# Patient Record
Sex: FEMALE | Race: WHITE | NOT HISPANIC OR LATINO | Employment: OTHER | URBAN - METROPOLITAN AREA
[De-identification: names, ages, dates, MRNs, and addresses within clinical notes are randomized per-mention and may not be internally consistent; named-entity substitution may affect disease eponyms.]

---

## 2017-02-27 DIAGNOSIS — R00.2 PALPITATIONS: Primary | ICD-10-CM

## 2017-03-01 ENCOUNTER — HOSPITAL ENCOUNTER (OUTPATIENT)
Dept: CARDIOLOGY | Facility: CLINIC | Age: 67
Discharge: HOME | End: 2017-03-01
Payer: MEDICARE

## 2017-03-01 ENCOUNTER — OFFICE VISIT (OUTPATIENT)
Dept: ELECTROPHYSIOLOGY | Facility: CLINIC | Age: 67
End: 2017-03-01
Payer: MEDICARE

## 2017-03-01 VITALS
BODY MASS INDEX: 21.66 KG/M2 | HEART RATE: 56 BPM | HEIGHT: 65 IN | SYSTOLIC BLOOD PRESSURE: 118 MMHG | WEIGHT: 130 LBS | DIASTOLIC BLOOD PRESSURE: 66 MMHG

## 2017-03-01 DIAGNOSIS — R00.2 PALPITATIONS: ICD-10-CM

## 2017-03-01 LAB
AORTIC VALVE REGURGITATION: NORMAL
DIASTOLIC DYSFUNCTION: NO
ESTIMATED PA SYSTOLIC PRESSURE: 25.85
MITRAL VALVE MOBILITY: NORMAL
MITRAL VALVE REGURGITATION: NORMAL
RETIRED EF AND QEF - SEE NOTES: 60 (ref 55–65)
TRICUSPID VALVE REGURGITATION: NORMAL

## 2017-03-01 PROCEDURE — 93325 DOPPLER ECHO COLOR FLOW MAPG: CPT | Mod: PBBFAC | Performed by: INTERNAL MEDICINE

## 2017-03-01 PROCEDURE — 99999 PR PBB SHADOW E&M-EST. PATIENT-LVL III: CPT | Mod: PBBFAC,,, | Performed by: INTERNAL MEDICINE

## 2017-03-01 PROCEDURE — 93010 ELECTROCARDIOGRAM REPORT: CPT | Mod: S$PBB,,, | Performed by: INTERNAL MEDICINE

## 2017-03-01 PROCEDURE — 93320 DOPPLER ECHO COMPLETE: CPT | Mod: 26,S$PBB,, | Performed by: INTERNAL MEDICINE

## 2017-03-01 PROCEDURE — 93351 STRESS TTE COMPLETE: CPT | Mod: 26,S$PBB,, | Performed by: INTERNAL MEDICINE

## 2017-03-01 PROCEDURE — 99204 OFFICE O/P NEW MOD 45 MIN: CPT | Mod: S$PBB,,, | Performed by: INTERNAL MEDICINE

## 2017-03-01 NOTE — MR AVS SNAPSHOT
[facility]  [address]  [place] [address]  Phone: [phone]  Fax: [phone]                  Meredith Ramos   3/1/2017 8:00 AM   Office Visit    Description:  Female : 1950   Provider:  Shin Ortiz MD   Department:  [facility]           Reason for Visit     Palpitations           Diagnoses this Visit        Comments    Palpitations                To Do List           Future Appointments        Provider Department Dept Phone    3/1/2017 3:15 PM EXERCISE, STRESS ECHO [facility] [phone]    3/6/2017 8:20 AM HOLTER, EKG [facility] [phone]      Goals (5 Years of Data)     None      Follow-Up and Disposition     Return if symptoms worsen or fail to improve.    Follow-up and Disposition History      [facility]     [facility] -  Assistance  Registered nurses in the [facility] provide clinical advisement, health education, appointment booking, and other advisory services.  Call for this free service at [phone].             Medications           Message regarding Medications     Verify the changes and/or additions to your medication regime listed below are the same as discussed with your clinician today.  If any of these changes or additions are incorrect, please notify your healthcare provider.             Verify that the below list of medications is an accurate representation of the medications you are currently taking.  If none reported, the list may be blank. If incorrect, please contact your healthcare provider. Carry this list with you in case of emergency.           Current Medications     biotin 300 mcg Tab Take 1 tablet by mouth once daily.    calcium-vitamin D 500-125 mg-unit tablet Take 1 tablet by mouth 2 (two) times daily.    azelastine (ASTEPRO) 0.15 % (205.5 mcg) Spry by Nasal route.    fish oil-omega-3 fatty acids 300-1,000 mg capsule Take 2 g by mouth once daily.     multivitamin (ONE DAILY MULTIVITAMIN) per tablet Take 1 tablet by mouth once daily.           Clinical Reference Information           Your Vitals Were     BP                   118/66           Blood Pressure          Most Recent Value    BP  118/66      Allergies as of 3/1/2017     No Known Allergies      Immunizations Administered on Date of Encounter - 3/1/2017     None      Orders Placed During Today's Visit      Normal Orders This Visit    Holter monitor - 24 hour     Future Labs/Procedures Expected by Expires    Exercise stress echo with color flow  As directed 3/1/2018      Language Assistance Services     ATTENTION: Language assistance services are available, free of charge. Please call [phone].      ATENCIÓN: Si habla español, tiene a zuniga disposición servicios gratuitos de asistencia lingüística. Llame al [phone].     CHÚ Ý: N?u b?n nói Ti?ng Vi?t, có các d?ch v? h? tr? ngôn ng? mi?n phí dành cho b?n. G?i s? [phone].         [facility] complies with applicable Federal civil rights laws and does not discriminate on the basis of race, color, national origin, age, disability, or sex.

## 2017-03-01 NOTE — PROGRESS NOTES
Subjective:    Patient ID:  Meredith Ramos is a 67 y.o. female who presents for evaluation of Palpitations      HPI 66 yo female with minimal past medical history.  Notes occasional palpitations, manifesting as skipped beat, primarily at night while getting ready to sleep, over the past few years.  Over the past couple of years, has increased in frequency, and now occurring during the day.  No associated symptoms.  Improved with taking deep breaths. Has calmed down over the past couple of days.  Has exercised significantly, less so over the past year.  Has gained 7 lbs over the past year, has stopped going to the gym.  Walks 4 miles a day.  Having increasing stressors over the past year, not worse over the past couple of weeks.  Notes occasional tightness in chest, primarily when lying in bed, not with exertion.  ECG reveals nsr with normal baseline intervals, no ectopy.      Review of Systems   Constitution: Negative. Negative for weakness and malaise/fatigue.   Cardiovascular: Positive for palpitations. Negative for chest pain, dyspnea on exertion, irregular heartbeat, leg swelling, near-syncope, orthopnea, paroxysmal nocturnal dyspnea and syncope.   Respiratory: Negative for cough and shortness of breath.    Neurological: Negative for dizziness and light-headedness.   All other systems reviewed and are negative.       Objective:    Physical Exam   Constitutional: She is oriented to person, place, and time. She appears well-developed and well-nourished.   Eyes: Conjunctivae are normal. No scleral icterus.   Neck: No JVD present. No tracheal deviation present.   Cardiovascular: Normal rate and regular rhythm.  PMI is not displaced.    Pulmonary/Chest: Effort normal and breath sounds normal. No respiratory distress.   Abdominal: Soft. There is no hepatosplenomegaly. There is no tenderness.   Musculoskeletal: She exhibits no edema or tenderness.   Neurological: She is alert and oriented to person, place, and time.    Skin: Skin is warm and dry. No rash noted.   Psychiatric: She has a normal mood and affect. Her behavior is normal.         Assessment:       1. Palpitations         Plan:       Palpitations, c/w benign etiology, ie PVC's.  Exercise echo with color flow.  24 hr holter monitor.  We discussed that if in fact she is having premature beats, management is directed at symptoms.  Offered, did not recommend, medical therapy.  She would defer.

## 2017-03-02 ENCOUNTER — TELEPHONE (OUTPATIENT)
Dept: CARDIOLOGY | Facility: HOSPITAL | Age: 67
End: 2017-03-02

## 2017-03-02 ENCOUNTER — TELEPHONE (OUTPATIENT)
Dept: ELECTROPHYSIOLOGY | Facility: CLINIC | Age: 67
End: 2017-03-02

## 2017-03-02 DIAGNOSIS — R00.2 PALPITATIONS: Primary | ICD-10-CM

## 2017-03-02 NOTE — TELEPHONE ENCOUNTER
Called Pt and left VM. Will continue to try and reach out to Pt. Will also verify with Dr. Ortiz if he would prefer Dipyridamole or Dobutamine for PET Stress Test.

## 2017-03-02 NOTE — TELEPHONE ENCOUNTER
Discussed with Eil in Echo. Pest Stress scheduled with Dipyridamole on 3/9. I called Pt to discuss further, however, she did not answer. Left a detailed voicemail asking her to call me back. Will continue to follow up with Pt.

## 2017-03-02 NOTE — TELEPHONE ENCOUNTER
Relayed results of stress test to patient.  She was asymptomatic and reached 17 mets, ? False positive.    Options are LHC or Pet stress.  Recommended Pet stress given overall findings, and pt is in agreement.  Will obtain Pet Stress test.

## 2017-03-02 NOTE — TELEPHONE ENCOUNTER
----- Message from Shin Ortiz MD sent at 3/2/2017  7:39 AM CST -----  Thanks for the follow-up Boni.  Much appreciated.  I will order a Pet stress.  ----- Message -----     From: Boni Gan MD     Sent: 3/1/2017   4:53 PM       To: Shin Ortiz MD    Im sitting here with Kai reading Ms. Ramos's stress echo. She got to 17 METS without symptoms but has EKG changes and what I thought was failure to augment vs subtle anterior and septal wall motion abnormalities.     PET ?

## 2017-03-03 ENCOUNTER — TELEPHONE (OUTPATIENT)
Dept: ELECTROPHYSIOLOGY | Facility: CLINIC | Age: 67
End: 2017-03-03

## 2017-03-03 NOTE — TELEPHONE ENCOUNTER
----- Message from Carmen Muñoz sent at 3/3/2017 10:09 AM CST -----  Contact: pt   Pt called and returned your call from yesterday.  She can be reached @ [phone].  Thanks

## 2017-03-03 NOTE — TELEPHONE ENCOUNTER
Pt confirmed Cardiac PET Stress on 3/9/17 at 145 pm. We dicussed pre-test instructions. Verbalized understanding. Denied questions, needs, and concerns.

## 2017-03-06 ENCOUNTER — APPOINTMENT (OUTPATIENT)
Dept: ELECTROPHYSIOLOGY | Facility: CLINIC | Age: 67
End: 2017-03-06
Payer: MEDICARE

## 2017-03-06 PROCEDURE — 93226 XTRNL ECG REC<48 HR SCAN A/R: CPT | Mod: PBBFAC | Performed by: INTERNAL MEDICINE

## 2017-03-06 PROCEDURE — 93225 XTRNL ECG REC<48 HRS REC: CPT | Mod: PBBFAC | Performed by: INTERNAL MEDICINE

## 2017-03-06 PROCEDURE — 93227 XTRNL ECG REC<48 HR R&I: CPT | Mod: S$PBB,,, | Performed by: INTERNAL MEDICINE

## 2017-03-08 ENCOUNTER — TELEPHONE (OUTPATIENT)
Dept: CARDIOLOGY | Facility: CLINIC | Age: 67
End: 2017-03-08

## 2017-03-09 ENCOUNTER — CLINICAL SUPPORT (OUTPATIENT)
Dept: CARDIOLOGY | Facility: CLINIC | Age: 67
End: 2017-03-09
Payer: MEDICARE

## 2017-03-09 DIAGNOSIS — R00.2 PALPITATIONS: ICD-10-CM

## 2017-03-09 PROCEDURE — 78492 MYOCRD IMG PET MLT RST&STRS: CPT | Mod: 26,S$PBB,, | Performed by: INTERNAL MEDICINE

## 2017-03-09 PROCEDURE — 93017 CV STRESS TEST TRACING ONLY: CPT | Mod: PBBFAC | Performed by: INTERNAL MEDICINE

## 2017-03-09 PROCEDURE — 93018 CV STRESS TEST I&R ONLY: CPT | Mod: S$PBB,,, | Performed by: INTERNAL MEDICINE

## 2017-03-09 PROCEDURE — 93016 CV STRESS TEST SUPVJ ONLY: CPT | Mod: S$PBB,,, | Performed by: INTERNAL MEDICINE

## 2017-03-17 ENCOUNTER — HOSPITAL ENCOUNTER (EMERGENCY)
Facility: HOSPITAL | Age: 67
Discharge: HOME | End: 2017-03-17
Attending: EMERGENCY MEDICINE
Payer: MEDICARE

## 2017-03-17 VITALS
RESPIRATION RATE: 16 BRPM | OXYGEN SATURATION: 98 % | HEART RATE: 65 BPM | BODY MASS INDEX: 21.66 KG/M2 | SYSTOLIC BLOOD PRESSURE: 123 MMHG | WEIGHT: 130 LBS | HEIGHT: 65 IN | TEMPERATURE: 99 F | DIASTOLIC BLOOD PRESSURE: 73 MMHG

## 2017-03-17 DIAGNOSIS — M25.561 ACUTE PAIN OF RIGHT KNEE: Primary | ICD-10-CM

## 2017-03-17 PROCEDURE — 25000003 PHARM REV CODE 250: Performed by: EMERGENCY MEDICINE

## 2017-03-17 PROCEDURE — 99284 EMERGENCY DEPT VISIT MOD MDM: CPT | Mod: ,,, | Performed by: EMERGENCY MEDICINE

## 2017-03-17 PROCEDURE — 99284 EMERGENCY DEPT VISIT MOD MDM: CPT

## 2017-03-17 RX ORDER — NAPROXEN 500 MG/1
500 TABLET ORAL
Status: COMPLETED | OUTPATIENT
Start: 2017-03-17 | End: 2017-03-17

## 2017-03-17 RX ORDER — HYDROCODONE BITARTRATE AND ACETAMINOPHEN 5; 325 MG/1; MG/1
1 TABLET ORAL EVERY 4 HOURS PRN
Qty: 12 TABLET | Refills: 0 | Status: SHIPPED | OUTPATIENT
Start: 2017-03-17 | End: 2017-05-07

## 2017-03-17 RX ADMIN — NAPROXEN 500 MG: 500 TABLET ORAL at 01:03

## 2017-03-17 NOTE — DISCHARGE INSTRUCTIONS
Arthralgia    Arthralgia is the term for pain in or around the joint. It is a symptom, not a disease. This pain may involve one or more joints. In some cases, the pain moves from joint to joint.  There are many causes for joint pain. These include:  · Injury  · Osteoarthritis (wearing out of the joint surface)  · Gout (inflammation of the joint due to crystals in the joint fluid)  · Infection inside the joint    · Bursitis (inflammation of the fluid-filled sacs around the joint)  · Autoimmune disorders such as rheumatoid arthritis or lupus  · Tendonitis (inflamation of chords that attach muscle to bone)  Home care  · Rest the involved joint(s) until your symptoms improve.   · You may be prescribed pain medication. If none is prescribed, you may use acetaminophen or ibuprofen to control pain and inflammation.  Follow up  Follow up with your healthcare provider or our staff as advised.  When to seek medical care  Contact your healthcare provider right away if any of the following occurs:  · Pain, swelling, or redness of joint increases  · Pain worsens or recurs after a period of improvement  · Pain moves to other joints  · You cannot bear weight on the affected joint   · You cannot move the affected joint  · Joint appears deformed  · New rash appears  · Fever of 101ºF (38.8ºC) or higher, or as directed by your healthcare provider  Date Last Reviewed: 4/26/2015  © 5061-5702 [employer]. [address] [place] [address], [place] [address]. All rights reserved. This information is not intended as a substitute for professional medical care. Always follow your healthcare professional's instructions.

## 2017-03-17 NOTE — ED PROVIDER NOTES
Encounter Date: 3/17/2017    SCRIBE #1 NOTE: I, Verito Yo, am scribing for, and in the presence of,  Dr. Arcos. I have scribed the entire note.       History     Chief Complaint   Patient presents with    Leg Pain     Review of patient's allergies indicates:  No Known Allergies  HPI Comments: Time seen by provider: 9:56 AM    This is a 67 y.o. female with no pertinent medical hx who presents with complaint of right leg pain. Pt reports pain behind knee for the past several weeks, some fluid on right side when it first started but then resolved. Pain now radiating down calf muscle and up her thigh. She was walking this morning and pain worsened to the point where she was unable to bear weight on it. She presents with crutches. Pt denies numbness of right foot, or any other symptoms at this time. No hx of DVT.     The history is provided by the patient and medical records.     Past Medical History:   Diagnosis Date    Diverticulitis     Osteoporosis     ostenopenia      Past Surgical History:   Procedure Laterality Date    BREAST BIOPSY      x1    BUNIONECTOMY      Hammer toe repair    BUNIONECTOMY      COLECTOMY  2004    partial for diverticulitis    COLECTOMY      partial for diverticulitis    HYSTERECTOMY      kidney stones      removal of breast implants       Family History   Problem Relation Age of Onset    Hypertension Father     Cancer Father      bladder cancer    Breast cancer Sister 54    Uterine cancer Maternal Grandmother     Colon cancer Maternal Grandfather     Ovarian cancer Neg Hx      Social History   Substance Use Topics    Smoking status: Former Smoker     Types: Cigarettes     Quit date: 6/25/1998    Smokeless tobacco: Never Used    Alcohol use No     Review of Systems   Musculoskeletal:        (+) RLE pain   Neurological: Negative for numbness.       Physical Exam   Initial Vitals   BP Pulse Resp Temp SpO2   03/17/17 0917 03/17/17 0917 03/17/17 0917 03/17/17 0917 03/17/17  0917   139/71 77 15 98.9 °F (37.2 °C) 98 %     Physical Exam    Vitals reviewed.  Constitutional: She appears well-developed and well-nourished. She is not diaphoretic. No distress.   68 yo  woman    HENT:   Head: Normocephalic and atraumatic.   Cardiovascular: Normal rate, regular rhythm, normal heart sounds and intact distal pulses.   Pulmonary/Chest: Breath sounds normal. No respiratory distress. She has no wheezes. She has no rhonchi. She has no rales.   Abdominal: Soft. She exhibits no distension.   Musculoskeletal:   Moves all extremities x 4. Mild TTP of the right popliteal fossa without bony tenderness. Sensation intact without peripheral edema.    Neurological: She is alert and oriented to person, place, and time. No sensory deficit.   Psychiatric: Her behavior is normal. Thought content normal.         ED Course   Procedures  Labs Reviewed - No data to display     Imaging Results         US Lower Extremity Veins Bilateral (Final result) Result time:  03/17/17 12:31:27    Final result by Juan Kauffman MD (03/17/17 12:31:27)    Impression:        No evidence for acute deep venous thrombosis.    No significant Baker's cyst noted      Electronically signed by: Juan Kauffman MD  Date:     03/17/17  Time:    12:31     Narrative:    Complete evaluation of the right and left lower extremity venous structures was performed.      There is small amount of fluid in both popliteal spaces but no defined cystic lesion evident.  Perhaps there could have been rupture of a very small Baker's cyst.      There is good flow identified in the right common femoral , superficial femoral, greater saphenous, popliteal , anterior tibial, posterior tibial, and peroneal veins.    On the left side there is good flow identified in the left common femoral, superficial femoral, greater saphenous , popliteal , anterior tibial, posterior tibial , and peroneal veins.    The examination was performed using gray scale, compression  when apprpropriate , color flow and Doppler spectrum analysis.                 Medical Decision Making:   History:   Old Medical Records: I decided to obtain old medical records.  Differential Diagnosis:   Osteoarthritis, Baker's cyst, and DVT.   Clinical Tests:   Radiological Study: Ordered and Reviewed            Scribe Attestation:   Scribe #1: I performed the above scribed service and the documentation accurately describes the services I performed. I attest to the accuracy of the note.    Attending Attestation:           Physician Attestation for Scribe:  Physician Attestation Statement for Scribe #1: I, Dr. Arcos, reviewed documentation, as scribed by Verito Yo in my presence, and it is both accurate and complete.         Attending ED Notes:   Emergency department findings including Doppler ultrasound of bilateral lower extremity vessels does not reveal any evidence of a deep vein thrombosis, nor is there jace evidence of a large Baker's cyst.  The patient will be discharged home in stable condition with an Ace wrap for compression and I'll prescribe a brief course of narcotic analgesics to be taken as needed for severe pain.  I will refer her to orthopedic sports medicine as an outpatient for follow-up in 3-5 days for further management and therapy.          ED Course     Clinical Impression:   The encounter diagnosis was Acute pain of right knee.    Disposition:   Disposition: Discharged  Condition: Stable       Dominic Arcos MD  03/17/17 4190

## 2017-03-17 NOTE — ED AVS SNAPSHOT
[facility]  [address]  [facility] [address]               Meredith Ramos   3/17/2017  9:45 AM   ED    Description:  Female : 1950   Department:  [facility]           Your Care was Coordinated By:     Provider Role From To    Dominic Arcos MD Attending Provider 17 0954 --      Reason for Visit     Leg Pain           Diagnoses this Visit        Comments    Acute pain of right knee    -  Primary       ED Disposition     None           To Do List           Follow-up Information     Follow up with [facility]. Schedule an appointment as soon as possible for a visit in 3 days.    Specialties:  Sports Medicine, Orthopedic Surgery, Physical Therapy    Why:  Further evaluation of acute right-sided knee pain    Contact information:    [address] [place] [id number] [phone]         These Medications        Disp Refills Start End    hydrocodone-acetaminophen 5-325mg (NORCO) 5-325 mg per tablet 12 tablet 0 3/17/2017     Take 1 tablet by mouth every 4 (four) hours as needed. - Oral    Ph[facility]: [facility] [address] [place] [address] [address] [facility] Ph #: [phone]         [facility]     [facility] -  Assistance  Registered nurses in the [facility] provide clinical advisement, health education, appointment booking, and other advisory services.  Call for this free service at [phone].             Medications           Message regarding Medications     Verify the changes and/or additions to your medication regime listed below are the same as discussed with your clinician today.  If any of these changes or additions are incorrect, please notify your healthcare provider.        START taking these NEW medications        Refills    hydrocodone-acetaminophen 5-325mg (NORCO) 5-325 mg per tablet 0    Sig: Take 1 tablet by  "mouth every 4 (four) hours as needed.    Class: Print    Route: Oral      These medications were administered today        Dose Freq    naproxen tablet 500 mg 500 mg ED 1 Time    Sig: Take 1 tablet (500 mg total) by mouth ED 1 Time.    Class: Normal    Route: Oral      STOP taking these medications     azelastine (ASTEPRO) 0.15 % (205.5 mcg) Spry by Nasal route.           Verify that the below list of medications is an accurate representation of the medications you are currently taking.  If none reported, the list may be blank. If incorrect, please contact your healthcare provider. Carry this list with you in case of emergency.           Current Medications     biotin 300 mcg Tab Take 1 tablet by mouth once daily.    calcium-vitamin D 500-125 mg-unit tablet Take 1 tablet by mouth 2 (two) times daily.    fish oil-omega-3 fatty acids 300-1,000 mg capsule Take 2 g by mouth once daily.    hydrocodone-acetaminophen 5-325mg (NORCO) 5-325 mg per tablet Take 1 tablet by mouth every 4 (four) hours as needed.    multivitamin (ONE DAILY MULTIVITAMIN) per tablet Take 1 tablet by mouth once daily.    naproxen tablet 500 mg Take 1 tablet (500 mg total) by mouth ED 1 Time.           Clinical Reference Information           Your Vitals Were     BP Pulse Temp Resp Height Weight    139/71 (BP Location: Right arm, Patient Position: Sitting) 77 98.9 °F (37.2 °C) (Oral) 15 5' 5" (1.651 m) 59 kg (130 lb)    SpO2 BMI             98% 21.63 kg/m2         Allergies as of 3/17/2017     No Known Allergies      Immunizations Administered on Date of Encounter - 3/17/2017     None      ED Micro, Lab, POCT     None      ED Imaging Orders     Start Ordered       Status Ordering Provider    03/17/17 1002 03/17/17 1002   Lower Extremity Veins Bilateral  1 time imaging      Final result         Discharge Instructions         Arthralgia    Arthralgia is the term for pain in or around the joint. It is a symptom, not a disease. This pain may involve " one or more joints. In some cases, the pain moves from joint to joint.  There are many causes for joint pain. These include:  · Injury  · Osteoarthritis (wearing out of the joint surface)  · Gout (inflammation of the joint due to crystals in the joint fluid)  · Infection inside the joint    · Bursitis (inflammation of the fluid-filled sacs around the joint)  · Autoimmune disorders such as rheumatoid arthritis or lupus  · Tendonitis (inflamation of chords that attach muscle to bone)  Home care  · Rest the involved joint(s) until your symptoms improve.   · You may be prescribed pain medication. If none is prescribed, you may use acetaminophen or ibuprofen to control pain and inflammation.  Follow up  Follow up with your healthcare provider or our staff as advised.  When to seek medical care  Contact your healthcare provider right away if any of the following occurs:  · Pain, swelling, or redness of joint increases  · Pain worsens or recurs after a period of improvement  · Pain moves to other joints  · You cannot bear weight on the affected joint   · You cannot move the affected joint  · Joint appears deformed  · New rash appears  · Fever of 101ºF (38.8ºC) or higher, or as directed by your healthcare provider  Date Last Reviewed: 4/26/2015  © 5925-5102 [employer]. [address] [place] [address], [place] [address]. All rights reserved. This information is not intended as a substitute for professional medical care. Always follow your healthcare professional's instructions.           [facility] complies with applicable Federal civil rights laws and does not discriminate on the basis of race, color, national origin, age, disability, or sex.        Language Assistance Services     ATTENTION: Language assistance services are available, free of charge. Please call [phone].      ATENCIÓN: Si habla español, tiene a zuniga disposición servicios gratuitos de asistencia lingüística. Llame al  [phone].     HAILEY Ý: N?u b?n nói Ti?ng Vi?t, có các d?ch v? h? tr? ngôn ng? mi?n phí dành cho b?n. G?i s? [phone].

## 2017-03-20 ENCOUNTER — TELEPHONE (OUTPATIENT)
Dept: ELECTROPHYSIOLOGY | Facility: CLINIC | Age: 67
End: 2017-03-20

## 2017-03-20 NOTE — TELEPHONE ENCOUNTER
Per Dr. Ortiz, stress test is negative, however, did have a few early heart beats that is normal. Discussed with Pt. All questions answered. Denied further questions, needs, and concerns.

## 2017-03-20 NOTE — TELEPHONE ENCOUNTER
----- Message from Pavel Joaquin MA sent at 3/20/2017  2:36 PM CDT -----  Contact: pt called      ----- Message -----     From: Wendie Sterling     Sent: 3/20/2017   1:55 PM       To: Diana Melissa Staff    Pt called, states she is returning Dr. Ortiz's call in regards to obtaining test results. Ph [phone]. Thank you

## 2017-03-22 ENCOUNTER — OFFICE VISIT (OUTPATIENT)
Dept: SPORTS MEDICINE | Facility: CLINIC | Age: 67
End: 2017-03-22
Payer: MEDICARE

## 2017-03-22 ENCOUNTER — HOSPITAL ENCOUNTER (OUTPATIENT)
Dept: RADIOLOGY | Facility: HOSPITAL | Age: 67
Discharge: HOME | End: 2017-03-22
Attending: FAMILY MEDICINE
Payer: MEDICARE

## 2017-03-22 VITALS — BODY MASS INDEX: 21.66 KG/M2 | TEMPERATURE: 98 F | WEIGHT: 130 LBS | HEIGHT: 65 IN

## 2017-03-22 DIAGNOSIS — M25.561 RIGHT KNEE PAIN, UNSPECIFIED CHRONICITY: ICD-10-CM

## 2017-03-22 DIAGNOSIS — M65.9 SYNOVITIS OF KNEE: Primary | ICD-10-CM

## 2017-03-22 PROCEDURE — 73564 X-RAY EXAM KNEE 4 OR MORE: CPT | Mod: TC,PO,RT

## 2017-03-22 PROCEDURE — 73564 X-RAY EXAM KNEE 4 OR MORE: CPT | Mod: 26,59,LT, | Performed by: RADIOLOGY

## 2017-03-22 PROCEDURE — 97110 THERAPEUTIC EXERCISES: CPT | Mod: GP,,, | Performed by: FAMILY MEDICINE

## 2017-03-22 PROCEDURE — 99214 OFFICE O/P EST MOD 30 MIN: CPT | Mod: 25,S$PBB,, | Performed by: FAMILY MEDICINE

## 2017-03-22 PROCEDURE — 99999 PR PBB SHADOW E&M-EST. PATIENT-LVL III: CPT | Mod: PBBFAC,,, | Performed by: FAMILY MEDICINE

## 2017-03-22 NOTE — PROGRESS NOTES
Meredith Ramos, a 67 y.o. female, presents today for evaluation of her RIGHT knee.      New patient.     HISTORY OF PRESENT ILLNESS   Location: posterior knee, right  Onset: insidious,   Palliative:    Relative rest   Oral analgesics     Provocative:    ADLs     Prior: none  Progression: plateau discomfort  Quality:    sharp  Radiation: none  Severity: per nursing documentation  Timing: intermittent w/ use  Trauma:     Review of systems (ROS):  A 10+ review of systems was performed with pertinent positives and negatives noted above in the history of present illness. Other systems were negative unless otherwise specified.      PHYSICAL EXAMINATION  General:  The patient is alert and oriented x 3. Mood is pleasant. Observation of ears, eyes and nose reveal no gross abnormalities. HEENT: NCAT, sclera anicteric.   Lungs: Respirations are equal and unlabored.  Gait is coordinated. Patient can toe walk and heel walk without difficulty.    RIGHT KNEE EXAMINATION    Observation/Inspection  Gait:   Nonantalgic   Alignment:  Neutral   Scars:   None   Muscle atrophy: Mild  Effusion:  None   Warmth:  None   Discoloration:   none     Tenderness / Crepitus (T / C):         T / C      T / C  Patella   - / -   Lateral joint line   - / -     Peripatellar medial  -  Medial joint line    + / -  Peripatellar lateral -  Medial plica   - / -  Patellar tendon -   Popliteal fossa   - / -  Quad tendon   -   Gastrocnemius   -  Prepatellar Bursa - / -   Quadricep   -  Tibial tubercle  -  Thigh/hamstring  -  Pes anserine/HS -  Fibula    -  ITB   - / -  Tibia     -  Tib/fib joint  - / -  LCL    -    MFC   - / -   MCL: Proximal  -    LFC   - / -   Distal    -          ROM: (* = pain)  PASSIVE   ACTIVE    Left :   5 / 0 / 145   5 / 0 / 145     Right :    5 / 0 / 145   5 / 0 / 145    Patellofemoral examination:  See above noted areas of tenderness.   Patella position    Subluxation / dislocation: Centered        Sup. / Inf;   Normal   Crepitus  (PF):    Absent   Patellar Mobility:       Medial-lateral:   Normal    Superior-inferior:  Normal    Inferior tilt   Normal    Patellar tendon:  Normal   Lateral tilt:    Normal   J-sign:     None   Patellofemoral grind:   No pain       Meniscal Signs:     Pain on terminal extension:  +  Pain on terminal flexion:  +  Katies maneuver:  +*  Squat     NT    Ligament Examination:  ACL / Lachman:  WNL  PCL-Post.  drawer: normal 0 to 2mm  MCL- Valgus:  normal 0 to 2mm  LCL- Varus:    normal 0 to 2mm  Pivot shift:  guarding   Dial Test:   difference c/w other side   At 30° flexion: normal (< 5°)    At 90° flexion: normal (< 5°)   Reverse Pivot Shift:   normal (Equal)     Strength: (* = with pain) Painful Side  Quadriceps   5/5  Hamstrin/5    Extremity Neuro-vascular Examination:   Sensation:  Grossly intact to light touch all dermatomal regions.   Motor Function:  Fully intact motor function at hip, knee, foot and ankle    DTRs;  quadriceps and  achilles 2+.  No clonus and downgoing Babinski.    Vascular status:  DP and PT pulses 2+, brisk capillary refill, symmetric.     Other Findings:      ASSESSMENT & PLAN  Assessment:   #1 Kellgren-Guillermo Grade I osteoarthritis of knee, right   W/ popliteal cyst   W/ suspected inflammation vs. Tearing of post horn of medial meniscus     No evidence of neurologic pathology  No evidence of vascular pathology    Imaging studies reviewed:   X-ray knee, bilateral 17.03    Plan:    We discussed the importance of appropriate diet, weight, and regular exercise including quadriceps strengthening     We discussed options including:  #1 watchful waiting  #2 physical therapy aimed at:   Core stability   RoM knee   Strengthening quadriceps   Gait training   #3 injection therapy:   CSI iaknee     Right,     Left,    VSI iaknee    Right,     Left,    Orthobiologics   #4 MRI for further evaluation      The patient chooses #2     Pain management: handout given  Bracing: ACE wrap,  "d/c  Physical therapy: hgPT, handout given, begin as above  Activity (e.g. sports, work) restrictions: as tolerated   school/vocation: yoga, walking (BUT WILL FOCUS ON BICYCLE VS. SWIMMING FOR NOW)    Follow up in 8 w  A/e hgPT  Ineffective-->csi iaknee right   Ineffective-->MRI knee right  Should symptoms worsen or fail to resolve, consider:  Revisiting the above options    [address]54 HOME EXERCISE PROGRAM (HEP):  The patient was taught a homegoing physical therapy regimen as described above and based on the appropriate chapter of "The Sports Medicine Patient Advisor," by Artis Akers, c2090. The patient demonstrated understanding of the exercises and proper technique of their execution. This interaction took 15 minutes.       "

## 2017-03-22 NOTE — MR AVS SNAPSHOT
[facility]  [address]  [facility] [address]  Phone: [phone]                  Meredith Ramos   3/22/2017 3:15 PM   Appointment    Description:  Female : 1950   Provider:  Sarwat Lu MD   Department:  [facility]                To Do List           Future Appointments        Provider Department Dept Phone    3/22/2017 3:15 PM Sarwat Lu MD [facility] [phone]      Goals (5 Years of Data)     None      [facility]     [facility] Care [facility] -  Assistance  Registered nurses in the [facility] Center provide clinical advisement, health education, appointment booking, and other advisory services.  Call for this free service at [phone].             Medications           Message regarding Medications     Verify the changes and/or additions to your medication regime listed below are the same as discussed with your clinician today.  If any of these changes or additions are incorrect, please notify your healthcare provider.             Verify that the below list of medications is an accurate representation of the medications you are currently taking.  If none reported, the list may be blank. If incorrect, please contact your healthcare provider. Carry this list with you in case of emergency.           Current Medications     biotin 300 mcg Tab Take 1 tablet by mouth once daily.    calcium-vitamin D 500-125 mg-unit tablet Take 1 tablet by mouth 2 (two) times daily.    fish oil-omega-3 fatty acids 300-1,000 mg capsule Take 2 g by mouth once daily.    hydrocodone-acetaminophen 5-325mg (NORCO) 5-325 mg per tablet Take 1 tablet by mouth every 4 (four) hours as needed.    multivitamin (ONE DAILY MULTIVITAMIN) per tablet Take 1 tablet by mouth once daily.           Clinical Reference Information           Allergies as of 3/22/2017     No Known Allergies      Immunizations Administered on Date of  Encounter - 3/22/2017     None      Language Assistance Services     ATTENTION: Language assistance services are available, free of charge. Please call [phone].      ATENCIÓN: Si habla radha, tiene a zuniga disposición servicios gratuitos de asistencia lingüística. Llame al [phone].     CHÚ Ý: N?u b?n nói Ti?ng Vi?t, có các d?ch v? h? tr? ngôn ng? mi?n phí dành cho b?n. G?i s? [phone].         [facility] complies with applicable Federal civil rights laws and does not discriminate on the basis of race, color, national origin, age, disability, or sex.

## 2017-05-07 ENCOUNTER — HOSPITAL ENCOUNTER (EMERGENCY)
Facility: HOSPITAL | Age: 67
Discharge: HOME | End: 2017-05-07
Attending: EMERGENCY MEDICINE
Payer: MEDICARE

## 2017-05-07 VITALS
WEIGHT: 128 LBS | SYSTOLIC BLOOD PRESSURE: 139 MMHG | RESPIRATION RATE: 18 BRPM | OXYGEN SATURATION: 99 % | HEIGHT: 65 IN | DIASTOLIC BLOOD PRESSURE: 86 MMHG | HEART RATE: 73 BPM | BODY MASS INDEX: 21.33 KG/M2 | TEMPERATURE: 98 F

## 2017-05-07 DIAGNOSIS — N20.0 RENAL STONE: Primary | ICD-10-CM

## 2017-05-07 DIAGNOSIS — R10.9 LEFT FLANK PAIN: ICD-10-CM

## 2017-05-07 LAB
ALBUMIN SERPL BCP-MCNC: 4.2 G/DL
ALP SERPL-CCNC: 103 U/L
ALT SERPL W/O P-5'-P-CCNC: 46 U/L
ANION GAP SERPL CALC-SCNC: 11 MMOL/L
AST SERPL-CCNC: 44 U/L
BACTERIA #/AREA URNS AUTO: ABNORMAL /HPF
BASOPHILS # BLD AUTO: 0.04 K/UL
BASOPHILS NFR BLD: 0.6 %
BILIRUB SERPL-MCNC: 0.5 MG/DL
BILIRUB UR QL STRIP: NEGATIVE
BUN SERPL-MCNC: 16 MG/DL
CALCIUM SERPL-MCNC: 10 MG/DL
CHLORIDE SERPL-SCNC: 104 MMOL/L
CLARITY UR REFRACT.AUTO: ABNORMAL
CO2 SERPL-SCNC: 25 MMOL/L
COLOR UR AUTO: YELLOW
CREAT SERPL-MCNC: 0.8 MG/DL
DIFFERENTIAL METHOD: NORMAL
EOSINOPHIL # BLD AUTO: 0.2 K/UL
EOSINOPHIL NFR BLD: 3.7 %
ERYTHROCYTE [DISTWIDTH] IN BLOOD BY AUTOMATED COUNT: 13 %
EST. GFR  (AFRICAN AMERICAN): >60 ML/MIN/1.73 M^2
EST. GFR  (NON AFRICAN AMERICAN): >60 ML/MIN/1.73 M^2
GLUCOSE SERPL-MCNC: 98 MG/DL
GLUCOSE UR QL STRIP: NEGATIVE
HCT VFR BLD AUTO: 41.7 %
HGB BLD-MCNC: 14.2 G/DL
HGB UR QL STRIP: ABNORMAL
HYALINE CASTS UR QL AUTO: 0 /LPF
KETONES UR QL STRIP: NEGATIVE
LEUKOCYTE ESTERASE UR QL STRIP: NEGATIVE
LYMPHOCYTES # BLD AUTO: 2.8 K/UL
LYMPHOCYTES NFR BLD: 42.6 %
MCH RBC QN AUTO: 30.2 PG
MCHC RBC AUTO-ENTMCNC: 34.1 %
MCV RBC AUTO: 89 FL
MICROSCOPIC COMMENT: ABNORMAL
MONOCYTES # BLD AUTO: 0.5 K/UL
MONOCYTES NFR BLD: 6.9 %
NEUTROPHILS # BLD AUTO: 3 K/UL
NEUTROPHILS NFR BLD: 46 %
NITRITE UR QL STRIP: NEGATIVE
PH UR STRIP: 5 [PH] (ref 5–8)
PLATELET # BLD AUTO: 185 K/UL
PMV BLD AUTO: 12.4 FL
POTASSIUM SERPL-SCNC: 3.8 MMOL/L
PROT SERPL-MCNC: 7.2 G/DL
PROT UR QL STRIP: ABNORMAL
RBC # BLD AUTO: 4.7 M/UL
RBC #/AREA URNS AUTO: >100 /HPF (ref 0–4)
SODIUM SERPL-SCNC: 140 MMOL/L
SP GR UR STRIP: 1.02 (ref 1–1.03)
URN SPEC COLLECT METH UR: ABNORMAL
UROBILINOGEN UR STRIP-ACNC: NEGATIVE EU/DL
WBC # BLD AUTO: 6.5 K/UL
WBC #/AREA URNS AUTO: 3 /HPF (ref 0–5)

## 2017-05-07 PROCEDURE — 63600175 PHARM REV CODE 636 W HCPCS

## 2017-05-07 PROCEDURE — 96361 HYDRATE IV INFUSION ADD-ON: CPT

## 2017-05-07 PROCEDURE — 96375 TX/PRO/DX INJ NEW DRUG ADDON: CPT

## 2017-05-07 PROCEDURE — 96376 TX/PRO/DX INJ SAME DRUG ADON: CPT

## 2017-05-07 PROCEDURE — 96374 THER/PROPH/DIAG INJ IV PUSH: CPT

## 2017-05-07 PROCEDURE — 99284 EMERGENCY DEPT VISIT MOD MDM: CPT | Mod: 25

## 2017-05-07 PROCEDURE — 85025 COMPLETE CBC W/AUTO DIFF WBC: CPT

## 2017-05-07 PROCEDURE — 25000003 PHARM REV CODE 250

## 2017-05-07 PROCEDURE — 80053 COMPREHEN METABOLIC PANEL: CPT

## 2017-05-07 PROCEDURE — 81001 URINALYSIS AUTO W/SCOPE: CPT

## 2017-05-07 PROCEDURE — 99285 EMERGENCY DEPT VISIT HI MDM: CPT | Mod: ,,,

## 2017-05-07 RX ORDER — ONDANSETRON 4 MG/1
4 TABLET, FILM COATED ORAL EVERY 6 HOURS
Qty: 12 TABLET | Refills: 0 | Status: SHIPPED | OUTPATIENT
Start: 2017-05-07 | End: 2017-05-07 | Stop reason: CLARIF

## 2017-05-07 RX ORDER — HYDROCODONE BITARTRATE AND ACETAMINOPHEN 10; 325 MG/1; MG/1
1 TABLET ORAL EVERY 4 HOURS PRN
Qty: 12 TABLET | Refills: 0 | Status: ON HOLD | OUTPATIENT
Start: 2017-05-07 | End: 2017-05-19 | Stop reason: HOSPADM

## 2017-05-07 RX ORDER — MORPHINE SULFATE 2 MG/ML
4 INJECTION, SOLUTION INTRAMUSCULAR; INTRAVENOUS
Status: DISCONTINUED | OUTPATIENT
Start: 2017-05-07 | End: 2017-05-07

## 2017-05-07 RX ORDER — ONDANSETRON 4 MG/1
4 TABLET, FILM COATED ORAL EVERY 6 HOURS
Qty: 12 TABLET | Refills: 0 | Status: SHIPPED | OUTPATIENT
Start: 2017-05-07 | End: 2018-01-17

## 2017-05-07 RX ORDER — KETOROLAC TROMETHAMINE 30 MG/ML
10 INJECTION, SOLUTION INTRAMUSCULAR; INTRAVENOUS
Status: COMPLETED | OUTPATIENT
Start: 2017-05-07 | End: 2017-05-07

## 2017-05-07 RX ORDER — NAPROXEN 500 MG/1
500 TABLET ORAL 2 TIMES DAILY WITH MEALS
Qty: 12 TABLET | Refills: 0 | Status: SHIPPED | OUTPATIENT
Start: 2017-05-07 | End: 2017-05-07 | Stop reason: CLARIF

## 2017-05-07 RX ORDER — ONDANSETRON 2 MG/ML
4 INJECTION INTRAMUSCULAR; INTRAVENOUS
Status: COMPLETED | OUTPATIENT
Start: 2017-05-07 | End: 2017-05-07

## 2017-05-07 RX ORDER — TAMSULOSIN HYDROCHLORIDE 0.4 MG/1
0.4 CAPSULE ORAL DAILY
Qty: 10 CAPSULE | Refills: 0 | Status: SHIPPED | OUTPATIENT
Start: 2017-05-07 | End: 2017-10-05 | Stop reason: SDUPTHER

## 2017-05-07 RX ORDER — NAPROXEN 500 MG/1
500 TABLET ORAL 2 TIMES DAILY WITH MEALS
Qty: 20 TABLET | Refills: 0 | Status: SHIPPED | OUTPATIENT
Start: 2017-05-07 | End: 2017-10-05 | Stop reason: SDUPTHER

## 2017-05-07 RX ADMIN — KETOROLAC TROMETHAMINE 10 MG: 30 INJECTION, SOLUTION INTRAMUSCULAR at 08:05

## 2017-05-07 RX ADMIN — SODIUM CHLORIDE 1000 ML: 0.9 INJECTION, SOLUTION INTRAVENOUS at 07:05

## 2017-05-07 RX ADMIN — ONDANSETRON 4 MG: 2 INJECTION INTRAMUSCULAR; INTRAVENOUS at 07:05

## 2017-05-07 RX ADMIN — KETOROLAC TROMETHAMINE 10 MG: 30 INJECTION, SOLUTION INTRAMUSCULAR at 07:05

## 2017-05-07 RX ADMIN — ONDANSETRON 4 MG: 2 INJECTION INTRAMUSCULAR; INTRAVENOUS at 08:05

## 2017-05-07 NOTE — ED NOTES
GENERAL: The patient is a well-developed, well-nourished female in no apparent distress. She is alert and oriented x3.    HEENT: Head is normocephalic and atraumatic. Extraocular muscles are intact. Pupils are equal, round, and reactive to light and accommodation. Nares appeared normal. Mouth is well hydrated and without lesions. Mucous membranes are moist. Posterior pharynx clear of any exudate or lesions.    NECK: Supple. No carotid bruits. No lymphadenopathy or thyromegaly.    LUNGS: Clear to auscultation.    HEART: Regular rate and rhythm without murmur.     ABDOMEN: Soft, nontender, and nondistended. Positive bowel sounds. No hepatosplenomegaly was noted.     EXTREMITIES: Without any cyanosis, clubbing, rash, lesions or edema.     NEUROLOGIC: Cranial nerves II through XII are grossly intact.     PSYCHIATRIC: Flat affect, but denies suicidal or homicidal ideations.    SKIN: No ulceration or induration present.

## 2017-05-07 NOTE — ED AVS SNAPSHOT
[facility]  [address]  [facility] [address]               Meredith Ramos   2017  6:35 PM   ED    Description:  Female : 1950   Department:  [facility]           Your Care was Coordinated By:     Provider Role From To    Joan Lew MD Attending Provider 17 [id number] --    Tennille Cross PA-C Physician Assistant 17 [phone] --      Reason for Visit     Flank Pain           Diagnoses this Visit        Comments    Renal stone    -  Primary     Left flank pain           ED Disposition     ED Disposition Condition Comment    Discharge             To Do List            These Medications        Disp Refills Start End    hydrocodone-acetaminophen 10-325mg (NORCO)  mg Tab 12 tablet 0 2017     Take 1 tablet by mouth every 4 (four) hours as needed for Pain. - Oral    Pharmacy: [facility] [place] [address] AT [facility] Ph #: [phone]       tamsulosin (FLOMAX) 0.4 mg Cp24 10 capsule 0 2017    Take 1 capsule (0.4 mg total) by mouth once daily. - Oral    Pharmacy: [facility] [place] [address] AT [facility] Ph #: [phone]       naproxen (NAPROSYN) 500 MG tablet 20 tablet 0 2017     Take 1 tablet (500 mg total) by mouth 2 (two) times daily with meals. - Oral    Pharmacy: [place] [address] AT [facility] Ph #: [phone]       ondansetron (ZOFRAN) 4 MG tablet 12 tablet 0 2017     Take 1 tablet (4 mg total) by mouth every 6 (six) hours. - Oral    Pharmacy: [place] [id number] AT [facility] Ph #: [phone]         [facility]     [facility] Care [facility] -  Assistance  Unless otherwise directed by your provider, please contact [facility], our nurse care line that is  available for 24/7 assistance.     Registered nurses in the [facility] provide: appointment scheduling, clinical advisement, health education, and other advisory services.  Call: [phone] (toll free)               Medications           Message regarding Medications     Verify the changes and/or additions to your medication regime listed below are the same as discussed with your clinician today.  If any of these changes or additions are incorrect, please notify your healthcare provider.        START taking these NEW medications        Refills    hydrocodone-acetaminophen 10-325mg (NORCO)  mg Tab 0    Sig: Take 1 tablet by mouth every 4 (four) hours as needed for Pain.    Class: Print    Route: Oral    tamsulosin (FLOMAX) 0.4 mg Cp24 0    Sig: Take 1 capsule (0.4 mg total) by mouth once daily.    Class: Print    Route: Oral    naproxen (NAPROSYN) 500 MG tablet 0    Sig: Take 1 tablet (500 mg total) by mouth 2 (two) times daily with meals.    Class: Print    Route: Oral    ondansetron (ZOFRAN) 4 MG tablet 0    Sig: Take 1 tablet (4 mg total) by mouth every 6 (six) hours.    Class: Print    Route: Oral      These medications were administered today        Dose Freq    sodium chloride 0.9% bolus 1,000 mL 1,000 mL ED 1 Time    Sig: Inject 1,000 mLs into the vein ED 1 Time.    Class: Normal    Route: Intravenous    Cosign for Ordering: Accepted by Joan Lew MD on 5/7/2017  8:31 PM    ondansetron injection 4 mg 4 mg ED 1 Time    Sig: Inject 4 mg into the vein ED 1 Time.    Class: Normal    Route: Intravenous    Cosign for Ordering: Accepted by Joan Lew MD on 5/7/2017  8:31 PM    ketorolac injection 10 mg 10 mg ED 1 Time    Sig: Inject 10 mg into the vein ED 1 Time.    Class: Normal    Route: Intravenous    Cosign for Ordering: Accepted by Joan Lew MD on 5/7/2017  8:31 PM      STOP taking these medications     hydrocodone-acetaminophen 5-325mg (NORCO) 5-325 mg per tablet Take 1  "tablet by mouth every 4 (four) hours as needed.           Verify that the below list of medications is an accurate representation of the medications you are currently taking.  If none reported, the list may be blank. If incorrect, please contact your healthcare provider. Carry this list with you in case of emergency.           Current Medications     biotin 300 mcg Tab Take 1 tablet by mouth once daily.    calcium-vitamin D 500-125 mg-unit tablet Take 1 tablet by mouth 2 (two) times daily.    fish oil-omega-3 fatty acids 300-1,000 mg capsule Take 2 g by mouth once daily.    multivitamin (ONE DAILY MULTIVITAMIN) per tablet Take 1 tablet by mouth once daily.    hydrocodone-acetaminophen 10-325mg (NORCO)  mg Tab Take 1 tablet by mouth every 4 (four) hours as needed for Pain.    naproxen (NAPROSYN) 500 MG tablet Take 1 tablet (500 mg total) by mouth 2 (two) times daily with meals.    ondansetron (ZOFRAN) 4 MG tablet Take 1 tablet (4 mg total) by mouth every 6 (six) hours.    tamsulosin (FLOMAX) 0.4 mg Cp24 Take 1 capsule (0.4 mg total) by mouth once daily.           Clinical Reference Information           Your Vitals Were     BP Pulse Temp Resp Height Weight    187/84 (BP Location: Right arm, Patient Position: Sitting) 76 98.1 °F (36.7 °C) (Oral) 18 5' 5" (1.651 m) 58.1 kg (128 lb)    SpO2 BMI             99% 21.3 kg/m2         Allergies as of 5/7/2017     No Known Allergies      Immunizations Administered on Date of Encounter - 5/7/2017     None      ED Micro, Lab, POCT     Start Ordered       Status Ordering Provider    05/07/17 1846 05/07/17 1846  Urinalysis  STAT      Final result     05/07/17 1846 05/07/17 1846  CBC auto differential  STAT      Final result     05/07/17 1846 05/07/17 1846  Comprehensive metabolic panel  STAT      Final result     05/07/17 1846 05/07/17 1846  Urinalysis Microscopic  Once      Final result       ED Imaging Orders     Start Ordered       Status Ordering Provider    05/07/17 " 1847 05/07/17 1846  CT Renal Stone Study ABD Pelvis WO  1 time imaging      Final result         Discharge Instructions         Kidney Stone (with Pain)    The sharp cramping pain on either side of your lower back and nausea/vomiting that you have are because of a small stone that has formed in the kidney. It is now passing down a narrow tube (ureter) on its way to your bladder. Once the stone reaches your bladder, the pain will often stop. But it may come back as the stone continues to pass out of the bladder and through the urethra. The stone may pass in your urine stream in one piece. The size may be 1/16 inch to 1/4 inch (1 mm to 6 mm). Or, the stone may break up into nhung fragments that you may not even notice.  Once you have had a kidney stone, you are at risk of getting another one in the future. There are 4 types of kidney stones. Eighty percent are calcium stones--mostly calcium oxalate but also some with calcium phosphate. The other 3 types include uric acid stones, struvite stones (from a preceding infection), and rarely, cystine stones.  Most stones will pass on their own, but may take from a few hours to a few days. Sometimes the stone is too large to pass by itself. In that case, the healthcare provider will need to use other ways to remove the stone. These techniques include:  · Lithotripsy. This uses ultrasound waves to break up the stone.  · Ureteroscopy. This pushes a basket-like instrument through the urethra and bladder and into the ureter to pull out the stone.  · Various types of direct surgery through the skin  Home care  The following are general care guidelines:  · Drink plenty of fluids. This means at least 12, 8-ounce glasses of fluid--mostly water--a day.  · Each time you urinate, do so in a jar. Pour the urine from the jar through the strainer and into the toilet. Continue doing this until 24 hours after your pain stops. By then, if there was a kidney stone, it should pass from your  bladder. Some stones dissolve into sand-like particles and pass right through the strainer. In that case, you wont ever see a stone.  · Save any stone that you find in the strainer and bring it to your healthcare provider to look at. It may be possible to stop certain types of stones from forming. For this reason, it is important to know what kind of stone you have.  · Try to stay as active as possible. This will help the stone pass. Don't stay in bed unless your pain keeps you from getting up. You may notice a red, pink, or brown color to your urine. This is normal while passing a kidney stone.  · If you develop pain, you may take ibuprofen or naproxen for pain, unless another medicine was prescribed. If you have chronic liver or kidney disease, talk with your healthcare provider before taking these medicines. Also talk with your provider if you've had a stomach ulcer or GI bleeding.  Preventing stones  Each year for the next 5 to 7 years, you are at risk that a new stone will form. Your risk is a 50% chance over this time period. The risk is higher if you have a family history of kidney stones or have certain chronic illnesses like hypertension, obesity, or diabetes. But you can make changes to your lifestyle and diet that can lower your risk for another stone.  Most kidney stones are made of calcium. The following is advice for preventing another calcium stone. If you dont know the type of stone you have, follow this advice until the cause of your stone is found.  Things that help:  · The most important thing you can do is to drink plenty of fluids each day. See home care above.   · Eat foods that contain phytates. These include wheat, rice, rye, barley, and beans. Phytates are substances that may lower your risk for any type of stone to form.  · Eat more fruits and vegetables. Choose those that are high in potassium.  · Eat foods high in natural citrate like fruit and low-sugar fruit juices.  · Having too  little calcium in your diet can put you at risk for calcium kidney stones. Eat a normal amount of calcium in your diet and talk with your healthcare provider if you are taking calcium supplements. Cutting back on your calcium intake may raise your risk. New research shows that eating calcium-rich and oxalate-rich foods together lowers your risk for stones by binding the minerals in the stomach and intestines before they can reach the kidneys.    · Limit salt intake to 2 grams (1 teaspoon) per day. Use limited amounts when cooking, and dont add salt at the table. Processed and canned foods are usually high in salt.   · Spinach, rhubarb, peanuts, cashews, almonds, grapefruit, and grapefruit juice are all high oxalate foods. You should limit how much of these you eat. Or eat them with calcium-rich foods. These include dairy products, dark leafy greens, soy products, and calcium-enriched foods.  · Reducing the amount of animal meat and high protein foods in your diet may lower your risk for uric acid stones.  · Avoid excess sugar (sucrose) and fructose (sweetener in many soft drinks) in your diet.   · If you take vitamin C as a supplement, don't take more than 1,000 mg a day.  · A dietitian or your healthcare provider can give you information about changes in your diet that will help prevent more kidney stones from forming.  Follow-up care  Follow up with your healthcare provider, or as advised, if the pain lasts more than 48 hours. Talk with your provider about urine and blood tests to find out the cause of your stone. If you had an X-ray, CT scan, or other diagnostic test, you will be told of any new findings that may affect your care.  Call 911  Call 911 if you have any of these:  · Weakness, dizziness, or fainting  When to seek medical advice  Call your healthcare provider right away if any of these occur:  · Pain that is not controlled by the medicine given  · Repeated vomiting or unable to keep down  fluids  · Fever of 100.4ºF (38ºC) or higher, or as directed by your healthcare provider  · Passage of solid red or brown urine (can't see through it) or urine with lots of blood clots  · Foul-smelling or cloudy urine  · Unable to pass urine for 8 hours and increasing bladder pressure  Date Last Reviewed: 10/1/2016  © 2573-4214 [employer] [address] [place] [address], [place] [address]. All rights reserved. This information is not intended as a substitute for professional medical care. Always follow your healthcare professional's instructions.          Your Scheduled Appointments     May 15, 2017 11:30 AM CDT   Established Patient Visit with MD Desiree [facility] ([facility]) [address] [place] [address] [phone]              Smoking Cessation     If you would like to quit smoking:   You may be eligible for free services if you are a [place] resident and started smoking cigarettes before September 1, 1988.  Call the Smoking Cessation Trust ([facility]) toll free at [phone] or ([phone] [phone].   Call 1-800-QUIT-NOW if you do not meet the above criteria.   Contact us via email: [email or web address]   View our website for more information: [email or web address]         [facility] complies with applicable Federal civil rights laws and does not discriminate on the basis of race, color, national origin, age, disability, or sex.        Language Assistance Services     ATTENTION: Language assistance services are available, free of charge. Please call [phone].      ATENCIÓN: Si habla español, tiene a zuniga disposición servicios gratuitos de asistencia lingüística. Llame al [phone].     CHÚ Ý: N?u b?n nói Ti?ng Vi?t, có các d?ch v? h? tr? ngôn ng? mi?n phí dành cho b?n. G?i s? [phone].

## 2017-05-08 ENCOUNTER — NURSE TRIAGE (OUTPATIENT)
Dept: ADMINISTRATIVE | Facility: CLINIC | Age: 67
End: 2017-05-08

## 2017-05-08 NOTE — ED PROVIDER NOTES
Encounter Date: 5/7/2017    SCRIBE #1 NOTE: I, Jose Lazo, am scribing for, and in the presence of,  Joan Lew MD. I have scribed the following portions of the note - the APC attestation.       History     Chief Complaint   Patient presents with    Flank Pain     reports flank pain and nausea x1 day     Review of patient's allergies indicates:  No Known Allergies  HPI Comments: 67-year-old female with medical history of diverticulitis and osteoporosis presents the ED with left flank pain beginning this morning.  Patient also endorses nausea.  Patient has history of kidney stones states it feels similar.  Patient denies fever, chills, vomiting, chest pain, shortness of breath, dysuria, hematuria, changes in bowel.    The history is provided by the patient.     Past Medical History:   Diagnosis Date    Diverticulitis     Osteoporosis     ostenopenia      Past Surgical History:   Procedure Laterality Date    BREAST BIOPSY      x1    BUNIONECTOMY      Hammer toe repair    BUNIONECTOMY      COLECTOMY  2004    partial for diverticulitis    COLECTOMY      partial for diverticulitis    HYSTERECTOMY      kidney stones      removal of breast implants       Family History   Problem Relation Age of Onset    Hypertension Father     Cancer Father      bladder cancer    Breast cancer Sister 54    Uterine cancer Maternal Grandmother     Colon cancer Maternal Grandfather     Ovarian cancer Neg Hx      Social History   Substance Use Topics    Smoking status: Former Smoker     Types: Cigarettes     Quit date: 6/25/1998    Smokeless tobacco: Never Used    Alcohol use No     Review of Systems   Constitutional: Negative for chills, fatigue and fever.   HENT: Negative for congestion and sore throat.    Eyes: Negative for visual disturbance.   Respiratory: Negative for shortness of breath.    Cardiovascular: Negative for chest pain and palpitations.   Gastrointestinal: Positive for nausea. Negative for abdominal  pain and vomiting.   Genitourinary: Positive for flank pain.   Musculoskeletal: Negative for back pain and myalgias.   Skin: Negative for rash.   Neurological: Negative for syncope, weakness and headaches.   Psychiatric/Behavioral: The patient is not nervous/anxious.        Physical Exam   Initial Vitals   BP Pulse Resp Temp SpO2   05/07/17 1832 05/07/17 1832 05/07/17 1832 05/07/17 1832 05/07/17 1832   187/84 76 18 98.1 °F (36.7 °C) 99 %     Physical Exam    Vitals reviewed.  Constitutional: She appears well-developed and well-nourished. She is not diaphoretic. No distress.   HENT:   Head: Normocephalic and atraumatic.   Right Ear: External ear normal.   Left Ear: External ear normal.   Nose: Nose normal.   Mouth/Throat: Oropharynx is clear and moist. No oropharyngeal exudate.   Eyes: Conjunctivae and EOM are normal. Pupils are equal, round, and reactive to light. Right eye exhibits no discharge. Left eye exhibits no discharge.   Neck: Normal range of motion. Neck supple.   Cardiovascular: Normal rate, regular rhythm and intact distal pulses.   Pulmonary/Chest: Breath sounds normal. No respiratory distress. She has no wheezes. She has no rhonchi. She has no rales. She exhibits no tenderness.   Abdominal: Soft. Bowel sounds are normal. She exhibits no distension and no ascites. There is no tenderness. There is no rigidity, no rebound and no CVA tenderness.   Musculoskeletal: Normal range of motion. She exhibits no edema or tenderness.   Neurological: She is alert. She has normal strength. No sensory deficit.   Skin: Skin is warm. No rash noted.   Psychiatric: She has a normal mood and affect.         ED Course   Procedures  Labs Reviewed   URINALYSIS - Abnormal; Notable for the following:        Result Value    Appearance, UA Cloudy (*)     Protein, UA 1+ (*)     Occult Blood UA 3+ (*)     All other components within normal limits    Narrative:     1 cup of urine   COMPREHENSIVE METABOLIC PANEL - Abnormal; Notable  for the following:     AST 44 (*)     ALT 46 (*)     All other components within normal limits   URINALYSIS MICROSCOPIC - Abnormal; Notable for the following:     RBC, UA >100 (*)     All other components within normal limits    Narrative:     1 cup of urine   CBC W/ AUTO DIFFERENTIAL        Imaging Results         CT Renal Stone Study ABD Pelvis WO (Final result) Result time:  05/07/17 20:29:32    Final result by Jez Alcantara MD (05/07/17 20:29:32)    Impression:       Mild left hydroureteronephrosis.  The mid and distal left ureter is not well visualized, however there appears to be a 0.6 cm calcification adjacent to the left UVJ, possibly reflecting an obstructing ureteral stone.  Differential for these findings would include infection, correlation with urinalysis advised.    Findings suggestive of constipation.  Please note, there is fluid within the distal small bowel, could reflect sequela of the same however early changes of infectious or inflammatory enteritis would be a consideration in the appropriate clinical setting.    There is a small groundglass opacity in the lateral basal segment right lower lobe.  This is felt likely to reflect atelectasis or related to scarring/motion.  Recommend considering repeat chest CT in 6-12 months, to assess for stability.       Subsegmental atelectasis within the middle lobe.    Several additional findings above.    ______________________________________     Electronically signed by resident: JEZ ALCANTARA MD  Date:     05/07/17  Time:    20:23            As the supervising and teaching physician, I personally reviewed the images and resident's interpretation and I agree with the findings.          Electronically signed by: ITZEL COBOS MD  Date:     05/07/17  Time:    20:29     Narrative:    CT of the Abdomen and Pelvis without IV contrast, using renal stone protocol      HISTORY:  67 year old F with abdominal pain    COMPARISON: None.     FINDINGS:  Heart: Normal  in size. No pericardial effusion.     Lung Bases: There is a small bandlike opacity within the middle lobe, favored to represent subsegmental atelectasis.  There is also a small groundglass opacity in the lateral basal segment right lower lobe (axial series 2 image 2).  This finding may reflect an infectious or inflammatory process versus artifact/atelectasis.     Liver: Normal in size and attenuation, with no focal hepatic lesions.       Gallbladder: No calcified gallstones.     Bile Ducts: No evidence of dilated ducts.     Pancreas: No mass or peripancreatic fat stranding.      Spleen: Unremarkable     Adrenals: Unremarkable     GI Tract/Mesentery: There is a small gastric diverticulum extending from the gastric fundus, located superior to the left adrenal gland.  There is no evidence of small bowel obstruction or inflammation.  There is fluid filled distal small bowel, nonspecific, without inflammation, and may reflect sequela of early enteritis although would need correlation.  The colon is filled with stool, without definite evidence of obstruction or inflammation.  The appendix is visualized without abnormality.  There is anastomotic suture line in the sigmoid colon.  There is diverticulosis, without evidence of diverticulitis.    Kidneys/ Ureters: Normal in size and location. There is left mild hydroureteronephrosis with a few scattered parapelvic cysts.  The mid and distal portions of the left ureter are incompletely visualized, secondary to paucity of intra-abdominal fat.  There is a calcification noted possibly at the level of the left UVJ, measuring 0.6 cm.  This finding may reflect a ureteral stone versus pelvic phlebolith.  There are other pelvic phleboliths visualized.  The right renal collecting system is normal in appearance, without evidence of hydronephrosis or hydroureter.  However, the mid and distal right ureter are not definitely visualized secondary to a paucity of intra-abdominal fat.   "    Bladder: No evidence of wall thickening.     Reproductive organs: Status post hysterectomy.  The adnexa is unremarkable.    Retroperitoneum:  No significant adenopathy.      Peritoneal Space: No ascites. No free air.       Vasculature: There is apposed by calcification of the aortic branches.     Bones: No acute fracture. There is mild S-shaped scoliosis of the thoracolumbar spine.  Degenerative changes are noted of the lumbar spine..                 Medical Decision Making:   History:   Old Medical Records: I decided to obtain old medical records.  Old Records Summarized: records from clinic visits.  Clinical Tests:   Lab Tests: Ordered and Reviewed  Radiological Study: Ordered and Reviewed       APC / Resident Notes:   67-year-old female with medical history of diverticulitis and osteoporosis presents the ED with left flank pain beginning this morning.  Cardiac exam reveals regular rate and rhythm.  Abdomen is soft, nontender, nondistended with normal bowel sounds ×4.  His are clear bilaterally on auscultation with no decreased breath sounds.  No CVA tenderness.  Oropharynx is clear and moist without erythema or exudates.  Vitals are stable.  Patient is in no acute distress.    Patient given IV fluids, IV Zofran 4 mg and IV Toradol 10 mg.    Labs and imaging reviewed.   CBC wnl. CMP wnl. UA shows >100 rbc.     CT study shows, "Mild left hydroureteronephrosis.  The mid and distal left ureter is not well visualized, however there appears to be a 0.6 cm calcification adjacent to the left UVJ, possibly reflecting an obstructing ureteral stone.  Differential for these findings would include infection, correlation with urinalysis advised."    DDX includes but is not limited to renal stone, renal colic, UTI, muscle spasm, pyelonephritis.    Discharged to home in stable condition, return to ED warnings given, follow up and patient care instructions given.Patient discharged home with zofran 4mg, naproxen 500mg, flomax " .4mg, norco 10mg.      Discussed and reviewed with my supervising physician.       Scribe Attestation:   Scribe #1: I performed the above scribed service and the documentation accurately describes the services I performed. I attest to the accuracy of the note.    Attending Attestation:     Physician Attestation Statement for NP/PA:   I discussed this assessment and plan of this patient with the NP/PA, but I did not personally examine the patient. The face to face encounter was performed by the NP/PA.    Other NP/PA Attestation Additions:    History of Present Illness: Flank pain         Physician Attestation for Scribe:  Physician Attestation Statement for Scribe #1: I, Joan Lew MD, reviewed documentation, as scribed by Jose Lazo in my presence, and it is both accurate and complete.                 ED Course     Clinical Impression:   The primary encounter diagnosis was Renal stone. A diagnosis of Left flank pain was also pertinent to this visit.    Disposition:   Disposition: Discharged  Condition: Stable       Tennille Cross PA-C  05/07/17 2054

## 2017-05-08 NOTE — DISCHARGE INSTRUCTIONS
Kidney Stone (with Pain)    The sharp cramping pain on either side of your lower back and nausea/vomiting that you have are because of a small stone that has formed in the kidney. It is now passing down a narrow tube (ureter) on its way to your bladder. Once the stone reaches your bladder, the pain will often stop. But it may come back as the stone continues to pass out of the bladder and through the urethra. The stone may pass in your urine stream in one piece. The size may be 1/16 inch to 1/4 inch (1 mm to 6 mm). Or, the stone may break up into nhung fragments that you may not even notice.  Once you have had a kidney stone, you are at risk of getting another one in the future. There are 4 types of kidney stones. Eighty percent are calcium stones--mostly calcium oxalate but also some with calcium phosphate. The other 3 types include uric acid stones, struvite stones (from a preceding infection), and rarely, cystine stones.  Most stones will pass on their own, but may take from a few hours to a few days. Sometimes the stone is too large to pass by itself. In that case, the healthcare provider will need to use other ways to remove the stone. These techniques include:  · Lithotripsy. This uses ultrasound waves to break up the stone.  · Ureteroscopy. This pushes a basket-like instrument through the urethra and bladder and into the ureter to pull out the stone.  · Various types of direct surgery through the skin  Home care  The following are general care guidelines:  · Drink plenty of fluids. This means at least 12, 8-ounce glasses of fluid--mostly water--a day.  · Each time you urinate, do so in a jar. Pour the urine from the jar through the strainer and into the toilet. Continue doing this until 24 hours after your pain stops. By then, if there was a kidney stone, it should pass from your bladder. Some stones dissolve into sand-like particles and pass right through the strainer. In that case, you wont ever see a  stone.  · Save any stone that you find in the strainer and bring it to your healthcare provider to look at. It may be possible to stop certain types of stones from forming. For this reason, it is important to know what kind of stone you have.  · Try to stay as active as possible. This will help the stone pass. Don't stay in bed unless your pain keeps you from getting up. You may notice a red, pink, or brown color to your urine. This is normal while passing a kidney stone.  · If you develop pain, you may take ibuprofen or naproxen for pain, unless another medicine was prescribed. If you have chronic liver or kidney disease, talk with your healthcare provider before taking these medicines. Also talk with your provider if you've had a stomach ulcer or GI bleeding.  Preventing stones  Each year for the next 5 to 7 years, you are at risk that a new stone will form. Your risk is a 50% chance over this time period. The risk is higher if you have a family history of kidney stones or have certain chronic illnesses like hypertension, obesity, or diabetes. But you can make changes to your lifestyle and diet that can lower your risk for another stone.  Most kidney stones are made of calcium. The following is advice for preventing another calcium stone. If you dont know the type of stone you have, follow this advice until the cause of your stone is found.  Things that help:  · The most important thing you can do is to drink plenty of fluids each day. See home care above.   · Eat foods that contain phytates. These include wheat, rice, rye, barley, and beans. Phytates are substances that may lower your risk for any type of stone to form.  · Eat more fruits and vegetables. Choose those that are high in potassium.  · Eat foods high in natural citrate like fruit and low-sugar fruit juices.  · Having too little calcium in your diet can put you at risk for calcium kidney stones. Eat a normal amount of calcium in your diet and  talk with your healthcare provider if you are taking calcium supplements. Cutting back on your calcium intake may raise your risk. New research shows that eating calcium-rich and oxalate-rich foods together lowers your risk for stones by binding the minerals in the stomach and intestines before they can reach the kidneys.    · Limit salt intake to 2 grams (1 teaspoon) per day. Use limited amounts when cooking, and dont add salt at the table. Processed and canned foods are usually high in salt.   · Spinach, rhubarb, peanuts, cashews, almonds, grapefruit, and grapefruit juice are all high oxalate foods. You should limit how much of these you eat. Or eat them with calcium-rich foods. These include dairy products, dark leafy greens, soy products, and calcium-enriched foods.  · Reducing the amount of animal meat and high protein foods in your diet may lower your risk for uric acid stones.  · Avoid excess sugar (sucrose) and fructose (sweetener in many soft drinks) in your diet.   · If you take vitamin C as a supplement, don't take more than 1,000 mg a day.  · A dietitian or your healthcare provider can give you information about changes in your diet that will help prevent more kidney stones from forming.  Follow-up care  Follow up with your healthcare provider, or as advised, if the pain lasts more than 48 hours. Talk with your provider about urine and blood tests to find out the cause of your stone. If you had an X-ray, CT scan, or other diagnostic test, you will be told of any new findings that may affect your care.  Call 911  Call 911 if you have any of these:  · Weakness, dizziness, or fainting  When to seek medical advice  Call your healthcare provider right away if any of these occur:  · Pain that is not controlled by the medicine given  · Repeated vomiting or unable to keep down fluids  · Fever of 100.4ºF (38ºC) or higher, or as directed by your healthcare provider  · Passage of solid red or brown urine (can't  see through it) or urine with lots of blood clots  · Foul-smelling or cloudy urine  · Unable to pass urine for 8 hours and increasing bladder pressure  Date Last Reviewed: 10/1/2016  © 0257-4321 [employer] [address] [place] [address], [place] [address]. All rights reserved. This information is not intended as a substitute for professional medical care. Always follow your healthcare professional's instructions.

## 2017-05-09 ENCOUNTER — OFFICE VISIT (OUTPATIENT)
Dept: UROLOGY | Facility: CLINIC | Age: 67
End: 2017-05-09
Payer: MEDICARE

## 2017-05-09 ENCOUNTER — HOSPITAL ENCOUNTER (OUTPATIENT)
Dept: RADIOLOGY | Facility: HOSPITAL | Age: 67
Discharge: HOME | End: 2017-05-09
Attending: UROLOGY
Payer: MEDICARE

## 2017-05-09 ENCOUNTER — NURSE TRIAGE (OUTPATIENT)
Dept: ADMINISTRATIVE | Facility: CLINIC | Age: 67
End: 2017-05-09

## 2017-05-09 VITALS
SYSTOLIC BLOOD PRESSURE: 100 MMHG | BODY MASS INDEX: 21.26 KG/M2 | WEIGHT: 127.63 LBS | HEART RATE: 80 BPM | DIASTOLIC BLOOD PRESSURE: 70 MMHG | HEIGHT: 65 IN

## 2017-05-09 DIAGNOSIS — N20.1 LEFT URETERAL CALCULUS: ICD-10-CM

## 2017-05-09 DIAGNOSIS — N20.1 LEFT URETERAL CALCULUS: Primary | ICD-10-CM

## 2017-05-09 PROCEDURE — 74000 XR ABDOMEN AP 1 VIEW: CPT | Mod: TC,PO

## 2017-05-09 PROCEDURE — 99999 PR PBB SHADOW E&M-EST. PATIENT-LVL III: CPT | Mod: PBBFAC,,, | Performed by: UROLOGY

## 2017-05-09 PROCEDURE — 74000 XR ABDOMEN AP 1 VIEW: CPT | Mod: 26,,, | Performed by: RADIOLOGY

## 2017-05-09 PROCEDURE — 99204 OFFICE O/P NEW MOD 45 MIN: CPT | Mod: S$PBB,,, | Performed by: UROLOGY

## 2017-05-09 RX ORDER — KETOROLAC TROMETHAMINE 10 MG/1
10 TABLET, FILM COATED ORAL EVERY 6 HOURS
Qty: 10 TABLET | Refills: 0 | Status: SHIPPED | OUTPATIENT
Start: 2017-05-09 | End: 2017-10-05 | Stop reason: SDUPTHER

## 2017-05-09 NOTE — PROGRESS NOTES
Subjective:       Patient ID: Meredith Ramos is a 67 y.o. female.    Chief Complaint: Nephrolithiasis (f/u er visit 5/7/17)    HPI patient had left flank pain was seen in the ER 5/7/17 and found to have a 6 mm left lower third ureteral stone.  She still been having pain fairly THIN.  She is taking Norco and would like to take something nonnarcotics all have her stop the Naprosyn and switch her to Toradol to supplement the Norco.  No fever chills nausea vomiting.  She was unable to void for today's visit.  Her last stone was 30 years ago    Past Medical History:   Diagnosis Date    Diverticulitis     Kidney stone     Osteoporosis     ostenopenia        Past Surgical History:   Procedure Laterality Date    BREAST BIOPSY      x1    BUNIONECTOMY      Hammer toe repair    BUNIONECTOMY      COLECTOMY  2004    partial for diverticulitis    COLECTOMY      partial for diverticulitis    HYSTERECTOMY      kidney stones      removal of breast implants         Family History   Problem Relation Age of Onset    Hypertension Father     Cancer Father      bladder cancer    Breast cancer Sister 54    Uterine cancer Maternal Grandmother     Colon cancer Maternal Grandfather     Ovarian cancer Neg Hx        Social History     Social History    Marital status:      Spouse name: N/A    Number of children: N/A    Years of education: N/A     Occupational History    Not on file.     Social History Main Topics    Smoking status: Former Smoker     Types: Cigarettes     Quit date: 6/28/2003    Smokeless tobacco: Never Used    Alcohol use No    Drug use: Not on file    Sexual activity: Not on file     Other Topics Concern    Not on file     Social History Narrative       Allergies:  Review of patient's allergies indicates no known allergies.    Medications:    Current Outpatient Prescriptions:     biotin 300 mcg Tab, Take 1 tablet by mouth once daily., Disp: , Rfl:     calcium-vitamin D 500-125 mg-unit  tablet, Take 1 tablet by mouth 2 (two) times daily., Disp: , Rfl:     fish oil-omega-3 fatty acids 300-1,000 mg capsule, Take 2 g by mouth once daily., Disp: , Rfl:     hydrocodone-acetaminophen 10-325mg (NORCO)  mg Tab, Take 1 tablet by mouth every 4 (four) hours as needed for Pain., Disp: 12 tablet, Rfl: 0    multivitamin (ONE DAILY MULTIVITAMIN) per tablet, Take 1 tablet by mouth once daily., Disp: , Rfl:     naproxen (NAPROSYN) 500 MG tablet, Take 1 tablet (500 mg total) by mouth 2 (two) times daily with meals., Disp: 20 tablet, Rfl: 0    ondansetron (ZOFRAN) 4 MG tablet, Take 1 tablet (4 mg total) by mouth every 6 (six) hours., Disp: 12 tablet, Rfl: 0    tamsulosin (FLOMAX) 0.4 mg Cp24, Take 1 capsule (0.4 mg total) by mouth once daily., Disp: 10 capsule, Rfl: 0    ketorolac (TORADOL) 10 mg tablet, Take 1 tablet (10 mg total) by mouth every 6 (six) hours., Disp: 10 tablet, Rfl: 0    Review of Systems   Constitutional: Negative.    HENT: Negative.    Eyes: Negative.    Respiratory: Negative.    Endocrine: Negative.    Genitourinary: Positive for flank pain.   Allergic/Immunologic: Negative.    Neurological: Negative.    Hematological: Negative.    Psychiatric/Behavioral: Negative.        Objective:      Physical Exam   Constitutional: She appears well-developed.   HENT:   Head: Normocephalic.   Cardiovascular: Normal rate.    Pulmonary/Chest: Effort normal.   Abdominal: Soft.   Musculoskeletal: Normal range of motion.   Neurological: She is alert.   Skin: Skin is warm.         Assessment:       1. Left ureteral calculus        Plan:       Meredith was seen today for nephrolithiasis.    Diagnoses and all orders for this visit:    Left ureteral calculus  -     X-Ray Abdomen AP 1 View; Future  -     US Retroperitoneal Complete (Kidney and; Future    Other orders  -     ketorolac (TORADOL) 10 mg tablet; Take 1 tablet (10 mg total) by mouth every 6 (six) hours.        The patient is scheduled for  ureteroscopy. The risks and benefits of ureteroscopy were discussed with the patient in detail.  Consent was obtained.  The risks include but are not limited to burning with urination, bleeding, infection, pain, incomplete fragmentation of the stone, need for further procedures, injury to the kidney, ureter, bladder and need for open surgery.  The patient was informed that they may require a ureteral stent and that stents can cause irritative voiding symptoms.  They also understand that ureteral stents are temporary and must be removed or exchanged in a timely fashion as they can calcify and make more stones and become difficult to remove. Alternative treatments were also discussed with the patient in detail to include ESWL, percutaneous treatment of the stone, open surgery or observation. Patient understands these risks and has agreed to proceed with surgery.

## 2017-05-09 NOTE — TELEPHONE ENCOUNTER
"Pt called re pain. Pain in side on under ribs and less in back. Stone 6 mm. On norco- helps some, took naproxen at 0500. Took norco at 6pm last pm.     Reason for Disposition   [1] SEVERE pain (e.g., excruciating) AND [2] present > 1 hour    Answer Assessment - Initial Assessment Questions  1. LOCATION: "Where does it hurt?"      Under rib parelel to belly button under left ribs. Still LBP, T96.9   2. RADIATION: "Does the pain shoot anywhere else?" (e.g., chest, back)     No   3. ONSET: "When did the pain begin?" (e.g., minutes, hours or days ago)      Last pm, chest got really tight. abd feels tight   4. SUDDEN: "Gradual or sudden onset?"      Gradual. Able to sleep off and on   5. PATTERN "Does the pain come and go, or is it constant?"     - If constant: "Is it getting better, staying the same, or worsening?"       (Note: Constant means the pain never goes away completely; most serious pain is constant and it progresses)      - If intermittent: "How long does it last?" "Do you have pain now?"      (Note: Intermittent means the pain goes away completely between bouts)     Constant since 10pm   6. SEVERITY: "How bad is the pain?"  (e.g., Scale 1-10; mild, moderate, or severe)     - MILD (1-3): doesn't interfere with normal activities, abdomen soft and not tender to touch      - MODERATE (4-7): interferes with normal activities or awakens from sleep, tender to touch      - SEVERE (8-10): excruciating pain, doubled over, unable to do any normal activities       7-8, able to walk slowly   7. RECURRENT SYMPTOM: "Have you ever had this type of abdominal pain before?" If so, ask: "When was the last time?" and "What happened that time?"      No   8. AGGRAVATING FACTORS: "Does anything seem to cause this pain?" (e.g., foods, stress, alcohol)     Not eating or drinking except for water - drinking more than 1 gal. 1 qt today, eating yogurt. Baked apple    9. CARDIAC SYMPTOMS: "Do you have any of the following symptoms: chest " "pain, difficulty breathing, sweating, nausea?"     No   10. OTHER SYMPTOMS: "Do you have any other symptoms?" (e.g., fever, vomiting, diarrhea)      V x 2 yest. No diarrhea. Last BM Sunday. No flatus.    Protocols used:  ABDOMINAL PAIN - [facility] ED due to level 7-8 pain continuously since 10pm, no flatus, vomiting. Call back with questions.   "

## 2017-05-09 NOTE — TELEPHONE ENCOUNTER
Reason for Disposition   Caller has medication question, adult has minor symptoms, caller declines triage, and triager answers question    Protocols used: ST MEDICATION QUESTION CALL-A-AH  seen in ER and dx w/ kidney stone/ has questions about course of illness as well as taking med    Advised to take med w/ small amount of food in stomach as not to cause increased GI distress/ verbalized understanding    Cynthia Perera RN

## 2017-05-10 ENCOUNTER — TELEPHONE (OUTPATIENT)
Dept: UROLOGY | Facility: CLINIC | Age: 67
End: 2017-05-10

## 2017-05-10 ENCOUNTER — PATIENT MESSAGE (OUTPATIENT)
Dept: UROLOGY | Facility: CLINIC | Age: 67
End: 2017-05-10

## 2017-05-11 ENCOUNTER — ANESTHESIA EVENT (OUTPATIENT)
Dept: SURGERY | Facility: HOSPITAL | Age: 67
End: 2017-05-11
Payer: MEDICARE

## 2017-05-11 ENCOUNTER — TELEPHONE (OUTPATIENT)
Dept: UROLOGY | Facility: CLINIC | Age: 67
End: 2017-05-11

## 2017-05-11 DIAGNOSIS — N20.1 URETERAL STONE: Primary | ICD-10-CM

## 2017-05-11 NOTE — ANESTHESIA PREPROCEDURE EVALUATION
Anesthesia Assessment: Preoperative EQUATION    Planned Procedure: Procedure(s) (LRB):  URETEROSCOPY (Left)  Requested Anesthesia Type:General  Surgeon: Efren Ford Jr., MD  Service: Urology  Known or anticipated Date of Surgery:5/19/2017    Surgeon notes: reviewed  Triage considerations:     The patient has no apparent active cardiac condition (No unstable coronary Syndrome such as severe unstable angina or recent [<1 month] myocardial infarction, decompensated CHF, severe valvular   disease or significant arrhythmia)    Previous anesthesia records:Not available    Last PCP note: within 3 months , outside [facility]     Other important co-morbidities: KIDNEY STONE     Tests already available:  5/7/17 CBC,CMP, 3/9/17 PET SCAN WITH EKG NORMAL            Instructions given. (See in Nurse's note)    Optimization:  Anesthesia Preop Clinic Assessment NOT  Indicated   Plan: Navigation:             Straight Line to surgery.               No tests, anesthesia preop clinic visit, or consult required.                                                                                                                 05/11/2017  Meredith Ramos is a 67 y.o., female.    Anesthesia Evaluation    I have reviewed the Patient Summary Reports.    I have reviewed the Nursing Notes.   I have reviewed the Medications.     Review of Systems  Anesthesia Hx:  No problems with previous Anesthesia Hx of Anesthetic complications POST OP N & V Denies Family Hx of Anesthesia complications.  Personal Hx of Anesthesia complications, Post-Operative Nausea/Vomiting, in the past, but not with recent anesthetics / prophylaxis   Social:  Former Smoker, No Alcohol Use    Hematology/Oncology:  Hematology Normal        EENT/Dental:EENT/Dental Normal   Cardiovascular:   Exercise tolerance: good Denies Hypertension.  Stress 2017 - neg for ischemia, EF 60-65%   Pulmonary:   Denies Shortness of breath.    Renal/:   Chronic Renal Disease renal calculi     Hepatic/GI:  Hepatic/GI Normal    Neurological:  Neurology Normal    Endocrine:  Endocrine Normal    Psych:   depression      RAFAEL JIM 5/11/17    Physical Exam  General:  Well nourished    Airway/Jaw/Neck:  Airway Findings: Mouth Opening: Normal Tongue: Normal  General Airway Assessment: Adult       Chest/Lungs:  Chest/Lungs Findings: Clear to auscultation, Normal Respiratory Rate     Heart/Vascular:  Heart Findings: Rate: Normal  Rhythm: Regular Rhythm        Mental Status:  Mental Status Findings:  Cooperative, Alert and Oriented         Anesthesia Plan  Type of Anesthesia, risks & benefits discussed:  Anesthesia Type:  general  Patient's Preference:   Intra-op Monitoring Plan:   Intra-op Monitoring Plan Comments:   Post Op Pain Control Plan:   Post Op Pain Control Plan Comments:   Induction:   IV  Beta Blocker:  Patient is not currently on a Beta-Blocker (No further documentation required).       Informed Consent: Patient understands risks and agrees with Anesthesia plan.  Questions answered.   ASA Score: 2     Day of Surgery Review of History & Physical:    H&P update referred to the surgeon.         Ready For Surgery From Anesthesia Perspective.

## 2017-05-11 NOTE — PRE ADMISSION SCREENING
Anesthesia Assessment: Preoperative EQUATION    Planned Procedure: Procedure(s) (LRB):  URETEROSCOPY (Left)  Requested Anesthesia Type:General  Surgeon: Efren Ford Jr., MD  Service: Urology  Known or anticipated Date of Surgery:5/19/2017    Surgeon notes: reviewed  Triage considerations:     The patient has no apparent active cardiac condition (No unstable coronary Syndrome such as severe unstable angina or recent [<1 month] myocardial infarction, decompensated CHF, severe valvular   disease or significant arrhythmia)    Previous anesthesia records:Not available    Last PCP note: within 3 months , outside [facility]     Other important co-morbidities: KIDNEY STONE     Tests already available:  5/7/17 CBC,CMP, 3/9/17 PET SCAN WITH EKG NORMAL            Instructions given. (See in Nurse's note)    Optimization:  Anesthesia Preop Clinic Assessment NOT  Indicated   Plan: Navigation:             Straight Line to surgery.               No tests, anesthesia preop clinic visit, or consult required.

## 2017-05-12 ENCOUNTER — PATIENT MESSAGE (OUTPATIENT)
Dept: UROLOGY | Facility: CLINIC | Age: 67
End: 2017-05-12

## 2017-05-15 ENCOUNTER — OFFICE VISIT (OUTPATIENT)
Dept: SPORTS MEDICINE | Facility: CLINIC | Age: 67
End: 2017-05-15
Payer: MEDICARE

## 2017-05-15 VITALS — WEIGHT: 127 LBS | HEIGHT: 65 IN | TEMPERATURE: 98 F | BODY MASS INDEX: 21.16 KG/M2

## 2017-05-15 DIAGNOSIS — G89.29 CHRONIC PAIN OF RIGHT KNEE: Primary | ICD-10-CM

## 2017-05-15 DIAGNOSIS — M25.561 CHRONIC PAIN OF RIGHT KNEE: Primary | ICD-10-CM

## 2017-05-15 DIAGNOSIS — M25.461 EFFUSION OF RIGHT KNEE: ICD-10-CM

## 2017-05-15 PROCEDURE — 99214 OFFICE O/P EST MOD 30 MIN: CPT | Mod: S$PBB,,, | Performed by: FAMILY MEDICINE

## 2017-05-15 PROCEDURE — 99999 PR PBB SHADOW E&M-EST. PATIENT-LVL III: CPT | Mod: PBBFAC,,, | Performed by: FAMILY MEDICINE

## 2017-05-15 PROCEDURE — 99213 OFFICE O/P EST LOW 20 MIN: CPT | Mod: PBBFAC,PO | Performed by: FAMILY MEDICINE

## 2017-05-15 NOTE — PROGRESS NOTES
Meredith Ramos, a 67 y.o. female, presents today for evaluation of her RIGHT knee.      HISTORY OF PRESENT ILLNESS   Location: posterior knee, right  Onset: insidious  Palliative:    Relative rest   Oral analgesics   HgPT, compliant   Provocative:    ADLs  Prior: none  Progression: plateau discomfort  Quality:    sharp  Radiation: none  Severity: per nursing documentation  Timing: intermittent w/ use  Trauma: none     Review of systems (ROS):  A 10+ review of systems was performed with pertinent positives and negatives noted above in the history of present illness. Other systems were negative unless otherwise specified.      PHYSICAL EXAMINATION  General:  The patient is alert and oriented x 3. Mood is pleasant. Observation of ears, eyes and nose reveal no gross abnormalities. HEENT: NCAT, sclera anicteric.   Lungs: Respirations are equal and unlabored.  Gait is coordinated. Patient can toe walk and heel walk without difficulty.    RIGHT KNEE EXAMINATION    Observation/Inspection  Gait:   Nonantalgic   Alignment:  Neutral   Scars:   None   Muscle atrophy: Mild  Effusion:  Present   Warmth:  None   Discoloration:   none     Tenderness / Crepitus (T / C):         T / C      T / C  Patella   - / -   Lateral joint line   - / -     Peripatellar medial  -  Medial joint line    + / -  Peripatellar lateral -  Medial plica   - / -  Patellar tendon -   Popliteal fossa   - / -  Quad tendon   -   Gastrocnemius   -  Prepatellar Bursa - / -   Quadricep   -  Tibial tubercle  -  Thigh/hamstring  -  Pes anserine/HS -  Fibula    -  ITB   - / -  Tibia     -  Tib/fib joint  - / -  LCL    -    MFC   - / -   MCL: Proximal  -    LFC   - / -   Distal    -          ROM: (* = pain)  PASSIVE   ACTIVE    Left :   5 / 0 / 145   5 / 0 / 145     Right :    5 / 0 / 145*   5 / 0 / 145*    Patellofemoral examination:  See above noted areas of tenderness.   Patella position    Subluxation / dislocation: Centered        Sup. / Inf;   Normal    Crepitus (PF):    Absent   Patellar Mobility:       Medial-lateral:   Normal    Superior-inferior:  Normal    Inferior tilt   Normal    Patellar tendon:  Normal   Lateral tilt:    Normal   J-sign:     None   Patellofemoral grind:   No pain       Meniscal Signs:     Pain on terminal extension:  +  Pain on terminal flexion:  +  Katies maneuver:  +*  Squat     NT    Ligament Examination:  ACL / Lachman:  WNL  PCL-Post.  drawer: normal 0 to 2mm  MCL- Valgus:  normal 0 to 2mm  LCL- Varus:    normal 0 to 2mm  Pivot shift:  guarding   Dial Test:   difference c/w other side   At 30° flexion: normal (< 5°)    At 90° flexion: normal (< 5°)   Reverse Pivot Shift:   normal (Equal)     Strength: (* = with pain) Painful Side  Quadriceps   5/5  Hamstrin/5    Extremity Neuro-vascular Examination:   Sensation:  Grossly intact to light touch all dermatomal regions.   Motor Function:  Fully intact motor function at hip, knee, foot and ankle    DTRs;  quadriceps and  achilles 2+.  No clonus and downgoing Babinski.    Vascular status:  DP and PT pulses 2+, brisk capillary refill, symmetric.     Other Findings:      ASSESSMENT & PLAN  Assessment:   #1 Kellgren-Guillermo Grade I osteoarthritis of knee, right   W/ effusion    W/ popliteal cyst   W/ suspected inflammation vs. tearing of post horn of medial meniscus     No evidence of neurologic pathology  No evidence of vascular pathology    Imaging studies reviewed:   X-ray knee, bilateral 17.03    Plan:    We discussed the importance of appropriate diet, weight, and regular exercise including quadriceps strengthening     We discussed options including:  #1 watchful waiting  #2 continue physical therapy aimed at:   Core stability   RoM knee   Strengthening quadriceps   Gait training   #3 injection therapy:   CSI iaknee     Right,     Left,    VSI iaknee    Right,     Left,    Orthobiologics   #4 MRI for further evaluation      The patient chooses #2     Pain management:  handout given  Bracing: ACE wrap, d/c'd by pt  Physical therapy: hgPT, handout given, continue as above  Activity (e.g. sports, work) restrictions: as tolerated   school/vocation: yoga, walking / swimming / bicycle     PNW in late 07/2017    Follow up per pt  A/e hgPT  Ineffective-->csi iaknee right   Ineffective-->MRI knee right  Should symptoms worsen or fail to resolve, consider:  Revisiting the above options

## 2017-05-15 NOTE — MR AVS SNAPSHOT
[facility]  [address]  [facility] [address]  Phone: [phone]                  Meredith Ramos   5/15/2017 11:30 AM   Appointment    Description:  Female : 1950   Provider:  Sarwat Lu MD   Department:  [facility]                To Do List           Future Appointments        Provider Department Dept Phone    2017 7:15 AM NOM US 11 ALL [facility] [phone]    2017 8:45 AM Efren Ford Jr., MD [facility] - Urology 4th Floor [phone]      Your Future Surgeries/Procedures     May 19, 2017   Surgery with Efren Ford Jr., MD   [facility] ([facility])    [address] [facility] [address] [phone]              Goals (5 Years of Data)     None      [facility]     [facility] Nurse Care Line -  Assistance  Unless otherwise directed by your provider, please contact [facility], our nurse care line that is available for  assistance.     Registered nurses in the [facility] provide: appointment scheduling, clinical advisement, health education, and other advisory services.  Call: [phone] (toll free)               Medications           Message regarding Medications     Verify the changes and/or additions to your medication regime listed below are the same as discussed with your clinician today.  If any of these changes or additions are incorrect, please notify your healthcare provider.             Verify that the below list of medications is an accurate representation of the medications you are currently taking.  If none reported, the list may be blank. If incorrect, please contact your healthcare provider. Carry this list with you in case of emergency.           Current Medications     biotin 300 mcg Tab Take 1 tablet by mouth once daily.    hydrocodone-acetaminophen 10-325mg (NORCO)  mg Tab Take 1 tablet by  mouth every 4 (four) hours as needed for Pain.    ketorolac (TORADOL) 10 mg tablet Take 1 tablet (10 mg total) by mouth every 6 (six) hours.    multivitamin (ONE DAILY MULTIVITAMIN) per tablet Take 1 tablet by mouth once daily.    naproxen (NAPROSYN) 500 MG tablet Take 1 tablet (500 mg total) by mouth 2 (two) times daily with meals.    ondansetron (ZOFRAN) 4 MG tablet Take 1 tablet (4 mg total) by mouth every 6 (six) hours.    tamsulosin (FLOMAX) 0.4 mg Cp24 Take 1 capsule (0.4 mg total) by mouth once daily.           Clinical Reference Information           Allergies as of 5/15/2017     No Known Allergies      Immunizations Administered on Date of Encounter - 5/15/2017     None      Language Assistance Services     ATTENTION: Language assistance services are available, free of charge. Please call [phone].      ATENCIÓN: Si habla radha, tiene a zuniga disposición servicios gratuitos de asistencia lingüística. Llame al [phone].     HAILEY Ý: N?u b?n nói Ti?ng Vi?t, có các d?ch v? h? tr? ngôn ng? mi?n phí dành cho b?n. G?i s? [phone].         [facility] complies with applicable Federal civil rights laws and does not discriminate on the basis of race, color, national origin, age, disability, or sex.

## 2017-05-18 ENCOUNTER — TELEPHONE (OUTPATIENT)
Dept: UROLOGY | Facility: CLINIC | Age: 67
End: 2017-05-18

## 2017-05-19 ENCOUNTER — HOSPITAL ENCOUNTER (OUTPATIENT)
Facility: HOSPITAL | Age: 67
Discharge: HOME | End: 2017-05-19
Attending: UROLOGY | Admitting: UROLOGY
Payer: MEDICARE

## 2017-05-19 ENCOUNTER — ANESTHESIA (OUTPATIENT)
Dept: SURGERY | Facility: HOSPITAL | Age: 67
End: 2017-05-19
Payer: MEDICARE

## 2017-05-19 ENCOUNTER — SURGERY (OUTPATIENT)
Age: 67
End: 2017-05-19

## 2017-05-19 VITALS
WEIGHT: 127 LBS | SYSTOLIC BLOOD PRESSURE: 130 MMHG | HEIGHT: 65 IN | TEMPERATURE: 98 F | RESPIRATION RATE: 16 BRPM | DIASTOLIC BLOOD PRESSURE: 73 MMHG | HEART RATE: 55 BPM | BODY MASS INDEX: 21.16 KG/M2 | OXYGEN SATURATION: 100 %

## 2017-05-19 DIAGNOSIS — N20.1 LEFT URETERAL STONE: ICD-10-CM

## 2017-05-19 DIAGNOSIS — N13.5 URETERAL OBSTRUCTION: ICD-10-CM

## 2017-05-19 PROCEDURE — 76000 FLUOROSCOPY <1 HR PHYS/QHP: CPT | Mod: 26,59,GC, | Performed by: UROLOGY

## 2017-05-19 PROCEDURE — 36000706: Performed by: UROLOGY

## 2017-05-19 PROCEDURE — 52351 CYSTOURETERO & OR PYELOSCOPE: CPT | Mod: LT,GC,, | Performed by: UROLOGY

## 2017-05-19 PROCEDURE — 71000033 HC RECOVERY, INTIAL HOUR: Performed by: UROLOGY

## 2017-05-19 PROCEDURE — D9220A PRA ANESTHESIA: Mod: ANES,,, | Performed by: ANESTHESIOLOGY

## 2017-05-19 PROCEDURE — D9220A PRA ANESTHESIA: Mod: CRNA,,, | Performed by: NURSE ANESTHETIST, CERTIFIED REGISTERED

## 2017-05-19 PROCEDURE — 63600175 PHARM REV CODE 636 W HCPCS: Performed by: STUDENT IN AN ORGANIZED HEALTH CARE EDUCATION/TRAINING PROGRAM

## 2017-05-19 PROCEDURE — 63600175 PHARM REV CODE 636 W HCPCS: Performed by: NURSE ANESTHETIST, CERTIFIED REGISTERED

## 2017-05-19 PROCEDURE — 52332 CYSTOSCOPY AND TREATMENT: CPT | Mod: 51,LT,GC, | Performed by: UROLOGY

## 2017-05-19 PROCEDURE — C2617 STENT, NON-COR, TEM W/O DEL: HCPCS | Performed by: UROLOGY

## 2017-05-19 PROCEDURE — 25000003 PHARM REV CODE 250: Performed by: NURSE ANESTHETIST, CERTIFIED REGISTERED

## 2017-05-19 PROCEDURE — 25000003 PHARM REV CODE 250: Performed by: ANESTHESIOLOGY

## 2017-05-19 PROCEDURE — 37000009 HC ANESTHESIA EA ADD 15 MINS: Performed by: UROLOGY

## 2017-05-19 PROCEDURE — 36000707: Performed by: UROLOGY

## 2017-05-19 PROCEDURE — C1769 GUIDE WIRE: HCPCS | Performed by: UROLOGY

## 2017-05-19 PROCEDURE — 37000008 HC ANESTHESIA 1ST 15 MINUTES: Performed by: UROLOGY

## 2017-05-19 PROCEDURE — 71000015 HC POSTOP RECOV 1ST HR: Performed by: UROLOGY

## 2017-05-19 DEVICE — STENT URETERAL UNIV 6FR 24CM: Type: IMPLANTABLE DEVICE | Site: URETER | Status: FUNCTIONAL

## 2017-05-19 RX ORDER — ONDANSETRON 2 MG/ML
INJECTION INTRAMUSCULAR; INTRAVENOUS
Status: DISCONTINUED | OUTPATIENT
Start: 2017-05-19 | End: 2017-05-19

## 2017-05-19 RX ORDER — POLYETHYLENE GLYCOL 3350 17 G/17G
17 POWDER, FOR SOLUTION ORAL DAILY
Qty: 30 PACKET | Refills: 0 | Status: SHIPPED | OUTPATIENT
Start: 2017-05-19 | End: 2017-10-05 | Stop reason: SDUPTHER

## 2017-05-19 RX ORDER — PROPOFOL 10 MG/ML
VIAL (ML) INTRAVENOUS
Status: DISCONTINUED | OUTPATIENT
Start: 2017-05-19 | End: 2017-05-19

## 2017-05-19 RX ORDER — PHENAZOPYRIDINE HYDROCHLORIDE 100 MG/1
100 TABLET, FILM COATED ORAL 3 TIMES DAILY PRN
Qty: 21 TABLET | Refills: 0 | Status: SHIPPED | OUTPATIENT
Start: 2017-05-19 | End: 2017-05-29

## 2017-05-19 RX ORDER — GLYCOPYRROLATE 0.2 MG/ML
INJECTION INTRAMUSCULAR; INTRAVENOUS
Status: DISCONTINUED | OUTPATIENT
Start: 2017-05-19 | End: 2017-05-19

## 2017-05-19 RX ORDER — ROCURONIUM BROMIDE 10 MG/ML
INJECTION, SOLUTION INTRAVENOUS
Status: DISCONTINUED | OUTPATIENT
Start: 2017-05-19 | End: 2017-05-19

## 2017-05-19 RX ORDER — FENTANYL CITRATE 50 UG/ML
INJECTION, SOLUTION INTRAMUSCULAR; INTRAVENOUS
Status: DISCONTINUED
Start: 2017-05-19 | End: 2017-05-19 | Stop reason: HOSPADM

## 2017-05-19 RX ORDER — CEFAZOLIN SODIUM 2 G/50ML
2 SOLUTION INTRAVENOUS
Status: COMPLETED | OUTPATIENT
Start: 2017-05-19 | End: 2017-05-19

## 2017-05-19 RX ORDER — LIDOCAINE HCL/PF 100 MG/5ML
SYRINGE (ML) INTRAVENOUS
Status: DISCONTINUED | OUTPATIENT
Start: 2017-05-19 | End: 2017-05-19

## 2017-05-19 RX ORDER — MIDAZOLAM HYDROCHLORIDE 1 MG/ML
INJECTION, SOLUTION INTRAMUSCULAR; INTRAVENOUS
Status: DISCONTINUED | OUTPATIENT
Start: 2017-05-19 | End: 2017-05-19

## 2017-05-19 RX ORDER — OXYCODONE AND ACETAMINOPHEN 5; 325 MG/1; MG/1
1 TABLET ORAL EVERY 4 HOURS PRN
Status: DISCONTINUED | OUTPATIENT
Start: 2017-05-19 | End: 2017-05-19 | Stop reason: HOSPADM

## 2017-05-19 RX ORDER — SODIUM CHLORIDE 9 MG/ML
INJECTION, SOLUTION INTRAVENOUS CONTINUOUS
Status: DISCONTINUED | OUTPATIENT
Start: 2017-05-19 | End: 2017-05-19 | Stop reason: HOSPADM

## 2017-05-19 RX ORDER — MIDAZOLAM HYDROCHLORIDE 1 MG/ML
INJECTION INTRAMUSCULAR; INTRAVENOUS
Status: DISCONTINUED
Start: 2017-05-19 | End: 2017-05-19 | Stop reason: HOSPADM

## 2017-05-19 RX ORDER — FENTANYL CITRATE 50 UG/ML
INJECTION, SOLUTION INTRAMUSCULAR; INTRAVENOUS
Status: DISCONTINUED | OUTPATIENT
Start: 2017-05-19 | End: 2017-05-19

## 2017-05-19 RX ORDER — OXYBUTYNIN CHLORIDE 5 MG/1
5 TABLET ORAL 3 TIMES DAILY PRN
Qty: 30 TABLET | Refills: 0 | Status: SHIPPED | OUTPATIENT
Start: 2017-05-19 | End: 2017-10-05 | Stop reason: SDUPTHER

## 2017-05-19 RX ORDER — FAMOTIDINE 10 MG/ML
INJECTION INTRAVENOUS
Status: DISCONTINUED | OUTPATIENT
Start: 2017-05-19 | End: 2017-05-19

## 2017-05-19 RX ORDER — DEXAMETHASONE SODIUM PHOSPHATE 4 MG/ML
INJECTION, SOLUTION INTRA-ARTICULAR; INTRALESIONAL; INTRAMUSCULAR; INTRAVENOUS; SOFT TISSUE
Status: DISCONTINUED | OUTPATIENT
Start: 2017-05-19 | End: 2017-05-19

## 2017-05-19 RX ORDER — NEOSTIGMINE METHYLSULFATE 1 MG/ML
INJECTION, SOLUTION INTRAVENOUS
Status: DISCONTINUED | OUTPATIENT
Start: 2017-05-19 | End: 2017-05-19

## 2017-05-19 RX ORDER — LIDOCAINE HYDROCHLORIDE 10 MG/ML
1 INJECTION, SOLUTION EPIDURAL; INFILTRATION; INTRACAUDAL; PERINEURAL ONCE
Status: DISCONTINUED | OUTPATIENT
Start: 2017-05-19 | End: 2017-05-19 | Stop reason: HOSPADM

## 2017-05-19 RX ORDER — OXYCODONE AND ACETAMINOPHEN 5; 325 MG/1; MG/1
1-2 TABLET ORAL EVERY 4 HOURS PRN
Qty: 21 TABLET | Refills: 0 | Status: SHIPPED | OUTPATIENT
Start: 2017-05-19 | End: 2017-10-05 | Stop reason: SDUPTHER

## 2017-05-19 RX ADMIN — ROCURONIUM BROMIDE 30 MG: 10 INJECTION, SOLUTION INTRAVENOUS at 11:05

## 2017-05-19 RX ADMIN — LIDOCAINE HYDROCHLORIDE 100 MG: 20 INJECTION, SOLUTION INTRAVENOUS at 11:05

## 2017-05-19 RX ADMIN — CEFAZOLIN SODIUM 2 G: 2 SOLUTION INTRAVENOUS at 11:05

## 2017-05-19 RX ADMIN — PROPOFOL 180 MG: 10 INJECTION, EMULSION INTRAVENOUS at 11:05

## 2017-05-19 RX ADMIN — MIDAZOLAM HYDROCHLORIDE 2 MG: 1 INJECTION, SOLUTION INTRAMUSCULAR; INTRAVENOUS at 10:05

## 2017-05-19 RX ADMIN — DEXAMETHASONE SODIUM PHOSPHATE 4 MG: 4 INJECTION, SOLUTION INTRAMUSCULAR; INTRAVENOUS at 11:05

## 2017-05-19 RX ADMIN — NEOSTIGMINE METHYLSULFATE 4 MG: 1 INJECTION INTRAVENOUS at 11:05

## 2017-05-19 RX ADMIN — FAMOTIDINE 20 MG: 10 INJECTION, SOLUTION INTRAVENOUS at 11:05

## 2017-05-19 RX ADMIN — ONDANSETRON 4 MG: 2 INJECTION INTRAMUSCULAR; INTRAVENOUS at 11:05

## 2017-05-19 RX ADMIN — FENTANYL CITRATE 50 MCG: 50 INJECTION, SOLUTION INTRAMUSCULAR; INTRAVENOUS at 11:05

## 2017-05-19 RX ADMIN — SODIUM CHLORIDE: 0.9 INJECTION, SOLUTION INTRAVENOUS at 10:05

## 2017-05-19 RX ADMIN — GLYCOPYRROLATE 0.6 MG: 0.2 INJECTION, SOLUTION INTRAMUSCULAR; INTRAVENOUS at 11:05

## 2017-05-19 NOTE — ANESTHESIA POSTPROCEDURE EVALUATION
"Anesthesia Post Evaluation    Patient: Meredith Ramos    Procedure(s) Performed: Procedure(s) (LRB):  URETEROSCOPY (Left)  CYSTOSCOPY (N/A)  PLACEMENT-STENT (Left)    Final Anesthesia Type: general  Patient location during evaluation: PACU  Patient participation: Yes- Able to Participate  Level of consciousness: awake and alert  Post-procedure vital signs: reviewed and stable  Pain management: adequate  Airway patency: patent  PONV status at discharge: No PONV  Anesthetic complications: no      Cardiovascular status: blood pressure returned to baseline  Respiratory status: unassisted, spontaneous ventilation and room air  Hydration status: euvolemic  Follow-up not needed.        Visit Vitals    /72    Pulse (!) 58    Temp 36.8 °C (98.2 °F) (Oral)    Resp 18    Ht 5' 5" (1.651 m)    Wt 57.6 kg (127 lb)    SpO2 100%    Breastfeeding No    BMI 21.13 kg/m2       Pain/Jesus Alberto Score: Pain Assessment Performed: Yes (5/19/2017 12:00 PM)  Presence of Pain: denies (5/19/2017 12:00 PM)  Jesus Alberto Score: 9 (5/19/2017 12:00 PM)      "

## 2017-05-19 NOTE — DISCHARGE INSTRUCTIONS
Remove stent by pulling on strings Monday morning at 0700.        Cystoscopy    Cystoscopy is a procedure that lets your doctor look directly inside your urethra and bladder. It can be used to:  · Help diagnose a problem with your urethra, bladder, or kidneys.  · Take a sample (biopsy) of bladder or urethral tissue.  · Treat certain problems (such as removing kidney stones).  · Place a stent to bypass an obstruction.  · Take special X-rays of the kidneys.  Based on the findings, your doctor may recommend other tests or treatments.  What is a cystoscope?  A cystoscope is a telescope-like instrument that contains lenses and fiberoptics (small glass wires that make bright light). The cystoscope may be straight and rigid, or flexible to bend around curves in the urethra. The doctor may look directly into the cystoscope, or project the image onto a monitor.  Getting ready  · Ask your doctor if you should stop taking any medications prior to the procedure.  · Ask whether you should avoid eating or drinking anything after midnight before the procedure.  · Follow any other instructions your doctor gives you.  Tell your doctor before the exam if you:  · Take any medications, such as aspirin or blood thinners  · Have allergies to any medications  · Are pregnant   The procedure  Cystoscopy is done in the doctors office or hospital. The doctor and a nurse are present during the procedure. It takes only a few minutes, longer if a biopsy, X-ray, or treatment needs to be done.  During the procedure:  · You lie on an exam table on your back, knees bent and legs apart. You are covered with a drape.  · Your urethra and the area around it are washed. Anesthetic jelly may be applied to numb the urethra. Other pain medication is usually not needed. In some cases, you may be offered a mild sedative to help you relax. If a more extensive procedure is to be done, such as a biopsy or kidney stone removal, general anesthesia may be  needed.  · The cystoscope is inserted. A sterile fluid is put into the bladder to expand it. You may feel pressure from this fluid.  · When the procedure is done, the cystoscope is removed.  After the procedure  If you had a sedative, general anesthesia, or spinal anesthesia, you must have someone drive you home. Once youre home:  · Drink plenty of fluids.  · You may have burning or light bleeding when you urinate--this is normal.  · Medications may be prescribed to ease any discomfort or prevent infection. Take these as directed.  · Call your doctor if you have heavy bleeding or blood clots, burning that lasts more than a day, a fever over 100°F  (38° C), or trouble urinating.  Date Last Reviewed: 9/8/2014  © 9884-5749 [employer]. [address] [place] [address], [place] [address]. All rights reserved. This information is not intended as a substitute for professional medical care. Always follow your healthcare professional's instructions.

## 2017-05-19 NOTE — INTERVAL H&P NOTE
The patient has been examined and the H&P has been reviewed:    I concur with the findings and no changes have occurred since H&P was written.   To OR for left ureteroscopy with laser lithotripsy     Anesthesia/Surgery risks, benefits and alternative options discussed and understood by patient/family.          Active Hospital Problems    Diagnosis  POA    Left ureteral stone [N20.1]  Yes      Resolved Hospital Problems    Diagnosis Date Resolved POA   No resolved problems to display.

## 2017-05-19 NOTE — OP NOTE
[facility]  Operative Note    Date: 05/19/2017    Pre-Op Diagnosis:   1.  left ureteral stone  2.  Palpitations   3.  Osteoporosis       Post-Op Diagnosis:   1.  recently passed left ureteral stone  2.  Palpitations   3.  Osteoporosis          Procedure(s) Performed:   1.  Left ureteroscopy  2.  Cystoscopy  3.  Left JJ ureteral stent insertion   4.  Fluoro < 1 h    Specimen(s): none    Staff Surgeon: Efren Ford MD    Assistant Surgeon: Jemal Ye MD    Anesthesia: General endotracheal anesthesia    Indications: Meredith Ramos is a 67 y.o. female with a left ureteral stone, presenting for definitive stone management.  She currently does not have a JJ ureteral stent in place.      Findings:     - no stone in bladder   - left ureteroscopy demonstrated irritation and edema of distal ureteral mucosa likely from recently passed ureteral stone  - no stone identified in ureter      Estimated Blood Loss: min    Drains: 6 Fr x 24 cm JJ ureteral stent with strings    Procedure in detail:  After informed consent was obtained, the patient was brought the the cystoscopy suite and placed in the supine position.  SCDs were applied and working.  Anesthesia was administered.  The patient was then placed in the dorsal lithotomy position and prepped and draped in the usual sterile fashion.      A rigid cystoscope in a 22 Fr sheath was introduced into the patient's urethra.  This passed easily.  The entire urethra was visualized which showed no strictures or masses.  Formal cystoscopy was performed which revealed no masses or lesions suspicious for malignancy, no bladder stones, no bladder diverticuli, no trabeculations.  The ureteral orifices were visualized in the normal anatomic position bilaterally and clear efflux was visualized.      A plain film of the abdomen was taken, and a calcification was noted just proximal to the left ureteral orifice; it was unclear if this represented the ureteral stone or  a pelvic phlebolith.     A guidewire was passed up the left ureteral orifice and up into the kidney.  This passed easily and placement was confirmed using fluoro.  The cystoscope was removed keeping the guidewire in place and the wire was secured to the drape.      An 8 Fr rigid ureteroscope was passed into the patient's bladder alongside the wire under direct vision.  It was then passed through the left ureteral orifice alongside the wire.  The ureteroscope was advanced all the way into the left renal pelvis, but no stone was identified.      A 6 Fr x 24 cm JJ ureteral stent with strings was passed over the wire and up into the renal pelvis using fluoro.  When the coil appeared to be in good position in the kidney and the radio-opaque marker of the pusher was at the inferior pubis, the wire was removed under continuous fluoro.  Good coils were seen in the kidney and the bladder using fluoro.      The patient tolerated the procedure well and was transferred to the recovery room in stable condition.      Disposition:  The patient will follow up with Dr. Ford in 4 weeks with a retroperitoneal US.      Jemal Ye MD

## 2017-05-19 NOTE — IP AVS SNAPSHOT
[facility]  [address]  [facility] [address]  Phone: [phone]           Patient Discharge Instructions   Our goal is to set you up for success. This packet includes information on your condition, medications, and your home care.  It will help you care for yourself to prevent having to return to the hospital.     Please ask your nurse if you have any questions.      There are many details to remember when preparing to leave the hospital. Here is what you will need to do:    1. Take your medicine. If you are prescribed medications, review your Medication List on the following pages. You may have new medications to  at the pharmacy and others that you'll need to stop taking. Review the instructions for how and when to take your medications. Talk with your doctor or nurses if you are unsure of what to do.     2. Go to your follow-up appointments. Specific follow-up information is listed in the following pages. Your may be contacted by a nurse or clinical provider about future appointments. Be sure we have all of the phone numbers to reach you. Please contact your provider's office if you are unable to make an appointment.     3. Watch for warning signs. Your doctor or nurse will give you detailed warning signs to watch for and when to call for assistance. These instructions may also include educational information about your condition. If you experience any of warning signs to your health, call your doctor.           [facility]  Unless otherwise directed by your provider, please   contact [facility], our nurse care line   that is available for 24/7 assistance.     [phone] (toll-free)     Registered nurses in the [facility]   provide: appointment scheduling, clinical advisement, health education, and other advisory services.                  ** Verify the list of medication(s) below is accurate and up to date. Carry this with you in case of  emergency. If your medications have changed, please notify your healthcare provider.             Medication List      START taking these medications        Additional Info                      oxybutynin 5 MG Tab   Commonly known as:  DITROPAN   Quantity:  30 tablet   Refills:  0   Dose:  5 mg    Instructions:  Take 1 tablet (5 mg total) by mouth 3 (three) times daily as needed.     Begin Date    AM    Noon    PM    Bedtime       oxycodone-acetaminophen 5-325 mg per tablet   Commonly known as:  PERCOCET   Quantity:  21 tablet   Refills:  0   Dose:  1-2 tablet    Instructions:  Take 1-2 tablets by mouth every 4 (four) hours as needed.     Begin Date    AM    Noon    PM    Bedtime       phenazopyridine 100 MG tablet   Commonly known as:  PYRIDIUM   Quantity:  21 tablet   Refills:  0   Dose:  100 mg    Instructions:  Take 1 tablet (100 mg total) by mouth 3 (three) times daily as needed for Pain.     Begin Date    AM    Noon    PM    Bedtime       polyethylene glycol 17 gram Pwpk   Commonly known as:  GLYCOLAX   Quantity:  30 packet   Refills:  0   Dose:  17 g    Instructions:  Take 17 g by mouth once daily.     Begin Date    AM    Noon    PM    Bedtime         CONTINUE taking these medications        Additional Info                      biotin 300 mcg Tab   Refills:  0   Dose:  1 tablet    Instructions:  Take 1 tablet by mouth once daily.     Begin Date    AM    Noon    PM    Bedtime       ketorolac 10 mg tablet   Commonly known as:  TORADOL   Quantity:  10 tablet   Refills:  0   Dose:  10 mg    Instructions:  Take 1 tablet (10 mg total) by mouth every 6 (six) hours.     Begin Date    AM    Noon    PM    Bedtime       naproxen 500 MG tablet   Commonly known as:  NAPROSYN   Quantity:  20 tablet   Refills:  0   Dose:  500 mg    Instructions:  Take 1 tablet (500 mg total) by mouth 2 (two) times daily with meals.     Begin Date    AM    Noon    PM    Bedtime       ondansetron 4 MG tablet   Commonly known as:  ZOFRAN    Quantity:  12 tablet   Refills:  0   Dose:  4 mg    Instructions:  Take 1 tablet (4 mg total) by mouth every 6 (six) hours.     Begin Date    AM    Noon    PM    Bedtime       ONE DAILY MULTIVITAMIN per tablet   Refills:  0   Dose:  1 tablet   Generic drug:  multivitamin    Instructions:  Take 1 tablet by mouth once daily.     Begin Date    AM    Noon    PM    Bedtime       tamsulosin 0.4 mg Cp24   Commonly known as:  FLOMAX   Quantity:  10 capsule   Refills:  0   Dose:  0.4 mg    Instructions:  Take 1 capsule (0.4 mg total) by mouth once daily.     Begin Date    AM    Noon    PM    Bedtime         STOP taking these medications     hydrocodone-acetaminophen 10-325mg  mg Tab   Commonly known as:  NORCO            Where to Get Your Medications      You can get these medications from any pharmacy     Bring a paper prescription for each of these medications     oxybutynin 5 MG Tab    oxycodone-acetaminophen 5-325 mg per tablet    phenazopyridine 100 MG tablet    polyethylene glycol 17 gram Pwpk                  Please bring to all follow up appointments:    1. A copy of your discharge instructions.  2. All medicines you are currently taking in their original bottles.  3. Identification and insurance card.    Please arrive 15 minutes ahead of scheduled appointment time.    Please call 24 hours in advance if you must reschedule your appointment and/or time.        Your Scheduled Appointments     May 29, 2017  7:15 AM CDT   Us Retroper with [facility] [address] [facility] ([facility] )    [address] [facility] [id number] [phone]            May 29, 2017  8:45 AM CDT   Post OP with Efren Ford Jr., MD   [facility] - Urology 4th Floor ([facility] )    [address]  [place] [id number] [phone]              Follow-up Information     Follow up with Efren Ford Jr, MD In 4 weeks.    Specialty:  Urology    Why:  post op ureteroscopy, needs  retroperitoneal US prior     Contact information:    Lynda CALDWE[address]  [facility] [address]  [phone]          Discharge Instructions     Future Orders    US Retroperitoneal Complete     Process Instructions:    No food or drink after midnight (8 hours before exam time for an afternoon appointment).  Oral medication with a small amount of water the morning before test is acceptable.    Questions:    Reason for Exam:      May the Radiologist modify the order per protocol to meet the clinical needs of the patient?:  Yes    Activity as tolerated     Call MD for:  difficulty breathing or increased cough     Call MD for:  increased confusion or weakness     Call MD for:  persistent dizziness, light-headedness, or visual disturbances     Call MD for:  persistent nausea and vomiting or diarrhea     Call MD for:  redness, tenderness, or signs of infection (pain, swelling, redness, odor or green/yellow discharge around incision site)     Call MD for:  severe persistent headache     Call MD for:  severe uncontrolled pain     Call MD for:  temperature >100.4     Call MD for:  worsening rash     Diet general     Questions:    Total calories:      Fat restriction, if any:      Protein restriction, if any:      Na restriction, if any:      Fluid restriction:      Additional restrictions:      No dressing needed         Discharge Instructions       Remove stent by pulling on strings Monday morning at 0700.        Cystoscopy    Cystoscopy is a procedure that lets your doctor look directly inside your urethra and bladder. It can be used to:  · Help diagnose a problem with your urethra, bladder, or kidneys.  · Take a sample (biopsy) of bladder or urethral tissue.  · Treat certain problems (such as removing kidney stones).  · Place a stent to bypass an obstruction.  · Take special X-rays of the kidneys.  Based on the findings, your doctor may recommend other tests or treatments.  What is a cystoscope?  A cystoscope is a  telescope-like instrument that contains lenses and fiberoptics (small glass wires that make bright light). The cystoscope may be straight and rigid, or flexible to bend around curves in the urethra. The doctor may look directly into the cystoscope, or project the image onto a monitor.  Getting ready  · Ask your doctor if you should stop taking any medications prior to the procedure.  · Ask whether you should avoid eating or drinking anything after midnight before the procedure.  · Follow any other instructions your doctor gives you.  Tell your doctor before the exam if you:  · Take any medications, such as aspirin or blood thinners  · Have allergies to any medications  · Are pregnant   The procedure  Cystoscopy is done in the doctors office or hospital. The doctor and a nurse are present during the procedure. It takes only a few minutes, longer if a biopsy, X-ray, or treatment needs to be done.  During the procedure:  · You lie on an exam table on your back, knees bent and legs apart. You are covered with a drape.  · Your urethra and the area around it are washed. Anesthetic jelly may be applied to numb the urethra. Other pain medication is usually not needed. In some cases, you may be offered a mild sedative to help you relax. If a more extensive procedure is to be done, such as a biopsy or kidney stone removal, general anesthesia may be needed.  · The cystoscope is inserted. A sterile fluid is put into the bladder to expand it. You may feel pressure from this fluid.  · When the procedure is done, the cystoscope is removed.  After the procedure  If you had a sedative, general anesthesia, or spinal anesthesia, you must have someone drive you home. Once youre home:  · Drink plenty of fluids.  · You may have burning or light bleeding when you urinate--this is normal.  · Medications may be prescribed to ease any discomfort or prevent infection. Take these as directed.  · Call your doctor if you have heavy bleeding or  "blood clots, burning that lasts more than a day, a fever over 100°F  (38° C), or trouble urinating.  Date Last Reviewed: 9/8/2014  © 8273-2934 [employer]. [address] [place] [address], [place] [address]. All rights reserved. This information is not intended as a substitute for professional medical care. Always follow your healthcare professional's instructions.            Primary Diagnosis     Your primary diagnosis was:  Urinary Tract Stone      Admission Information     Date & Time Provider Department CSN    5/19/2017  9:38 AM Efren Ford Jr., MD [facility] [id number]      Care Providers     Provider Role Specialty Primary office phone    Efren Ford Jr., MD Attending Provider Urology [phone]    Efren Ford Jr., MD Surgeon  Urology [phone]      Your Vitals Were     BP Pulse Temp Resp Height Weight    121/72 58 98.2 °F (36.8 °C) (Oral) 18 5' 5" (1.651 m) 57.6 kg (127 lb)    SpO2 BMI             100% 21.13 kg/m2         Recent Lab Values     No lab values to display.      Allergies as of 5/19/2017     No Known Allergies      Advance Directives     An advance directive is a document which, in the event you are no longer able to make decisions for yourself, tells your healthcare team what kind of treatment you do or do not want to receive, or who you would like to make those decisions for you.  If you do not currently have an advance directive, [facility] encourages you to create one.  For more information call:  [phone] ([phone]), [phone] ([phone]),  or log on to [email or web address].        Smoking Cessation     If you would like to quit smoking:   You may be eligible for free services if you are a [place] resident and started smoking cigarettes before September 1, 1988.  Call the Smoking Cessation [employer] (SCT) toll free at (293) 340-2627 or (479) 387-1564.   Call 1-800-QUIT-NOW if you do not meet the above criteria.   Contact us via email: " [email or web address]   View our website for more information: [email or web address]        Language Assistance Services     ATTENTION: Language assistance services are available, free of charge. Please call [phone].      ATENCIÓN: Si habla radha, tiene a zuniga disposición servicios gratuitos de asistencia lingüística. Llame al [phone].     CHÚ Ý: N?u b?n nói Ti?ng Vi?t, có các d?ch v? h? tr? ngôn ng? mi?n phí dành cho b?n. G?i s? [phone].         [facility] complies with applicable Federal civil rights laws and does not discriminate on the basis of race, color, national origin, age, disability, or sex.

## 2017-05-19 NOTE — ANESTHESIA RELEASE NOTE
"Anesthesia Release from PACU Note    Patient: Meredith Ramos    Procedure(s) Performed: Procedure(s) (LRB):  URETEROSCOPY (Left)  CYSTOSCOPY (N/A)  PLACEMENT-STENT (Left)    Anesthesia type: general    Post pain: Adequate analgesia    Post assessment: no apparent anesthetic complications and tolerated procedure well    Last Vitals:   Visit Vitals    /72    Pulse (!) 58    Temp 36.8 °C (98.2 °F) (Oral)    Resp 18    Ht 5' 5" (1.651 m)    Wt 57.6 kg (127 lb)    SpO2 100%    Breastfeeding No    BMI 21.13 kg/m2       Post vital signs: stable    Level of consciousness: awake and alert     Nausea/Vomiting: no nausea/no vomiting    Complications: none    Airway Patency: patent    Respiratory: unassisted, spontaneous ventilation, room air    Cardiovascular: stable and blood pressure at baseline    Hydration: euvolemic  "

## 2017-05-19 NOTE — TRANSFER OF CARE
"Anesthesia Transfer of Care Note    Patient: Meredith Ramos    Procedure(s) Performed: Procedure(s) (LRB):  URETEROSCOPY (Left)  CYSTOSCOPY (N/A)  PLACEMENT-STENT (Left)    Patient location: PACU    Anesthesia Type: general    Transport from OR: Transported from OR on 6-10 L/min O2 by face mask with adequate spontaneous ventilation    Post pain: adequate analgesia    Post assessment: no apparent anesthetic complications    Post vital signs: stable    Level of consciousness: awake, alert and oriented    Nausea/Vomiting: no nausea/vomiting    Complications: none          Last vitals:   Visit Vitals    /74    Pulse 66    Temp 36.8 °C (98.2 °F) (Oral)    Resp 16    Ht 5' 5" (1.651 m)    Wt 57.6 kg (127 lb)    SpO2 100%    Breastfeeding No    BMI 21.13 kg/m2     "

## 2017-05-29 ENCOUNTER — HOSPITAL ENCOUNTER (OUTPATIENT)
Dept: RADIOLOGY | Facility: HOSPITAL | Age: 67
Discharge: HOME | End: 2017-05-29
Attending: UROLOGY
Payer: MEDICARE

## 2017-05-29 DIAGNOSIS — N20.1 LEFT URETERAL CALCULUS: ICD-10-CM

## 2017-05-29 PROCEDURE — 76770 US EXAM ABDO BACK WALL COMP: CPT | Mod: 26,,, | Performed by: RADIOLOGY

## 2017-05-29 PROCEDURE — 76770 US EXAM ABDO BACK WALL COMP: CPT | Mod: TC

## 2017-06-04 ENCOUNTER — PATIENT MESSAGE (OUTPATIENT)
Dept: UROLOGY | Facility: CLINIC | Age: 67
End: 2017-06-04

## 2017-09-13 ENCOUNTER — PATIENT MESSAGE (OUTPATIENT)
Dept: SPORTS MEDICINE | Facility: CLINIC | Age: 67
End: 2017-09-13

## 2017-09-20 ENCOUNTER — TELEPHONE (OUTPATIENT)
Dept: SPORTS MEDICINE | Facility: CLINIC | Age: 67
End: 2017-09-20

## 2017-09-20 NOTE — TELEPHONE ENCOUNTER
Left message.     Informed pt Dr. Lu has never seen her for her hip thus he cannot rx PT.     [facility] Lu.

## 2017-09-26 ENCOUNTER — TELEPHONE (OUTPATIENT)
Dept: SPORTS MEDICINE | Facility: CLINIC | Age: 67
End: 2017-09-26

## 2017-09-26 NOTE — TELEPHONE ENCOUNTER
1st attempt.     Left voicemail.    10/02/17 appt needs to be r/s due to change in Dr. Lu's schedule.

## 2017-09-27 ENCOUNTER — TELEPHONE (OUTPATIENT)
Dept: SPORTS MEDICINE | Facility: CLINIC | Age: 67
End: 2017-09-27

## 2017-09-27 NOTE — TELEPHONE ENCOUNTER
2nd attempt.     Left voicemail.    10/02/17 appt needs to be r/s due to change in Dr. Lu's schedule.

## 2017-10-05 ENCOUNTER — OFFICE VISIT (OUTPATIENT)
Dept: SPORTS MEDICINE | Facility: CLINIC | Age: 67
End: 2017-10-05
Payer: MEDICARE

## 2017-10-05 ENCOUNTER — HOSPITAL ENCOUNTER (OUTPATIENT)
Dept: RADIOLOGY | Facility: HOSPITAL | Age: 67
Discharge: HOME | End: 2017-10-05
Attending: FAMILY MEDICINE
Payer: MEDICARE

## 2017-10-05 VITALS — BODY MASS INDEX: 21.16 KG/M2 | HEIGHT: 65 IN | WEIGHT: 127 LBS | TEMPERATURE: 98 F

## 2017-10-05 DIAGNOSIS — M25.552 LEFT HIP PAIN: ICD-10-CM

## 2017-10-05 DIAGNOSIS — M76.892 HAMSTRING TENDINITIS OF LEFT THIGH: Primary | ICD-10-CM

## 2017-10-05 PROCEDURE — 99214 OFFICE O/P EST MOD 30 MIN: CPT | Mod: S$PBB,,, | Performed by: FAMILY MEDICINE

## 2017-10-05 PROCEDURE — 73502 X-RAY EXAM HIP UNI 2-3 VIEWS: CPT | Mod: 26,LT,, | Performed by: RADIOLOGY

## 2017-10-05 PROCEDURE — 73502 X-RAY EXAM HIP UNI 2-3 VIEWS: CPT | Mod: TC,PO,LT

## 2017-10-05 PROCEDURE — 99999 PR PBB SHADOW E&M-EST. PATIENT-LVL III: CPT | Mod: PBBFAC,,, | Performed by: FAMILY MEDICINE

## 2017-10-05 PROCEDURE — 99213 OFFICE O/P EST LOW 20 MIN: CPT | Mod: PBBFAC,25,PO | Performed by: FAMILY MEDICINE

## 2017-10-05 NOTE — PROGRESS NOTES
Meredith Ramos, a 67 y.o. female, is here for evaluation of LEFT hip.     HISTORY OF PRESENT ILLNESS   Location: posteior thigh, left  Onset: insidious, late July 2017  Palliative:    Relative rest   Oral analgesics (NSAIDs PRN)   Self stretching  Provocative:   ADLs   Prior:    PMH knee pain   Progression: plateau discomfort   Quality:    Ache  Radiation: none  Severity: per nursing documentation  Timing: intermittent with use  Trauma: none knownn    Review of systems (ROS):  A 10+ review of systems was performed with pertinent positives and negatives noted above in the history of present illness. Other systems were negative unless otherwise specified.    PHYSICAL EXAMINATION  General:  The patient is alert and oriented x 3.  Mood is pleasant.  Observation of ears, eyes and nose reveal no gross abnormalities.  HEENT: NCAT, sclera nonicteric  Lungs: Respirations are equal and unlabored.   Gait is coordinated. Patient can toe walk and heel walk without difficulty.    HIP/PELVIS EXAMINATION    Observation/Inspection  Gait:   Nonantalgic   Alignment:  Neutral   Scars:   None   Muscle atrophy: None   Effusion:  None   Warmth:  None   Discoloration:   None   Leg lengths:   Equal   Pelvis:    Level     Tenderness/Crepitus (T/C):      T / C  Trochanteric bursa   - / -  Piriformis    + / -  SI joint    - / -  Psoas tendon   - / -  Rectus insertion  - / -  Adductor insertion  - / -  Pubic symphysis  - / -    ROM: (* = pain)    Flexion:      120 degrees  External rotation:   40 degrees  Internal rotation with axial load:  30 degrees  Internal rotation without axial load:  40 degrees  Abduction:    45 degrees  Adduction:     20 degrees    Special Tests:  Pain w/ forced internal rotation (FADIR):  -   Pain w/ forced external rotation (DAVID):  -   Circumduction test:     -  Stinchfield test:     -   Log roll:       -   Snapping hip (internal):    -   Sit-up pain:      -   Resisted sit-up pain:     -   Resisted sit-up with  adductor contraction pain:  -   Step-down test:     +  Trendelenburg test:     -  Bridge test      +     Extremity Neuro-vascular Examination:   Sensation:  Grossly intact to light touch all dermatomal regions.   Motor Function:  Fully intact motor function at hip, knee, foot and ankle    DTRs;  quadriceps and  achilles 2+.  No clonus and downgoing Babinski.    Vascular status:  DP and PT pulses 2+, brisk capillary refill, symmetric.    Skin:  intact, compartments soft.    Other Findings:    ASSESSMENT & PLAN  Assessment:   #1 tendinitis of conjoined tendon of hamstrings, left   W/ overlying ischial bursitis    No evidence of neurologic pathology  No evidence of vascular pathology    Imaging studies reviewed:   X-ray pelvis and hip, left 17.10    Plan:    We discussed the importance of appropriate diet, weight, and regular exercise including quadriceps strengthening     We discussed options including:  #1 watchful waiting  #2 physical therapy aimed at:   Core stability   RoM hip   Strengthening quadriceps   Gait training   #3 injection therapy:   CSI GTB    Right,     Left,    CSI iaHip    Right,     Left,    Orthobiologics   #4 MRI for further evaluation      The patient chooses #1    Pain management: handout given  Bracing:   Physical therapy: discussed hamstring stretching, quad strengthening, reducing sqats+lunges  Activity (e.g. sports, work) restrictions: as tolerated   School/vocation: yoga, walking / swimming / bicycle     Follow up per patient  Should symptoms worsen or fail to resolve, consider:  Revisiting the above options

## 2018-01-17 ENCOUNTER — HOSPITAL ENCOUNTER (OUTPATIENT)
Dept: RADIOLOGY | Facility: HOSPITAL | Age: 68
Discharge: HOME | End: 2018-01-17
Attending: INTERNAL MEDICINE
Payer: MEDICARE

## 2018-01-17 ENCOUNTER — OFFICE VISIT (OUTPATIENT)
Dept: INTERNAL MEDICINE | Facility: CLINIC | Age: 68
End: 2018-01-17
Payer: MEDICARE

## 2018-01-17 DIAGNOSIS — M25.579 ANKLE PAIN, UNSPECIFIED CHRONICITY, UNSPECIFIED LATERALITY: ICD-10-CM

## 2018-01-17 DIAGNOSIS — M25.579 ANKLE PAIN, UNSPECIFIED CHRONICITY, UNSPECIFIED LATERALITY: Primary | ICD-10-CM

## 2018-01-17 PROCEDURE — 99213 OFFICE O/P EST LOW 20 MIN: CPT | Mod: PBBFAC,25 | Performed by: INTERNAL MEDICINE

## 2018-01-17 PROCEDURE — 99999 PR PBB SHADOW E&M-EST. PATIENT-LVL III: CPT | Mod: PBBFAC,,, | Performed by: INTERNAL MEDICINE

## 2018-01-17 PROCEDURE — 73610 X-RAY EXAM OF ANKLE: CPT | Mod: 26,RT,, | Performed by: RADIOLOGY

## 2018-01-17 PROCEDURE — 99213 OFFICE O/P EST LOW 20 MIN: CPT | Mod: S$PBB,,, | Performed by: INTERNAL MEDICINE

## 2018-01-17 PROCEDURE — 73610 X-RAY EXAM OF ANKLE: CPT | Mod: TC,RT

## 2018-01-17 RX ORDER — OSELTAMIVIR PHOSPHATE 75 MG/1
75 CAPSULE ORAL 2 TIMES DAILY
Qty: 10 CAPSULE | Refills: 0 | Status: SHIPPED | OUTPATIENT
Start: 2018-01-17 | End: 2018-01-22

## 2018-01-18 ENCOUNTER — TELEPHONE (OUTPATIENT)
Dept: INTERNAL MEDICINE | Facility: CLINIC | Age: 68
End: 2018-01-18

## 2018-01-18 DIAGNOSIS — R74.8 ELEVATED LIVER ENZYMES: Primary | ICD-10-CM

## 2018-01-18 NOTE — TELEPHONE ENCOUNTER
Please contact patient and inform her that all of her results were acceptable except one of her liver blood tests was slightly elevated. I would like to recheck the level in 1 week. Order in,please schedule lab in 1 week

## 2018-01-21 VITALS
BODY MASS INDEX: 20.64 KG/M2 | WEIGHT: 123.88 LBS | HEIGHT: 65 IN | DIASTOLIC BLOOD PRESSURE: 76 MMHG | SYSTOLIC BLOOD PRESSURE: 120 MMHG | HEART RATE: 69 BPM | TEMPERATURE: 99 F

## 2018-01-21 NOTE — PROGRESS NOTES
Subjective:       Patient ID: Meredith Ramos is a 68 y.o. female.    Chief Complaint: Ankle Pain    HPI  She complains of right ankle pain.  Denies any acute trauma    Past medical history: Partial colectomy secondary to diverticular disease, nephrolithiasis, status post hysterectomy    Medications: None    NO KNOWN DRUG ALLERGIES      Review of Systems   Constitutional: Negative for chills, fatigue, fever and unexpected weight change.   Respiratory: Negative for chest tightness and shortness of breath.    Cardiovascular: Negative for chest pain and palpitations.   Gastrointestinal: Negative for abdominal pain and blood in stool.   Neurological: Negative for dizziness, syncope, numbness and headaches.       Objective:      Physical Exam   HENT:   Right Ear: External ear normal.   Left Ear: External ear normal.   Nose: Nose normal.   Mouth/Throat: Oropharynx is clear and moist.   Eyes: Pupils are equal, round, and reactive to light.   Neck: Normal range of motion.   Cardiovascular: Normal rate and regular rhythm.    No murmur heard.  Pulmonary/Chest: Breath sounds normal.   Abdominal: She exhibits no distension. There is no hepatosplenomegaly. There is no tenderness.   Lymphadenopathy:     She has no cervical adenopathy.     She has no axillary adenopathy.   Neurological: She has normal strength and normal reflexes. No cranial nerve deficit or sensory deficit.     right ankle without tenderness or redness  Assessment/Plan       Assessment and plan: Ankle pain: Check right ankle x-ray.  Check CMP, CBC, uric acid.  She was here for an urgent care only appointment.  She will return to clinic for a physical

## 2018-01-25 ENCOUNTER — LAB VISIT (OUTPATIENT)
Dept: LAB | Facility: HOSPITAL | Age: 68
End: 2018-01-25
Attending: INTERNAL MEDICINE
Payer: MEDICARE

## 2018-01-25 DIAGNOSIS — R74.8 ELEVATED LIVER ENZYMES: ICD-10-CM

## 2018-01-25 LAB
ALBUMIN SERPL BCP-MCNC: 4.2 G/DL
ALP SERPL-CCNC: 79 U/L
ALT SERPL W/O P-5'-P-CCNC: 35 U/L
ANION GAP SERPL CALC-SCNC: 7 MMOL/L
AST SERPL-CCNC: 38 U/L
BILIRUB SERPL-MCNC: 0.5 MG/DL
BUN SERPL-MCNC: 10 MG/DL
CALCIUM SERPL-MCNC: 9.7 MG/DL
CHLORIDE SERPL-SCNC: 105 MMOL/L
CO2 SERPL-SCNC: 28 MMOL/L
CREAT SERPL-MCNC: 0.7 MG/DL
EST. GFR  (AFRICAN AMERICAN): >60 ML/MIN/1.73 M^2
EST. GFR  (NON AFRICAN AMERICAN): >60 ML/MIN/1.73 M^2
GLUCOSE SERPL-MCNC: 88 MG/DL
POTASSIUM SERPL-SCNC: 4 MMOL/L
PROT SERPL-MCNC: 6.8 G/DL
SODIUM SERPL-SCNC: 140 MMOL/L

## 2018-01-25 PROCEDURE — 36415 COLL VENOUS BLD VENIPUNCTURE: CPT

## 2018-01-25 PROCEDURE — 80053 COMPREHEN METABOLIC PANEL: CPT

## 2018-03-12 ENCOUNTER — TELEPHONE (OUTPATIENT)
Dept: INTERNAL MEDICINE | Facility: CLINIC | Age: 68
End: 2018-03-12

## 2018-03-12 ENCOUNTER — PATIENT MESSAGE (OUTPATIENT)
Dept: INTERNAL MEDICINE | Facility: CLINIC | Age: 68
End: 2018-03-12

## 2018-03-12 NOTE — TELEPHONE ENCOUNTER
----- Message from Gutierrez Giles sent at 3/12/2018  4:33 PM CDT -----  Contact: Self  Pt called in about wanting to see if she needs a Tetanus shot. Pt almost cut her finger off from gardening. Pt would like the nurse to give her a call back in regards to this matter      Pt can be reached at [phone] ty

## 2018-03-13 NOTE — TELEPHONE ENCOUNTER
Message left for pt to call office to inform pt Yes, she needs a tetanus shot, and she should schedule an urgent care visit to have her finger checked

## 2018-03-13 NOTE — TELEPHONE ENCOUNTER
Yes, she needs a tetanus shot, and she should schedule an urgent care visit to have her finger checked

## 2018-03-14 ENCOUNTER — TELEPHONE (OUTPATIENT)
Dept: INTERNAL MEDICINE | Facility: CLINIC | Age: 68
End: 2018-03-14

## 2018-03-14 ENCOUNTER — OFFICE VISIT (OUTPATIENT)
Dept: INTERNAL MEDICINE | Facility: CLINIC | Age: 68
End: 2018-03-14
Payer: MEDICARE

## 2018-03-14 VITALS
BODY MASS INDEX: 20.97 KG/M2 | DIASTOLIC BLOOD PRESSURE: 66 MMHG | SYSTOLIC BLOOD PRESSURE: 110 MMHG | HEIGHT: 65 IN | TEMPERATURE: 98 F | HEART RATE: 72 BPM | OXYGEN SATURATION: 97 % | WEIGHT: 125.88 LBS

## 2018-03-14 DIAGNOSIS — S61.211A LACERATION OF LEFT INDEX FINGER WITHOUT FOREIGN BODY, NAIL DAMAGE STATUS UNSPECIFIED, INITIAL ENCOUNTER: Primary | ICD-10-CM

## 2018-03-14 PROCEDURE — 99999 PR PBB SHADOW E&M-EST. PATIENT-LVL IV: CPT | Mod: PBBFAC,,, | Performed by: NURSE PRACTITIONER

## 2018-03-14 PROCEDURE — 99213 OFFICE O/P EST LOW 20 MIN: CPT | Mod: S$PBB,,, | Performed by: NURSE PRACTITIONER

## 2018-03-14 PROCEDURE — 90714 TD VACC NO PRESV 7 YRS+ IM: CPT | Mod: PBBFAC

## 2018-03-14 PROCEDURE — 99214 OFFICE O/P EST MOD 30 MIN: CPT | Mod: PBBFAC,25 | Performed by: NURSE PRACTITIONER

## 2018-03-14 NOTE — PROGRESS NOTES
"Subjective:       Patient ID: Meredith Ramos is a 68 y.o. female.    Chief Complaint: Hand Pain ( tetnus shot if possible)    HPI:  69 yo female that presents to clinic with cut to left index finger x 4 days.    States that she was outside on Saturday working in her garden where she cut her finger with pruning sheers.    States that she stopped the bleeding and cleaned the finger with hydrogen peroxide and has been using steri strips on the finger.   States that her finger doesn't hurt anymore and doesn't appear to be infected but she needed to get a tetanus shot.    Denies any fever.  Denies any bleeding or drainage from her finger.  States finger is only mildly tender when she "bumps it against something."    Review of Systems   Constitutional: Negative for appetite change, chills, fatigue and fever.   Respiratory: Negative for apnea, cough, shortness of breath and wheezing.    Cardiovascular: Negative for chest pain, palpitations and leg swelling.   Musculoskeletal: Negative for arthralgias, back pain, gait problem, myalgias, neck pain and neck stiffness.   Skin: Positive for wound (left index finger). Negative for rash.   Neurological: Negative for dizziness, light-headedness, numbness and headaches.   Psychiatric/Behavioral: Negative for behavioral problems.       Objective:      Physical Exam   Constitutional: She is oriented to person, place, and time. She appears well-developed and well-nourished. No distress.   Cardiovascular: Normal rate, regular rhythm, normal heart sounds and intact distal pulses.    No murmur heard.  Pulmonary/Chest: Effort normal and breath sounds normal. No respiratory distress. She has no wheezes. She has no rales.   Musculoskeletal:        Left hand: She exhibits tenderness and laceration. She exhibits normal range of motion, normal capillary refill and no swelling. Normal strength noted.        Hands:  Healing laceration to tip of left index finger.  Slightly tender to touch but " erythema or drainage noted.  Sensation intact with good ROM.  Nail is intact.   Neurological: She is alert and oriented to person, place, and time. No cranial nerve deficit or sensory deficit.   Skin: Skin is warm and dry. Capillary refill takes less than 2 seconds.   Psychiatric: Her behavior is normal.       Assessment:       1. Laceration of left index finger without foreign body, nail damage status unspecified, initial encounter        Plan:     1. Laceration of left index finger without foreign body, nail damage status unspecified, initial encounter   -Well healing laceration to tip of left index finger.  -Will give Td vaacine in clinic today as patient believes it has been longer than 10 years since last Td vaccine.  -Continue to clean finger daily with warm water and antibacterial soap.  -Can also use over the counter Bactroban or neosporin ointment to cover cut until skin is fully healed.  -Instructed to notify clinic if pain worsens or she develops discoloration or purulent drainage in finger.

## 2018-03-14 NOTE — TELEPHONE ENCOUNTER
----- Message from Jelly Busch sent at 3/14/2018  1:23 PM CDT -----  Contact: self/[phone]  Pt called in regards to the np telling her to go to the store and get Bactroban but she needs a Rx for it. She would like to know what else can she use.      Please advise

## 2018-04-19 ENCOUNTER — TELEPHONE (OUTPATIENT)
Dept: INTERNAL MEDICINE | Facility: CLINIC | Age: 68
End: 2018-04-19

## 2018-04-19 NOTE — TELEPHONE ENCOUNTER
----- Message from Joan EPIFANIO Alvarez sent at 4/17/2018  5:45 PM CDT -----  Contact: PT Portal Request  Appointment Request From: Meredith Ramos    With Provider: Lisa Childress MD [-Primary Care Physician-]    Would Accept With:Only the person I've selected    Preferred Date Range: From 4/17/2018 To 5/17/2018    Preferred Times: Any    Reason for visit: Request an Appt    Comments:  I would like to schedule an appointment for my annual Medicare exam.

## 2018-05-14 LAB — HM COLONOSCOPY: NORMAL

## 2018-05-24 ENCOUNTER — TELEPHONE (OUTPATIENT)
Dept: INTERNAL MEDICINE | Facility: CLINIC | Age: 68
End: 2018-05-24

## 2018-06-29 ENCOUNTER — TELEPHONE (OUTPATIENT)
Dept: DIABETES | Facility: CLINIC | Age: 68
End: 2018-06-29

## 2018-06-29 NOTE — TELEPHONE ENCOUNTER
----- Message from Martha Lazo sent at 6/29/2018  8:09 AM CDT -----  Contact: Patient   Patient would like to get information on the program for the  Nutritional stand point, Please call her at [phone].    Thanks  td

## 2018-09-23 NOTE — ED NOTES
Patient declines Morphine, states she is not in any pain,    Sunitha Paz M.D.   [facility]  Office Number: [phone]  Fax Number: [phone]  Answering Service: [phone]  9/23/2018, 11:31 AM

## 2019-02-22 ENCOUNTER — OFFICE VISIT (OUTPATIENT)
Dept: URGENT CARE | Facility: CLINIC | Age: 69
End: 2019-02-22
Payer: MEDICARE

## 2019-02-22 VITALS
RESPIRATION RATE: 18 BRPM | OXYGEN SATURATION: 96 % | TEMPERATURE: 99 F | HEART RATE: 92 BPM | DIASTOLIC BLOOD PRESSURE: 75 MMHG | SYSTOLIC BLOOD PRESSURE: 110 MMHG

## 2019-02-22 DIAGNOSIS — R05.9 COUGH: Primary | ICD-10-CM

## 2019-02-22 LAB
CTP QC/QA: YES
FLUAV AG NPH QL: NEGATIVE
FLUBV AG NPH QL: NEGATIVE

## 2019-02-22 PROCEDURE — 99214 OFFICE O/P EST MOD 30 MIN: CPT | Mod: S$GLB,,, | Performed by: FAMILY MEDICINE

## 2019-02-22 PROCEDURE — 87804 POCT INFLUENZA A/B: ICD-10-PCS | Mod: 59,QW,S$GLB, | Performed by: FAMILY MEDICINE

## 2019-02-22 PROCEDURE — 99214 PR OFFICE/OUTPT VISIT, EST, LEVL IV, 30-39 MIN: ICD-10-PCS | Mod: S$GLB,,, | Performed by: FAMILY MEDICINE

## 2019-02-22 PROCEDURE — 87804 INFLUENZA ASSAY W/OPTIC: CPT | Mod: QW,S$GLB,, | Performed by: FAMILY MEDICINE

## 2019-02-22 RX ORDER — BENZONATATE 100 MG/1
CAPSULE ORAL
Qty: 30 CAPSULE | Refills: 1 | Status: SHIPPED | OUTPATIENT
Start: 2019-02-22 | End: 2019-09-07 | Stop reason: ALTCHOICE

## 2019-02-22 RX ORDER — OSELTAMIVIR PHOSPHATE 75 MG/1
75 CAPSULE ORAL 2 TIMES DAILY
Qty: 10 CAPSULE | Refills: 0 | Status: SHIPPED | OUTPATIENT
Start: 2019-02-22 | End: 2019-02-27

## 2019-02-22 NOTE — PATIENT INSTRUCTIONS
mucinex DM   The Flu (Influenza)     The virus that causes the flu spreads through the air in droplets when someone who has the flu coughs, sneezes, laughs, or talks.   The flu (influenza) is an infection that affects your respiratory tract. This tract is made up of your mouth, nose, and lungs, and the passages between them. Unlike a cold, the flu can make you very ill. And it can lead to pneumonia, a serious lung infection. The flu can have serious complications and even cause death.  Who is at risk for the flu?  Anyone can get the flu. But you are more likely to become infected if you:  · Have a weakened immune system  · Work in a healthcare setting where you may be exposed to flu germs  · Live or work with someone who has the flu  · Havent had an annual flu shot  How does the flu spread?  The flu is caused by a virus. The virus spreads through the air in droplets when someone who has the flu coughs, sneezes, laughs, or talks. You can become infected when you inhale these viruses directly. You can also become infected when you touch a surface on which the droplets have landed and then transfer the germs to your eyes, nose, or mouth. Touching used tissues, or sharing utensils, drinking glasses, or a toothbrush from an infected person can expose you to flu viruses, too.  What are the symptoms of the flu?  Flu symptoms tend to come on quickly and may last a few days to a few weeks. They include:  · Fever usually higher than 100.4°F  (38°C) and chills  · Sore throat and headache  · Dry cough  · Runny nose  · Tiredness and weakness  · Muscle aches  Who is at risk for flu complications?  For some people, the flu can be very serious. The risk for complications is greater for:  · Children younger than age 5  · Adults ages 65 and older  · People with a chronic illness such as diabetes or heart, kidney, or lung disease  · People who live in a nursing home or long-term care facility   How is the flu treated?  The flu  usually gets better after 7 days or so. In some cases, your healthcare provider may prescribe an antiviral medicine. This may help you get well a little sooner. For the medicine to help, you need to take it as soon as possible (ideally within 48 hours) after your symptoms start. If you develop pneumonia or other serious illness, you may need to stay in the hospital.  Easing flu symptoms  · Drink lots of fluids such as water, juice, and warm soup. A good rule is to drink enough so that you urinate your normal amount.  · Get plenty of rest.  · Ask your healthcare provider what to take for fever and pain.  · Call your provider if your fever is 100.4°F (38°C) or higher, or you become dizzy, lightheaded, or short of breath.  Taking steps to protect others  · Wash your hands often, especially after coughing or sneezing. Or clean your hands with an alcohol-based hand  containing at least 60% alcohol.  · Cough or sneeze into a tissue. Then throw the tissue away and wash your hands. If you dont have a tissue, cough and sneeze into your elbow.  · Stay home until at least 24 hours after you no longer have a fever or chills. Be sure the fever isnt being hidden by fever-reducing medicine.  · Dont share food, utensils, drinking glasses, or a toothbrush with others.  · Ask your healthcare provider if others in your household should get antiviral medicine to help them avoid infection.  How can the flu be prevented?  · One of the best ways to avoid the flu is to get a flu vaccine each year. The virus that causes the flu changes from year to year. For that reason, healthcare providers recommend getting the flu vaccine each year, as soon as it's available in your area. The vaccine is given as a shot. Your healthcare provider can tell you which vaccine is right for you. A nasal spray is also available but is not recommended for the 6587-3489 flu season. The CDC says this is because the nasal spray did not seem to protect  against the flu over the last several flu seasons. In the past, it was meant for people ages 2 to 49.  · Wash your hands often. Frequent handwashing is a proven way to help prevent infection.  · Carry an alcohol-based hand gel containing at least 60% alcohol. Use it when you can't use soap and water. Then wash your hands as soon as you can.  · Avoid touching your eyes, nose, and mouth.  · At home and work, clean phones, computer keyboards, and toys often with disinfectant wipes.  · If possible, avoid close contact with others who have the flu or symptoms of the flu.  Handwashing tips  Handwashing is one of the best ways to prevent many common infections. If you are caring for or visiting someone with the flu, wash your hands each time you enter and leave the room. Follow these steps:  · Use warm water and plenty of soap. Rub your hands together well.  · Clean the whole hand, including under your nails, between your fingers, and up the wrists.  · Wash for at least 15 seconds.  · Rinse, letting the water run down your fingers, not up your wrists.  · Dry your hands well. Use a paper towel to turn off the faucet and open the door.  Using alcohol-based hand   Alcohol-based hand  are also a good choice. Use them when you can't use soap and water. Follow these steps:  · Squeeze about a tablespoon of gel into the palm of one hand.  · Rub your hands together briskly, cleaning the backs of your hands, the palms, between your fingers, and up the wrists.  · Rub until the gel is gone and your hands are completely dry.  Preventing the flu in healthcare settings  The flu is a special concern for people in hospitals and long-term care facilities. To help prevent the spread of flu, many hospitals and nursing homes take these steps:  · Healthcare providers wash their hands or use an alcohol-based hand  before and after treating each patient.  · People with the flu have private rooms and bathrooms or share a  room with someone with the same infection.  · People who are at high risk for the flu but don't have it are encouraged to get the flu and pneumonia vaccines.  · All healthcare workers are encouraged or required to get flu shots.   Date Last Reviewed: 12/1/2016 © 2000-2017 [employer]. [address] [place] [address], [place] [address]. All rights reserved. This information is not intended as a substitute for professional medical care. Always follow your healthcare professional's instructions.      Symptomatic treatment:    Alternate Tylenol and Ibuprofen every 3 hrs  salt water gargles to soothe throat  Honey/lemon water to soothe throat  Cepachol helps to numb the discomfort in throat  Nasal saline spray reduces inflammation and dryness  Warm face compresses/hot showers as often as you can to open sinuses   Flonase OTC or Nasacort OTC  Simple foods like chicken noodle soup help hydrate  Delsym helps with coughing at night or mucinex DM(not D)  Zyrtec/Claritin during the day and Benadryl at night may help if allergy component   Zantac will help if there is reflux from the post nasal drip  Rest as much as you can  Your symptoms will likely last 5-7 days, maybe longer depending on how it affects your body.  You are contagious 5-7, so minimize contact with others to reduce the spread to others and stay home from work or school as we discussed. Dehydration is preventable but is one of the main reasons why you will feel so badly. Drink pedialyte, gatorade or propel. Stay hydrated.    Antibiotics are not needed unless a complication(such as Otitis Media, Bacterial sinus infection or pneumonia). Taking antibiotics for Flu/Cold is not supported by evidencebased medicine and can expose you to unnecessary side effects of the medication, such as anaphylaxis.   If you experience any:  Chest pain, shortness of breath, wheezing or difficulty breathing  Severe headache, face, neck or ear pain  New rash  Fever over 101.5º F  (38.6 C) for more than three days  Confusion, behavior change or seizure  Severe weakness or dizziness  Go to ER

## 2019-02-22 NOTE — PROGRESS NOTES
Subjective:       Patient ID: Meredith Ramos is a 69 y.o. female.    Vitals:  tympanic temperature is 98.5 °F (36.9 °C). Her blood pressure is 110/75 and her pulse is 92. Her respiration is 18 and oxygen saturation is 96%.     Chief Complaint: Cough    Patient presents with a sore throat 6 days ago. Throat pain went away. Patient now with fever and chills on Tuesday. Patient did get a flu shot this season.       Cough   This is a new problem. Episode onset: 3 days. The problem has been unchanged. The problem occurs every few minutes. The cough is non-productive. Associated symptoms include ear pain, a fever and myalgias. Pertinent negatives include no chills, eye redness, hemoptysis, rash, sore throat, shortness of breath or wheezing. Treatments tried: mucinex fast max. The treatment provided no relief.       Constitution: Positive for fever. Negative for chills, sweating and fatigue.   HENT: Positive for ear pain, congestion and voice change. Negative for sinus pain, sinus pressure and sore throat.    Neck: Negative for painful lymph nodes.   Eyes: Negative for eye redness.   Respiratory: Positive for cough. Negative for chest tightness, sputum production, bloody sputum, COPD, shortness of breath, stridor, wheezing and asthma.    Gastrointestinal: Negative for nausea and vomiting.   Musculoskeletal: Positive for muscle ache.   Skin: Negative for rash.   Allergic/Immunologic: Negative for seasonal allergies and asthma.   Hematologic/Lymphatic: Negative for swollen lymph nodes.       Objective:      Physical Exam   Constitutional: She appears well-developed and well-nourished.   HENT:   Head: Normocephalic and atraumatic.   Right Ear: External ear normal.   Left Ear: External ear normal.   Mild erythema of pharynx, Turbinate edema and congestion   Eyes: Conjunctivae and EOM are normal. Pupils are equal, round, and reactive to light.   Neck: Normal range of motion. Neck supple. No thyromegaly present.    Cardiovascular: Normal rate, regular rhythm, normal heart sounds and intact distal pulses.   Pulmonary/Chest: Effort normal and breath sounds normal.   Lymphadenopathy:     She has no cervical adenopathy.   Psychiatric: She has a normal mood and affect. Her behavior is normal. Judgment and thought content normal.   Vitals reviewed.      Assessment:       1. Cough        Plan:         Cough  -     POCT Influenza A/B    Other orders  -     oseltamivir (TAMIFLU) 75 MG capsule; Take 1 capsule (75 mg total) by mouth 2 (two) times daily. for 10 doses  Dispense: 10 capsule; Refill: 0  -     benzonatate (TESSALON PERLES) 100 MG capsule; 1 or 2 caps every 8 hours prn cough  Dispense: 30 capsule; Refill: 1

## 2019-02-25 ENCOUNTER — OFFICE VISIT (OUTPATIENT)
Dept: OTOLARYNGOLOGY | Facility: CLINIC | Age: 69
End: 2019-02-25
Payer: MEDICARE

## 2019-02-25 ENCOUNTER — PATIENT MESSAGE (OUTPATIENT)
Dept: OTOLARYNGOLOGY | Facility: CLINIC | Age: 69
End: 2019-02-25

## 2019-02-25 ENCOUNTER — HOSPITAL ENCOUNTER (OUTPATIENT)
Dept: RADIOLOGY | Facility: OTHER | Age: 69
Discharge: HOME | End: 2019-02-25
Attending: OTOLARYNGOLOGY
Payer: MEDICARE

## 2019-02-25 VITALS
TEMPERATURE: 100 F | BODY MASS INDEX: 1.88 KG/M2 | SYSTOLIC BLOOD PRESSURE: 106 MMHG | DIASTOLIC BLOOD PRESSURE: 66 MMHG | HEART RATE: 82 BPM | HEIGHT: 65 IN | WEIGHT: 11.31 LBS

## 2019-02-25 DIAGNOSIS — R05.3 PERSISTENT COUGH: ICD-10-CM

## 2019-02-25 DIAGNOSIS — R09.81 NASAL CONGESTION: ICD-10-CM

## 2019-02-25 DIAGNOSIS — R50.9 FEVER IN ADULT: ICD-10-CM

## 2019-02-25 DIAGNOSIS — J98.8 RESPIRATORY INFECTION: ICD-10-CM

## 2019-02-25 PROCEDURE — 71046 X-RAY EXAM CHEST 2 VIEWS: CPT | Mod: TC,FY

## 2019-02-25 PROCEDURE — 71046 X-RAY EXAM CHEST 2 VIEWS: CPT | Mod: 26,,, | Performed by: RADIOLOGY

## 2019-02-25 PROCEDURE — 71046 XR CHEST PA AND LATERAL: ICD-10-PCS | Mod: 26,,, | Performed by: RADIOLOGY

## 2019-02-25 PROCEDURE — 99214 OFFICE O/P EST MOD 30 MIN: CPT | Mod: S$GLB,,, | Performed by: OTOLARYNGOLOGY

## 2019-02-25 PROCEDURE — 99214 PR OFFICE/OUTPT VISIT, EST, LEVL IV, 30-39 MIN: ICD-10-PCS | Mod: S$GLB,,, | Performed by: OTOLARYNGOLOGY

## 2019-02-25 RX ORDER — IBUPROFEN 200 MG
200 TABLET ORAL EVERY 6 HOURS PRN
COMMUNITY
End: 2019-09-07 | Stop reason: ALTCHOICE

## 2019-02-25 RX ORDER — CHIA/LINOLENIC/LINOLEIC/OLEIC 1000 MG
CAPSULE ORAL
COMMUNITY
End: 2019-09-07 | Stop reason: ALTCHOICE

## 2019-02-25 RX ORDER — LEVOFLOXACIN 500 MG/1
500 TABLET, FILM COATED ORAL DAILY
Qty: 10 TABLET | Refills: 0 | Status: SHIPPED | OUTPATIENT
Start: 2019-02-25 | End: 2019-03-07

## 2019-02-25 RX ORDER — AZELASTINE 1 MG/ML
SPRAY, METERED NASAL
Qty: 30 ML | Refills: 1 | Status: SHIPPED | OUTPATIENT
Start: 2019-02-25 | End: 2019-09-07 | Stop reason: ALTCHOICE

## 2019-02-25 RX ORDER — EFINACONAZOLE 100 MG/ML
SOLUTION TOPICAL
Refills: 10 | COMMUNITY
Start: 2019-01-30 | End: 2019-09-07 | Stop reason: ALTCHOICE

## 2019-02-25 RX ORDER — FERROUS SULFATE, DRIED 160(50) MG
TABLET, EXTENDED RELEASE ORAL
COMMUNITY

## 2019-02-25 NOTE — PATIENT INSTRUCTIONS
Proceed with chest X-ray.  Start generic Levaquin once a day.  Azelastine nasal spray 2 sprays in each nostril twice a day.  Continue generic Tessalon perles and/or Mucinex as needed for cough.    Follow up depending on response to above.

## 2019-02-26 ENCOUNTER — TELEPHONE (OUTPATIENT)
Dept: URGENT CARE | Facility: CLINIC | Age: 69
End: 2019-02-26

## 2019-03-03 ENCOUNTER — NURSE TRIAGE (OUTPATIENT)
Dept: ADMINISTRATIVE | Facility: CLINIC | Age: 69
End: 2019-03-03

## 2019-03-03 NOTE — TELEPHONE ENCOUNTER
Patient called to report the following:     -taking Levaquin since last Monday for sinus infection   -has 3 days of abx left  -pt states she took her Levaquin at 8 am instead of 5 pm  -denies abd pain, nausea vomiting, abd swelling, difficulty breathing, drowsiness, facial swelling, fever, joint swelling    -coughing and sinus symptoms have improved   -advised on home care, med management, and when to call back     Reason for Disposition   Caller has NON-URGENT medication question about med that PCP prescribed and triager unable to answer question    Protocols used: ST MEDICATION QUESTION CALL-A-

## 2019-03-18 ENCOUNTER — OFFICE VISIT (OUTPATIENT)
Dept: INTERNAL MEDICINE | Facility: CLINIC | Age: 69
End: 2019-03-18
Payer: MEDICARE

## 2019-03-18 VITALS
HEART RATE: 64 BPM | OXYGEN SATURATION: 99 % | DIASTOLIC BLOOD PRESSURE: 74 MMHG | HEIGHT: 65 IN | BODY MASS INDEX: 19.24 KG/M2 | WEIGHT: 115.5 LBS | SYSTOLIC BLOOD PRESSURE: 118 MMHG

## 2019-03-18 DIAGNOSIS — Z00.00 ANNUAL PHYSICAL EXAM: Primary | ICD-10-CM

## 2019-03-18 DIAGNOSIS — Z13.6 ENCOUNTER FOR SCREENING FOR CARDIOVASCULAR DISORDERS: ICD-10-CM

## 2019-03-18 DIAGNOSIS — Z23 NEED FOR PNEUMOCOCCAL VACCINE: ICD-10-CM

## 2019-03-18 DIAGNOSIS — Z11.59 NEED FOR HEPATITIS C SCREENING TEST: ICD-10-CM

## 2019-03-18 PROCEDURE — 99397 PER PM REEVAL EST PAT 65+ YR: CPT | Mod: S$PBB,,, | Performed by: PHYSICIAN ASSISTANT

## 2019-03-18 PROCEDURE — G0009 ADMIN PNEUMOCOCCAL VACCINE: HCPCS | Mod: PBBFAC

## 2019-03-18 PROCEDURE — 99999 PR PBB SHADOW E&M-EST. PATIENT-LVL III: CPT | Mod: PBBFAC,,, | Performed by: PHYSICIAN ASSISTANT

## 2019-03-18 PROCEDURE — 99213 OFFICE O/P EST LOW 20 MIN: CPT | Mod: PBBFAC,25 | Performed by: PHYSICIAN ASSISTANT

## 2019-03-18 PROCEDURE — 99999 PR PBB SHADOW E&M-EST. PATIENT-LVL III: ICD-10-PCS | Mod: PBBFAC,,, | Performed by: PHYSICIAN ASSISTANT

## 2019-03-18 PROCEDURE — 99397 PR PREVENTIVE VISIT,EST,65 & OVER: ICD-10-PCS | Mod: S$PBB,,, | Performed by: PHYSICIAN ASSISTANT

## 2019-03-18 NOTE — PROGRESS NOTES
Answers for HPI/ROS submitted by the patient on 3/17/2019   activity change: No  unexpected weight change: No  neck pain: No  hearing loss: No  rhinorrhea: No  trouble swallowing: No  eye discharge: No  visual disturbance: No  chest tightness: No  wheezing: No  chest pain: No  palpitations: No  blood in stool: No  constipation: No  vomiting: No  diarrhea: No  polydipsia: No  polyuria: No  difficulty urinating: No  hematuria: No  menstrual problem: No  dysuria: No  joint swelling: No  arthralgias: No  headaches: No  weakness: No  confusion: No  dysphoric mood: No

## 2019-03-18 NOTE — PROGRESS NOTES
"Subjective:       Patient ID: Meredith Ramos is a 69 y.o. female.    Chief Complaint: Annual Exam    HPI     Established pt of Lisa Childress MD    Here today for annual exam. She is without acute complaints. Recently had sinus/URI treated with course of Levaquin. Feeling much better. Remains active walking 4 to 5  Miles a day, occ weight lifting. BP WNL.     Recently had lab (CMP/CBC) with outside GI provider.    Follows with [facility] GYN at [facility]    Review of patient's allergies indicates:  No Known Allergies    Past Medical History:   Diagnosis Date    Diverticulitis     Kidney stone     Osteoporosis     ostenopenia      Patient Active Problem List   Diagnosis    Abnormal Pap smear    Atypical glandular cells on Pap smear    Palpitations    Left ureteral stone             Review of Systems   Constitutional: Negative for activity change and unexpected weight change.   HENT: Negative for hearing loss, rhinorrhea and trouble swallowing.    Eyes: Negative for discharge and visual disturbance.   Respiratory: Negative for chest tightness and wheezing.    Cardiovascular: Negative for chest pain and palpitations.   Gastrointestinal: Negative for blood in stool, constipation, diarrhea and vomiting.   Endocrine: Negative for polydipsia and polyuria.   Genitourinary: Negative for difficulty urinating, dysuria, hematuria and menstrual problem.   Musculoskeletal: Negative for arthralgias, joint swelling and neck pain.   Neurological: Negative for weakness and headaches.   Psychiatric/Behavioral: Negative for confusion and dysphoric mood.       Objective: /74   Pulse 64   Ht 5' 5" (1.651 m)   Wt 52.4 kg (115 lb 8.3 oz)   SpO2 99%   BMI 19.22 kg/m²         Physical Exam   Constitutional: She appears well-developed and well-nourished. No distress.   HENT:   Head: Normocephalic and atraumatic.   Right Ear: Tympanic membrane, external ear and ear canal normal.   Left Ear: Tympanic membrane, external ear and " ear canal normal.   Mouth/Throat: Oropharynx is clear and moist.   Eyes: Pupils are equal, round, and reactive to light.   Neck: No thyromegaly present.   Cardiovascular: Normal rate and regular rhythm. Exam reveals no friction rub.   No murmur heard.  Pulmonary/Chest: Effort normal and breath sounds normal. She has no wheezes. She has no rales.   Abdominal: Soft. Bowel sounds are normal. There is no tenderness.   Musculoskeletal: She exhibits no edema.   Lymphadenopathy:     She has no cervical adenopathy.   Neurological: She is alert.   Skin: Skin is warm and dry. Capillary refill takes less than 2 seconds. No rash noted.   Psychiatric: She has a normal mood and affect.   Vitals reviewed.      Assessment:       1. Annual physical exam    2. Need for hepatitis C screening test    3. Encounter for screening for cardiovascular disorders         Plan:         Meredith was seen today for annual exam.    Diagnoses and all orders for this visit:    Annual physical exam  -     Lipid panel; Future    Need for hepatitis C screening test  -     Hepatitis C antibody; Future      Need for Pneumococcal vaccine        -  Prevnar today    Encounter for screening for cardiovascular disorders   -     Lipid panel; Future        Courtney Lozada PA-C

## 2019-03-18 NOTE — PATIENT INSTRUCTIONS
SCHEDULE FASTING LAB    PREVNAR TODAY    WAITING LIST FOR [facility]    LET ME KNOW ABOUT LAST DEXA AND CHEMISTRY LAB (AST/ALT)

## 2019-03-19 ENCOUNTER — PATIENT MESSAGE (OUTPATIENT)
Dept: INTERNAL MEDICINE | Facility: CLINIC | Age: 69
End: 2019-03-19

## 2019-03-19 DIAGNOSIS — Z00.00 ANNUAL PHYSICAL EXAM: Primary | ICD-10-CM

## 2019-03-21 ENCOUNTER — PATIENT MESSAGE (OUTPATIENT)
Dept: INTERNAL MEDICINE | Facility: CLINIC | Age: 69
End: 2019-03-21

## 2019-03-22 ENCOUNTER — LAB VISIT (OUTPATIENT)
Dept: LAB | Facility: HOSPITAL | Age: 69
End: 2019-03-22
Payer: MEDICARE

## 2019-03-22 DIAGNOSIS — Z13.6 ENCOUNTER FOR SCREENING FOR CARDIOVASCULAR DISORDERS: ICD-10-CM

## 2019-03-22 DIAGNOSIS — Z00.00 ANNUAL PHYSICAL EXAM: ICD-10-CM

## 2019-03-22 DIAGNOSIS — Z11.59 NEED FOR HEPATITIS C SCREENING TEST: ICD-10-CM

## 2019-03-22 PROBLEM — M85.80 OSTEOPENIA: Status: ACTIVE | Noted: 2019-03-22

## 2019-03-22 LAB
ALBUMIN SERPL BCP-MCNC: 3.6 G/DL
ALP SERPL-CCNC: 84 U/L
ALT SERPL W/O P-5'-P-CCNC: 18 U/L
ANION GAP SERPL CALC-SCNC: 10 MMOL/L
AST SERPL-CCNC: 28 U/L
BILIRUB SERPL-MCNC: 0.6 MG/DL
BUN SERPL-MCNC: 15 MG/DL
CALCIUM SERPL-MCNC: 9.8 MG/DL
CHLORIDE SERPL-SCNC: 104 MMOL/L
CHOLEST SERPL-MCNC: 176 MG/DL
CHOLEST/HDLC SERPL: 2.7 {RATIO}
CO2 SERPL-SCNC: 27 MMOL/L
CREAT SERPL-MCNC: 0.7 MG/DL
EST. GFR  (AFRICAN AMERICAN): >60 ML/MIN/1.73 M^2
EST. GFR  (NON AFRICAN AMERICAN): >60 ML/MIN/1.73 M^2
GLUCOSE SERPL-MCNC: 91 MG/DL
HCV AB SERPL QL IA: NEGATIVE
HDLC SERPL-MCNC: 66 MG/DL
HDLC SERPL: 37.5 %
LDLC SERPL CALC-MCNC: 98.2 MG/DL
NONHDLC SERPL-MCNC: 110 MG/DL
POTASSIUM SERPL-SCNC: 4.3 MMOL/L
PROT SERPL-MCNC: 6.5 G/DL
SODIUM SERPL-SCNC: 141 MMOL/L
TRIGL SERPL-MCNC: 59 MG/DL

## 2019-03-22 PROCEDURE — 86803 HEPATITIS C AB TEST: CPT

## 2019-03-22 PROCEDURE — 80061 LIPID PANEL: CPT

## 2019-03-22 PROCEDURE — 80053 COMPREHEN METABOLIC PANEL: CPT

## 2019-03-22 PROCEDURE — 36415 COLL VENOUS BLD VENIPUNCTURE: CPT

## 2019-04-06 NOTE — PROGRESS NOTES
Subjective:       Patient ID: Meredith Ramos is a 69 y.o. female.    Chief Complaint: Persistent respiratory symptoms (with cough, fever and congeston)    Former/existing patient of mine here today complaining of respiratory symptoms beginning about 10 days ago.  Initially noted mild symptoms on 02/16/2019 and then full-blown as of 02/18/2019.  Symptoms include nasal congestion with fever and chills initially which improved.  There has also been facial pressure and and cough with purulent secretions.  Was seen in Urgent Care where states initial impression was influenza and given Tamiflu though on site flu test negative.  Has had some gradual improvement as far as fever and chills and less cough.  However continues with low-grade fever, malaise, nasal congestion, cough, purulent secretions.  Has 2 grandchildren with whom she interacts regularly including a 2-1/2-year-old in .  She reports she is otherwise in good health.  No tobacco.          Review of Systems   Ears: Negative for hearing loss and ear discharge.    Nose:  Positive for postnasal drip. Negative for nosebleeds.    Mouth/Throat: Negative for pain swallowing, impaired swallowing and hoarse voice.   Constitutional: Positive for fever and chills.    Eyes:  Negative for visual change.   Cardiovascular:  Negative for chest pain.   Respiratory:  Positive for recent cough. Negative for asthma, emphysema, history of tuberculosis and shortness of breath.    Gastrointestinal:  Negative for acid reflux and indigestion.   Other:  Negative for arthritis, weakness, disturbances in coordination, slurred, swollen glands and anemia.           Objective:        Vitals:    02/25/19 1414   BP: 106/66   Pulse: 82   Temp: 100.1 °F (37.8 °C)     Body mass index is 1.88 kg/m².  Physical Exam   Constitutional: She is oriented to person, place, and time. She appears well-developed and well-nourished. No distress.   HENT:   Head: Normocephalic and atraumatic.   Right  Ear: Tympanic membrane, external ear and ear canal normal.   Left Ear: Tympanic membrane, external ear and ear canal normal.   Nose: No rhinorrhea or nasal deformity. No epistaxis.   Mouth/Throat: Uvula is midline, oropharynx is clear and moist and mucous membranes are normal. No oral lesions. No uvula swelling. No oropharyngeal exudate, posterior oropharyngeal edema or posterior oropharyngeal erythema.   Pink nostrils, nontender.   Neck: Phonation normal. Neck supple. No tracheal deviation present.   Cardiovascular: Normal rate and regular rhythm.   Pulmonary/Chest: Effort normal. No stridor. No respiratory distress. She has no wheezes. She has no rales.   Lungs are clear to auscultation bilaterally except for intermittent wet cough.   Lymphadenopathy:     She has no cervical adenopathy.   Neurological: She is alert and oriented to person, place, and time. She displays no weakness.   Skin: Skin is warm and dry.   Psychiatric: She has a normal mood and affect. Her behavior is normal. Her speech is not slurred.       Tests / Results:        Assessment:       1. Persistent cough    2. Nasal congestion    3. Fever in adult    4. Respiratory infection        Plan:        Reviewed all above and considerations and options/recommendations and answered questions and will proceed as follows:  Proceed with chest X-ray.  Start generic Levaquin once a day.  Azelastine nasal spray 2 sprays in each nostril twice a day.  Continue generic Tessalon perles and/or Mucinex as needed for cough.    Follow up depending on response to above as discussed.

## 2019-05-17 ENCOUNTER — PATIENT MESSAGE (OUTPATIENT)
Dept: INTERNAL MEDICINE | Facility: CLINIC | Age: 69
End: 2019-05-17

## 2019-05-31 LAB — HM MAMMOGRAM: NORMAL

## 2019-09-07 ENCOUNTER — OFFICE VISIT (OUTPATIENT)
Dept: URGENT CARE | Facility: CLINIC | Age: 69
End: 2019-09-07
Payer: MEDICARE

## 2019-09-07 VITALS
HEART RATE: 62 BPM | TEMPERATURE: 98 F | RESPIRATION RATE: 18 BRPM | DIASTOLIC BLOOD PRESSURE: 83 MMHG | SYSTOLIC BLOOD PRESSURE: 118 MMHG | OXYGEN SATURATION: 99 %

## 2019-09-07 DIAGNOSIS — Z87.442 HISTORY OF KIDNEY STONES: ICD-10-CM

## 2019-09-07 DIAGNOSIS — R10.9 FLANK PAIN: Primary | ICD-10-CM

## 2019-09-07 LAB
BILIRUB UR QL STRIP: NEGATIVE
GLUCOSE UR QL STRIP: NEGATIVE
KETONES UR QL STRIP: NEGATIVE
LEUKOCYTE ESTERASE UR QL STRIP: NEGATIVE
PH, POC UA: 6.5 (ref 5–8)
POC BLOOD, URINE: NEGATIVE
POC NITRATES, URINE: NEGATIVE
PROT UR QL STRIP: NEGATIVE
SP GR UR STRIP: 1.01 (ref 1–1.03)
UROBILINOGEN UR STRIP-ACNC: NORMAL (ref 0.1–1.1)

## 2019-09-07 PROCEDURE — 81003 POCT URINALYSIS, DIPSTICK, AUTOMATED, W/O SCOPE: ICD-10-PCS | Mod: QW,S$GLB,, | Performed by: FAMILY MEDICINE

## 2019-09-07 PROCEDURE — 96372 THER/PROPH/DIAG INJ SC/IM: CPT | Mod: S$GLB,,, | Performed by: FAMILY MEDICINE

## 2019-09-07 PROCEDURE — 74018 RADEX ABDOMEN 1 VIEW: CPT | Mod: FY,S$GLB,, | Performed by: RADIOLOGY

## 2019-09-07 PROCEDURE — 96372 PR INJECTION,THERAP/PROPH/DIAG2ST, IM OR SUBCUT: ICD-10-PCS | Mod: S$GLB,,, | Performed by: FAMILY MEDICINE

## 2019-09-07 PROCEDURE — 99214 OFFICE O/P EST MOD 30 MIN: CPT | Mod: 25,S$GLB,, | Performed by: FAMILY MEDICINE

## 2019-09-07 PROCEDURE — 81003 URINALYSIS AUTO W/O SCOPE: CPT | Mod: QW,S$GLB,, | Performed by: FAMILY MEDICINE

## 2019-09-07 PROCEDURE — 74018 XR KUB: ICD-10-PCS | Mod: FY,S$GLB,, | Performed by: RADIOLOGY

## 2019-09-07 PROCEDURE — 87086 URINE CULTURE/COLONY COUNT: CPT

## 2019-09-07 PROCEDURE — 99214 PR OFFICE/OUTPT VISIT, EST, LEVL IV, 30-39 MIN: ICD-10-PCS | Mod: 25,S$GLB,, | Performed by: FAMILY MEDICINE

## 2019-09-07 RX ORDER — CIPROFLOXACIN 500 MG/1
500 TABLET ORAL EVERY 12 HOURS
Qty: 14 TABLET | Refills: 0 | Status: SHIPPED | OUTPATIENT
Start: 2019-09-07 | End: 2019-09-14

## 2019-09-07 RX ORDER — TAMSULOSIN HYDROCHLORIDE 0.4 MG/1
0.4 CAPSULE ORAL DAILY
Qty: 30 CAPSULE | Refills: 1 | Status: SHIPPED | OUTPATIENT
Start: 2019-09-07 | End: 2020-07-31

## 2019-09-07 RX ORDER — KETOROLAC TROMETHAMINE 30 MG/ML
30 INJECTION, SOLUTION INTRAMUSCULAR; INTRAVENOUS
Status: COMPLETED | OUTPATIENT
Start: 2019-09-07 | End: 2019-09-07

## 2019-09-07 RX ADMIN — KETOROLAC TROMETHAMINE 30 MG: 30 INJECTION, SOLUTION INTRAMUSCULAR; INTRAVENOUS at 03:09

## 2019-09-07 NOTE — PATIENT INSTRUCTIONS
Understanding Kidney Stones  Your kidneys are bean-shaped organs. They help filter extra salts, waste, and water from your body. You need to drink enough water every day to help flush the extra salts into your urine.     What are kidney stones?  Kidney stones are made up of chemical crystals that separate out from urine. These crystals clump together to make stones. They form in the calyx of the kidney. They may stay in the kidney or move into the urinary tract.   Why kidney stones form  Kidneys form stones for many reasons. If you dont drink enough water, for instance, you wont have enough urine to dilute chemicals. Then the chemicals may form crystals, which can develop into stones. Here are some reasons why kidney stones form:  · Fluid loss (dehydration). This can concentrate urine, causing stones to form.  · Certain foods. Some foods contain large amounts of the chemicals that sometimes crystallize into stones. Eating foods that contain a lot of meat or salt can lead to more kidney stones.  · Kidney infections. These infections foster stones by slowing urine flow or changing the acid balance of your urine.  · Family history. If family members have had kidney stones, youre more likely to have them, too.  · A lack of certain substances in your urine. Some substances can help protect you from forming stones. If you dont have enough of these in your urine, stone formation can increase.  Where stones form  Stones begin in the cup-shaped part of the kidney (calyx). Some stay in the calyx and grow. Others move into the kidney, pelvis, or into the ureter. There they can lodge, block the flow of urine, and cause pain.  Symptoms  Many stones cause sudden and severe pain and bloody urine. Others cause nausea or frequent, burning urination. Symptoms often depend on your stones size and location. Fever may indicate a serious infection. Call your healthcare provider right away if you develop a fever.  Date Last  Reviewed: 1/1/2017 © 2000-2017 [employer]. [address] [place] [address], [place] [address]. All rights reserved. This information is not intended as a substitute for professional medical care. Always follow your healthcare professional's instructions.        Preventing Kidney Stones  If youve had a kidney stone, you may worry that youll have another. Removing or passing your stone doesnt prevent future stones. But with your healthcare providers help, you can reduce your risk of forming new stones. Follow up with your healthcare provider to help find new stones. You may need follow-up every 3 months to a year for a lifetime.    Drink lots of water  Staying well-hydrated is the best way to reduce your risk of future stones. Drink 8 12-ounce glasses of water daily. Have 2 with each meal and 2 between meals. Try keeping a pitcher of water nearby during the day and at night.  Take medicines if needed  Medicines, including vitamins and minerals, may be prescribed for certain types of stones. You may want to write your doses and medicine times on a calendar. Some medicines decrease stone-forming chemicals in your blood. Others help prevent those chemicals from crystallizing in urine. Still others help keep a normal acid balance in your urine.  Follow your prescribed diet  Your healthcare provider will tell you which foods contain the chemicals you should avoid. Your healthcare provider may also suggest talking to a dietitian. He or she can help you plan meals youll enjoy. These meals wont put you at risk for future stones. You may be told to limit certain foods, depending on which type of stones youve had. You should limit the amount of salt in your food to about 2 grams a day. This will help prevent most types of kidney stones. Make sure you get an adequate amount of calcium in your diet.  For calcium oxalate stones: Limit animal protein, such as meat, eggs, and fish. Limit grapefruit juice and  alcohol. Limit high-oxalate foods (such as cola, tea, chocolate, spinach, rhubarb, wheat bran, and peanuts).  For uric acid stones: Limit high-purine foods, such as mushrooms, peas, beans, anchovies, meat, poultry, shellfish, and organ meats. These foods increase uric acid production.  For cystine stones: Limit high-methionine foods (fish is the most common, but eggs and meats, also). These foods increase production of cystine.  Date Last Reviewed: 2/1/2017  © 4793-2941 [employer]. [address] [place] [address]. All rights reserved. This information is not intended as a substitute for professional medical care. Always follow your healthcare professional's instructions.        Anatomy of the Female Urinary Tract  Your urinary tract helps get rid of urine (your bodys liquid waste). The kidneys collect chemicals and water your body doesnt need. This is turned into urine. Urine travels out of the kidneys through the ureters to the bladder. The bladder holds urine until youre ready to release it. The urethra carries urine from the bladder out of the body. The main sphincter muscle circles the mid-urethra.      Front view of female urinary tract.     Date Last Reviewed: 1/1/2017  © 0882-3971 [employer]. [address] [place] [address]. All rights reserved. This information is not intended as a substitute for professional medical care. Always follow your healthcare professional's instructions.      Follow with your urologists

## 2019-09-07 NOTE — PROGRESS NOTES
Subjective:       Patient ID: Meredith Ramos is a 69 y.o. female.    Vitals:  tympanic temperature is 98.2 °F (36.8 °C). Her blood pressure is 118/83 and her pulse is 62. Her respiration is 18 and oxygen saturation is 99%.     Chief Complaint: Flank Pain    Patient presents with c/o rt flank that presented upon waking around 530 am. Denies urinary urgency, frequency, or dysuria. Patient has increased her fluid in take today. Patient is concerned for kidney stones as she has had them in the past. No fever.    Flank Pain   This is a new problem. The current episode started today. The problem occurs constantly. Pertinent negatives include no abdominal pain, dysuria, fever or pelvic pain. Risk factors: + hx of kidney stones. She has tried nothing for the symptoms.       Constitution: Negative for chills and fever.   Neck: Negative for painful lymph nodes.   Gastrointestinal: Negative for abdominal pain, nausea and vomiting.   Genitourinary: Positive for flank pain. Negative for dysuria, frequency, urgency, urine decreased, hematuria, history of kidney stones, painful menstruation, irregular menstruation, missed menses, heavy menstrual bleeding, ovarian cysts, genital trauma, vaginal pain, vaginal discharge, vaginal bleeding, vaginal odor, painful intercourse, genital sore, painful ejaculation and pelvic pain.   Musculoskeletal: Negative for back pain.   Skin: Negative for rash and lesion.   Hematologic/Lymphatic: Negative for swollen lymph nodes.       Objective:      Physical Exam   Constitutional: She is oriented to person, place, and time. She appears well-developed and well-nourished.   Cardiovascular: Normal rate, regular rhythm and normal heart sounds.   Pulmonary/Chest: Effort normal and breath sounds normal.   Abdominal: Soft. Bowel sounds are normal. There is tenderness (posteriorly in the flank region).   Neurological: She is alert and oriented to person, place, and time.   Skin: Skin is warm and dry.    Psychiatric: She has a normal mood and affect. Her behavior is normal. Judgment and thought content normal.   Nursing note and vitals reviewed.      Assessment:       1. Flank pain    2. History of kidney stones        Plan:         Flank pain  -     POCT Urinalysis, Dipstick, Automated, W/O Scope  -     XR KUB; Future; Expected date: 09/07/2019  -     ketorolac injection 30 mg  -     tamsulosin (FLOMAX) 0.4 mg Cap; Take 1 capsule (0.4 mg total) by mouth once daily.  Dispense: 30 capsule; Refill: 1  -     Urine culture  -     ciprofloxacin HCl (CIPRO) 500 MG tablet; Take 1 tablet (500 mg total) by mouth every 12 (twelve) hours. for 7 days  Dispense: 14 tablet; Refill: 0    History of kidney stones  -     tamsulosin (FLOMAX) 0.4 mg Cap; Take 1 capsule (0.4 mg total) by mouth once daily.  Dispense: 30 capsule; Refill: 1  -     ciprofloxacin HCl (CIPRO) 500 MG tablet; Take 1 tablet (500 mg total) by mouth every 12 (twelve) hours. for 7 days  Dispense: 14 tablet; Refill: 0      Patient Instructions       Understanding Kidney Stones  Your kidneys are bean-shaped organs. They help filter extra salts, waste, and water from your body. You need to drink enough water every day to help flush the extra salts into your urine.     What are kidney stones?  Kidney stones are made up of chemical crystals that separate out from urine. These crystals clump together to make stones. They form in the calyx of the kidney. They may stay in the kidney or move into the urinary tract.   Why kidney stones form  Kidneys form stones for many reasons. If you dont drink enough water, for instance, you wont have enough urine to dilute chemicals. Then the chemicals may form crystals, which can develop into stones. Here are some reasons why kidney stones form:  · Fluid loss (dehydration). This can concentrate urine, causing stones to form.  · Certain foods. Some foods contain large amounts of the chemicals that sometimes crystallize into stones.  Eating foods that contain a lot of meat or salt can lead to more kidney stones.  · Kidney infections. These infections foster stones by slowing urine flow or changing the acid balance of your urine.  · Family history. If family members have had kidney stones, youre more likely to have them, too.  · A lack of certain substances in your urine. Some substances can help protect you from forming stones. If you dont have enough of these in your urine, stone formation can increase.  Where stones form  Stones begin in the cup-shaped part of the kidney (calyx). Some stay in the calyx and grow. Others move into the kidney, pelvis, or into the ureter. There they can lodge, block the flow of urine, and cause pain.  Symptoms  Many stones cause sudden and severe pain and bloody urine. Others cause nausea or frequent, burning urination. Symptoms often depend on your stones size and location. Fever may indicate a serious infection. Call your healthcare provider right away if you develop a fever.  Date Last Reviewed: 1/1/2017 © 2000-2017 [employer]. [address] [place] [address]. All rights reserved. This information is not intended as a substitute for professional medical care. Always follow your healthcare professional's instructions.        Preventing Kidney Stones  If youve had a kidney stone, you may worry that youll have another. Removing or passing your stone doesnt prevent future stones. But with your healthcare providers help, you can reduce your risk of forming new stones. Follow up with your healthcare provider to help find new stones. You may need follow-up every 3 months to a year for a lifetime.    Drink lots of water  Staying well-hydrated is the best way to reduce your risk of future stones. Drink 8 12-ounce glasses of water daily. Have 2 with each meal and 2 between meals. Try keeping a pitcher of water nearby during the day and at night.  Take medicines if needed  Medicines,  including vitamins and minerals, may be prescribed for certain types of stones. You may want to write your doses and medicine times on a calendar. Some medicines decrease stone-forming chemicals in your blood. Others help prevent those chemicals from crystallizing in urine. Still others help keep a normal acid balance in your urine.  Follow your prescribed diet  Your healthcare provider will tell you which foods contain the chemicals you should avoid. Your healthcare provider may also suggest talking to a dietitian. He or she can help you plan meals youll enjoy. These meals wont put you at risk for future stones. You may be told to limit certain foods, depending on which type of stones youve had. You should limit the amount of salt in your food to about 2 grams a day. This will help prevent most types of kidney stones. Make sure you get an adequate amount of calcium in your diet.  For calcium oxalate stones: Limit animal protein, such as meat, eggs, and fish. Limit grapefruit juice and alcohol. Limit high-oxalate foods (such as cola, tea, chocolate, spinach, rhubarb, wheat bran, and peanuts).  For uric acid stones: Limit high-purine foods, such as mushrooms, peas, beans, anchovies, meat, poultry, shellfish, and organ meats. These foods increase uric acid production.  For cystine stones: Limit high-methionine foods (fish is the most common, but eggs and meats, also). These foods increase production of cystine.  Date Last Reviewed: 2/1/2017  © 1823-4901 [employer]. [address] [place] [address], [place] [address]. All rights reserved. This information is not intended as a substitute for professional medical care. Always follow your healthcare professional's instructions.        Anatomy of the Female Urinary Tract  Your urinary tract helps get rid of urine (your bodys liquid waste). The kidneys collect chemicals and water your body doesnt need. This is turned into urine. Urine travels out of the kidneys  through the ureters to the bladder. The bladder holds urine until youre ready to release it. The urethra carries urine from the bladder out of the body. The main sphincter muscle circles the mid-urethra.      Front view of female urinary tract.     Date Last Reviewed: 1/1/2017  © 6463-7370 [employer]. [address] [place] [address]. All rights reserved. This information is not intended as a substitute for professional medical care. Always follow your healthcare professional's instructions.      Follow with your urologists

## 2019-09-09 ENCOUNTER — TELEPHONE (OUTPATIENT)
Dept: UROLOGY | Facility: CLINIC | Age: 69
End: 2019-09-09

## 2019-09-09 ENCOUNTER — TELEPHONE (OUTPATIENT)
Dept: URGENT CARE | Facility: CLINIC | Age: 69
End: 2019-09-09

## 2019-09-09 LAB
BACTERIA UR CULT: NORMAL
BACTERIA UR CULT: NORMAL

## 2019-09-09 NOTE — TELEPHONE ENCOUNTER
Patient states symptoms are much milder from Saturday and she has appointment with urology tomorrow.  No questions.

## 2019-09-09 NOTE — TELEPHONE ENCOUNTER
----- Message from Oksana Salas sent at 9/9/2019 10:38 AM CDT -----  Contact: pt @ [phone]  Calling to speak with someone in Dr. Ford regarding symptoms she is having (possible kidney stone) please call.

## 2019-09-10 ENCOUNTER — OFFICE VISIT (OUTPATIENT)
Dept: UROLOGY | Facility: CLINIC | Age: 69
End: 2019-09-10
Payer: MEDICARE

## 2019-09-10 VITALS
SYSTOLIC BLOOD PRESSURE: 117 MMHG | BODY MASS INDEX: 20.03 KG/M2 | WEIGHT: 120.38 LBS | HEART RATE: 75 BPM | DIASTOLIC BLOOD PRESSURE: 77 MMHG

## 2019-09-10 DIAGNOSIS — Z87.442 HISTORY OF KIDNEY STONES: ICD-10-CM

## 2019-09-10 DIAGNOSIS — R10.9 RIGHT FLANK PAIN: Primary | ICD-10-CM

## 2019-09-10 PROCEDURE — 99214 OFFICE O/P EST MOD 30 MIN: CPT | Mod: S$PBB,,, | Performed by: NURSE PRACTITIONER

## 2019-09-10 PROCEDURE — 99213 OFFICE O/P EST LOW 20 MIN: CPT | Mod: PBBFAC | Performed by: NURSE PRACTITIONER

## 2019-09-10 PROCEDURE — 99999 PR PBB SHADOW E&M-EST. PATIENT-LVL III: CPT | Mod: PBBFAC,,, | Performed by: NURSE PRACTITIONER

## 2019-09-10 PROCEDURE — 81002 URINALYSIS NONAUTO W/O SCOPE: CPT | Mod: PBBFAC | Performed by: NURSE PRACTITIONER

## 2019-09-10 PROCEDURE — 99214 PR OFFICE/OUTPT VISIT, EST, LEVL IV, 30-39 MIN: ICD-10-PCS | Mod: S$PBB,,, | Performed by: NURSE PRACTITIONER

## 2019-09-10 PROCEDURE — 99999 PR PBB SHADOW E&M-EST. PATIENT-LVL III: ICD-10-PCS | Mod: PBBFAC,,, | Performed by: NURSE PRACTITIONER

## 2019-09-10 NOTE — PROGRESS NOTES
CHIEF COMPLAINT:    Mrs Ramos is a 69 y.o. female presenting for right flank pain.  PRESENTING ILLNESS:    Meredith Ramos is a 69 y.o. female with a PMH of kidney stones who presents for right flank pain. Her last clinic visit was 5/9/17 with Dr. Ford.    Hx of kidney stones. Had surgery to remove stone many years ago and passed a stone in 2017.    Today patient presents to clinic for right flank pain for the past 4 days. She denies radiation of pain. Denies urinary issues. Denies dysuria, hematuria. Denies fever or chills. FOS strong. Denies difficulty voiding. She reports she normally drinks 1 gallon of water per day but she has been out of town for the past few weeks and decreased her water intake. She follows a strict diet (which includes many foods high in oxalate) and unsure if that caused her to have a stone. She started drinking a gallon per day again since flank pain started. She has also been drinking about 24 oz of lemonade each day.     9/7/19 negative urine culture    9/7/19 KUB - large volume of stool present - constipation    Last CT RSS 5/7/17     REVIEW OF SYSTEMS:    Review of Systems    Constitutional: Negative for fever and chills.   HENT: Negative for hearing loss.   Eyes: Negative for visual disturbance.   Respiratory: Negative for shortness of breath.   Cardiovascular: Negative for chest pain.   Gastrointestinal: Positive constipation. Negative for nausea, vomiting.   Genitourinary: See HPI  Neurological: Negative for dizziness.   Hematological: Does not bruise/bleed easily.   Psychiatric/Behavioral: Negative for confusion.     PATIENT HISTORY:    Past Medical History:   Diagnosis Date    Diverticulitis     Kidney stone     Osteoporosis     ostenopenia        Past Surgical History:   Procedure Laterality Date    BREAST BIOPSY      x1    BUNIONECTOMY      Hammer toe repair    BUNIONECTOMY      COLECTOMY  2004    partial for diverticulitis    COLECTOMY      partial for  diverticulitis    CYSTOSCOPY N/A 2017    [facility]    HYSTERECTOMY      kidney stones      PLACEMENT-STENT Left 2017    Performed by [facility]ren Ford Jr[facility] at [facility] OR [facility]    removal of breast implants      URETEROSCOPY Left 2017    Performed by [facility] Ford Jr[facility] at [facility] OR [facility]       Family History   Problem Relation Age of Onset    Hypertension Father     Cancer Father         bladder cancer    Breast cancer Sister 54    Uterine cancer Maternal Grandmother     Colon cancer Maternal Grandfather     Ovarian cancer Neg Hx        Social History     Socioeconomic History    Marital status:      Spouse name: Not on file    Number of children: Not on file    Years of education: Not on file    Highest education level: Not on file   Occupational History    Not on file   Social Needs    Financial resource strain: Not on file    Food insecurity:     Worry: Not on file     Inability: Not on file    Transportation needs:     Medical: Not on file     Non-medical: Not on file   Tobacco Use    Smoking status: Former Smoker     Types: Cigarettes     Last attempt to quit: 2003     Years since quittin.2    Smokeless tobacco: Never Used   Substance and Sexual Activity    Alcohol use: No    Drug use: No    Sexual activity: Never   Lifestyle    Physical activity:     Days per week: Not on file     Minutes per session: Not on file    Stress: Not on file   Relationships    Social connections:     Talks on phone: Not on file     Gets together: Not on file     Attends [employer] service: Not on file     Active member of club or organization: Not on file     Attends meetings of clubs or organizations: Not on file     Relationship status: Not on file   Other Topics Concern    Not on file   Social History Narrative    Not on file       Allergies:  Patient has no known allergies.    Medications:    Current Outpatient  Medications:     calcium-vitamin D3 (CALCIUM 500 + D) 500 mg(1,250mg) -200 unit per tablet, Calcium 500 + D, Disp: , Rfl:     psyllium 3.4 gram/5.8 gram Powd, psyllium, Disp: , Rfl:     ciprofloxacin HCl (CIPRO) 500 MG tablet, Take 1 tablet (500 mg total) by mouth every 12 (twelve) hours. for 7 days, Disp: 14 tablet, Rfl: 0    tamsulosin (FLOMAX) 0.4 mg Cap, Take 1 capsule (0.4 mg total) by mouth once daily., Disp: 30 capsule, Rfl: 1    PHYSICAL EXAMINATION:    Constitutional: She is oriented to person, place, and time. She appears well-developed and well-nourished.  She is in no apparent distress.    Neck: Normal ROM.     Cardiovascular: Normal rate.      Pulmonary/Chest: Effort normal. No respiratory distress.     Abdominal:  She exhibits no distension.  There is no CVA tenderness.     Lymphadenopathy:          Right: No supraclavicular adenopathy present.        Left: No supraclavicular adenopathy present.     Neurological: She is alert and oriented to person, place, and time.     Skin: Skin is warm and dry.     Extremities: Normal ROM    Psych: Cooperative with normal affect.        Physical Exam   Musculoskeletal:        Back:          LABS:    U/a: sp grav 1.005, pH 6, negative    IMPRESSION:    Encounter Diagnoses   Name Primary?    Right flank pain Yes    History of kidney stones        PLAN:  -CT RSS ordered and scheduled to r/o kidney stones.  Discussed with patient could be musculoskeletal in nature if no stone present.  -Need to correct constipation - recommend stool softener  -General risk factors for kidney stones and the conservative measures to prevent kidney stones in the future were discussed with the patient in detail.  The patient was encouraged to drink 2-3 liters of water a day, limit iced tea and rico as well as foods high in oxalate.  They were cautioned to try to limit salt and red meat intake. Low oxalate diet (limit spinach, rhubarb, nuts, beets, potatoes, chocolate).  No tums,  rolaids, vitamin C supplements, Multivitamin. We also discussed adding citrate to the diet with the addition of tello or lemon juice to their water or alternatively with crystal light.   -Get prompt medical attention if any of the following occur:  · Severe pain that returns and not relieved by pain medicines  · Repeated vomiting or unable to keep down fluids  · Weakness, dizziness or fainting  · Fever of 101.4ºF (38ºC) or higher, or as directed by your healthcare provider  · Blood clots in urine  · Foul smelling or cloudy urine  · Unable to pass urine for 8 hours or increasing bladder pressure  -RTC based on CT results      I spent 25 minutes with the patient of which more than half was spent in coordinating the patient's care as well as in direct consultation with the patient in regards to our treatment and plan.

## 2019-09-10 NOTE — PATIENT INSTRUCTIONS
The patient was encouraged to drink 2-3 liters of water a day, limit iced tea and rico as well as foods high in oxalate. They were cautioned to try to limit salt and red meat intake. Low oxalate diet (limit spinach, rhubarb, nuts, beets, potatoes, chocolate).  No tums, rolaids, vitamin C supplements. We also discussed adding citrate to the diet with the addition of tello or lemon juice to their water or alternatively with crystal light.     Get prompt medical attention if any of the following occur:  · Severe pain that returns and not relieved by pain medicines  · Repeated vomiting or unable to keep down fluids  · Weakness, dizziness or fainting  · Fever of 101.4ºF (38ºC) or higher, or as directed by your healthcare provider  · Blood clots in urine  · Foul smelling or cloudy urine  · Unable to pass urine for 8 hours or increasing bladder pressure

## 2019-09-12 ENCOUNTER — TELEPHONE (OUTPATIENT)
Dept: UROLOGY | Facility: CLINIC | Age: 69
End: 2019-09-12

## 2019-09-12 ENCOUNTER — HOSPITAL ENCOUNTER (OUTPATIENT)
Dept: RADIOLOGY | Facility: OTHER | Age: 69
Discharge: HOME | End: 2019-09-12
Attending: NURSE PRACTITIONER
Payer: MEDICARE

## 2019-09-12 DIAGNOSIS — R10.9 RIGHT FLANK PAIN: ICD-10-CM

## 2019-09-12 PROCEDURE — 74176 CT ABD & PELVIS W/O CONTRAST: CPT | Mod: 26,,, | Performed by: RADIOLOGY

## 2019-09-12 PROCEDURE — 74176 CT ABD & PELVIS W/O CONTRAST: CPT | Mod: TC

## 2019-09-12 PROCEDURE — 74176 CT RENAL STONE STUDY ABD PELVIS WO: ICD-10-PCS | Mod: 26,,, | Performed by: RADIOLOGY

## 2019-09-12 NOTE — TELEPHONE ENCOUNTER
Left message for pt to call clinic regarding test results        ----- Message from Lu Sharma NP sent at 9/12/2019  2:53 PM CDT -----  Please notify patient her CT renal stone study was normal. No stones.

## 2020-03-18 ENCOUNTER — PATIENT MESSAGE (OUTPATIENT)
Dept: INTERNAL MEDICINE | Facility: CLINIC | Age: 70
End: 2020-03-18

## 2020-07-01 ENCOUNTER — PATIENT MESSAGE (OUTPATIENT)
Dept: INTERNAL MEDICINE | Facility: CLINIC | Age: 70
End: 2020-07-01

## 2020-07-02 ENCOUNTER — PATIENT MESSAGE (OUTPATIENT)
Dept: INTERNAL MEDICINE | Facility: CLINIC | Age: 70
End: 2020-07-02

## 2020-07-22 ENCOUNTER — PATIENT OUTREACH (OUTPATIENT)
Dept: ADMINISTRATIVE | Facility: HOSPITAL | Age: 70
End: 2020-07-22

## 2020-07-22 ENCOUNTER — TELEPHONE (OUTPATIENT)
Dept: ADMINISTRATIVE | Facility: HOSPITAL | Age: 70
End: 2020-07-22

## 2020-07-22 DIAGNOSIS — M89.9 DISORDER OF BONE AND ARTICULAR CARTILAGE: Primary | ICD-10-CM

## 2020-07-22 DIAGNOSIS — M94.9 DISORDER OF BONE AND ARTICULAR CARTILAGE: Primary | ICD-10-CM

## 2020-07-22 DIAGNOSIS — Z78.0 ASYMPTOMATIC MENOPAUSAL STATE: ICD-10-CM

## 2020-07-22 NOTE — PROGRESS NOTES
Message sent of need to call our office to schedule her Dexa Scan- Several Vaccines Updated- MMG , Colonoscopy and Dexa Updated/Highlighted.

## 2020-07-29 ENCOUNTER — HOSPITAL ENCOUNTER (OUTPATIENT)
Dept: RADIOLOGY | Facility: OTHER | Age: 70
Discharge: HOME | End: 2020-07-29
Attending: INTERNAL MEDICINE
Payer: MEDICARE

## 2020-07-29 DIAGNOSIS — Z78.0 ASYMPTOMATIC MENOPAUSAL STATE: ICD-10-CM

## 2020-07-29 DIAGNOSIS — M89.9 DISORDER OF BONE AND ARTICULAR CARTILAGE: ICD-10-CM

## 2020-07-29 DIAGNOSIS — M94.9 DISORDER OF BONE AND ARTICULAR CARTILAGE: ICD-10-CM

## 2020-07-29 PROCEDURE — 77080 DEXA BONE DENSITY SPINE HIP: ICD-10-PCS | Mod: 26,,, | Performed by: RADIOLOGY

## 2020-07-29 PROCEDURE — 77080 DXA BONE DENSITY AXIAL: CPT | Mod: 26,,, | Performed by: RADIOLOGY

## 2020-07-29 PROCEDURE — 77080 DXA BONE DENSITY AXIAL: CPT | Mod: TC

## 2020-07-31 ENCOUNTER — OFFICE VISIT (OUTPATIENT)
Dept: INTERNAL MEDICINE | Facility: CLINIC | Age: 70
End: 2020-07-31
Payer: MEDICARE

## 2020-07-31 VITALS
OXYGEN SATURATION: 98 % | SYSTOLIC BLOOD PRESSURE: 105 MMHG | WEIGHT: 114.19 LBS | BODY MASS INDEX: 19 KG/M2 | DIASTOLIC BLOOD PRESSURE: 70 MMHG | HEART RATE: 73 BPM

## 2020-07-31 DIAGNOSIS — Z13.6 ENCOUNTER FOR LIPID SCREENING FOR CARDIOVASCULAR DISEASE: ICD-10-CM

## 2020-07-31 DIAGNOSIS — Z13.220 ENCOUNTER FOR LIPID SCREENING FOR CARDIOVASCULAR DISEASE: ICD-10-CM

## 2020-07-31 DIAGNOSIS — M85.80 OSTEOPENIA, UNSPECIFIED LOCATION: ICD-10-CM

## 2020-07-31 DIAGNOSIS — Z00.00 ANNUAL PHYSICAL EXAM: Primary | ICD-10-CM

## 2020-07-31 PROCEDURE — 99397 PER PM REEVAL EST PAT 65+ YR: CPT | Mod: S$PBB,,, | Performed by: PHYSICIAN ASSISTANT

## 2020-07-31 PROCEDURE — 99397 PR PREVENTIVE VISIT,EST,65 & OVER: ICD-10-PCS | Mod: S$PBB,,, | Performed by: PHYSICIAN ASSISTANT

## 2020-07-31 PROCEDURE — 99999 PR PBB SHADOW E&M-EST. PATIENT-LVL III: ICD-10-PCS | Mod: PBBFAC,,, | Performed by: PHYSICIAN ASSISTANT

## 2020-07-31 PROCEDURE — 99999 PR PBB SHADOW E&M-EST. PATIENT-LVL III: CPT | Mod: PBBFAC,,, | Performed by: PHYSICIAN ASSISTANT

## 2020-07-31 PROCEDURE — 99213 OFFICE O/P EST LOW 20 MIN: CPT | Mod: PBBFAC | Performed by: PHYSICIAN ASSISTANT

## 2020-07-31 NOTE — PROGRESS NOTES
Subjective:       Patient ID: Meredith Ramos is a 70 y.o. female.    Chief Complaint: Annual Exam    HPI     Established pt of Lisa Childress MD       Here for annual exam.     No acute complaints today    HM reviewed and all UTD    Inquires about Dexa Scan, results revealed osteopenia   There is a 16.5% risk of a major osteoporotic fracture and a 3.3% risk of hip fracture in the next 10 years (FRAX).    Past Medical History:   Diagnosis Date    Diverticulitis     Kidney stone     Osteoporosis     ostenopenia      Social History     Tobacco Use    Smoking status: Former Smoker     Types: Cigarettes     Quit date: 2003     Years since quittin.1    Smokeless tobacco: Never Used   Substance Use Topics    Alcohol use: No     Frequency: Never     Binge frequency: Never    Drug use: No     Review of patient's allergies indicates:  No Known Allergies    Current Outpatient Medications:     calcium-vitamin D3 (CALCIUM 500 + D) 500 mg(1,250mg) -200 unit per tablet, Calcium 500 + D, Disp: , Rfl:     psyllium 3.4 gram/5.8 gram Powd, psyllium, Disp: , Rfl:     Health Maintenance Topics with due status: Not Due       Topic Last Completion Date    TETANUS VACCINE 2018    Colonoscopy 2018    Lipid Panel 2019    Mammogram 2019    Influenza Vaccine 10/08/2019    DEXA SCAN 2020     There are no preventive care reminders to display for this patient.      Answers for HPI/ROS submitted by the patient on 2020   activity change: No  unexpected weight change: No  neck pain: No  hearing loss: No  rhinorrhea: No  trouble swallowing: No  eye discharge: No  visual disturbance: No  chest tightness: No  wheezing: No  chest pain: No  palpitations: No  blood in stool: No  constipation: No  vomiting: No  diarrhea: No  polydipsia: No  polyuria: No  difficulty urinating: No  hematuria: No  menstrual problem: No  dysuria: No  joint swelling: No  arthralgias: No  headaches: No  weakness:  No  confusion: No  dysphoric mood: No      Review of Systems   Constitutional: Negative for activity change and unexpected weight change.   HENT: Negative for hearing loss, rhinorrhea and trouble swallowing.    Eyes: Negative for discharge and visual disturbance.   Respiratory: Negative for chest tightness and wheezing.    Cardiovascular: Negative for chest pain and palpitations.   Gastrointestinal: Negative for blood in stool, constipation, diarrhea and vomiting.   Endocrine: Negative for polydipsia and polyuria.   Genitourinary: Negative for difficulty urinating, dysuria, hematuria and menstrual problem.   Musculoskeletal: Negative for arthralgias, joint swelling and neck pain.   Neurological: Negative for weakness and headaches.   Psychiatric/Behavioral: Negative for confusion and dysphoric mood.         Objective: /70   Pulse 73   Wt 51.8 kg (114 lb 3.2 oz)   SpO2 98%   BMI 19.00 kg/m²         Physical Exam  Vitals signs reviewed.   Constitutional:       General: She is not in acute distress.     Appearance: Normal appearance. She is well-developed and normal weight.   HENT:      Head: Normocephalic and atraumatic.      Right Ear: Tympanic membrane, ear canal and external ear normal.      Left Ear: Tympanic membrane, ear canal and external ear normal.      Mouth/Throat:      Mouth: Mucous membranes are moist.      Pharynx: Oropharynx is clear.   Eyes:      Pupils: Pupils are equal, round, and reactive to light.   Cardiovascular:      Rate and Rhythm: Normal rate and regular rhythm.      Heart sounds: No murmur. No friction rub.   Pulmonary:      Effort: Pulmonary effort is normal.      Breath sounds: Normal breath sounds. No wheezing or rales.   Abdominal:      General: Bowel sounds are normal.      Palpations: Abdomen is soft.      Tenderness: There is no abdominal tenderness.   Musculoskeletal: Normal range of motion.      Right lower leg: No edema.      Left lower leg: No edema.   Skin:     General:  Skin is warm and dry.      Findings: No rash.   Neurological:      Mental Status: She is alert and oriented to person, place, and time.   Psychiatric:         Mood and Affect: Mood normal.         Assessment:       1. Annual physical exam    2. Osteopenia, unspecified location    3. Encounter for lipid screening for cardiovascular disease        Plan:           Meredith was seen today for annual exam.    Diagnoses and all orders for this visit:    Annual physical exam  -     VITAMIN D; Future  -     Comprehensive metabolic panel; Future  -     Lipid Panel; Future  -     CBC auto differential; Future  -     TSH; Future    Osteopenia, unspecified location  Continue Vitamin D, Calcium and exercise  We discussed bisphonates given FRAX score(pt undecided), pt will research and let me know if she desires to start  -     VITAMIN D; Future  -     CBC auto differential; Future    Encounter for lipid screening for cardiovascular disease  -     Lipid Panel; Future  -     CBC auto differential; Future      Courtney Lozada PA-C

## 2020-08-03 ENCOUNTER — PATIENT MESSAGE (OUTPATIENT)
Dept: INTERNAL MEDICINE | Facility: CLINIC | Age: 70
End: 2020-08-03

## 2020-08-03 DIAGNOSIS — Z01.84 ANTIBODY RESPONSE EXAM: Primary | ICD-10-CM

## 2020-08-05 ENCOUNTER — LAB VISIT (OUTPATIENT)
Dept: LAB | Facility: HOSPITAL | Age: 70
End: 2020-08-05
Attending: INTERNAL MEDICINE
Payer: MEDICARE

## 2020-08-05 DIAGNOSIS — Z13.220 ENCOUNTER FOR LIPID SCREENING FOR CARDIOVASCULAR DISEASE: ICD-10-CM

## 2020-08-05 DIAGNOSIS — Z01.84 ANTIBODY RESPONSE EXAM: ICD-10-CM

## 2020-08-05 DIAGNOSIS — Z13.6 ENCOUNTER FOR LIPID SCREENING FOR CARDIOVASCULAR DISEASE: ICD-10-CM

## 2020-08-05 DIAGNOSIS — Z00.00 ANNUAL PHYSICAL EXAM: ICD-10-CM

## 2020-08-05 DIAGNOSIS — M85.80 OSTEOPENIA, UNSPECIFIED LOCATION: ICD-10-CM

## 2020-08-05 LAB
25(OH)D3+25(OH)D2 SERPL-MCNC: 39 NG/ML (ref 30–96)
ALBUMIN SERPL BCP-MCNC: 3.9 G/DL (ref 3.5–5.2)
ALP SERPL-CCNC: 90 U/L (ref 55–135)
ALT SERPL W/O P-5'-P-CCNC: 25 U/L (ref 10–44)
ANION GAP SERPL CALC-SCNC: 7 MMOL/L (ref 8–16)
AST SERPL-CCNC: 32 U/L (ref 10–40)
BASOPHILS # BLD AUTO: 0.05 K/UL (ref 0–0.2)
BASOPHILS NFR BLD: 1.1 % (ref 0–1.9)
BILIRUB SERPL-MCNC: 0.4 MG/DL (ref 0.1–1)
BUN SERPL-MCNC: 15 MG/DL (ref 8–23)
CALCIUM SERPL-MCNC: 9.2 MG/DL (ref 8.7–10.5)
CHLORIDE SERPL-SCNC: 108 MMOL/L (ref 95–110)
CHOLEST SERPL-MCNC: 171 MG/DL (ref 120–199)
CHOLEST/HDLC SERPL: 2.3 {RATIO} (ref 2–5)
CO2 SERPL-SCNC: 27 MMOL/L (ref 23–29)
CREAT SERPL-MCNC: 0.8 MG/DL (ref 0.5–1.4)
DIFFERENTIAL METHOD: ABNORMAL
EOSINOPHIL # BLD AUTO: 1 K/UL (ref 0–0.5)
EOSINOPHIL NFR BLD: 23.7 % (ref 0–8)
ERYTHROCYTE [DISTWIDTH] IN BLOOD BY AUTOMATED COUNT: 12.6 % (ref 11.5–14.5)
EST. GFR  (AFRICAN AMERICAN): >60 ML/MIN/1.73 M^2
EST. GFR  (NON AFRICAN AMERICAN): >60 ML/MIN/1.73 M^2
GLUCOSE SERPL-MCNC: 83 MG/DL (ref 70–110)
HCT VFR BLD AUTO: 43.4 % (ref 37–48.5)
HDLC SERPL-MCNC: 73 MG/DL (ref 40–75)
HDLC SERPL: 42.7 % (ref 20–50)
HGB BLD-MCNC: 13.7 G/DL (ref 12–16)
IMM GRANULOCYTES # BLD AUTO: 0.01 K/UL (ref 0–0.04)
IMM GRANULOCYTES NFR BLD AUTO: 0.2 % (ref 0–0.5)
LDLC SERPL CALC-MCNC: 89 MG/DL (ref 63–159)
LYMPHOCYTES # BLD AUTO: 1.3 K/UL (ref 1–4.8)
LYMPHOCYTES NFR BLD: 28.5 % (ref 18–48)
MCH RBC QN AUTO: 31.5 PG (ref 27–31)
MCHC RBC AUTO-ENTMCNC: 31.6 G/DL (ref 32–36)
MCV RBC AUTO: 100 FL (ref 82–98)
MONOCYTES # BLD AUTO: 0.3 K/UL (ref 0.3–1)
MONOCYTES NFR BLD: 6.2 % (ref 4–15)
NEUTROPHILS # BLD AUTO: 1.8 K/UL (ref 1.8–7.7)
NEUTROPHILS NFR BLD: 40.3 % (ref 38–73)
NONHDLC SERPL-MCNC: 98 MG/DL
NRBC BLD-RTO: 0 /100 WBC
PLATELET # BLD AUTO: 167 K/UL (ref 150–350)
PMV BLD AUTO: 13.8 FL (ref 9.2–12.9)
POTASSIUM SERPL-SCNC: 4.5 MMOL/L (ref 3.5–5.1)
PROT SERPL-MCNC: 6.6 G/DL (ref 6–8.4)
RBC # BLD AUTO: 4.35 M/UL (ref 4–5.4)
SARS-COV-2 IGG SERPLBLD QL IA.RAPID: NEGATIVE
SODIUM SERPL-SCNC: 142 MMOL/L (ref 136–145)
TRIGL SERPL-MCNC: 45 MG/DL (ref 30–150)
TSH SERPL DL<=0.005 MIU/L-ACNC: 2.21 UIU/ML (ref 0.4–4)
WBC # BLD AUTO: 4.38 K/UL (ref 3.9–12.7)

## 2020-08-05 PROCEDURE — 82306 VITAMIN D 25 HYDROXY: CPT

## 2020-08-05 PROCEDURE — 84443 ASSAY THYROID STIM HORMONE: CPT

## 2020-08-05 PROCEDURE — 85025 COMPLETE CBC W/AUTO DIFF WBC: CPT

## 2020-08-05 PROCEDURE — 80053 COMPREHEN METABOLIC PANEL: CPT

## 2020-08-05 PROCEDURE — 36415 COLL VENOUS BLD VENIPUNCTURE: CPT

## 2020-08-05 PROCEDURE — 86769 SARS-COV-2 COVID-19 ANTIBODY: CPT

## 2020-08-05 PROCEDURE — 80061 LIPID PANEL: CPT

## 2020-08-07 DIAGNOSIS — R79.89 ABNORMAL CBC: Primary | ICD-10-CM

## 2020-08-12 ENCOUNTER — LAB VISIT (OUTPATIENT)
Dept: LAB | Facility: HOSPITAL | Age: 70
End: 2020-08-12
Payer: MEDICARE

## 2020-08-12 DIAGNOSIS — R79.89 ABNORMAL CBC: ICD-10-CM

## 2020-08-12 LAB
BASOPHILS # BLD AUTO: 0.06 K/UL (ref 0–0.2)
BASOPHILS NFR BLD: 1.4 % (ref 0–1.9)
DIFFERENTIAL METHOD: ABNORMAL
EOSINOPHIL # BLD AUTO: 0.8 K/UL (ref 0–0.5)
EOSINOPHIL NFR BLD: 19.3 % (ref 0–8)
ERYTHROCYTE [DISTWIDTH] IN BLOOD BY AUTOMATED COUNT: 12.6 % (ref 11.5–14.5)
HCT VFR BLD AUTO: 41.8 % (ref 37–48.5)
HGB BLD-MCNC: 13 G/DL (ref 12–16)
IMM GRANULOCYTES # BLD AUTO: 0.01 K/UL (ref 0–0.04)
IMM GRANULOCYTES NFR BLD AUTO: 0.2 % (ref 0–0.5)
LYMPHOCYTES # BLD AUTO: 1.2 K/UL (ref 1–4.8)
LYMPHOCYTES NFR BLD: 29 % (ref 18–48)
MCH RBC QN AUTO: 31 PG (ref 27–31)
MCHC RBC AUTO-ENTMCNC: 31.1 G/DL (ref 32–36)
MCV RBC AUTO: 100 FL (ref 82–98)
MONOCYTES # BLD AUTO: 0.3 K/UL (ref 0.3–1)
MONOCYTES NFR BLD: 7 % (ref 4–15)
NEUTROPHILS # BLD AUTO: 1.8 K/UL (ref 1.8–7.7)
NEUTROPHILS NFR BLD: 43.1 % (ref 38–73)
NRBC BLD-RTO: 0 /100 WBC
PLATELET # BLD AUTO: 153 K/UL (ref 150–350)
PMV BLD AUTO: 13.5 FL (ref 9.2–12.9)
RBC # BLD AUTO: 4.2 M/UL (ref 4–5.4)
WBC # BLD AUTO: 4.14 K/UL (ref 3.9–12.7)

## 2020-08-12 PROCEDURE — 36415 COLL VENOUS BLD VENIPUNCTURE: CPT

## 2020-08-12 PROCEDURE — 85025 COMPLETE CBC W/AUTO DIFF WBC: CPT

## 2020-08-17 DIAGNOSIS — R79.89 ABNORMAL CBC: Primary | ICD-10-CM

## 2020-09-09 NOTE — DISCHARGE SUMMARY
[facility]  Discharge Note  Short Stay    Admit Date: 5/19/2017    Discharge Date and Time: 5/19/2017    Attending Physician: Efren Ford Jr., MD     Discharge Provider: Jemal Ye    Diagnoses:  Active Hospital Problems    Diagnosis  POA    *Left ureteral stone [N20.1]  Yes    Palpitations [R00.2]  Yes      Resolved Hospital Problems    Diagnosis Date Resolved POA   No resolved problems to display.       Discharged Condition: good    Hospital Course: Patient was admitted for a ureteroscopy and tolerated the procedure well with no complications.    Final Diagnoses: Same as principal problem.    Disposition: Home or Self Care    Follow up/Patient Instructions:    Medications:  Reconciled Home Medications:   Current Discharge Medication List      START taking these medications    Details   oxybutynin (DITROPAN) 5 MG Tab Take 1 tablet (5 mg total) by mouth 3 (three) times daily as needed.  Qty: 30 tablet, Refills: 0      oxycodone-acetaminophen (PERCOCET) 5-325 mg per tablet Take 1-2 tablets by mouth every 4 (four) hours as needed.  Qty: 21 tablet, Refills: 0      phenazopyridine (PYRIDIUM) 100 MG tablet Take 1 tablet (100 mg total) by mouth 3 (three) times daily as needed for Pain.  Qty: 21 tablet, Refills: 0      polyethylene glycol (GLYCOLAX) 17 gram PwPk Take 17 g by mouth once daily.  Qty: 30 packet, Refills: 0         CONTINUE these medications which have NOT CHANGED    Details   biotin 300 mcg Tab Take 1 tablet by mouth once daily.      ketorolac (TORADOL) 10 mg tablet Take 1 tablet (10 mg total) by mouth every 6 (six) hours.  Qty: 10 tablet, Refills: 0      multivitamin (ONE DAILY MULTIVITAMIN) per tablet Take 1 tablet by mouth once daily.      tamsulosin (FLOMAX) 0.4 mg Cp24 Take 1 capsule (0.4 mg total) by mouth once daily.  Qty: 10 capsule, Refills: 0      naproxen (NAPROSYN) 500 MG tablet Take 1 tablet (500 mg total) by mouth 2 (two) times daily with meals.  Qty: 20 tablet, Refills: 0       ondansetron (ZOFRAN) 4 MG tablet Take 1 tablet (4 mg total) by mouth every 6 (six) hours.  Qty: 12 tablet, Refills: 0         STOP taking these medications       hydrocodone-acetaminophen 10-325mg (NORCO)  mg Tab Comments:   Reason for Stopping:               Discharge Procedure Orders  US Retroperitoneal Complete   Standing Status: Future  Standing Exp. Date: 05/19/18   Order Specific Question Answer Comments   May the Radiologist modify the order per protocol to meet the clinical needs of the patient? Yes      Diet general     Activity as tolerated     Call MD for:  temperature >100.4     Call MD for:  persistent nausea and vomiting or diarrhea     Call MD for:  severe uncontrolled pain     Call MD for:  redness, tenderness, or signs of infection (pain, swelling, redness, odor or green/yellow discharge around incision site)     Call MD for:  difficulty breathing or increased cough     Call MD for:  severe persistent headache     Call MD for:  worsening rash     Call MD for:  persistent dizziness, light-headedness, or visual disturbances     Call MD for:  increased confusion or weakness     No dressing needed       Follow-up Information     Follow up with Efren Ford Jr, MD In 4 weeks.    Specialty:  Urology    Why:  post op ureteroscopy, needs retroperitoneal US prior     Contact information:    [address] [facility] [address]  [phone]            Discharge Procedure Orders (must include Diet, Follow-up, Activity):    Discharge Procedure Orders (must include Diet, Follow-up, Activity)  US Retroperitoneal Complete   Standing Status: Future  Standing Exp. Date: 05/19/18   Order Specific Question Answer Comments   May the Radiologist modify the order per protocol to meet the clinical needs of the patient? Yes      Diet general     Activity as tolerated     Call MD for:  temperature >100.4     Call MD for:  persistent nausea and vomiting or diarrhea     Call MD for:  severe uncontrolled  pain     Call MD for:  redness, tenderness, or signs of infection (pain, swelling, redness, odor or green/yellow discharge around incision site)     Call MD for:  difficulty breathing or increased cough     Call MD for:  severe persistent headache     Call MD for:  worsening rash     Call MD for:  persistent dizziness, light-headedness, or visual disturbances     Call MD for:  increased confusion or weakness     No dressing needed         6

## 2020-09-10 ENCOUNTER — HOSPITAL ENCOUNTER (OUTPATIENT)
Dept: RADIOLOGY | Facility: HOSPITAL | Age: 70
Discharge: HOME | End: 2020-09-10
Attending: PHYSICIAN ASSISTANT
Payer: MEDICARE

## 2020-09-10 ENCOUNTER — OFFICE VISIT (OUTPATIENT)
Dept: INTERNAL MEDICINE | Facility: CLINIC | Age: 70
End: 2020-09-10
Payer: MEDICARE

## 2020-09-10 VITALS
WEIGHT: 114 LBS | OXYGEN SATURATION: 98 % | SYSTOLIC BLOOD PRESSURE: 102 MMHG | BODY MASS INDEX: 19.04 KG/M2 | HEART RATE: 72 BPM | BODY MASS INDEX: 18.97 KG/M2 | DIASTOLIC BLOOD PRESSURE: 60 MMHG | WEIGHT: 114.44 LBS

## 2020-09-10 DIAGNOSIS — Z12.31 ENCOUNTER FOR SCREENING MAMMOGRAM FOR MALIGNANT NEOPLASM OF BREAST: ICD-10-CM

## 2020-09-10 DIAGNOSIS — Z87.19 HISTORY OF ULCERATIVE COLITIS: ICD-10-CM

## 2020-09-10 DIAGNOSIS — M54.50 CHRONIC LEFT-SIDED LOW BACK PAIN WITHOUT SCIATICA: Primary | ICD-10-CM

## 2020-09-10 DIAGNOSIS — Z12.39 BREAST CANCER SCREENING: ICD-10-CM

## 2020-09-10 DIAGNOSIS — G89.29 CHRONIC LEFT-SIDED LOW BACK PAIN WITHOUT SCIATICA: Primary | ICD-10-CM

## 2020-09-10 PROCEDURE — 77067 SCR MAMMO BI INCL CAD: CPT | Mod: TC

## 2020-09-10 PROCEDURE — 77063 BREAST TOMOSYNTHESIS BI: CPT | Mod: 26,,, | Performed by: RADIOLOGY

## 2020-09-10 PROCEDURE — 99213 OFFICE O/P EST LOW 20 MIN: CPT | Mod: S$PBB,,, | Performed by: PHYSICIAN ASSISTANT

## 2020-09-10 PROCEDURE — 77067 MAMMO DIGITAL SCREENING BILAT WITH TOMO: ICD-10-PCS | Mod: 26,,, | Performed by: RADIOLOGY

## 2020-09-10 PROCEDURE — 77067 SCR MAMMO BI INCL CAD: CPT | Mod: 26,,, | Performed by: RADIOLOGY

## 2020-09-10 PROCEDURE — 99999 PR PBB SHADOW E&M-EST. PATIENT-LVL IV: ICD-10-PCS | Mod: PBBFAC,,, | Performed by: PHYSICIAN ASSISTANT

## 2020-09-10 PROCEDURE — 99213 PR OFFICE/OUTPT VISIT, EST, LEVL III, 20-29 MIN: ICD-10-PCS | Mod: S$PBB,,, | Performed by: PHYSICIAN ASSISTANT

## 2020-09-10 PROCEDURE — 99999 PR PBB SHADOW E&M-EST. PATIENT-LVL IV: CPT | Mod: PBBFAC,,, | Performed by: PHYSICIAN ASSISTANT

## 2020-09-10 PROCEDURE — 77063 MAMMO DIGITAL SCREENING BILAT WITH TOMO: ICD-10-PCS | Mod: 26,,, | Performed by: RADIOLOGY

## 2020-09-10 PROCEDURE — 99214 OFFICE O/P EST MOD 30 MIN: CPT | Mod: PBBFAC | Performed by: PHYSICIAN ASSISTANT

## 2020-09-10 NOTE — PROGRESS NOTES
Subjective:       Patient ID: Meredith Ramos is a 70 y.o. female.    Chief Complaint: Back Pain    HPI       Established pt of Lisa Childress MD     C/o lower back/gluetal pain mostly left sided. Recurrent. Started yoga/back stretches and meditation on yesterday and notes significant improvement.    Also request MMG.     Answers for HPI/ROS submitted by the patient on 9/9/2020   Back pain  Chronicity: chronic  Onset: more than 1 month ago  Frequency: daily  Progression since onset: gradually worsening  Pain location: gluteal, lumbar spine, sacro-iliac  Pain quality: aching  Pain - numeric: 7/10  Pain is: worse during the night  Aggravated by: position, lying down, sitting, stress, twisting  Stiffness is present: at night, all day  abdominal pain: No  bladder incontinence: No  bowel incontinence: No  chest pain: No  dysuria: No  fever: No  headaches: No  leg pain: No  numbness: No  paresis: No  paresthesias: No  pelvic pain: No  perianal numbness: No  tingling: No  weakness: No  weight loss: No  genital pain: No  hematuria: No  Risk factors: history of osteoporosis, menopause, poor posture, history of renal stones  Pain severity: moderate  Treatments tried: nothing      Review of Systems   Constitutional: Negative for fever.   Cardiovascular: Negative for chest pain.   Gastrointestinal: Negative for abdominal pain.   Genitourinary: Negative for bladder incontinence, dysuria, hematuria and pelvic pain.   Musculoskeletal: Positive for back pain. Negative for leg pain.   Neurological: Negative for weakness, numbness and headaches.         Objective: /60   Pulse 72   Wt 51.9 kg (114 lb 6.7 oz)   SpO2 98%   BMI 19.04 kg/m²         Physical Exam  Vitals signs reviewed.   Constitutional:       General: She is not in acute distress.     Appearance: She is well-developed and normal weight.   HENT:      Head: Normocephalic and atraumatic.   Cardiovascular:      Rate and Rhythm: Normal rate and regular  rhythm.      Heart sounds: No murmur. No friction rub.   Pulmonary:      Effort: Pulmonary effort is normal.      Breath sounds: Normal breath sounds. No wheezing or rales.   Abdominal:      General: Bowel sounds are normal.      Palpations: Abdomen is soft.      Tenderness: There is no abdominal tenderness. There is no right CVA tenderness.   Musculoskeletal: Normal range of motion.      Right lower leg: No edema.      Left lower leg: No edema.      Comments: ambulating without difficulty  Negative SLR     Skin:     General: Skin is warm and dry.      Findings: No rash.   Neurological:      Mental Status: She is alert.         Assessment:       1. Chronic left-sided low back pain without sciatica    2. Breast cancer screening    3. Encounter for screening mammogram for malignant neoplasm of breast     4. History of ulcerative colitis        Plan:         Meredith was seen today for back pain.    Diagnoses and all orders for this visit:    Chronic left-sided low back pain without sciatica  Conservative measures discussed  Referred to PT  -     Ambulatory referral/consult to Physical/Occupational Therapy; Future    Breast cancer screening  -     Mammo Digital Screening Bilat w/ Sihvam; Future    Encounter for screening mammogram for malignant neoplasm of breast   -     Mammo Digital Screening Bilat w/ Shivam; Future    History of ulcerative colitis  -Managed by Diet  - [facility]TD [facility]        Courtney Lozada PA-C

## 2020-09-17 ENCOUNTER — CLINICAL SUPPORT (OUTPATIENT)
Dept: REHABILITATION | Facility: OTHER | Age: 70
End: 2020-09-17
Payer: MEDICARE

## 2020-09-17 ENCOUNTER — TELEPHONE (OUTPATIENT)
Dept: RADIOLOGY | Facility: HOSPITAL | Age: 70
End: 2020-09-17

## 2020-09-17 DIAGNOSIS — G89.29 CHRONIC LEFT-SIDED LOW BACK PAIN WITHOUT SCIATICA: ICD-10-CM

## 2020-09-17 DIAGNOSIS — M54.50 CHRONIC LEFT-SIDED LOW BACK PAIN WITHOUT SCIATICA: ICD-10-CM

## 2020-09-17 PROCEDURE — 97161 PT EVAL LOW COMPLEX 20 MIN: CPT | Mod: PN | Performed by: PHYSICAL THERAPIST

## 2020-09-17 NOTE — PLAN OF CARE
[facility]  Physical Therapy Initial Evaluation    Date: 9/17/2020   Name: Meredith Ramos  Clinic Number: [id number]    Therapy Diagnosis:   1. Chronic left-sided low back pain without sciatica  Ambulatory referral/consult to Physical/Occupational Therapy       Physician: Courtney Lozada PA-C    Physician Orders: PT Eval and Treat   Medical Diagnosis from Referral: Chronic left-sided low back pain without sciatica   Evaluation Date: 9/17/2020  Authorization Period Expiration: 9/10/2021  Plan of Care Expiration: 11/30/2020  Visit # / Visits authorized: 1/ 1    Time In: 1:45 pm  Time Out: 2:25 pm  Total Appointment Time (timed & untimed codes): 40 minutes    Precautions: Standard    Subjective   Date of onset: 2 weeks  History of current condition - Meredith reports: chronic back pain with history of scoliosis and piriformis syndrome. Recent exacerbation of L sided buttock pain worse at night and moving in bed. Pain does not extend beyond the knee. Denies any bowel/ bladder changes, saddle anesthesia, unexplained weakness, falls. Pt had foot surgery many years ago with 2nd toe shortened and has had difficulty with her balance since. Improvements in pain since doing daily yoga stretches.      Medical History:   Past Medical History:   Diagnosis Date    Diverticulitis     Kidney stone     Osteoporosis     ostenopenia        Surgical History:   Meredith Ramos  has a past surgical history that includes Bunionectomy; removal of breast implants; Colectomy (2004); Bunionectomy; Colectomy; kidney stones; Breast biopsy; and Hysterectomy (07/22/2013).    Medications:   Meredith has a current medication list which includes the following prescription(s): calcium-vitamin d3 and psyllium.    Allergies:   Review of patient's allergies indicates:  No Known Allergies     Imaging, none    Prior Therapy: yes  Social History: lives alone  Occupation: desk work   Prior Level of Function: orange theory    Current Level of Function: gentle yoga, unable to run, disturbed sleep at night, painful with transitioning in bed    Pain:  Current 1/10, worst 9/10, best 0/10   Location: left sacroiliac buttock   Description: Aching  Aggravating Factors: scooting, rolling in bed, SLR on R, sitting  Easing Factors: standing, waking, yoga    Patients goals: To be able to sleep at night and to work on her posture    Objective     Observation: pt presents to PT with mask and goggles donned     Posture:  R scapula winged, L lumbar concave curvature    Lumbar Range of Motion:    percentage Pain   Flexion 100%   Palms flat to floor posterior weight shift        Extension 50%   ASIS does not pass toes        Left rotation   50% Stiffness L SI jt region        Right Rotation   50% Stiff central low back           B hips PROM grossly within normal limits all planes    Lower Extremity Strength  Right LE  Left LE    Knee extension: 5/5 Knee extension: 5/5   Knee flexion: 5/5 Knee flexion: 5/5   Hip flexion: 4+/5 Hip flexion: 4+/5   Hip extension:  4+/5 Hip extension: 4+/5   Hip abduction: 5/5 Hip abduction: 4+/5   Hip adduction: 5/5 Hip adduction 5/5   Ankle dorsiflexion: 5/5 Ankle dorsiflexion: 5/5   Ankle plantarflexion: 5/5 Ankle plantarflexion: 5/5         Special Tests:  -Repeated Flexion: improvements pain  -Repeated Ext: improvements pain  -Piriformis Test: negative  -Prone Instability Test: negative  -Bridge Test: positive L SL   -Active SLR: positive L  -DAVID: negative  -SLS: 10 sec ea with increased postural sway    Gaeslen's: negative  SI compression: negative  SI distraction: negative  Sacral compression: negative  Thigh thrust: negative      Joint Mobility: hypomobility central PA's T12-L5, painful to overpressure L5 level    Palpation: TTP L gluteus med/min     Sensation: grossly intact LE dermatomes     Flexibility:    Ely's test: R = 130 degrees ; L = 130 degrees (increased anterior pelvic tilt)   Popliteal Angle: R = 0  "degrees ; L = 0 degrees   Jere's test: R = + ; L = +   Jemal test: R = + ; L = +        Limitation/Restriction for FOTO lumbar Survey    Therapist reviewed FOTO scores for Meredith Ramos on 9/17/2020.   FOTO documents entered into [employer] - see Media section.    Limitation Score: 30%         TREATMENT   Treatment Time In: 2:10  Treatment Time Out: 2:25 pm  Total Treatment time (time-based codes) separate from Evaluation: 15 minutes    Meredith received therapeutic exercises to develop strength, ROM, flexibility and core stabilization for 15 minutes including:    Prone on elbows x 2 min  Piriformis stretch 30" x 2  Self trigger point release with tennis ball x 3 min  Quadruped bird dog 3 sec x 10 ea    Home Exercises and Patient Education Provided    Education provided:   - HEP, educated patient on exam findings and options for follow up care. Pt requests to follow up virtually at this time due to risk of community spread COVID 19    Written Home Exercises Provided: yes.  Exercises were reviewed and Meredith was able to demonstrate them prior to the end of the session.  Meredith demonstrated good  understanding of the education provided.     See EMR under Patient Instructions for exercises provided 9/17/2020.    Assessment   Meredith is a 70 y.o. female referred to outpatient Physical Therapy with a medical diagnosis of Chronic left-sided low back pain without sciatica. Patient presents with decreased flexibility TFL and hip flexors, weakness B gluteals and trunk, decreased motor control/ stability, and hypomobility lumbar spine with ROM restrictions in extension and rotation. Signs and symptoms consistent with referring diagnosis     Patient prognosis is Good.   Patientt will benefit from skilled outpatient Physical Therapy to address the deficits stated above and in the chart below, provide patient /family education, and to maximize patientt's level of independence.     Plan of care discussed with patient: " Yes  Patient's spiritual, cultural and educational needs considered and patient is agreeable to the plan of care and goals as stated below:     Anticipated Barriers for therapy: none    Medical Necessity is demonstrated by the following  History  Co-morbidities and personal factors that may impact the plan of care Co-morbidities:   none    Personal Factors:   no deficits     low   Examination  Body Structures and Functions, activity limitations and participation restrictions that may impact the plan of care Body Regions:   back    Body Systems:    ROM  strength  transfers  motor control    Participation Restrictions:   Disturbed sleep, ed mobility    Activity limitations:   Learning and applying knowledge  no deficits    General Tasks and Commands  no deficits    Communication  no deficits    Mobility  no deficits    Self care  no deficits    Domestic Life  no deficits    Interactions/Relationships  no deficits    Life Areas  no deficits    Community and Social Life  recreation and leisure         moderate   Clinical Presentation stable and uncomplicated low   Decision Making/ Complexity Score: low     Goals:  Short Term Goals: 5 weeks   1. Patient to demonstrate independence with postural awareness for improved management of condition  2. Patient to report decreased pain in L side low back by 30% or greater at night for improved sleep    Long Term Goals: 10 weeks   1. Patient to be independent with home exercise program for improved self management of condition  2. Patient to have decreased subjective report of disability as noted by a score of <30% on the FOTO Lumbar questionnaire   3. Patient to improve gluteal strength as noted by negative single leg bridge test  4. Patient to increased AROM in lumbar spine % all planes for improved performance of ADL's      Plan   Plan of care Certification: 9/17/2020 to 11/30/2020.    Outpatient Physical Therapy 1 times weekly for 10 weeks to include the following  interventions: Neuromuscular Re-ed, Patient Education, Therapeutic Activites and Therapeutic Exercise.   Other: patient has consented to virtual visits for follow up care     Lynette Noel PT

## 2020-09-17 NOTE — TELEPHONE ENCOUNTER
Spoke with patient and explained mammogram findings.Patient expressed understanding of results. Patient scheduled abnormal mammogram follow up appointment at [facility] on 9/21/2020.

## 2020-09-18 ENCOUNTER — PATIENT MESSAGE (OUTPATIENT)
Dept: INTERNAL MEDICINE | Facility: CLINIC | Age: 70
End: 2020-09-18

## 2020-09-20 ENCOUNTER — PATIENT MESSAGE (OUTPATIENT)
Dept: REHABILITATION | Facility: OTHER | Age: 70
End: 2020-09-20

## 2020-09-21 ENCOUNTER — HOSPITAL ENCOUNTER (OUTPATIENT)
Dept: RADIOLOGY | Facility: HOSPITAL | Age: 70
Discharge: HOME | End: 2020-09-21
Attending: PHYSICIAN ASSISTANT
Payer: MEDICARE

## 2020-09-21 DIAGNOSIS — R92.8 ABNORMAL MAMMOGRAM: ICD-10-CM

## 2020-09-21 PROCEDURE — 77065 DX MAMMO INCL CAD UNI: CPT | Mod: TC,RT

## 2020-09-21 PROCEDURE — 77061 BREAST TOMOSYNTHESIS UNI: CPT | Mod: 26,RT,, | Performed by: RADIOLOGY

## 2020-09-21 PROCEDURE — 77061 BREAST TOMOSYNTHESIS UNI: CPT | Mod: TC,RT

## 2020-09-21 PROCEDURE — 77065 DX MAMMO INCL CAD UNI: CPT | Mod: 26,RT,, | Performed by: RADIOLOGY

## 2020-09-21 PROCEDURE — 77061 MAMMO DIGITAL DIAGNOSTIC RIGHT WITH TOMO: ICD-10-PCS | Mod: 26,RT,, | Performed by: RADIOLOGY

## 2020-09-21 PROCEDURE — 77065 MAMMO DIGITAL DIAGNOSTIC RIGHT WITH TOMO: ICD-10-PCS | Mod: 26,RT,, | Performed by: RADIOLOGY

## 2020-09-30 ENCOUNTER — PATIENT MESSAGE (OUTPATIENT)
Dept: INTERNAL MEDICINE | Facility: CLINIC | Age: 70
End: 2020-09-30

## 2020-10-08 ENCOUNTER — PATIENT MESSAGE (OUTPATIENT)
Dept: INTERNAL MEDICINE | Facility: CLINIC | Age: 70
End: 2020-10-08

## 2020-10-13 ENCOUNTER — LAB VISIT (OUTPATIENT)
Dept: LAB | Facility: HOSPITAL | Age: 70
End: 2020-10-13
Attending: INTERNAL MEDICINE
Payer: MEDICARE

## 2020-10-13 DIAGNOSIS — R79.89 ABNORMAL CBC: ICD-10-CM

## 2020-10-13 LAB
BASOPHILS # BLD AUTO: 0.06 K/UL (ref 0–0.2)
BASOPHILS NFR BLD: 1 % (ref 0–1.9)
DIFFERENTIAL METHOD: ABNORMAL
EOSINOPHIL # BLD AUTO: 0.2 K/UL (ref 0–0.5)
EOSINOPHIL NFR BLD: 3.2 % (ref 0–8)
ERYTHROCYTE [DISTWIDTH] IN BLOOD BY AUTOMATED COUNT: 12 % (ref 11.5–14.5)
HCT VFR BLD AUTO: 42.5 % (ref 37–48.5)
HGB BLD-MCNC: 13.4 G/DL (ref 12–16)
IMM GRANULOCYTES # BLD AUTO: 0.03 K/UL (ref 0–0.04)
IMM GRANULOCYTES NFR BLD AUTO: 0.5 % (ref 0–0.5)
LYMPHOCYTES # BLD AUTO: 1.3 K/UL (ref 1–4.8)
LYMPHOCYTES NFR BLD: 22 % (ref 18–48)
MCH RBC QN AUTO: 30.9 PG (ref 27–31)
MCHC RBC AUTO-ENTMCNC: 31.5 G/DL (ref 32–36)
MCV RBC AUTO: 98 FL (ref 82–98)
MONOCYTES # BLD AUTO: 0.3 K/UL (ref 0.3–1)
MONOCYTES NFR BLD: 5.7 % (ref 4–15)
NEUTROPHILS # BLD AUTO: 4 K/UL (ref 1.8–7.7)
NEUTROPHILS NFR BLD: 67.6 % (ref 38–73)
NRBC BLD-RTO: 0 /100 WBC
PLATELET # BLD AUTO: 162 K/UL (ref 150–350)
PMV BLD AUTO: 13.7 FL (ref 9.2–12.9)
RBC # BLD AUTO: 4.34 M/UL (ref 4–5.4)
WBC # BLD AUTO: 5.96 K/UL (ref 3.9–12.7)

## 2020-10-13 PROCEDURE — 85025 COMPLETE CBC W/AUTO DIFF WBC: CPT

## 2020-10-13 PROCEDURE — 36415 COLL VENOUS BLD VENIPUNCTURE: CPT

## 2020-10-26 ENCOUNTER — PATIENT MESSAGE (OUTPATIENT)
Dept: INTERNAL MEDICINE | Facility: CLINIC | Age: 70
End: 2020-10-26

## 2020-10-26 RX ORDER — SULFAMETHOXAZOLE AND TRIMETHOPRIM 800; 160 MG/1; MG/1
1 TABLET ORAL 2 TIMES DAILY
Qty: 14 TABLET | Refills: 0 | Status: SHIPPED | OUTPATIENT
Start: 2020-10-26 | End: 2020-11-02

## 2020-10-28 ENCOUNTER — OFFICE VISIT (OUTPATIENT)
Dept: OTOLARYNGOLOGY | Facility: CLINIC | Age: 70
End: 2020-10-28
Payer: MEDICARE

## 2020-10-28 ENCOUNTER — PATIENT MESSAGE (OUTPATIENT)
Dept: OTOLARYNGOLOGY | Facility: CLINIC | Age: 70
End: 2020-10-28

## 2020-10-28 VITALS
DIASTOLIC BLOOD PRESSURE: 80 MMHG | SYSTOLIC BLOOD PRESSURE: 111 MMHG | WEIGHT: 115.19 LBS | BODY MASS INDEX: 19.19 KG/M2 | HEART RATE: 80 BPM | HEIGHT: 65 IN | TEMPERATURE: 98 F

## 2020-10-28 DIAGNOSIS — R09.81 NASAL CONGESTION: ICD-10-CM

## 2020-10-28 DIAGNOSIS — L02.429 BOIL, LEG: ICD-10-CM

## 2020-10-28 DIAGNOSIS — J34.89 NASAL PAIN: ICD-10-CM

## 2020-10-28 DIAGNOSIS — J34.89 NASAL VESTIBULITIS: ICD-10-CM

## 2020-10-28 PROCEDURE — 99213 PR OFFICE/OUTPT VISIT, EST, LEVL III, 20-29 MIN: ICD-10-PCS | Mod: S$GLB,,, | Performed by: OTOLARYNGOLOGY

## 2020-10-28 PROCEDURE — 99213 OFFICE O/P EST LOW 20 MIN: CPT | Mod: S$GLB,,, | Performed by: OTOLARYNGOLOGY

## 2020-10-28 RX ORDER — ZINC GLUCONATE 50 MG
50 TABLET ORAL
COMMUNITY
End: 2021-07-01 | Stop reason: ALTCHOICE

## 2020-10-28 RX ORDER — MULTIVITAMIN
1 TABLET ORAL
COMMUNITY
End: 2021-11-29

## 2020-10-28 NOTE — PROGRESS NOTES
Subjective:       Patient ID: Meredith Ramos is a 70 y.o. female.    Chief Complaint: Possible Staph infection nose    Here today complaining of concerns about possible staph infection in her nose.  States visited a friend in the hospital on 10/18 who had a staph infection that spread to her spine.  That evening she noted a boil on her thigh for which she applied warm compresses and mupirocin ointment and ruptured/drained around 10/21 or 10/22.  The following evening noted some pressure in the glabellar area which cleared after getting up the next morning.  Later had some chills and felt feverish but temperature at home was normal.  On 10/26 noted some streaks from the ruptured boil on her thigh.  PCP prescribed oral Bactrim which she has been taking since 10/26.  Began to feel some nasal pressure and mild congestion and swelling in her nose.  Spoke to friend who is ED physician and told possible staph infection in her nose.  No rhinorrhea or bleeding or crusting.  Denies aggressive cleaning, plucking, recent URI or AR flare up.  Reports grandson is 4 or 5 years old and picks his nose frequently and has frequent impetigo.  States she feels fine otherwise and otherwise in good health.  Sense of smell is fine; no chest or GI complaints.        Review of Systems     Constitutional: Negative for fever.      HENT: Negative for ear discharge, ear pain, nosebleeds, runny nose, sore throat and voice change.      Eyes:  Negative for change in eyesight.     Respiratory:  Negative for cough and shortness of breath.      Cardiovascular:  Negative for chest pain.     Gastrointestinal:  Negative for abdominal pain.     Hematologic: Negative for swollen glands.                Objective:        Vitals:    10/28/20 0918   BP: 111/80   Pulse: 80   Temp: 98.4 °F (36.9 °C)     Body mass index is 19.17 kg/m².  Physical Exam  Constitutional:       General: She is not in acute distress.     Appearance: She is well-developed.   HENT:       Head: Normocephalic and atraumatic.      Right Ear: Tympanic membrane, ear canal and external ear normal.      Left Ear: Tympanic membrane, ear canal and external ear normal.      Nose: No nasal deformity, mucosal edema or rhinorrhea.      Comments: Nasal mucosa pink to red with clear airway bilaterally.  Complains of sensitivity to palpation over nasal bridge/dorsum with no nodule, etc palpated.        Mouth/Throat:      Mouth: No oral lesions.      Pharynx: No oropharyngeal exudate, posterior oropharyngeal erythema or uvula swelling.   Neck:      Musculoskeletal: Neck supple.      Trachea: Phonation normal.   Pulmonary:      Effort: Pulmonary effort is normal. No respiratory distress.   Lymphadenopathy:      Cervical: No cervical adenopathy.   Skin:     General: Skin is warm and dry.   Neurological:      Mental Status: She is alert and oriented to person, place, and time.   Psychiatric:         Mood and Affect: Mood normal.         Behavior: Behavior normal.         Tests / Results:        Assessment:       1. Nasal pain    2. Nasal congestion    3. Boil, leg    4. Nasal vestibulitis        Plan:       Reviewed above and considerations and recommendations and answered questions.  Use mupirocin ointment in both nostrils 2-3 times daily for 7-10 days and understands hand washing before and after.  Proper nasal care reviewed.  Continue oral antibiotics per PCP.  Follow-up depending on response, progress, symptoms as discussed.

## 2020-10-28 NOTE — PATIENT INSTRUCTIONS
Proper nasal care.  Use mupirocin ointment sniffing into nostrils 2 -3 times a day for 7 - 10 days.  Continue oral antibiotic per PCP.   Follow up depending on response, progress, symptoms.

## 2020-11-09 ENCOUNTER — PATIENT MESSAGE (OUTPATIENT)
Dept: INTERNAL MEDICINE | Facility: CLINIC | Age: 70
End: 2020-11-09

## 2020-11-09 DIAGNOSIS — B95.8 STAPH SKIN INFECTION: Primary | ICD-10-CM

## 2020-11-09 DIAGNOSIS — L08.9 STAPH SKIN INFECTION: Primary | ICD-10-CM

## 2020-11-17 ENCOUNTER — LAB VISIT (OUTPATIENT)
Dept: LAB | Facility: HOSPITAL | Age: 70
End: 2020-11-17
Attending: STUDENT IN AN ORGANIZED HEALTH CARE EDUCATION/TRAINING PROGRAM
Payer: MEDICARE

## 2020-11-17 ENCOUNTER — OFFICE VISIT (OUTPATIENT)
Dept: INFECTIOUS DISEASES | Facility: CLINIC | Age: 70
End: 2020-11-17
Payer: MEDICARE

## 2020-11-17 VITALS
TEMPERATURE: 98 F | HEIGHT: 65 IN | DIASTOLIC BLOOD PRESSURE: 72 MMHG | BODY MASS INDEX: 19.28 KG/M2 | WEIGHT: 115.75 LBS | HEART RATE: 72 BPM | SYSTOLIC BLOOD PRESSURE: 108 MMHG

## 2020-11-17 DIAGNOSIS — L08.9 STAPH SKIN INFECTION: ICD-10-CM

## 2020-11-17 DIAGNOSIS — B95.8 STAPH SKIN INFECTION: ICD-10-CM

## 2020-11-17 DIAGNOSIS — M85.80 OSTEOPENIA, UNSPECIFIED LOCATION: Primary | ICD-10-CM

## 2020-11-17 DIAGNOSIS — Z87.19 HISTORY OF PROCTITIS: ICD-10-CM

## 2020-11-17 PROCEDURE — 99999 PR PBB SHADOW E&M-EST. PATIENT-LVL IV: ICD-10-PCS | Mod: PBBFAC,,, | Performed by: STUDENT IN AN ORGANIZED HEALTH CARE EDUCATION/TRAINING PROGRAM

## 2020-11-17 PROCEDURE — 99214 OFFICE O/P EST MOD 30 MIN: CPT | Mod: PBBFAC | Performed by: STUDENT IN AN ORGANIZED HEALTH CARE EDUCATION/TRAINING PROGRAM

## 2020-11-17 PROCEDURE — 99999 PR PBB SHADOW E&M-EST. PATIENT-LVL IV: CPT | Mod: PBBFAC,,, | Performed by: STUDENT IN AN ORGANIZED HEALTH CARE EDUCATION/TRAINING PROGRAM

## 2020-11-17 PROCEDURE — 99204 OFFICE O/P NEW MOD 45 MIN: CPT | Mod: S$PBB,,, | Performed by: STUDENT IN AN ORGANIZED HEALTH CARE EDUCATION/TRAINING PROGRAM

## 2020-11-17 PROCEDURE — 99204 PR OFFICE/OUTPT VISIT, NEW, LEVL IV, 45-59 MIN: ICD-10-PCS | Mod: S$PBB,,, | Performed by: STUDENT IN AN ORGANIZED HEALTH CARE EDUCATION/TRAINING PROGRAM

## 2020-11-17 NOTE — PROGRESS NOTES
INFECTIOUS DISEASE CLINIC  11/17/2020     Subjective:      Chief Complaint:   Chief Complaint   Patient presents with    Recurrent Skin Infections       History of Present Illness:    This is a 70 y.o. 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  Patient is new to me.     Patient states this all started October 18 - pimple boil on upper right thigh, visiting a friend in the hospital - reports applying warm compresses with subsequent rupture with purulent fluid. About 5 d later, patient reports that she had sniffles/nasal pain and red streaks along previous R boil- given a 7 day course of bactrim prescribed by PCP.  Patient states that she has a friend who is an ER doctor and recommended patient go to ENT for evaluation given nasal symptoms. At that visit, pt was given mupirocin ointment x 14d. Patient reported a satellite lesion on 11/4 near the previous boil that healed on its own. Patient reports that her ER doctor friend consulted with an ID provider at [facility] and added 7 additional days of bactrim and rifampin BID to continue along with bactrim. Using hibiclens bath 3x a week - started 11/5 until the end of November. Denies history of boils.  States that since completing her antibiotics, using mupirocin ointment, and using hibiclens, her boils resolved.    Patient states that she feels well today. No further lesions. Denied bug bites, soaps, or laundry detergent. Did not shave legs during the episode of boils. Denies prior history of boils.      Social:  [place]  HIV Risks denies, denies IVDU  Pets denies  Travel  denies  TB exposure denies  Denies allergies to antibiotics  Denies hx of STI or hx of HIV    Review of Systems   Constitution: Negative for chills, fever and malaise/fatigue.   HENT: Negative for congestion and sore throat.    Respiratory: Negative for cough and shortness of breath.    Skin:         Boil on R thigh - resolving/healing   Gastrointestinal: Positive for constipation. Negative for abdominal pain, diarrhea, nausea and vomiting.   Genitourinary: Negative for dysuria and genital sores.   All other systems reviewed and are negative.          Past Medical History:   Diagnosis Date    Diverticulitis     Kidney stone     Osteoporosis     ostenopenia      Past Surgical History:   Procedure Laterality Date    BREAST BIOPSY      x1    BUNIONECTOMY      Hammer toe repair    BUNIONECTOMY      COLECTOMY  2004    partial for diverticulitis    COLECTOMY      partial for diverticulitis    HYSTERECTOMY  2013    [facility] hyste[facility] at [facility].  No cancer.  Found 3cm fibroid on right ovary.    kidney stones      removal of breast implants       Family History   Problem Relation Age of Onset    Hypertension Father     Cancer Father         bladder cancer    Breast cancer Sister 54    Uterine cancer Maternal Grandmother     Colon cancer Maternal Grandfather     Ovarian cancer Neg Hx      Social History     Tobacco Use    Smoking status: Former Smoker     Types: Cigarettes     Quit date: 2003     Years since quittin.4    Smokeless tobacco: Never Used   Substance Use Topics    Alcohol use: No     Frequency: Never     Binge frequency: Never    Drug use: No       Review of patient's allergies indicates:  No Known Allergies      Objective:   VS (24h):   Vitals:    20 0823   BP: 108/72   Pulse: 72   Temp: 97.9 °F (36.6 °C)         Physical Exam  Vitals signs reviewed.   Constitutional:       General: She is not in acute distress.     Appearance: She is normal weight. She is not toxic-appearing.   HENT:      Head: Normocephalic and atraumatic.      Right Ear: External ear normal.      Left Ear: External ear normal.      Nose: Nose normal.      Mouth/Throat:      Mouth: Mucous membranes are moist.      Pharynx: Oropharynx is clear. No oropharyngeal exudate  or posterior oropharyngeal erythema.   Eyes:      General: No scleral icterus.        Right eye: No discharge.         Left eye: No discharge.      Conjunctiva/sclera: Conjunctivae normal.   Neck:      Musculoskeletal: Neck supple.   Cardiovascular:      Rate and Rhythm: Normal rate and regular rhythm.      Heart sounds: Normal heart sounds.   Pulmonary:      Effort: Pulmonary effort is normal. No respiratory distress.      Breath sounds: No wheezing or rales.   Abdominal:      General: Abdomen is flat. Bowel sounds are normal. There is no distension.      Palpations: Abdomen is soft.      Tenderness: There is no abdominal tenderness. There is no right CVA tenderness, left CVA tenderness or guarding.   Musculoskeletal:         General: No swelling or tenderness.      Right lower leg: No edema.      Left lower leg: No edema.      Comments: No spinal tenderness   Lymphadenopathy:      Cervical: No cervical adenopathy.   Skin:     General: Skin is warm and dry.      Findings: Rash (Previous R thigh boil improving, see image below; no induration, fluctuance, pain or drainage) present.   Neurological:      General: No focal deficit present.      Mental Status: She is alert and oriented to person, place, and time. Mental status is at baseline.   Psychiatric:         Mood and Affect: Mood normal.       11/17            All data including recent labs, radiology, microbiology and pathology have been independently reviewed.    Labs:    Recent Labs   Lab Result Units 10/13/20  0946   WBC K/uL 5.96   Hemoglobin g/dL 13.4   Hematocrit % 42.5     Labs reviewed: notable for HCab neg    Medications:  Current Outpatient Medications on File Prior to Visit   Medication Sig Dispense Refill    calcium-vitamin D3 (CALCIUM 500 + D) 500 mg(1,250mg) -200 unit per tablet Calcium 500 + D      multivitamin (THERAGRAN) per tablet Take 1 tablet by mouth.      mupirocin calcium 2% nasl oint (BACTROBAN) 2 % Oint by Nasal route 2 (two) times  daily. Sniff into each nostril 2 -3 times a day for 7 - 10 days. 1 g 0    psyllium 3.4 gram/5.8 gram Powd psyllium      zinc gluconate 50 mg tablet Take 50 mg by mouth.      ZINC PICOLINATE ORAL        No current facility-administered medications on file prior to visit.        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 S/p 14d of antibiotic therapy (bactrim/rif x7d) as well as mupirocin nasal ointment, hibiclens washes, and soaks with improvement/resolution of previous boil. Denies history of previous skin infections/boils, hx of bites/scratches or shaving. No further evidence of recurrence on exam and patient is hemodynamically stable.     Plan:     1. Staph skin infection - no further evidence of recurrent infection  -discussed with patient that staph/strep species can live on skin  -completed course of antibiotics  -discussed ways to decrease risk of SSTI including using hibiclens (pt reports she has enough), hand washing, and limit shaving  -for completeness will check HIV      2. Osteopenia, unspecified location  -continue home med    3. History of proctitis  -managed by Dr. Moran at , reports improvement in sx with FODMAP diet  -denies hx of STI/HIV, not sexually active x 18 years  -will check HIV for completenss       These recommendations have been sent to and/or  discussed with the following providers:   - Courtney Lozada   - Lisa Childress MD    Follow up PRN    Lexie Stover MD  Infectious Disease

## 2020-11-17 NOTE — LETTER
November 17, 2020      Courtney Lozada PA-C  [address]  [facility] [address]           [facility] - Infectious Disease 1st Fl  [address]  [facility] [address]  Phone: [phone]  Fax: [phone]          Patient: Meredith Ramos   MR Number: [id number]   YOB: 1950   Date of Visit: 11/17/2020       Dear Courtney Lozada:    Thank you for referring Meredith Ramos to me for evaluation. Attached you will find relevant portions of my assessment and plan of care.    If you have questions, please do not hesitate to call me. I look forward to following Meredith Ramos along with you.    Sincerely,    Lexie Stover MD    Enclosure  CC:  No Recipients    If you would like to receive this communication electronically, please contact [email or web address] or [phone] to request more information on [employer] access.    For providers and/or their staff who would like to refer a patient to [facility], please contact us through our one-stop-shop provider referral line, [facility], at [phone].    If you feel you have received this communication in error or would no longer like to receive these types of communications, please e-mail [email or web address]

## 2020-11-20 ENCOUNTER — TELEPHONE (OUTPATIENT)
Dept: INFECTIOUS DISEASES | Facility: CLINIC | Age: 70
End: 2020-11-20

## 2020-11-20 DIAGNOSIS — L08.9 STAPH SKIN INFECTION: Primary | ICD-10-CM

## 2020-11-20 DIAGNOSIS — B95.8 STAPH SKIN INFECTION: Primary | ICD-10-CM

## 2020-11-20 NOTE — TELEPHONE ENCOUNTER
Patient states that she left prior to getting lab work done and requests to get lab work done in the morning if possible. Will re-order HIV lab.

## 2020-11-27 ENCOUNTER — PATIENT MESSAGE (OUTPATIENT)
Dept: INFECTIOUS DISEASES | Facility: CLINIC | Age: 70
End: 2020-11-27

## 2020-12-01 ENCOUNTER — LAB VISIT (OUTPATIENT)
Dept: LAB | Facility: HOSPITAL | Age: 70
End: 2020-12-01
Attending: STUDENT IN AN ORGANIZED HEALTH CARE EDUCATION/TRAINING PROGRAM
Payer: MEDICARE

## 2020-12-01 DIAGNOSIS — B95.8 STAPH SKIN INFECTION: ICD-10-CM

## 2020-12-01 DIAGNOSIS — L08.9 STAPH SKIN INFECTION: ICD-10-CM

## 2020-12-01 LAB — HIV 1+2 AB+HIV1 P24 AG SERPL QL IA: NEGATIVE

## 2020-12-01 PROCEDURE — 86703 HIV-1/HIV-2 1 RESULT ANTBDY: CPT

## 2020-12-01 PROCEDURE — 36415 COLL VENOUS BLD VENIPUNCTURE: CPT

## 2020-12-14 ENCOUNTER — PATIENT MESSAGE (OUTPATIENT)
Dept: INTERNAL MEDICINE | Facility: CLINIC | Age: 70
End: 2020-12-14

## 2020-12-28 ENCOUNTER — PATIENT MESSAGE (OUTPATIENT)
Dept: INTERNAL MEDICINE | Facility: CLINIC | Age: 70
End: 2020-12-28

## 2021-01-22 ENCOUNTER — PATIENT MESSAGE (OUTPATIENT)
Dept: ADMINISTRATIVE | Facility: OTHER | Age: 71
End: 2021-01-22

## 2021-01-31 ENCOUNTER — PATIENT MESSAGE (OUTPATIENT)
Dept: INTERNAL MEDICINE | Facility: CLINIC | Age: 71
End: 2021-01-31

## 2021-02-01 ENCOUNTER — OFFICE VISIT (OUTPATIENT)
Dept: URGENT CARE | Facility: CLINIC | Age: 71
End: 2021-02-01
Payer: MEDICARE

## 2021-02-01 VITALS
RESPIRATION RATE: 18 BRPM | DIASTOLIC BLOOD PRESSURE: 75 MMHG | SYSTOLIC BLOOD PRESSURE: 111 MMHG | BODY MASS INDEX: 19.16 KG/M2 | HEART RATE: 67 BPM | WEIGHT: 115 LBS | HEIGHT: 65 IN | OXYGEN SATURATION: 98 % | TEMPERATURE: 97 F

## 2021-02-01 DIAGNOSIS — S69.92XA LEFT WRIST INJURY, INITIAL ENCOUNTER: ICD-10-CM

## 2021-02-01 DIAGNOSIS — M25.532 LEFT WRIST PAIN: Primary | ICD-10-CM

## 2021-02-01 DIAGNOSIS — S69.92XA INJURY OF LEFT HAND, INITIAL ENCOUNTER: ICD-10-CM

## 2021-02-01 PROCEDURE — 73110 XR WRIST COMPLETE 3 VIEWS LEFT: ICD-10-PCS | Mod: FY,LT,S$GLB, | Performed by: RADIOLOGY

## 2021-02-01 PROCEDURE — 73130 X-RAY EXAM OF HAND: CPT | Mod: FY,LT,S$GLB, | Performed by: RADIOLOGY

## 2021-02-01 PROCEDURE — 99213 PR OFFICE/OUTPT VISIT, EST, LEVL III, 20-29 MIN: ICD-10-PCS | Mod: S$GLB,,, | Performed by: FAMILY MEDICINE

## 2021-02-01 PROCEDURE — 99213 OFFICE O/P EST LOW 20 MIN: CPT | Mod: S$GLB,,, | Performed by: FAMILY MEDICINE

## 2021-02-01 PROCEDURE — 73110 X-RAY EXAM OF WRIST: CPT | Mod: FY,LT,S$GLB, | Performed by: RADIOLOGY

## 2021-02-01 PROCEDURE — 73130 XR HAND COMPLETE 3 VIEW LEFT: ICD-10-PCS | Mod: FY,LT,S$GLB, | Performed by: RADIOLOGY

## 2021-02-09 ENCOUNTER — PATIENT MESSAGE (OUTPATIENT)
Dept: INTERNAL MEDICINE | Facility: CLINIC | Age: 71
End: 2021-02-09

## 2021-02-22 ENCOUNTER — OFFICE VISIT (OUTPATIENT)
Dept: ORTHOPEDICS | Facility: CLINIC | Age: 71
End: 2021-02-22
Payer: MEDICARE

## 2021-02-22 ENCOUNTER — HOSPITAL ENCOUNTER (OUTPATIENT)
Dept: RADIOLOGY | Facility: OTHER | Age: 71
Discharge: HOME | End: 2021-02-22
Attending: ORTHOPAEDIC SURGERY
Payer: MEDICARE

## 2021-02-22 VITALS — WEIGHT: 115 LBS | BODY MASS INDEX: 19.16 KG/M2 | HEIGHT: 65 IN

## 2021-02-22 DIAGNOSIS — S52.572A OTHER CLOSED INTRA-ARTICULAR FRACTURE OF DISTAL END OF LEFT RADIUS, INITIAL ENCOUNTER: Primary | ICD-10-CM

## 2021-02-22 DIAGNOSIS — R52 PAIN: Primary | ICD-10-CM

## 2021-02-22 DIAGNOSIS — R52 PAIN: ICD-10-CM

## 2021-02-22 PROCEDURE — 99203 PR OFFICE/OUTPT VISIT, NEW, LEVL III, 30-44 MIN: ICD-10-PCS | Mod: S$PBB,,, | Performed by: ORTHOPAEDIC SURGERY

## 2021-02-22 PROCEDURE — 99203 OFFICE O/P NEW LOW 30 MIN: CPT | Mod: S$PBB,,, | Performed by: ORTHOPAEDIC SURGERY

## 2021-02-22 PROCEDURE — 99999 PR PBB SHADOW E&M-EST. PATIENT-LVL II: ICD-10-PCS | Mod: PBBFAC,,, | Performed by: ORTHOPAEDIC SURGERY

## 2021-02-22 PROCEDURE — 73110 XR WRIST COMPLETE 3 VIEWS LEFT: ICD-10-PCS | Mod: 26,LT,, | Performed by: RADIOLOGY

## 2021-02-22 PROCEDURE — 99212 OFFICE O/P EST SF 10 MIN: CPT | Mod: PBBFAC | Performed by: ORTHOPAEDIC SURGERY

## 2021-02-22 PROCEDURE — 99999 PR PBB SHADOW E&M-EST. PATIENT-LVL II: CPT | Mod: PBBFAC,,, | Performed by: ORTHOPAEDIC SURGERY

## 2021-02-22 PROCEDURE — 73110 X-RAY EXAM OF WRIST: CPT | Mod: 26,LT,, | Performed by: RADIOLOGY

## 2021-02-22 PROCEDURE — 73110 X-RAY EXAM OF WRIST: CPT | Mod: TC,FY,LT

## 2021-03-15 ENCOUNTER — PATIENT MESSAGE (OUTPATIENT)
Dept: INTERNAL MEDICINE | Facility: CLINIC | Age: 71
End: 2021-03-15

## 2021-03-15 ENCOUNTER — OFFICE VISIT (OUTPATIENT)
Dept: ORTHOPEDICS | Facility: CLINIC | Age: 71
End: 2021-03-15
Payer: MEDICARE

## 2021-03-15 DIAGNOSIS — S52.572A OTHER CLOSED INTRA-ARTICULAR FRACTURE OF DISTAL END OF LEFT RADIUS, INITIAL ENCOUNTER: Primary | ICD-10-CM

## 2021-03-15 PROCEDURE — 99999 PR PBB SHADOW E&M-EST. PATIENT-LVL III: ICD-10-PCS | Mod: PBBFAC,,, | Performed by: ORTHOPAEDIC SURGERY

## 2021-03-15 PROCEDURE — 99213 PR OFFICE/OUTPT VISIT, EST, LEVL III, 20-29 MIN: ICD-10-PCS | Mod: S$PBB,,, | Performed by: ORTHOPAEDIC SURGERY

## 2021-03-15 PROCEDURE — 99213 OFFICE O/P EST LOW 20 MIN: CPT | Mod: PBBFAC | Performed by: ORTHOPAEDIC SURGERY

## 2021-03-15 PROCEDURE — 99213 OFFICE O/P EST LOW 20 MIN: CPT | Mod: S$PBB,,, | Performed by: ORTHOPAEDIC SURGERY

## 2021-03-15 PROCEDURE — 99999 PR PBB SHADOW E&M-EST. PATIENT-LVL III: CPT | Mod: PBBFAC,,, | Performed by: ORTHOPAEDIC SURGERY

## 2021-03-30 ENCOUNTER — CLINICAL SUPPORT (OUTPATIENT)
Dept: REHABILITATION | Facility: HOSPITAL | Age: 71
End: 2021-03-30
Attending: ORTHOPAEDIC SURGERY
Payer: MEDICARE

## 2021-03-30 DIAGNOSIS — M25.60 RANGE OF MOTION DEFICIT: ICD-10-CM

## 2021-03-30 DIAGNOSIS — M25.532 PAIN IN LEFT WRIST: ICD-10-CM

## 2021-03-30 DIAGNOSIS — S52.572A OTHER CLOSED INTRA-ARTICULAR FRACTURE OF DISTAL END OF LEFT RADIUS, INITIAL ENCOUNTER: ICD-10-CM

## 2021-03-30 PROCEDURE — 97166 OT EVAL MOD COMPLEX 45 MIN: CPT | Mod: PO

## 2021-03-30 PROCEDURE — 97110 THERAPEUTIC EXERCISES: CPT | Mod: PO

## 2021-04-19 ENCOUNTER — CLINICAL SUPPORT (OUTPATIENT)
Dept: REHABILITATION | Facility: HOSPITAL | Age: 71
End: 2021-04-19
Attending: ORTHOPAEDIC SURGERY
Payer: MEDICARE

## 2021-04-19 DIAGNOSIS — M25.532 PAIN IN LEFT WRIST: ICD-10-CM

## 2021-04-19 DIAGNOSIS — M25.60 RANGE OF MOTION DEFICIT: ICD-10-CM

## 2021-04-19 PROCEDURE — 97110 THERAPEUTIC EXERCISES: CPT | Mod: PO

## 2021-04-19 PROCEDURE — 97022 WHIRLPOOL THERAPY: CPT | Mod: PO

## 2021-04-21 DIAGNOSIS — M25.532 LEFT WRIST PAIN: Primary | ICD-10-CM

## 2021-04-22 ENCOUNTER — CLINICAL SUPPORT (OUTPATIENT)
Dept: REHABILITATION | Facility: HOSPITAL | Age: 71
End: 2021-04-22
Attending: ORTHOPAEDIC SURGERY
Payer: MEDICARE

## 2021-04-22 DIAGNOSIS — M25.60 RANGE OF MOTION DEFICIT: ICD-10-CM

## 2021-04-22 DIAGNOSIS — M25.532 PAIN IN LEFT WRIST: ICD-10-CM

## 2021-04-22 PROCEDURE — 97110 THERAPEUTIC EXERCISES: CPT | Mod: PO

## 2021-04-22 PROCEDURE — 97022 WHIRLPOOL THERAPY: CPT | Mod: PO

## 2021-04-26 ENCOUNTER — HOSPITAL ENCOUNTER (OUTPATIENT)
Dept: RADIOLOGY | Facility: OTHER | Age: 71
Discharge: HOME | End: 2021-04-26
Attending: ORTHOPAEDIC SURGERY
Payer: MEDICARE

## 2021-04-26 ENCOUNTER — OFFICE VISIT (OUTPATIENT)
Dept: ORTHOPEDICS | Facility: CLINIC | Age: 71
End: 2021-04-26
Payer: MEDICARE

## 2021-04-26 VITALS — HEIGHT: 65 IN | WEIGHT: 115 LBS | BODY MASS INDEX: 19.16 KG/M2

## 2021-04-26 DIAGNOSIS — S52.572A OTHER CLOSED INTRA-ARTICULAR FRACTURE OF DISTAL END OF LEFT RADIUS, INITIAL ENCOUNTER: Primary | ICD-10-CM

## 2021-04-26 DIAGNOSIS — S52.572A OTHER CLOSED INTRA-ARTICULAR FRACTURE OF DISTAL END OF LEFT RADIUS, INITIAL ENCOUNTER: ICD-10-CM

## 2021-04-26 PROCEDURE — 99999 PR PBB SHADOW E&M-EST. PATIENT-LVL II: ICD-10-PCS | Mod: PBBFAC,,, | Performed by: ORTHOPAEDIC SURGERY

## 2021-04-26 PROCEDURE — 99213 OFFICE O/P EST LOW 20 MIN: CPT | Mod: S$PBB,,, | Performed by: ORTHOPAEDIC SURGERY

## 2021-04-26 PROCEDURE — 73110 X-RAY EXAM OF WRIST: CPT | Mod: TC,FY,LT

## 2021-04-26 PROCEDURE — 73110 X-RAY EXAM OF WRIST: CPT | Mod: 26,LT,, | Performed by: RADIOLOGY

## 2021-04-26 PROCEDURE — 99212 OFFICE O/P EST SF 10 MIN: CPT | Mod: PBBFAC,25 | Performed by: ORTHOPAEDIC SURGERY

## 2021-04-26 PROCEDURE — 99999 PR PBB SHADOW E&M-EST. PATIENT-LVL II: CPT | Mod: PBBFAC,,, | Performed by: ORTHOPAEDIC SURGERY

## 2021-04-26 PROCEDURE — 73110 XR WRIST COMPLETE 3 VIEWS LEFT: ICD-10-PCS | Mod: 26,LT,, | Performed by: RADIOLOGY

## 2021-04-26 PROCEDURE — 99213 PR OFFICE/OUTPT VISIT, EST, LEVL III, 20-29 MIN: ICD-10-PCS | Mod: S$PBB,,, | Performed by: ORTHOPAEDIC SURGERY

## 2021-04-27 ENCOUNTER — CLINICAL SUPPORT (OUTPATIENT)
Dept: REHABILITATION | Facility: HOSPITAL | Age: 71
End: 2021-04-27
Attending: ORTHOPAEDIC SURGERY
Payer: MEDICARE

## 2021-04-27 DIAGNOSIS — M25.60 RANGE OF MOTION DEFICIT: ICD-10-CM

## 2021-04-27 DIAGNOSIS — M25.532 PAIN IN LEFT WRIST: ICD-10-CM

## 2021-04-27 PROCEDURE — 97110 THERAPEUTIC EXERCISES: CPT | Mod: PO

## 2021-04-27 PROCEDURE — 97022 WHIRLPOOL THERAPY: CPT | Mod: PO

## 2021-04-29 ENCOUNTER — CLINICAL SUPPORT (OUTPATIENT)
Dept: REHABILITATION | Facility: HOSPITAL | Age: 71
End: 2021-04-29
Attending: ORTHOPAEDIC SURGERY
Payer: MEDICARE

## 2021-04-29 DIAGNOSIS — M25.60 RANGE OF MOTION DEFICIT: ICD-10-CM

## 2021-04-29 DIAGNOSIS — M25.532 PAIN IN LEFT WRIST: ICD-10-CM

## 2021-04-29 PROCEDURE — 97110 THERAPEUTIC EXERCISES: CPT | Mod: PO

## 2021-05-04 ENCOUNTER — CLINICAL SUPPORT (OUTPATIENT)
Dept: REHABILITATION | Facility: HOSPITAL | Age: 71
End: 2021-05-04
Attending: ORTHOPAEDIC SURGERY
Payer: MEDICARE

## 2021-05-04 DIAGNOSIS — M25.60 RANGE OF MOTION DEFICIT: ICD-10-CM

## 2021-05-04 DIAGNOSIS — M25.532 PAIN IN LEFT WRIST: ICD-10-CM

## 2021-05-04 PROCEDURE — 97110 THERAPEUTIC EXERCISES: CPT | Mod: PO

## 2021-05-25 ENCOUNTER — HOSPITAL ENCOUNTER (OUTPATIENT)
Dept: RADIOLOGY | Facility: HOSPITAL | Age: 71
Discharge: HOME | End: 2021-05-25
Attending: PHYSICIAN ASSISTANT
Payer: MEDICARE

## 2021-05-25 VITALS — WEIGHT: 116 LBS | HEIGHT: 65 IN | BODY MASS INDEX: 19.33 KG/M2

## 2021-05-25 DIAGNOSIS — R92.8 ABNORMAL MAMMOGRAM OF RIGHT BREAST: ICD-10-CM

## 2021-05-25 DIAGNOSIS — R92.8 ABNORMAL MAMMOGRAM: ICD-10-CM

## 2021-05-25 DIAGNOSIS — Z12.31 ENCOUNTER FOR SCREENING MAMMOGRAM FOR MALIGNANT NEOPLASM OF BREAST: Primary | ICD-10-CM

## 2021-05-25 PROCEDURE — 77065 MAMMO DIGITAL DIAGNOSTIC RIGHT WITH TOMO: ICD-10-PCS | Mod: 26,RT,, | Performed by: RADIOLOGY

## 2021-05-25 PROCEDURE — 77065 DX MAMMO INCL CAD UNI: CPT | Mod: 26,RT,, | Performed by: RADIOLOGY

## 2021-05-25 PROCEDURE — 77061 BREAST TOMOSYNTHESIS UNI: CPT | Mod: 26,RT,, | Performed by: RADIOLOGY

## 2021-05-25 PROCEDURE — 77061 MAMMO DIGITAL DIAGNOSTIC RIGHT WITH TOMO: ICD-10-PCS | Mod: 26,RT,, | Performed by: RADIOLOGY

## 2021-05-25 PROCEDURE — 77061 BREAST TOMOSYNTHESIS UNI: CPT | Mod: TC,RT

## 2021-07-01 ENCOUNTER — OFFICE VISIT (OUTPATIENT)
Dept: INTERNAL MEDICINE | Facility: CLINIC | Age: 71
End: 2021-07-01
Payer: MEDICARE

## 2021-07-01 VITALS
HEIGHT: 65 IN | DIASTOLIC BLOOD PRESSURE: 70 MMHG | SYSTOLIC BLOOD PRESSURE: 100 MMHG | BODY MASS INDEX: 19.94 KG/M2 | OXYGEN SATURATION: 98 % | RESPIRATION RATE: 16 BRPM | HEART RATE: 63 BPM | WEIGHT: 119.69 LBS

## 2021-07-01 DIAGNOSIS — M85.80 OSTEOPENIA, UNSPECIFIED LOCATION: ICD-10-CM

## 2021-07-01 DIAGNOSIS — Z00.00 ANNUAL PHYSICAL EXAM: Primary | ICD-10-CM

## 2021-07-01 DIAGNOSIS — Z13.6 ENCOUNTER FOR LIPID SCREENING FOR CARDIOVASCULAR DISEASE: ICD-10-CM

## 2021-07-01 DIAGNOSIS — Z13.220 ENCOUNTER FOR LIPID SCREENING FOR CARDIOVASCULAR DISEASE: ICD-10-CM

## 2021-07-01 PROCEDURE — 99214 OFFICE O/P EST MOD 30 MIN: CPT | Mod: PBBFAC | Performed by: PHYSICIAN ASSISTANT

## 2021-07-01 PROCEDURE — 99213 PR OFFICE/OUTPT VISIT, EST, LEVL III, 20-29 MIN: ICD-10-PCS | Mod: S$PBB,,, | Performed by: PHYSICIAN ASSISTANT

## 2021-07-01 PROCEDURE — 99999 PR PBB SHADOW E&M-EST. PATIENT-LVL IV: CPT | Mod: PBBFAC,,, | Performed by: PHYSICIAN ASSISTANT

## 2021-07-01 PROCEDURE — 99213 OFFICE O/P EST LOW 20 MIN: CPT | Mod: S$PBB,,, | Performed by: PHYSICIAN ASSISTANT

## 2021-07-01 PROCEDURE — 99999 PR PBB SHADOW E&M-EST. PATIENT-LVL IV: ICD-10-PCS | Mod: PBBFAC,,, | Performed by: PHYSICIAN ASSISTANT

## 2021-07-01 RX ORDER — IBUPROFEN 100 MG/5ML
SUSPENSION, ORAL (FINAL DOSE FORM) ORAL
COMMUNITY
Start: 2020-03-31 | End: 2023-01-06

## 2021-07-01 RX ORDER — VIT C/E/ZN/COPPR/LUTEIN/ZEAXAN 250MG-90MG
CAPSULE ORAL
COMMUNITY
Start: 2020-09-01

## 2021-07-01 RX ORDER — MULTIVIT WITH MINERALS/HERBS
TABLET ORAL
COMMUNITY
Start: 2021-01-15 | End: 2023-01-06

## 2021-07-02 ENCOUNTER — LAB VISIT (OUTPATIENT)
Dept: LAB | Facility: HOSPITAL | Age: 71
End: 2021-07-02
Attending: INTERNAL MEDICINE
Payer: MEDICARE

## 2021-07-02 DIAGNOSIS — M85.80 OSTEOPENIA, UNSPECIFIED LOCATION: ICD-10-CM

## 2021-07-02 DIAGNOSIS — Z13.220 ENCOUNTER FOR LIPID SCREENING FOR CARDIOVASCULAR DISEASE: ICD-10-CM

## 2021-07-02 DIAGNOSIS — Z13.6 ENCOUNTER FOR LIPID SCREENING FOR CARDIOVASCULAR DISEASE: ICD-10-CM

## 2021-07-02 DIAGNOSIS — Z00.00 ANNUAL PHYSICAL EXAM: ICD-10-CM

## 2021-07-02 LAB
25(OH)D3+25(OH)D2 SERPL-MCNC: 42 NG/ML (ref 30–96)
ALBUMIN SERPL BCP-MCNC: 3.8 G/DL (ref 3.5–5.2)
ALP SERPL-CCNC: 85 U/L (ref 55–135)
ALT SERPL W/O P-5'-P-CCNC: 47 U/L (ref 10–44)
ANION GAP SERPL CALC-SCNC: 8 MMOL/L (ref 8–16)
AST SERPL-CCNC: 47 U/L (ref 10–40)
BASOPHILS # BLD AUTO: 0.05 K/UL (ref 0–0.2)
BASOPHILS NFR BLD: 1.5 % (ref 0–1.9)
BILIRUB SERPL-MCNC: 0.7 MG/DL (ref 0.1–1)
BUN SERPL-MCNC: 14 MG/DL (ref 8–23)
CALCIUM SERPL-MCNC: 9.6 MG/DL (ref 8.7–10.5)
CHLORIDE SERPL-SCNC: 105 MMOL/L (ref 95–110)
CHOLEST SERPL-MCNC: 164 MG/DL (ref 120–199)
CHOLEST/HDLC SERPL: 2.3 {RATIO} (ref 2–5)
CO2 SERPL-SCNC: 27 MMOL/L (ref 23–29)
CREAT SERPL-MCNC: 0.7 MG/DL (ref 0.5–1.4)
DIFFERENTIAL METHOD: ABNORMAL
EOSINOPHIL # BLD AUTO: 0.2 K/UL (ref 0–0.5)
EOSINOPHIL NFR BLD: 4.4 % (ref 0–8)
ERYTHROCYTE [DISTWIDTH] IN BLOOD BY AUTOMATED COUNT: 12.4 % (ref 11.5–14.5)
EST. GFR  (AFRICAN AMERICAN): >60 ML/MIN/1.73 M^2
EST. GFR  (NON AFRICAN AMERICAN): >60 ML/MIN/1.73 M^2
GLUCOSE SERPL-MCNC: 87 MG/DL (ref 70–110)
HCT VFR BLD AUTO: 40.2 % (ref 37–48.5)
HDLC SERPL-MCNC: 70 MG/DL (ref 40–75)
HDLC SERPL: 42.7 % (ref 20–50)
HGB BLD-MCNC: 13.4 G/DL (ref 12–16)
IMM GRANULOCYTES # BLD AUTO: 0.01 K/UL (ref 0–0.04)
IMM GRANULOCYTES NFR BLD AUTO: 0.3 % (ref 0–0.5)
LDLC SERPL CALC-MCNC: 85.8 MG/DL (ref 63–159)
LYMPHOCYTES # BLD AUTO: 1.3 K/UL (ref 1–4.8)
LYMPHOCYTES NFR BLD: 38.8 % (ref 18–48)
MAGNESIUM SERPL-MCNC: 2.1 MG/DL (ref 1.6–2.6)
MCH RBC QN AUTO: 31.5 PG (ref 27–31)
MCHC RBC AUTO-ENTMCNC: 33.3 G/DL (ref 32–36)
MCV RBC AUTO: 94 FL (ref 82–98)
MONOCYTES # BLD AUTO: 0.3 K/UL (ref 0.3–1)
MONOCYTES NFR BLD: 8.9 % (ref 4–15)
NEUTROPHILS # BLD AUTO: 1.6 K/UL (ref 1.8–7.7)
NEUTROPHILS NFR BLD: 46.1 % (ref 38–73)
NONHDLC SERPL-MCNC: 94 MG/DL
NRBC BLD-RTO: 0 /100 WBC
PLATELET # BLD AUTO: 145 K/UL (ref 150–450)
PMV BLD AUTO: 13.7 FL (ref 9.2–12.9)
POTASSIUM SERPL-SCNC: 4.2 MMOL/L (ref 3.5–5.1)
PROT SERPL-MCNC: 6.3 G/DL (ref 6–8.4)
RBC # BLD AUTO: 4.26 M/UL (ref 4–5.4)
SODIUM SERPL-SCNC: 140 MMOL/L (ref 136–145)
TRIGL SERPL-MCNC: 41 MG/DL (ref 30–150)
TSH SERPL DL<=0.005 MIU/L-ACNC: 3.25 UIU/ML (ref 0.4–4)
VIT B12 SERPL-MCNC: 541 PG/ML (ref 210–950)
WBC # BLD AUTO: 3.38 K/UL (ref 3.9–12.7)

## 2021-07-02 PROCEDURE — 82306 VITAMIN D 25 HYDROXY: CPT | Performed by: PHYSICIAN ASSISTANT

## 2021-07-02 PROCEDURE — 85025 COMPLETE CBC W/AUTO DIFF WBC: CPT | Performed by: PHYSICIAN ASSISTANT

## 2021-07-02 PROCEDURE — 84443 ASSAY THYROID STIM HORMONE: CPT | Performed by: PHYSICIAN ASSISTANT

## 2021-07-02 PROCEDURE — 80061 LIPID PANEL: CPT | Performed by: PHYSICIAN ASSISTANT

## 2021-07-02 PROCEDURE — 80053 COMPREHEN METABOLIC PANEL: CPT | Performed by: PHYSICIAN ASSISTANT

## 2021-07-02 PROCEDURE — 82607 VITAMIN B-12: CPT | Performed by: PHYSICIAN ASSISTANT

## 2021-07-02 PROCEDURE — 83735 ASSAY OF MAGNESIUM: CPT | Performed by: PHYSICIAN ASSISTANT

## 2021-07-02 PROCEDURE — 36415 COLL VENOUS BLD VENIPUNCTURE: CPT | Performed by: PHYSICIAN ASSISTANT

## 2021-07-08 ENCOUNTER — PATIENT MESSAGE (OUTPATIENT)
Dept: INTERNAL MEDICINE | Facility: CLINIC | Age: 71
End: 2021-07-08

## 2021-08-12 ENCOUNTER — TELEPHONE (OUTPATIENT)
Dept: INTERNAL MEDICINE | Facility: CLINIC | Age: 71
End: 2021-08-12

## 2021-08-19 ENCOUNTER — PATIENT OUTREACH (OUTPATIENT)
Dept: ADMINISTRATIVE | Facility: HOSPITAL | Age: 71
End: 2021-08-19

## 2021-08-26 ENCOUNTER — PATIENT MESSAGE (OUTPATIENT)
Dept: REHABILITATION | Facility: HOSPITAL | Age: 71
End: 2021-08-26

## 2021-08-26 ENCOUNTER — TELEPHONE (OUTPATIENT)
Dept: ORTHOPEDICS | Facility: CLINIC | Age: 71
End: 2021-08-26

## 2021-08-26 ENCOUNTER — PATIENT MESSAGE (OUTPATIENT)
Dept: ORTHOPEDICS | Facility: CLINIC | Age: 71
End: 2021-08-26

## 2021-08-26 DIAGNOSIS — S52.572A OTHER CLOSED INTRA-ARTICULAR FRACTURE OF DISTAL END OF LEFT RADIUS, INITIAL ENCOUNTER: Primary | ICD-10-CM

## 2021-09-23 ENCOUNTER — CLINICAL SUPPORT (OUTPATIENT)
Dept: REHABILITATION | Facility: HOSPITAL | Age: 71
End: 2021-09-23
Attending: ORTHOPAEDIC SURGERY
Payer: MEDICARE

## 2021-09-23 DIAGNOSIS — M25.60 RANGE OF MOTION DEFICIT: ICD-10-CM

## 2021-09-23 DIAGNOSIS — S52.572A OTHER CLOSED INTRA-ARTICULAR FRACTURE OF DISTAL END OF LEFT RADIUS, INITIAL ENCOUNTER: ICD-10-CM

## 2021-09-23 DIAGNOSIS — M25.532 PAIN IN LEFT WRIST: ICD-10-CM

## 2021-09-23 DIAGNOSIS — M25.342: ICD-10-CM

## 2021-09-23 PROCEDURE — L3913 HFO W/O JOINTS CF: HCPCS | Mod: PO

## 2021-09-27 ENCOUNTER — HOSPITAL ENCOUNTER (OUTPATIENT)
Dept: RADIOLOGY | Facility: HOSPITAL | Age: 71
Discharge: HOME | End: 2021-09-27
Attending: PHYSICIAN ASSISTANT
Payer: MEDICARE

## 2021-09-27 DIAGNOSIS — R92.8 ABNORMAL MAMMOGRAM OF RIGHT BREAST: ICD-10-CM

## 2021-09-27 DIAGNOSIS — Z12.31 ENCOUNTER FOR SCREENING MAMMOGRAM FOR MALIGNANT NEOPLASM OF BREAST: ICD-10-CM

## 2021-09-27 PROCEDURE — 77066 MAMMO DIGITAL DIAGNOSTIC BILAT WITH TOMO: ICD-10-PCS | Mod: 26,,, | Performed by: RADIOLOGY

## 2021-09-27 PROCEDURE — 77062 MAMMO DIGITAL DIAGNOSTIC BILAT WITH TOMO: ICD-10-PCS | Mod: 26,,, | Performed by: RADIOLOGY

## 2021-09-27 PROCEDURE — 77062 BREAST TOMOSYNTHESIS BI: CPT | Mod: TC

## 2021-09-27 PROCEDURE — 77062 BREAST TOMOSYNTHESIS BI: CPT | Mod: 26,,, | Performed by: RADIOLOGY

## 2021-09-27 PROCEDURE — 77066 DX MAMMO INCL CAD BI: CPT | Mod: 26,,, | Performed by: RADIOLOGY

## 2021-09-28 ENCOUNTER — CLINICAL SUPPORT (OUTPATIENT)
Dept: REHABILITATION | Facility: HOSPITAL | Age: 71
End: 2021-09-28
Attending: ORTHOPAEDIC SURGERY
Payer: MEDICARE

## 2021-09-28 DIAGNOSIS — M25.342: ICD-10-CM

## 2021-09-28 DIAGNOSIS — M25.60 RANGE OF MOTION DEFICIT: ICD-10-CM

## 2021-09-28 DIAGNOSIS — M25.532 PAIN IN LEFT WRIST: ICD-10-CM

## 2021-09-28 PROCEDURE — 97018 PARAFFIN BATH THERAPY: CPT | Mod: PO

## 2021-09-28 PROCEDURE — 97535 SELF CARE MNGMENT TRAINING: CPT | Mod: PO

## 2021-09-28 PROCEDURE — 97165 OT EVAL LOW COMPLEX 30 MIN: CPT | Mod: PO

## 2021-09-28 PROCEDURE — 97763 ORTHC/PROSTC MGMT SBSQ ENC: CPT | Mod: 59,PO

## 2021-10-01 ENCOUNTER — CLINICAL SUPPORT (OUTPATIENT)
Dept: REHABILITATION | Facility: HOSPITAL | Age: 71
End: 2021-10-01
Attending: ORTHOPAEDIC SURGERY
Payer: MEDICARE

## 2021-10-01 DIAGNOSIS — M25.60 RANGE OF MOTION DEFICIT: ICD-10-CM

## 2021-10-01 DIAGNOSIS — M25.342: ICD-10-CM

## 2021-10-01 DIAGNOSIS — M25.532 PAIN IN LEFT WRIST: ICD-10-CM

## 2021-10-01 PROCEDURE — 97763 ORTHC/PROSTC MGMT SBSQ ENC: CPT | Mod: PO

## 2021-10-01 PROCEDURE — 97535 SELF CARE MNGMENT TRAINING: CPT | Mod: PO

## 2021-10-04 ENCOUNTER — PATIENT MESSAGE (OUTPATIENT)
Dept: REHABILITATION | Facility: HOSPITAL | Age: 71
End: 2021-10-04

## 2021-10-06 ENCOUNTER — CLINICAL SUPPORT (OUTPATIENT)
Dept: REHABILITATION | Facility: HOSPITAL | Age: 71
End: 2021-10-06
Attending: ORTHOPAEDIC SURGERY
Payer: MEDICARE

## 2021-10-06 DIAGNOSIS — M25.60 RANGE OF MOTION DEFICIT: ICD-10-CM

## 2021-10-06 DIAGNOSIS — M25.342: ICD-10-CM

## 2021-10-06 DIAGNOSIS — M25.532 PAIN IN LEFT WRIST: ICD-10-CM

## 2021-10-06 PROCEDURE — 97018 PARAFFIN BATH THERAPY: CPT | Mod: PO

## 2021-10-06 PROCEDURE — 97535 SELF CARE MNGMENT TRAINING: CPT | Mod: PO

## 2021-10-12 ENCOUNTER — CLINICAL SUPPORT (OUTPATIENT)
Dept: REHABILITATION | Facility: HOSPITAL | Age: 71
End: 2021-10-12
Attending: ORTHOPAEDIC SURGERY
Payer: MEDICARE

## 2021-10-12 DIAGNOSIS — M25.342: ICD-10-CM

## 2021-10-12 DIAGNOSIS — M25.532 PAIN IN LEFT WRIST: ICD-10-CM

## 2021-10-12 DIAGNOSIS — M25.60 RANGE OF MOTION DEFICIT: ICD-10-CM

## 2021-10-12 PROCEDURE — 97535 SELF CARE MNGMENT TRAINING: CPT | Mod: PO

## 2021-10-12 PROCEDURE — 97018 PARAFFIN BATH THERAPY: CPT | Mod: 59,PO

## 2021-10-12 PROCEDURE — 97140 MANUAL THERAPY 1/> REGIONS: CPT | Mod: PO

## 2021-10-13 ENCOUNTER — PATIENT MESSAGE (OUTPATIENT)
Dept: INTERNAL MEDICINE | Facility: CLINIC | Age: 71
End: 2021-10-13
Payer: MEDICARE

## 2021-10-14 ENCOUNTER — CLINICAL SUPPORT (OUTPATIENT)
Dept: REHABILITATION | Facility: HOSPITAL | Age: 71
End: 2021-10-14
Attending: ORTHOPAEDIC SURGERY
Payer: MEDICARE

## 2021-10-14 DIAGNOSIS — M25.60 RANGE OF MOTION DEFICIT: ICD-10-CM

## 2021-10-14 DIAGNOSIS — M25.532 PAIN IN LEFT WRIST: ICD-10-CM

## 2021-10-14 DIAGNOSIS — M25.342: ICD-10-CM

## 2021-10-14 PROCEDURE — 97763 ORTHC/PROSTC MGMT SBSQ ENC: CPT | Mod: PO

## 2021-10-14 PROCEDURE — 97535 SELF CARE MNGMENT TRAINING: CPT | Mod: PO

## 2021-10-14 PROCEDURE — 97018 PARAFFIN BATH THERAPY: CPT | Mod: PO

## 2021-10-21 ENCOUNTER — CLINICAL SUPPORT (OUTPATIENT)
Dept: REHABILITATION | Facility: HOSPITAL | Age: 71
End: 2021-10-21
Payer: MEDICARE

## 2021-10-21 DIAGNOSIS — M25.342: ICD-10-CM

## 2021-10-21 DIAGNOSIS — M25.60 RANGE OF MOTION DEFICIT: ICD-10-CM

## 2021-10-21 DIAGNOSIS — M25.532 PAIN IN LEFT WRIST: ICD-10-CM

## 2021-10-21 PROCEDURE — 97763 ORTHC/PROSTC MGMT SBSQ ENC: CPT | Mod: PN

## 2021-10-21 PROCEDURE — 97010 HOT OR COLD PACKS THERAPY: CPT | Mod: PN

## 2021-10-25 ENCOUNTER — PATIENT MESSAGE (OUTPATIENT)
Dept: INTERNAL MEDICINE | Facility: CLINIC | Age: 71
End: 2021-10-25
Payer: MEDICARE

## 2021-10-26 ENCOUNTER — PATIENT MESSAGE (OUTPATIENT)
Dept: INTERNAL MEDICINE | Facility: CLINIC | Age: 71
End: 2021-10-26
Payer: MEDICARE

## 2021-10-27 ENCOUNTER — PATIENT MESSAGE (OUTPATIENT)
Dept: REHABILITATION | Facility: HOSPITAL | Age: 71
End: 2021-10-27
Payer: MEDICARE

## 2021-11-04 ENCOUNTER — PATIENT MESSAGE (OUTPATIENT)
Dept: REHABILITATION | Facility: HOSPITAL | Age: 71
End: 2021-11-04

## 2021-11-04 ENCOUNTER — CLINICAL SUPPORT (OUTPATIENT)
Dept: REHABILITATION | Facility: HOSPITAL | Age: 71
End: 2021-11-04
Payer: MEDICARE

## 2021-11-04 DIAGNOSIS — M25.342: ICD-10-CM

## 2021-11-04 DIAGNOSIS — M25.60 RANGE OF MOTION DEFICIT: ICD-10-CM

## 2021-11-04 DIAGNOSIS — M25.532 PAIN IN LEFT WRIST: ICD-10-CM

## 2021-11-04 PROCEDURE — 97010 HOT OR COLD PACKS THERAPY: CPT | Mod: PN

## 2021-11-04 PROCEDURE — 97763 ORTHC/PROSTC MGMT SBSQ ENC: CPT | Mod: PN

## 2021-11-04 PROCEDURE — 97110 THERAPEUTIC EXERCISES: CPT | Mod: PN

## 2021-11-29 ENCOUNTER — OFFICE VISIT (OUTPATIENT)
Dept: INTERNAL MEDICINE | Facility: CLINIC | Age: 71
End: 2021-11-29
Payer: MEDICARE

## 2021-11-29 VITALS
RESPIRATION RATE: 16 BRPM | SYSTOLIC BLOOD PRESSURE: 100 MMHG | DIASTOLIC BLOOD PRESSURE: 60 MMHG | HEIGHT: 65 IN | WEIGHT: 117.94 LBS | HEART RATE: 79 BPM | OXYGEN SATURATION: 100 % | BODY MASS INDEX: 19.65 KG/M2

## 2021-11-29 DIAGNOSIS — F43.0 REACTION, SITUATIONAL, ACUTE, TO STRESS: Primary | ICD-10-CM

## 2021-11-29 PROCEDURE — 99213 PR OFFICE/OUTPT VISIT, EST, LEVL III, 20-29 MIN: ICD-10-PCS | Mod: S$PBB,,, | Performed by: PHYSICIAN ASSISTANT

## 2021-11-29 PROCEDURE — 99999 PR PBB SHADOW E&M-EST. PATIENT-LVL III: ICD-10-PCS | Mod: PBBFAC,,, | Performed by: PHYSICIAN ASSISTANT

## 2021-11-29 PROCEDURE — 99999 PR PBB SHADOW E&M-EST. PATIENT-LVL III: CPT | Mod: PBBFAC,,, | Performed by: PHYSICIAN ASSISTANT

## 2021-11-29 PROCEDURE — 99213 OFFICE O/P EST LOW 20 MIN: CPT | Mod: PBBFAC | Performed by: PHYSICIAN ASSISTANT

## 2021-11-29 PROCEDURE — 99213 OFFICE O/P EST LOW 20 MIN: CPT | Mod: S$PBB,,, | Performed by: PHYSICIAN ASSISTANT

## 2021-11-29 RX ORDER — BUPROPION HYDROCHLORIDE 75 MG/1
75 TABLET ORAL 2 TIMES DAILY
Qty: 60 TABLET | Refills: 1 | Status: SHIPPED | OUTPATIENT
Start: 2021-11-29 | End: 2022-01-07

## 2021-11-29 RX ORDER — HYDROXYZINE HYDROCHLORIDE 25 MG/1
25 TABLET, FILM COATED ORAL NIGHTLY PRN
Qty: 20 TABLET | Refills: 0 | Status: SHIPPED | OUTPATIENT
Start: 2021-11-29 | End: 2023-01-06

## 2021-12-29 ENCOUNTER — PATIENT MESSAGE (OUTPATIENT)
Dept: INTERNAL MEDICINE | Facility: CLINIC | Age: 71
End: 2021-12-29
Payer: MEDICARE

## 2022-01-04 ENCOUNTER — PATIENT MESSAGE (OUTPATIENT)
Dept: INTERNAL MEDICINE | Facility: CLINIC | Age: 72
End: 2022-01-04
Payer: MEDICARE

## 2022-01-04 ENCOUNTER — TELEPHONE (OUTPATIENT)
Dept: INTERNAL MEDICINE | Facility: CLINIC | Age: 72
End: 2022-01-04
Payer: MEDICARE

## 2022-01-07 ENCOUNTER — OFFICE VISIT (OUTPATIENT)
Dept: INTERNAL MEDICINE | Facility: CLINIC | Age: 72
End: 2022-01-07
Payer: MEDICARE

## 2022-01-07 DIAGNOSIS — F43.0 REACTION, SITUATIONAL, ACUTE, TO STRESS: Primary | ICD-10-CM

## 2022-01-07 DIAGNOSIS — R03.0 ELEVATED BP WITHOUT DIAGNOSIS OF HYPERTENSION: ICD-10-CM

## 2022-01-07 PROCEDURE — 99213 OFFICE O/P EST LOW 20 MIN: CPT | Mod: 95,,, | Performed by: PHYSICIAN ASSISTANT

## 2022-01-07 PROCEDURE — 99213 PR OFFICE/OUTPT VISIT, EST, LEVL III, 20-29 MIN: ICD-10-PCS | Mod: 95,,, | Performed by: PHYSICIAN ASSISTANT

## 2022-01-07 RX ORDER — CITALOPRAM 10 MG/1
10 TABLET ORAL DAILY
Qty: 30 TABLET | Refills: 1 | Status: SHIPPED | OUTPATIENT
Start: 2022-01-07 | End: 2023-01-06

## 2022-01-07 NOTE — PROGRESS NOTES
Subjective:       Patient ID: Meredith Ramos is a 71 y.o. female.    Chief Complaint: Follow-up    HPI     Established pt of Lisa Childress MD     The patient location is: Home  The chief complaint leading to consultation is: medication concerns/follow up    Visit type: audiovisual    Face to Face time with patient: 10  15 minutes of total time spent on the encounter, which includes face to face time and non-face to face time preparing to see the patient (eg, review of tests), Obtaining and/or reviewing separately obtained history, Documenting clinical information in the electronic or other health record, Independently interpreting results (not separately reported) and communicating results to the patient/family/caregiver, or Care coordination (not separately reported).         Each patient to whom he or she provides medical services by telemedicine is:  (1) informed of the relationship between the physician and patient and the respective role of any other health care provider with respect to management of the patient; and (2) notified that he or she may decline to receive medical services by telemedicine and may withdraw from such care at any time.    Notes:     Here with concerns of facial flushing and few elevated BP readings since starting wellbutrnin about a month ago to help with mood/stress. She is interested in trying celexa instead. Reports mood has improved as some stressors have resolved. She is exercising more. Still seeing a therapist.     .Past medical history:I have reviewed and confirmed the past medical history in the chart.  Medications: reviewed medication list in the chart  Allergies: reviewed allergy section in the chart      Review of Systems   Constitutional: Negative for chills, fever and unexpected weight change.   Respiratory: Negative for shortness of breath.    Cardiovascular: Positive for palpitations. Negative for chest pain.   Integumentary:         As per HPI          Objective:       Physical Exam  Constitutional:       General: She is not in acute distress.     Appearance: She is not ill-appearing.   Pulmonary:      Effort: Pulmonary effort is normal. No respiratory distress.   Neurological:      Mental Status: She is alert.   Psychiatric:         Mood and Affect: Mood normal.         Assessment:       Problem List Items Addressed This Visit    None     Visit Diagnoses     Elevated BP without diagnosis of hypertension    -  Primary    Reaction, situational, acute, to stress        Relevant Medications    citalopram (CELEXA) 10 MG tablet          Plan:       Meredith was seen today for follow-up.    Diagnoses and all orders for this visit:    Reaction, situational, acute, to stress  Stop Wellbutrin, can take 1 pill for the next week then stop  Start celexa as below  Continue lifestyle mods and following with therapist  -     citalopram (CELEXA) 10 MG tablet; Take 1 tablet (10 mg total) by mouth once daily.    Elevated BP without diagnosis of hypertension  Continue to monitor  Reviewed log only a few isolated elevated readings overall avg BP is within acceptable range.         F/u in 4 weeks or sooner if needed.   Courtney Lozada PA-C

## 2022-03-10 ENCOUNTER — PATIENT MESSAGE (OUTPATIENT)
Dept: INTERNAL MEDICINE | Facility: CLINIC | Age: 72
End: 2022-03-10
Payer: MEDICARE

## 2022-03-10 DIAGNOSIS — M85.80 OSTEOPENIA, UNSPECIFIED LOCATION: Primary | ICD-10-CM

## 2022-03-10 DIAGNOSIS — Z87.19 HISTORY OF ULCERATIVE COLITIS: ICD-10-CM

## 2022-03-11 ENCOUNTER — PATIENT MESSAGE (OUTPATIENT)
Dept: INTERNAL MEDICINE | Facility: CLINIC | Age: 72
End: 2022-03-11
Payer: MEDICARE

## 2022-03-14 ENCOUNTER — LAB VISIT (OUTPATIENT)
Dept: LAB | Facility: HOSPITAL | Age: 72
End: 2022-03-14
Payer: MEDICARE

## 2022-03-14 DIAGNOSIS — Z87.19 HISTORY OF ULCERATIVE COLITIS: ICD-10-CM

## 2022-03-14 DIAGNOSIS — M85.80 OSTEOPENIA, UNSPECIFIED LOCATION: ICD-10-CM

## 2022-03-14 LAB — 25(OH)D3+25(OH)D2 SERPL-MCNC: 57 NG/ML (ref 30–96)

## 2022-03-14 PROCEDURE — 82306 VITAMIN D 25 HYDROXY: CPT | Performed by: PHYSICIAN ASSISTANT

## 2022-03-14 PROCEDURE — 36415 COLL VENOUS BLD VENIPUNCTURE: CPT | Performed by: PHYSICIAN ASSISTANT

## 2022-06-30 DIAGNOSIS — M54.50 LOW BACK PAIN: Primary | ICD-10-CM

## 2022-06-30 DIAGNOSIS — M41.9 SCOLIOSIS OF THORACOLUMBAR SPINE, UNSPECIFIED SCOLIOSIS TYPE: ICD-10-CM

## 2022-06-30 DIAGNOSIS — M35.7 HYPERMOBILITY SYNDROME: ICD-10-CM

## 2022-07-11 ENCOUNTER — CLINICAL SUPPORT (OUTPATIENT)
Dept: REHABILITATION | Facility: OTHER | Age: 72
End: 2022-07-11
Payer: MEDICARE

## 2022-07-11 DIAGNOSIS — M54.40 ACUTE RIGHT-SIDED LOW BACK PAIN WITH SCIATICA, SCIATICA LATERALITY UNSPECIFIED: ICD-10-CM

## 2022-07-11 PROBLEM — M54.50 LOW BACK PAIN: Status: ACTIVE | Noted: 2022-07-11

## 2022-07-11 PROCEDURE — 97162 PT EVAL MOD COMPLEX 30 MIN: CPT | Mod: KX,PN

## 2022-07-11 PROCEDURE — 97140 MANUAL THERAPY 1/> REGIONS: CPT | Mod: PN

## 2022-07-11 PROCEDURE — 97112 NEUROMUSCULAR REEDUCATION: CPT | Mod: PN

## 2022-07-11 PROCEDURE — 97110 THERAPEUTIC EXERCISES: CPT | Mod: KX,PN

## 2022-07-11 NOTE — PROGRESS NOTES
[facility]   Physical Therapy Initial Evaluation     Date: 7/11/2022   Name: Meredith Ramos  Clinic Number: [id number]    Therapy Diagnosis:   Encounter Diagnosis   Name Primary?    Acute right-sided low back pain with sciatica, sciatica laterality unspecified      Physician: Jeremías Maynard,*    Physician Orders: PT Eval and Treat   Medical Diagnosis from Referral: M54.50 (ICD-10-CM) - Low back pain M41.9 (ICD-10-CM) - Scoliosis of thoracolumbar spine, unspecified scoliosis type M35.7 (ICD-10-CM) - Hypermobility syndrome     Evaluation Date: 7/11/2022  Authorization Period Expiration: 12/31/2022  Plan of Care Expiration: 9/11/2022  Visit # / Visits authorized: auth pending    FOTO: #1/3 on 7/11/2022     Precautions: Standard     Time In: 01:07pm  Time Out: 02:10pm  Total Appointment Time (timed & untimed codes): 63 minutes    SUBJECTIVE     Date of onset: 3 months ago    History of current condition - Meredith reports that she has history of scoliosis and noticed that she could not turn over in bed due to back pain about 3 months ago. Patient then did exercises and a long walk; after about 2 miles into her walk, she noticed L hip pain, swelling and heat that lasted for a couple of days. She notes continued tenderness at L hip to date. Patient states that her MD told her she had a gluteal strain.     Patient complains of R ankle pain from spraining her ankle when walking on 7/9/2022.    Patient usually walks 1-15 miles/day when spending her summers in [place] with history of difficulty in rotating her L LE for pivoting type movements.      Patient reports history of bunion and hammer toe surgeries bilaterally about 30 years ago.     Fall/MVAs: tripped on the sidewalk twice with one fall resulting in L wrist fracture     Imaging,  X-ray indicating some degeneration and a gluteal tear with striations from scar tissue -- per patient report.    Prior Therapy: yes, 20 years ago for back  pain when she was not able to get out of bed due - states that it was helpful to do the exercises   Social History: none  Occupation: none  Prior Level of Function: 4-4.5 miles, 3-4x/wk and aerobic step 3-4x/wk   Current Level of Function: 4-4.5 miles, 3-4x/wk and aerobic step 3-4x/wk     Pain:  Current 0/10, worst 3/10, best 0/10   Location: pinching at L lumbosacral region and discomfort into L lateral hip   Description: intermittent   Aggravating Factors: R side bending with L arm going overhead, rolling over in bed   Easing Factors: not stretching over to the side and being careful when rolling over in bed     Patients goals: I want to strengthen whatever is causing the problem      Medical History:   Past Medical History:   Diagnosis Date    Diverticulitis     Kidney stone     Osteoporosis     ostenopenia      Surgical History:   Meredith Ramos  has a past surgical history that includes Bunionectomy; removal of breast implants; Colectomy (2004); Bunionectomy; Colectomy; kidney stones; Breast biopsy; and Hysterectomy (07/22/2013).    Medications:   Meredith has a current medication list which includes the following prescription(s): ascorbic acid (vitamin c), b complex vitamins, calcium-vitamin d3, cholecalciferol (vitamin d3), citalopram, hydroxyzine hcl, magnesium citrate, vitamin e acetate, and zinc.    Allergies:   Review of patient's allergies indicates:  No Known Allergies     OBJECTIVE   7/11/2022:     Posture:  R scapular winging, L sided scoliotic curve     Range of Motion:   LUMBAR  Degrees    Flexion 102   Extension 30   Left Side Bending 10   Right Side Bending 8         HIP     Internal rotation   (R) 40    (L) 35     External rotation   (R) 42    (L) 25 stiffness         Lower Extremity Strength  Right LE Left LE   Knee extension: 4+ Knee extension: 4+   Hip flexion: 4+ Hip flexion: 4   Hip extension: 4+ Hip extension: 4+   Hip abduction: 4+ Hip abduction:4+   Hip adduction: 4 Hip adduction: 4  "  Ankle dorsiflexion: 4+ Ankle dorsiflexion: 4+   Ankle plantarflexion: B 25 heel raises  Ankle plantarflexion:B 25 heel raises      Special Tests:  Bridge test: negative      SLR:  R: negative   L: negative      DAVID:  R: negative   L: negative     FADDIR:   R: negative   L: negative     Joint Mobility:   Thoracic, lumbar, hip - hypomobile, painful     Flexibility:   Ely's test: R = negative; L = negative   Popliteal Angle: R =  74 deg ; L = 70 deg    Function/balance:  Sit - stand: 21 x in 30"     R SLS: 10"     L SLS: 16"    Limitation/Restriction for FOTO Lumbar Survey    Therapist reviewed FOTO scores for Meredith Ramos on 7/11/2022.   FOTO documents entered into [employer] - see Media section.    Limitation Score: 17%  Predicted Score: 16%       TREATMENT     Total Treatment time (time-based codes) separate from Evaluation: 48 minutes     therapeutic exercises for 30 minutes:   R SL open book stretch, 20"x5   L hip ER stretch, 30"x3    L piriformis stretch, 30"x3   Bridging with green band at knees, 3" hold, 3x10  Written HOME EXERCISE PROGRAM provided, reviewed, patient demo understanding   Patient education on nature of current condition and PHYSICAL THERAPY POC     manual therapy techniques for 10 minutes:   L hip, lumbar     neuromuscular re-education for 8 minutes:   TA activation, 10"x 10    therapeutic activities for 00 minutes:     PATIENT EDUCATION AND HOME EXERCISES     Education provided:   - yes    Home Exercises Provided: yes. Exercises were reviewed and Meredith was able to demonstrate them prior to the end of the session.  Meredith demonstrated good  understanding of the education provided. See EMR under Patient Instructions for exercises provided during therapy sessions.    ASSESSMENT     Meredith is a 72 y.o. female referred to outpatient Physical Therapy with a medical diagnosis of M54.50 (ICD-10-CM) - Low back pain M41.9 (ICD-10-CM) - Scoliosis of thoracolumbar spine, unspecified scoliosis type " M35.7 (ICD-10-CM) - Hypermobility syndrome. Patient presents with decreased muscle strength, joint mobility. Increased pain, soft tissue restriction. Impaired posture, joint mechanics, mobility, flexibility.     Patient prognosis is Good. Rehab potential is good.     Patient will benefit from skilled outpatient Physical Therapy to address the deficits stated above and in the chart below, provide patient /family education, and to maximize patientt's level of independence.     Plan of care discussed with patient: Yes  Patient's spiritual, cultural and educational needs considered and patient is agreeable to the plan of care and goals as stated.    Medical Necessity is demonstrated by the following  History  Co-morbidities and personal factors that may impact the plan of care Co-morbidities:    Diverticulitis    Kidney stone    Osteoporosis        Personal Factors:   no deficits     moderate   Examination  Body Structures and Functions, activity limitations and participation restrictions that may impact the plan of care Body Regions:   back  lower extremities  trunk    Body Systems:    ROM  strength  balance  gait  transfers  transitions  motor control    Participation Restrictions:   Limitation in ADL, self care, social/recreational tasks     Activity limitations:   Learning and applying knowledge  no deficits    General Tasks and Commands  no deficits    Communication  no deficits    Mobility  lifting and carrying objects  walking  driving (bike, car, motorcycle)    Self care  dressing  looking after one's health    Domestic Life  shopping  cooking  doing house work (cleaning house, washing dishes, laundry)  assisting others    Interactions/Relationships  basic interpersonal interactions  complex interpersonal interactions  relating with strangers  formal relationships  family relationships    Life Areas  basic economic transactions    Community and Social Life  community life  recreation and leisure    "      moderate   Clinical Presentation evolving clinical presentation with changing clinical characteristics moderate   Decision Making/ Complexity Score: moderate     Anticipated Barriers for therapy: osteoporosis, multiple tx areas, co morbidities     Goals:  Short Term Goals: 3 weeks   1 Patient will be I with HOME EXERCISE PROGRAM  Initial, not met   2 Patient will achieve 15 deg R lumbar side bending ACTIVE RANGE OF MOTION to facilitate improved ability to stretch for obtaining yoga positions  Initial, not met   3 Patient will achieve 42 deg L hip ER ACTIVE RANGE OF MOTION to facilitate improved tolerance for rolling over in bed  Initial, not met     Long Term Goals: 6 weeks   1 Patient will obtain 4+/5 L hip flex MMT to facilitate improved ability to walk 7 miles on intermittent incline in preparation for her summers in [place]  Initial, not met   2 Patient will obtain 30" R/L SLS to facilitate improved ability to negotiate uneven terrain safely  Initial, not met   3 Patient will complete pivoting type movements with no limitation due to current condition to facilitate return to previous level of function for traveling  Initial, not met     PLAN   Plan of care Certification: 7/11/2022 to 9/11/2022    Outpatient Physical Therapy 2 times weekly for 6 weeks to include the following interventions: Cervical/Lumbar Traction, Electrical Stimulation , re-eval, dry needling, , Gait Training, Manual Therapy, Moist Heat/ Ice, Neuromuscular Re-ed, Patient Education, Self Care, Therapeutic Activities and Therapeutic Exercise.     Physical therapist and physical therapy assistant(s) met face to face to discuss patient's treatment plan and progress towards established goals. Pt will be seen by a physical therapist minimally every 6th visit or every 30 days.    Potential for KX modifier and additional visits based on subjective report, objective examination, nature of current condition, co morbidities and current functional " status.     Anne Hager, PT      I CERTIFY THE NEED FOR THESE SERVICES FURNISHED UNDER THIS PLAN OF TREATMENT AND WHILE UNDER MY CARE   Physician's comments:     Physician's Signature: ___________________________________________________     *cosign sent in routing

## 2022-07-12 NOTE — PLAN OF CARE
[facility]   Physical Therapy Initial Evaluation     Date: 7/11/2022   Name: Meredith Ramos  Clinic Number: [id number]    Therapy Diagnosis:   Encounter Diagnosis   Name Primary?    Acute right-sided low back pain with sciatica, sciatica laterality unspecified      Physician: Jeremías Maynard,*    Physician Orders: PT Eval and Treat   Medical Diagnosis from Referral: M54.50 (ICD-10-CM) - Low back pain M41.9 (ICD-10-CM) - Scoliosis of thoracolumbar spine, unspecified scoliosis type M35.7 (ICD-10-CM) - Hypermobility syndrome     Evaluation Date: 7/11/2022  Authorization Period Expiration: 12/31/2022  Plan of Care Expiration: 9/11/2022  Visit # / Visits authorized: auth pending    FOTO: #1/3 on 7/11/2022     Precautions: Standard     Time In: 01:07pm  Time Out: 02:10pm  Total Appointment Time (timed & untimed codes): 63 minutes    SUBJECTIVE     Date of onset: 3 months ago    History of current condition - Meredith reports that she has history of scoliosis and noticed that she could not turn over in bed due to back pain about 3 months ago. Patient then did exercises and a long walk; after about 2 miles into her walk, she noticed L hip pain, swelling and heat that lasted for a couple of days. She notes continued tenderness at L hip to date. Patient states that her MD told her she had a gluteal strain.     Patient complains of R ankle pain from spraining her ankle when walking on 7/9/2022.    Patient usually walks 1-15 miles/day when spending her summers in [place] with history of difficulty in rotating her L LE for pivoting type movements.      Patient reports history of bunion and hammer toe surgeries bilaterally about 30 years ago.     Fall/MVAs: tripped on the sidewalk twice with one fall resulting in L wrist fracture     Imaging,  X-ray indicating some degeneration and a gluteal tear with striations from scar tissue -- per patient report.    Prior Therapy: yes, 20 years ago for back  pain when she was not able to get out of bed due - states that it was helpful to do the exercises   Social History: none  Occupation: none  Prior Level of Function: 4-4.5 miles, 3-4x/wk and aerobic step 3-4x/wk   Current Level of Function: 4-4.5 miles, 3-4x/wk and aerobic step 3-4x/wk     Pain:  Current 0/10, worst 3/10, best 0/10   Location: pinching at L lumbosacral region and discomfort into L lateral hip   Description: intermittent   Aggravating Factors: R side bending with L arm going overhead, rolling over in bed   Easing Factors: not stretching over to the side and being careful when rolling over in bed     Patients goals: I want to strengthen whatever is causing the problem      Medical History:   Past Medical History:   Diagnosis Date    Diverticulitis     Kidney stone     Osteoporosis     ostenopenia      Surgical History:   Meredith Ramos  has a past surgical history that includes Bunionectomy; removal of breast implants; Colectomy (2004); Bunionectomy; Colectomy; kidney stones; Breast biopsy; and Hysterectomy (07/22/2013).    Medications:   Meredith has a current medication list which includes the following prescription(s): ascorbic acid (vitamin c), b complex vitamins, calcium-vitamin d3, cholecalciferol (vitamin d3), citalopram, hydroxyzine hcl, magnesium citrate, vitamin e acetate, and zinc.    Allergies:   Review of patient's allergies indicates:  No Known Allergies     OBJECTIVE   7/11/2022:     Posture:  R scapular winging, L sided scoliotic curve     Range of Motion:   LUMBAR  Degrees    Flexion 102   Extension 30   Left Side Bending 10   Right Side Bending 8         HIP     Internal rotation   (R) 40    (L) 35     External rotation   (R) 42    (L) 25 stiffness         Lower Extremity Strength  Right LE Left LE   Knee extension: 4+ Knee extension: 4+   Hip flexion: 4+ Hip flexion: 4   Hip extension: 4+ Hip extension: 4+   Hip abduction: 4+ Hip abduction:4+   Hip adduction: 4 Hip adduction: 4  "  Ankle dorsiflexion: 4+ Ankle dorsiflexion: 4+   Ankle plantarflexion: B 25 heel raises  Ankle plantarflexion:B 25 heel raises      Special Tests:  Bridge test: negative      SLR:  R: negative   L: negative      DAVID:  R: negative   L: negative     FADDIR:   R: negative   L: negative     Joint Mobility:   Thoracic, lumbar, hip - hypomobile, painful     Flexibility:   Ely's test: R = negative; L = negative   Popliteal Angle: R =  74 deg ; L = 70 deg    Function/balance:  Sit - stand: 21 x in 30"     R SLS: 10"     L SLS: 16"    Limitation/Restriction for FOTO Lumbar Survey    Therapist reviewed FOTO scores for Meredith Ramos on 7/11/2022.   FOTO documents entered into [facility] - see Media section.    Limitation Score: 17%  Predicted Score: 16%       TREATMENT     Total Treatment time (time-based codes) separate from Evaluation: 48 minutes     therapeutic exercises for 30 minutes:   R SL open book stretch, 20"x5   L hip ER stretch, 30"x3    L piriformis stretch, 30"x3   Bridging with green band at knees, 3" hold, 3x10  Written HOME EXERCISE PROGRAM provided, reviewed, patient demo understanding   Patient education on nature of current condition and PHYSICAL THERAPY POC     manual therapy techniques for 10 minutes:   L hip, lumbar     neuromuscular re-education for 8 minutes:   TA activation, 10"x 10    therapeutic activities for 00 minutes:     PATIENT EDUCATION AND HOME EXERCISES     Education provided:   - yes    Home Exercises Provided: yes. Exercises were reviewed and Meredith was able to demonstrate them prior to the end of the session.  Meredith demonstrated good  understanding of the education provided. See EMR under Patient Instructions for exercises provided during therapy sessions.    ASSESSMENT     Meredith is a 72 y.o. female referred to outpatient Physical Therapy with a medical diagnosis of M54.50 (ICD-10-CM) - Low back pain M41.9 (ICD-10-CM) - Scoliosis of thoracolumbar spine, unspecified scoliosis type " M35.7 (ICD-10-CM) - Hypermobility syndrome. Patient presents with decreased muscle strength, joint mobility. Increased pain, soft tissue restriction. Impaired posture, joint mechanics, mobility, flexibility.     Patient prognosis is Good. Rehab potential is good.     Patient will benefit from skilled outpatient Physical Therapy to address the deficits stated above and in the chart below, provide patient /family education, and to maximize patientt's level of independence.     Plan of care discussed with patient: Yes  Patient's spiritual, cultural and educational needs considered and patient is agreeable to the plan of care and goals as stated.    Medical Necessity is demonstrated by the following  History  Co-morbidities and personal factors that may impact the plan of care Co-morbidities:    Diverticulitis    Kidney stone    Osteoporosis        Personal Factors:   no deficits     moderate   Examination  Body Structures and Functions, activity limitations and participation restrictions that may impact the plan of care Body Regions:   back  lower extremities  trunk    Body Systems:    ROM  strength  balance  gait  transfers  transitions  motor control    Participation Restrictions:   Limitation in ADL, self care, social/recreational tasks     Activity limitations:   Learning and applying knowledge  no deficits    General Tasks and Commands  no deficits    Communication  no deficits    Mobility  lifting and carrying objects  walking  driving (bike, car, motorcycle)    Self care  dressing  looking after one's health    Domestic Life  shopping  cooking  doing house work (cleaning house, washing dishes, laundry)  assisting others    Interactions/Relationships  basic interpersonal interactions  complex interpersonal interactions  relating with strangers  formal relationships  family relationships    Life Areas  basic economic transactions    Community and Social Life  community life  recreation and leisure    "      moderate   Clinical Presentation evolving clinical presentation with changing clinical characteristics moderate   Decision Making/ Complexity Score: moderate     Anticipated Barriers for therapy: osteoporosis, multiple tx areas, co morbidities     Goals:  Short Term Goals: 3 weeks   1 Patient will be I with HOME EXERCISE PROGRAM  Initial, not met   2 Patient will achieve 15 deg R lumbar side bending ACTIVE RANGE OF MOTION to facilitate improved ability to stretch for obtaining yoga positions  Initial, not met   3 Patient will achieve 42 deg L hip ER ACTIVE RANGE OF MOTION to facilitate improved tolerance for rolling over in bed  Initial, not met     Long Term Goals: 6 weeks   1 Patient will obtain 4+/5 L hip flex MMT to facilitate improved ability to walk 7 miles on intermittent incline in preparation for her summers in [place]  Initial, not met   2 Patient will obtain 30" R/L SLS to facilitate improved ability to negotiate uneven terrain safely  Initial, not met   3 Patient will complete pivoting type movements with no limitation due to current condition to facilitate return to previous level of function for traveling  Initial, not met     PLAN   Plan of care Certification: 7/11/2022 to 9/11/2022    Outpatient Physical Therapy 2 times weekly for 6 weeks to include the following interventions: Cervical/Lumbar Traction, Electrical Stimulation , re-eval, dry needling, , Gait Training, Manual Therapy, Moist Heat/ Ice, Neuromuscular Re-ed, Patient Education, Self Care, Therapeutic Activities and Therapeutic Exercise.     Physical therapist and physical therapy assistant(s) met face to face to discuss patient's treatment plan and progress towards established goals. Pt will be seen by a physical therapist minimally every 6th visit or every 30 days.    Potential for KX modifier and additional visits based on subjective report, objective examination, nature of current condition, co morbidities and current functional " status.     Anne Hager, PT      I CERTIFY THE NEED FOR THESE SERVICES FURNISHED UNDER THIS PLAN OF TREATMENT AND WHILE UNDER MY CARE   Physician's comments:     Physician's Signature: ___________________________________________________     *cosign sent in routing

## 2022-07-13 ENCOUNTER — CLINICAL SUPPORT (OUTPATIENT)
Dept: REHABILITATION | Facility: OTHER | Age: 72
End: 2022-07-13
Payer: MEDICARE

## 2022-07-13 DIAGNOSIS — M54.40 ACUTE RIGHT-SIDED LOW BACK PAIN WITH SCIATICA, SCIATICA LATERALITY UNSPECIFIED: Primary | ICD-10-CM

## 2022-07-13 PROCEDURE — 97112 NEUROMUSCULAR REEDUCATION: CPT | Mod: PN

## 2022-07-13 PROCEDURE — 97110 THERAPEUTIC EXERCISES: CPT | Mod: PN

## 2022-07-13 PROCEDURE — 97140 MANUAL THERAPY 1/> REGIONS: CPT | Mod: PN

## 2022-07-13 NOTE — PROGRESS NOTES
[facility]   Physical Therapy Daily Treatment Note     Date: 7/11/2022   Name: Meredith Ramos  Clinic Number: [id number]    Therapy Diagnosis:   Encounter Diagnosis   Name Primary?    Acute right-sided low back pain with sciatica, sciatica laterality unspecified      Physician: Jeremías Maynard,*    Physician Orders: PT Eval and Treat   Medical Diagnosis from Referral: M54.50 (ICD-10-CM) - Low back pain M41.9 (ICD-10-CM) - Scoliosis of thoracolumbar spine, unspecified scoliosis type M35.7 (ICD-10-CM) - Hypermobility syndrome     Evaluation Date: 7/11/2022  Authorization Period Expiration: 12/31/2022  Plan of Care Expiration: 9/11/2022  Visit # / Visits authorized: 2/19  FOTO: #1/3 on 7/11/2022     Precautions: Standard     Time In: 09:14am  Time Out: 10:07am  Total Appointment Time: 53 minutes    SUBJECTIVE   History of current condition - Meredith reports that she has history of scoliosis and noticed that she could not turn over in bed due to back pain about 3 months ago. Patient then did exercises and a long walk; after about 2 miles into her walk, she noticed L hip pain, swelling and heat that lasted for a couple of days. She notes continued tenderness at L hip to date. Patient states that her MD told her she had a gluteal strain. Patient complains of R ankle pain from spraining her ankle when walking on 7/9/2022.    Patient usually walks 1-15 miles/day when spending her summers in [place] with history of difficulty in rotating her L LE for pivoting type movements.      Fall/MVAs: tripped on the sidewalk twice with one fall resulting in L wrist fracture     Imaging,  X-ray indicating some degeneration and a gluteal tear with striations from scar tissue -- per patient report.    Prior Level of Function: 4-4.5 miles, 3-4x/wk and aerobic step 3-4x/wk   Current Level of Function: 4-4.5 miles, 3-4x/wk and aerobic step 3-4x/wk     Pain:  Current 0/10, worst 3/10, best 0/10   Location:  "pinching at L lumbosacral region and discomfort into L lateral hip   Description: intermittent   Aggravating Factors: R side bending with L arm going overhead, rolling over in bed     Patients goals: I want to strengthen whatever is causing the problem     OBJECTIVE   7/11/2022:     Posture:  R scapular winging, L sided scoliotic curve     Range of Motion:   LUMBAR  Degrees    Flexion 102   Extension 30   Left Side Bending 10   Right Side Bending 8         HIP     Internal rotation   (R) 40    (L) 35     External rotation   (R) 42    (L) 25 stiffness         Lower Extremity Strength  Right LE Left LE   Knee extension: 4+ Knee extension: 4+   Hip flexion: 4+ Hip flexion: 4   Hip extension: 4+ Hip extension: 4+   Hip abduction: 4+ Hip abduction:4+   Hip adduction: 4 Hip adduction: 4   Ankle dorsiflexion: 4+ Ankle dorsiflexion: 4+   Ankle plantarflexion: B 25 heel raises  Ankle plantarflexion:B 25 heel raises      Special Tests:  Bridge test: negative      SLR:  R: negative   L: negative      DAVID:  R: negative   L: negative     FADDIR:   R: negative   L: negative     Joint Mobility:   Thoracic, lumbar, hip - hypomobile, painful     Flexibility:   Ely's test: R = negative; L = negative   Popliteal Angle: R =  74 deg ; L = 70 deg    Function/balance:  Sit - stand: 21 x in 30"     R SLS: 10"     L SLS: 16"    Limitation/Restriction for FOTO Lumbar Survey    Therapist reviewed FOTO scores for Meredith Ramos on 7/11/2022.   FOTO documents entered into [employer] - see Media section.    Limitation Score: 17%  Predicted Score: 16%       TREATMENT     therapeutic exercises for 23 minutes:   R SL open book stretch, 25"x5   L hip ER stretch, 30"x3    L piriformis stretch, 30"x3   Bridging with green band above knees, 3" hold, 3x10  Clam shell in SL, R/L, green band above knees, 3x10     manual therapy techniques for 10 minutes:   L hip quadrants, LAD, APs, R SL lumbar rotation and side gapping Gr III/IV JM     neuromuscular " "re-education for 20 minutes:   TA activation, 10"x 10  Supine R/L bent knee fall out with TA activation, green band above knees, 3x10  B UE ext green band pull down with alt LE norman, with TA activation, 3x10   Pallof press, green band, 3x10    therapeutic activities for 00 minutes:     PATIENT EDUCATION AND HOME EXERCISES     Education provided:   - yes    Home Exercises Provided: yes. Exercises were reviewed and Meredith was able to demonstrate them prior to the end of the session.  Meredith demonstrated good  understanding of the education provided. See EMR under Patient Instructions for exercises provided during therapy sessions.    ASSESSMENT   Patient completes progression of supine and standing stabilization exercises with proper mechanics and appropriate level of challenge. Patient reports decreased symptoms with lumbar rotation and gapping JM in R SL in addition to hip LAD and quadrants completed during manual intervention today.     Patient prognosis is Good. Rehab potential is good.     Patient will benefit from skilled outpatient Physical Therapy to address the deficits stated above and in the chart below, provide patient /family education, and to maximize patientt's level of independence.     Plan of care discussed with patient: Yes  Patient's spiritual, cultural and educational needs considered and patient is agreeable to the plan of care and goals as stated.      Anticipated Barriers for therapy: osteoporosis, multiple tx areas, co morbidities     Goals:  Short Term Goals: 3 weeks   1 Patient will be I with HOME EXERCISE PROGRAM  Initial, not met   2 Patient will achieve 15 deg R lumbar side bending ACTIVE RANGE OF MOTION to facilitate improved ability to stretch for obtaining yoga positions  Initial, not met   3 Patient will achieve 42 deg L hip ER ACTIVE RANGE OF MOTION to facilitate improved tolerance for rolling over in bed  Initial, not met     Long Term Goals: 6 weeks   1 Patient will obtain " "4+/5 L hip flex MMT to facilitate improved ability to walk 7 miles on intermittent incline in preparation for her summers in [place]  Initial, not met   2 Patient will obtain 30" R/L SLS to facilitate improved ability to negotiate uneven terrain safely  Initial, not met   3 Patient will complete pivoting type movements with no limitation due to current condition to facilitate return to previous level of function for traveling  Initial, not met     PLAN   Plan of care Certification: 7/11/2022 to 9/11/2022    Outpatient Physical Therapy 2 times weekly for 6 weeks to include the following interventions: Cervical/Lumbar Traction, Electrical Stimulation , re-eval, dry needling, , Gait Training, Manual Therapy, Moist Heat/ Ice, Neuromuscular Re-ed, Patient Education, Self Care, Therapeutic Activities and Therapeutic Exercise.     Physical therapist and physical therapy assistant(s) met face to face to discuss patient's treatment plan and progress towards established goals. Pt will be seen by a physical therapist minimally every 6th visit or every 30 days.    Potential for KX modifier and additional visits based on subjective report, objective examination, nature of current condition, co morbidities and current functional status.     Anne Hager, PT        "

## 2022-07-19 ENCOUNTER — CLINICAL SUPPORT (OUTPATIENT)
Dept: REHABILITATION | Facility: OTHER | Age: 72
End: 2022-07-19
Payer: MEDICARE

## 2022-07-19 DIAGNOSIS — M54.40 ACUTE RIGHT-SIDED LOW BACK PAIN WITH SCIATICA, SCIATICA LATERALITY UNSPECIFIED: Primary | ICD-10-CM

## 2022-07-19 PROCEDURE — 97112 NEUROMUSCULAR REEDUCATION: CPT | Mod: PN

## 2022-07-19 PROCEDURE — 97110 THERAPEUTIC EXERCISES: CPT | Mod: PN

## 2022-07-19 NOTE — PROGRESS NOTES
[facility]   Physical Therapy Daily Treatment Note     Date: 7/11/2022   Name: Meredith Ramos  Clinic Number: [id number]    Therapy Diagnosis:   Encounter Diagnosis   Name Primary?    Acute right-sided low back pain with sciatica, sciatica laterality unspecified      Physician: Jeremías Maynard,*    Physician Orders: PT Eval and Treat   Medical Diagnosis from Referral: M54.50 (ICD-10-CM) - Low back pain M41.9 (ICD-10-CM) - Scoliosis of thoracolumbar spine, unspecified scoliosis type M35.7 (ICD-10-CM) - Hypermobility syndrome     Evaluation Date: 7/11/2022  Authorization Period Expiration: 12/31/2022  Plan of Care Expiration: 9/11/2022  Visit # / Visits authorized: 3/19  FOTO: #1/3 on 7/11/2022     Precautions: Standard     Time In: 11:03am  Time Out: 12:05pm  Total Appointment Time: 62 minutes    SUBJECTIVE   Fall/MVAs: tripped on the sidewalk twice with one fall resulting in L wrist fracture     Imaging,  X-ray indicating some degeneration and a gluteal tear with striations from scar tissue -- per patient report.    Prior Level of Function: 4-4.5 miles, 3-4x/wk and aerobic step 3-4x/wk, Patient usually walks 1-15 miles/day when spending her summers in [place] with history of difficulty in rotating her L LE for pivoting type movements.    Current Level of Function: 4-4.5 miles, 3-4x/wk and aerobic step 3-4x/wk     Pain:  Current 0/10, worst 3/10, best 0/10   Location: pinching at L lumbosacral region and discomfort into L lateral hip      Patients goals: I want to strengthen whatever is causing the problem     Response: L hip feels a little less painful since onset of PHYSICAL THERAPY POC  Function: improving tolerance to prolonged positioning and walking   HOME EXERCISE PROGRAM: requests updated HOME EXERCISE PROGRAM     OBJECTIVE   7/11/2022:   Posture:  R scapular winging, L sided scoliotic curve     Range of Motion:   LUMBAR  Degrees    Flexion 102   Extension 30   Left Side  "Bending 10   Right Side Bending 8         HIP     Internal rotation   (R) 40    (L) 35     External rotation   (R) 42    (L) 25 stiffness         Lower Extremity Strength  Right LE Left LE   Knee extension: 4+ Knee extension: 4+   Hip flexion: 4+ Hip flexion: 4   Hip extension: 4+ Hip extension: 4+   Hip abduction: 4+ Hip abduction:4+   Hip adduction: 4 Hip adduction: 4   Ankle dorsiflexion: 4+ Ankle dorsiflexion: 4+   Ankle plantarflexion: B 25 heel raises  Ankle plantarflexion:B 25 heel raises      Special Tests:  Bridge test: negative      SLR:  R: negative   L: negative      DAVID:  R: negative   L: negative     FADDIR:   R: negative   L: negative     Joint Mobility:   Thoracic, lumbar, hip - hypomobile, painful     Flexibility:   Ely's test: R = negative; L = negative   Popliteal Angle: R =  74 deg ; L = 70 deg    Function/balance:  Sit - stand: 21 x in 30"     R SLS: 10"     L SLS: 16"    Limitation/Restriction for FOTO Lumbar Survey    Therapist reviewed FOTO scores for Meredith Ramos on 7/11/2022.   FOTO documents entered into [employer] - see Media section.    Limitation Score: 17%  Predicted Score: 16%       TREATMENT   CHARGES BASED ON 1-1 TX:  therapeutic exercises for 29 minutes:   R SL open book stretch, 25"x5   L hip ER stretch, 30"x3    L piriformis stretch, 30"x3   Bridging with green band above knees, half foam under feet, 3" hold, 3x10  Clam shell in SL, R/L, green band above knees, 3x10   +Written HOME EXERCISE PROGRAM updated, reviewed, patient demo understanding     manual therapy techniques for 10 minutes:   L hip quadrants, LAD, APs Gr III/IV JM   -R SL lumbar rotation and side gapping Gr III/IV      neuromuscular re-education for 15 minutes:   Supine R/L bent knee fall out with TA activation, green band above knees, 3x10  B UE ext green band pull down with alt LE marches, with TA activation, 3x10   Pallof press, green band, 3x10    therapeutic activities for 8 minutes:   +Sit -stand with " "pilates ring squeeze, 3x10    PATIENT EDUCATION AND HOME EXERCISES     Education provided:   - yes    Home Exercises Provided: yes. Exercises were reviewed and Meredith was able to demonstrate them prior to the end of the session.  Meredith demonstrated good  understanding of the education provided. See EMR under Patient Instructions for exercises provided during therapy sessions.    ASSESSMENT   Patient completes progression of supine and standing stabilization exercises with proper mechanics and appropriate level of challenge. Patient reports decreased symptoms with L hip LAD, APs and quadrants completed during manual intervention today. Written HOME EXERCISE PROGRAM updated, reviewed, patient demo understanding.     Patient prognosis is Good. Rehab potential is good.     Patient will benefit from skilled outpatient Physical Therapy to address the deficits stated above and in the chart below, provide patient /family education, and to maximize patientt's level of independence.     Plan of care discussed with patient: Yes  Patient's spiritual, cultural and educational needs considered and patient is agreeable to the plan of care and goals as stated.      Anticipated Barriers for therapy: osteoporosis, multiple tx areas, co morbidities     Goals:  Short Term Goals: 3 weeks   1 Patient will be I with HOME EXERCISE PROGRAM  Initial, not met   2 Patient will achieve 15 deg R lumbar side bending ACTIVE RANGE OF MOTION to facilitate improved ability to stretch for obtaining yoga positions  Initial, not met   3 Patient will achieve 42 deg L hip ER ACTIVE RANGE OF MOTION to facilitate improved tolerance for rolling over in bed  Initial, not met     Long Term Goals: 6 weeks   1 Patient will obtain 4+/5 L hip flex MMT to facilitate improved ability to walk 7 miles on intermittent incline in preparation for her summers in [place]  Initial, not met   2 Patient will obtain 30" R/L SLS to facilitate improved ability to negotiate " uneven terrain safely  Initial, not met   3 Patient will complete pivoting type movements with no limitation due to current condition to facilitate return to previous level of function for traveling  Initial, not met     PLAN   Plan of care Certification: 7/11/2022 to 9/11/2022    Outpatient Physical Therapy 2 times weekly for 6 weeks to include the following interventions: Cervical/Lumbar Traction, Electrical Stimulation , re-eval, dry needling, , Gait Training, Manual Therapy, Moist Heat/ Ice, Neuromuscular Re-ed, Patient Education, Self Care, Therapeutic Activities and Therapeutic Exercise.     Physical therapist and physical therapy assistant(s) met face to face to discuss patient's treatment plan and progress towards established goals. Pt will be seen by a physical therapist minimally every 6th visit or every 30 days.    Potential for KX modifier and additional visits based on subjective report, objective examination, nature of current condition, co morbidities and current functional status.     Anne Hager, PT

## 2022-07-21 ENCOUNTER — CLINICAL SUPPORT (OUTPATIENT)
Dept: REHABILITATION | Facility: OTHER | Age: 72
End: 2022-07-21
Payer: MEDICARE

## 2022-07-21 DIAGNOSIS — M54.40 ACUTE RIGHT-SIDED LOW BACK PAIN WITH SCIATICA, SCIATICA LATERALITY UNSPECIFIED: Primary | ICD-10-CM

## 2022-07-21 PROCEDURE — 97110 THERAPEUTIC EXERCISES: CPT | Mod: PN

## 2022-07-21 PROCEDURE — 97530 THERAPEUTIC ACTIVITIES: CPT | Mod: PN

## 2022-07-21 NOTE — PROGRESS NOTES
[facility]   Physical Therapy Daily Treatment Note     Date: 7/11/2022   Name: Meredith Ramos  Clinic Number: [id number]    Therapy Diagnosis:   Encounter Diagnosis   Name Primary?    Acute right-sided low back pain with sciatica, sciatica laterality unspecified      Physician: Jeremías Maynard,*    Physician Orders: PT Eval and Treat   Medical Diagnosis from Referral: M54.50 (ICD-10-CM) - Low back pain M41.9 (ICD-10-CM) - Scoliosis of thoracolumbar spine, unspecified scoliosis type M35.7 (ICD-10-CM) - Hypermobility syndrome     Evaluation Date: 7/11/2022  Authorization Period Expiration: 12/31/2022  Plan of Care Expiration: 9/11/2022  Visit # / Visits authorized: 3/19  FOTO: #1/3 on 7/11/2022     Precautions: Standard     Time In: 08:05am  Time Out: 09:15am  Total Appointment Time: 70 minutes    SUBJECTIVE   Fall/MVAs: tripped on the sidewalk twice with one fall resulting in L wrist fracture     Imaging,  X-ray indicating some degeneration and a gluteal tear with striations from scar tissue -- per patient report.    Prior Level of Function: 4-4.5 miles, 3-4x/wk and aerobic step 3-4x/wk, Patient usually walks 1-15 miles/day when spending her summers in [place] with history of difficulty in rotating her L LE for pivoting type movements.    Current Level of Function: 4-4.5 miles, 3-4x/wk and aerobic step 3-4x/wk     Pain:  Current 0/10, worst 3/10, best 0/10   Location: pinching at L lumbosacral region and discomfort into L lateral hip      Patients goals: I want to strengthen whatever is causing the problem     Response: L hip feels a little less painful since onset of PHYSICAL THERAPY POC  Function: improving tolerance to prolonged positioning and walking   HOME EXERCISE PROGRAM: requests updated HOME EXERCISE PROGRAM     OBJECTIVE   7/11/2022:   Posture:  R scapular winging, L sided scoliotic curve     Range of Motion:   LUMBAR  Degrees    Flexion 102   Extension 30   Left Side  "Bending 10   Right Side Bending 8         HIP     Internal rotation   (R) 40    (L) 35     External rotation   (R) 42    (L) 25 stiffness         Lower Extremity Strength  Right LE Left LE   Knee extension: 4+ Knee extension: 4+   Hip flexion: 4+ Hip flexion: 4   Hip extension: 4+ Hip extension: 4+   Hip abduction: 4+ Hip abduction:4+   Hip adduction: 4 Hip adduction: 4   Ankle dorsiflexion: 4+ Ankle dorsiflexion: 4+   Ankle plantarflexion: B 25 heel raises  Ankle plantarflexion:B 25 heel raises      Special Tests:  Bridge test: negative      SLR:  R: negative   L: negative      DAVID:  R: negative   L: negative     FADDIR:   R: negative   L: negative     Joint Mobility:   Thoracic, lumbar, hip - hypomobile, painful     Flexibility:   Ely's test: R = negative; L = negative   Popliteal Angle: R =  74 deg ; L = 70 deg    Function/balance:  Sit - stand: 21 x in 30"     R SLS: 10"     L SLS: 16"    Limitation/Restriction for FOTO Lumbar Survey    Therapist reviewed FOTO scores for Meredith Ramos on 7/11/2022.   FOTO documents entered into [employer] - see Media section.    Limitation Score: 17%  Predicted Score: 16%       TREATMENT   CHARGES BASED ON 1-1 TX:  therapeutic exercises for 24 minutes:   R SL open book stretch, 25"x5   L hip ER stretch, 30"x3    L piriformis stretch, 30"x3   Bridging with green band above knees, half foam under feet, 3" hold, 3x10  Clam shell in SL, R/L, green band above knees, 3x10   +written HOME EXERCISE PROGRAM updated, reviewed, patient demo understanding     manual therapy techniques for 10 minutes:   L hip quadrants, LAD, APs Gr III/IV JM   R SL lumbar rotation and side gapping Gr III/IV JM     neuromuscular re-education for 15 minutes:   Supine R/L bent knee fall out with TA activation, green band above knees, 3x10  B UE ext green band pull down with alt LE marches, with TA activation, 3x10   Pallof press, green band, 3x10    therapeutic activities for 20 minutes:   Sit -stand with " pilates ring squeeze, 3x10  +Dynamic lunges x 2 laps   +Dynamic marches x 2 laps   +Dynamic drinking bars x 2 laps     PATIENT EDUCATION AND HOME EXERCISES     Education provided:   - yes    Home Exercises Provided: yes. Exercises were reviewed and Meredith was able to demonstrate them prior to the end of the session.  Meredith demonstrated good  understanding of the education provided. See EMR under Patient Instructions for exercises provided during therapy sessions.    ASSESSMENT   Patient completes progression of exercises with proper mechanics and appropriate level of challenge. Dynamic warm up type movements initiated at start of session today. Mild impact on today's session by patient reported pre existing R shoulder pain. Patient reports decreased symptoms with L hip LAD, APs and quadrants completed during manual intervention today. Written HOME EXERCISE PROGRAM updated, reviewed, patient demo understanding.     Patient prognosis is Good. Rehab potential is good.     Patient will benefit from skilled outpatient Physical Therapy to address the deficits stated above and in the chart below, provide patient /family education, and to maximize patientt's level of independence.     Plan of care discussed with patient: Yes  Patient's spiritual, cultural and educational needs considered and patient is agreeable to the plan of care and goals as stated.      Anticipated Barriers for therapy: osteoporosis, multiple tx areas, co morbidities     Goals:  Short Term Goals: 3 weeks   1 Patient will be I with HOME EXERCISE PROGRAM  Initial, not met   2 Patient will achieve 15 deg R lumbar side bending ACTIVE RANGE OF MOTION to facilitate improved ability to stretch for obtaining yoga positions  Initial, not met   3 Patient will achieve 42 deg L hip ER ACTIVE RANGE OF MOTION to facilitate improved tolerance for rolling over in bed  Initial, not met     Long Term Goals: 6 weeks   1 Patient will obtain 4+/5 L hip flex MMT to  "facilitate improved ability to walk 7 miles on intermittent incline in preparation for her summers in [place]  Initial, not met   2 Patient will obtain 30" R/L SLS to facilitate improved ability to negotiate uneven terrain safely  Initial, not met   3 Patient will complete pivoting type movements with no limitation due to current condition to facilitate return to previous level of function for traveling  Initial, not met     PLAN   Plan of care Certification: 7/11/2022 to 9/11/2022    Outpatient Physical Therapy 2 times weekly for 6 weeks to include the following interventions: Cervical/Lumbar Traction, Electrical Stimulation , re-eval, dry needling, , Gait Training, Manual Therapy, Moist Heat/ Ice, Neuromuscular Re-ed, Patient Education, Self Care, Therapeutic Activities and Therapeutic Exercise.     Physical therapist and physical therapy assistant(s) met face to face to discuss patient's treatment plan and progress towards established goals. Pt will be seen by a physical therapist minimally every 6th visit or every 30 days.    Potential for KX modifier and additional visits based on subjective report, objective examination, nature of current condition, co morbidities and current functional status.     Anne Hager, PT            "

## 2022-07-26 ENCOUNTER — CLINICAL SUPPORT (OUTPATIENT)
Dept: REHABILITATION | Facility: OTHER | Age: 72
End: 2022-07-26
Payer: MEDICARE

## 2022-07-26 DIAGNOSIS — M54.40 ACUTE BILATERAL LOW BACK PAIN WITH SCIATICA, SCIATICA LATERALITY UNSPECIFIED: Primary | ICD-10-CM

## 2022-07-26 PROCEDURE — 97140 MANUAL THERAPY 1/> REGIONS: CPT | Mod: PN

## 2022-07-26 PROCEDURE — 97112 NEUROMUSCULAR REEDUCATION: CPT | Mod: PN

## 2022-07-26 PROCEDURE — 97530 THERAPEUTIC ACTIVITIES: CPT | Mod: PN

## 2022-07-26 PROCEDURE — 97110 THERAPEUTIC EXERCISES: CPT | Mod: PN

## 2022-07-26 NOTE — PROGRESS NOTES
[facility]   Physical Therapy Daily Treatment Note     Date: 7/11/2022   Name: Meredith Ramos  Clinic Number: [id number]    Therapy Diagnosis:   Encounter Diagnosis   Name Primary?    Acute right-sided low back pain with sciatica, sciatica laterality unspecified      Physician: Jeremías Maynard,*    Physician Orders: PT Eval and Treat   Medical Diagnosis from Referral: M54.50 (ICD-10-CM) - Low back pain M41.9 (ICD-10-CM) - Scoliosis of thoracolumbar spine, unspecified scoliosis type M35.7 (ICD-10-CM) - Hypermobility syndrome     Evaluation Date: 7/11/2022  Authorization Period Expiration: 12/31/2022  Plan of Care Expiration: 9/11/2022  Visit # / Visits authorized: 4/19  FOTO: #2/3 on 7/26/2022     Precautions: Standard     Time In: 01:08pm  Time Out: 02:15pm  Total Appointment Time: 67 minutes    SUBJECTIVE   Fall/MVAs: tripped on the sidewalk twice with one fall resulting in L wrist fracture     Imaging,  X-ray indicating some degeneration and a gluteal tear with striations from scar tissue -- per patient report.    Prior Level of Function: 4-4.5 miles, 3-4x/wk and aerobic step 3-4x/wk, Patient usually walks 1-15 miles/day when spending her summers in [place] with history of difficulty in rotating her L LE for pivoting type movements.    Current Level of Function: 4-4.5 miles, 3-4x/wk and aerobic step 3-4x/wk     Pain:  Current 0/10, worst 3/10, best 0/10   Location: pinching at L lumbosacral region and discomfort into L lateral hip      Patients goals: I want to strengthen whatever is causing the problem     Response: L hip feels a little less painful and she does not have to move with as much caution lately   Function: improving tolerance to prolonged positioning and walking   HOME EXERCISE PROGRAM: reports compliance     OBJECTIVE   7/11/2022:   Posture:  R scapular winging, L sided scoliotic curve     Range of Motion:   LUMBAR  Degrees    Flexion 102   Extension 30   Left  "Side Bending 10   Right Side Bending 8         HIP     Internal rotation   (R) 40    (L) 35     External rotation   (R) 42    (L) 25 stiffness         Lower Extremity Strength  Right LE Left LE   Knee extension: 4+ Knee extension: 4+   Hip flexion: 4+ Hip flexion: 4   Hip extension: 4+ Hip extension: 4+   Hip abduction: 4+ Hip abduction:4+   Hip adduction: 4 Hip adduction: 4   Ankle dorsiflexion: 4+ Ankle dorsiflexion: 4+   Ankle plantarflexion: B 25 heel raises  Ankle plantarflexion:B 25 heel raises      Special Tests:  Bridge test: negative      SLR:  R: negative   L: negative      DAVID:  R: negative   L: negative     FADDIR:   R: negative   L: negative     Joint Mobility:   Thoracic, lumbar, hip - hypomobile, painful     Flexibility:   Ely's test: R = negative; L = negative   Popliteal Angle: R =  74 deg ; L = 70 deg    Function/balance:  Sit - stand: 21 x in 30"     R SLS: 10"     L SLS: 16"    Limitation/Restriction for FOTO Lumbar Survey    Therapist reviewed FOTO scores for Meredith Ramos on 7/11/2022.   FOTO documents entered into [employer] - see Media section.    Limitation Score: 17%  Predicted Score: 16%       TREATMENT   CHARGES BASED ON 1-1 TX:  therapeutic exercises for 20 minutes:   R SL open book stretch, 25"x5   L/R hip ER stretch, 30"x3    L/R piriformis stretch, 30"x3   Bridging with blue band above knees, half foam under feet, 3" hold, 3x10  Clam shell in SL, R/L, blue band above knees, 3x10     manual therapy techniques for 10 minutes:   L hip quadrants, LAD, APs Gr III/IV JM   R SL lumbar rotation and side gapping Gr III/IV      neuromuscular re-education for 15 minutes:   Supine R/L bent knee fall out with TA activation, blue band above knees, 3x10  B UE ext green band pull down with alt LE marches, with TA activation, 3x10   Pallof press, green band, 3x10    therapeutic activities for 22 minutes:   Sit -stand with pilates ring squeeze, 3x10  Dynamic lunges x 2 laps   Dynamic marches x 1 lap "   Dynamic drinking birds x 1 lap    PATIENT EDUCATION AND HOME EXERCISES     Education provided:   - yes    Home Exercises Provided: yes. Exercises were reviewed and Meredith was able to demonstrate them prior to the end of the session.  Meredith demonstrated good  understanding of the education provided. See EMR under Patient Instructions for exercises provided during therapy sessions.    ASSESSMENT   Patient completes progression of exercises to include blue resistance band with proper mechanics and appropriate level of challenge. Mild impact on today's session by patient reported pre existing R shoulder pain. Patient reports decreased symptoms with L hip LAD, APs and quadrants in addition to R SL lumbar gapping and rotation JM completed during manual intervention today. Blue band provided for use at home between visits.     Patient prognosis is Good. Rehab potential is good.     Patient will benefit from skilled outpatient Physical Therapy to address the deficits stated above and in the chart below, provide patient /family education, and to maximize patientt's level of independence.     Plan of care discussed with patient: Yes  Patient's spiritual, cultural and educational needs considered and patient is agreeable to the plan of care and goals as stated.      Anticipated Barriers for therapy: osteoporosis, multiple tx areas, co morbidities     Goals:  Short Term Goals: 3 weeks   1 Patient will be I with HOME EXERCISE PROGRAM  Initial, not met   2 Patient will achieve 15 deg R lumbar side bending ACTIVE RANGE OF MOTION to facilitate improved ability to stretch for obtaining yoga positions  Initial, not met   3 Patient will achieve 42 deg L hip ER ACTIVE RANGE OF MOTION to facilitate improved tolerance for rolling over in bed  Initial, not met     Long Term Goals: 6 weeks   1 Patient will obtain 4+/5 L hip flex MMT to facilitate improved ability to walk 7 miles on intermittent incline in preparation for her summers  "in [place]  Initial, not met   2 Patient will obtain 30" R/L SLS to facilitate improved ability to negotiate uneven terrain safely  Initial, not met   3 Patient will complete pivoting type movements with no limitation due to current condition to facilitate return to previous level of function for traveling  Initial, not met     PLAN   Plan of care Certification: 7/11/2022 to 9/11/2022    Outpatient Physical Therapy 2 times weekly for 6 weeks to include the following interventions: Cervical/Lumbar Traction, Electrical Stimulation , re-eval, dry needling, , Gait Training, Manual Therapy, Moist Heat/ Ice, Neuromuscular Re-ed, Patient Education, Self Care, Therapeutic Activities and Therapeutic Exercise.     Physical therapist and physical therapy assistant(s) met face to face to discuss patient's treatment plan and progress towards established goals. Pt will be seen by a physical therapist minimally every 6th visit or every 30 days.    Potential for KX modifier and additional visits based on subjective report, objective examination, nature of current condition, co morbidities and current functional status.     Anne Hager, PT              "

## 2022-07-27 NOTE — PROGRESS NOTES
[facility] OUTPATIENT THERAPY AND WELLNESS   Physical Therapy Daily Treatment Note     Date: 7/11/2022   Name: Meredith Ramos  Clinic Number: [id number]    Therapy Diagnosis:   Encounter Diagnosis   Name Primary?    Acute right-sided low back pain with sciatica, sciatica laterality unspecified      Physician: Jeremaís Maynard,*    Physician Orders: PT Eval and Treat   Medical Diagnosis from Referral: M54.50 (ICD-10-CM) - Low back pain M41.9 (ICD-10-CM) - Scoliosis of thoracolumbar spine, unspecified scoliosis type M35.7 (ICD-10-CM) - Hypermobility syndrome     Evaluation Date: 7/11/2022  Authorization Period Expiration: 12/31/2022  Plan of Care Expiration: 9/11/2022  Visit # / Visits authorized: 5/19  FOTO: #2/3 on 7/26/2022     Precautions: Standard     Time In: 11:05am  Time Out: 12:12pm  Total Appointment Time: 67 minutes    SUBJECTIVE   Fall/MVAs: tripped on the sidewalk twice with one fall resulting in L wrist fracture     Imaging,  X-ray indicating some degeneration and a gluteal tear with striations from scar tissue -- per patient report.    Prior Level of Function: 4-4.5 miles, 3-4x/wk and aerobic step 3-4x/wk, Patient usually walks 1-15 miles/day when spending her summers in [place] with history of difficulty in rotating her L LE for pivoting type movements.    Current Level of Function: 4-4.5 miles, 3-4x/wk and aerobic step 3-4x/wk     Pain:  Current 0/10, worst 3/10, best 0/10   Location: pinching at L lumbosacral region and discomfort into L lateral hip      Patients goals: I want to strengthen whatever is causing the problem     Response: L hip feels a little less painful and she does not have to move with as much caution lately   Function: improving tolerance to prolonged positioning and walking   HOME EXERCISE PROGRAM: reports compliance     OBJECTIVE   7/11/2022:   Posture:  R scapular winging, L sided scoliotic curve     Range of Motion:   LUMBAR  Degrees    Flexion 102   Extension 30   Left  "Side Bending 10   Right Side Bending 8         HIP     Internal rotation   (R) 40    (L) 35     External rotation   (R) 42    (L) 25 stiffness         Lower Extremity Strength  Right LE Left LE   Knee extension: 4+ Knee extension: 4+   Hip flexion: 4+ Hip flexion: 4   Hip extension: 4+ Hip extension: 4+   Hip abduction: 4+ Hip abduction:4+   Hip adduction: 4 Hip adduction: 4   Ankle dorsiflexion: 4+ Ankle dorsiflexion: 4+   Ankle plantarflexion: B 25 heel raises  Ankle plantarflexion:B 25 heel raises      Special Tests:  Bridge test: negative      SLR:  R: negative   L: negative      DAVID:  R: negative   L: negative     FADDIR:   R: negative   L: negative     Joint Mobility:   Thoracic, lumbar, hip - hypomobile, painful     Flexibility:   Ely's test: R = negative; L = negative   Popliteal Angle: R =  74 deg ; L = 70 deg    Function/balance:  Sit - stand: 21 x in 30"     R SLS: 10"     L SLS: 16"    Limitation/Restriction for FOTO Lumbar Survey    Therapist reviewed FOTO scores for Meredith Ramos on 7/11/2022.   FOTO documents entered into [employer] - see Media section.    Limitation Score: 17%  Predicted Score: 16%       TREATMENT   CHARGES BASED ON 1-1 TX:  therapeutic exercises for 20 minutes:   R SL open book stretch, 25"x5   L/R hip ER stretch, 30"x3    L/R piriformis stretch, 30"x3   Bridging with blue band above knees, half foam under feet, 3" hold, 3x10  Clam shell in SL, R/L, blue band above knees, 3x10     manual therapy techniques for 10 minutes:   L hip quadrants, LAD, APs Gr III/IV JM   R SL lumbar rotation and side gapping Gr III/IV      neuromuscular re-education for 15 minutes:   Supine R/L bent knee fall out with TA activation, blue band above knees, 3x10  B UE ext green band pull down with alt LE marches, with TA activation, 3x10   Pallof press, green band, 3x10    therapeutic activities for 22 minutes:   Sit -stand with pilates ring squeeze, 3x10  Dynamic lunges, holding weighted ball +twist, x 2 " "laps   Dynamic marches x 1 lap   Dynamic drinking birds x 2 laps    PATIENT EDUCATION AND HOME EXERCISES     Education provided:   - yes    Home Exercises Provided: yes. Exercises were reviewed and Meredith was able to demonstrate them prior to the end of the session.  Meredith demonstrated good  understanding of the education provided. See EMR under Patient Instructions for exercises provided during therapy sessions.    ASSESSMENT   Patient completes current progression of exercises with proper mechanics and appropriate level of challenge. Patient reports decreased symptoms with L hip LAD, APs and quadrants in addition to R SL lumbar gapping and rotation JM completed during manual intervention today.     Patient prognosis is Good. Rehab potential is good.     Patient will benefit from skilled outpatient Physical Therapy to address the deficits stated above and in the chart below, provide patient /family education, and to maximize patientt's level of independence.     Plan of care discussed with patient: Yes  Patient's spiritual, cultural and educational needs considered and patient is agreeable to the plan of care and goals as stated.      Anticipated Barriers for therapy: osteoporosis, multiple tx areas, co morbidities     Goals:  Short Term Goals: 3 weeks   1 Patient will be I with HOME EXERCISE PROGRAM  Initial, not met   2 Patient will achieve 15 deg R lumbar side bending ACTIVE RANGE OF MOTION to facilitate improved ability to stretch for obtaining yoga positions  Initial, not met   3 Patient will achieve 42 deg L hip ER ACTIVE RANGE OF MOTION to facilitate improved tolerance for rolling over in bed  Initial, not met     Long Term Goals: 6 weeks   1 Patient will obtain 4+/5 L hip flex MMT to facilitate improved ability to walk 7 miles on intermittent incline in preparation for her summers in [place]  Initial, not met   2 Patient will obtain 30" R/L SLS to facilitate improved ability to negotiate uneven terrain " safely  Initial, not met   3 Patient will complete pivoting type movements with no limitation due to current condition to facilitate return to previous level of function for traveling  Initial, not met     PLAN   Plan of care Certification: 7/11/2022 to 9/11/2022    Outpatient Physical Therapy 2 times weekly for 6 weeks to include the following interventions: Cervical/Lumbar Traction, Electrical Stimulation , re-eval, dry needling, , Gait Training, Manual Therapy, Moist Heat/ Ice, Neuromuscular Re-ed, Patient Education, Self Care, Therapeutic Activities and Therapeutic Exercise.     Physical therapist and physical therapy assistant(s) met face to face to discuss patient's treatment plan and progress towards established goals. Pt will be seen by a physical therapist minimally every 6th visit or every 30 days.    Potential for KX modifier and additional visits based on subjective report, objective examination, nature of current condition, co morbidities and current functional status.     Anne Hager, PT

## 2022-07-28 ENCOUNTER — CLINICAL SUPPORT (OUTPATIENT)
Dept: REHABILITATION | Facility: OTHER | Age: 72
End: 2022-07-28
Payer: MEDICARE

## 2022-07-28 DIAGNOSIS — M54.40 ACUTE RIGHT-SIDED LOW BACK PAIN WITH SCIATICA, SCIATICA LATERALITY UNSPECIFIED: Primary | ICD-10-CM

## 2022-07-28 PROCEDURE — 97530 THERAPEUTIC ACTIVITIES: CPT | Mod: PN

## 2022-07-28 PROCEDURE — 97112 NEUROMUSCULAR REEDUCATION: CPT | Mod: PN

## 2022-08-01 ENCOUNTER — CLINICAL SUPPORT (OUTPATIENT)
Dept: REHABILITATION | Facility: OTHER | Age: 72
End: 2022-08-01
Payer: MEDICARE

## 2022-08-01 DIAGNOSIS — G89.29 CHRONIC BILATERAL LOW BACK PAIN WITHOUT SCIATICA: Primary | ICD-10-CM

## 2022-08-01 DIAGNOSIS — M54.50 CHRONIC BILATERAL LOW BACK PAIN WITHOUT SCIATICA: Primary | ICD-10-CM

## 2022-08-01 PROCEDURE — 97110 THERAPEUTIC EXERCISES: CPT | Mod: PN

## 2022-08-01 PROCEDURE — 97112 NEUROMUSCULAR REEDUCATION: CPT | Mod: PN

## 2022-08-01 PROCEDURE — 97140 MANUAL THERAPY 1/> REGIONS: CPT | Mod: PN

## 2022-08-01 PROCEDURE — 97530 THERAPEUTIC ACTIVITIES: CPT | Mod: PN

## 2022-08-01 NOTE — PROGRESS NOTES
[facility]   Physical Therapy Daily Treatment Note     Date: 7/11/2022   Name: Meredith Ramos  Clinic Number: [id number]    Therapy Diagnosis:   Encounter Diagnosis   Name Primary?    Acute right-sided low back pain with sciatica, sciatica laterality unspecified      Physician: Jeremías Maynard,*    Physician Orders: PT Eval and Treat   Medical Diagnosis from Referral: M54.50 (ICD-10-CM) - Low back pain M41.9 (ICD-10-CM) - Scoliosis of thoracolumbar spine, unspecified scoliosis type M35.7 (ICD-10-CM) - Hypermobility syndrome     Evaluation Date: 7/11/2022  Authorization Period Expiration: 12/31/2022  Plan of Care Expiration: 9/11/2022  Visit # / Visits authorized: 6/19  FOTO: #2/3 on 7/26/2022     Precautions: Standard     Time In: 0101pm  Time Out: 0201pm  Total Appointment Time: 60 minutes    SUBJECTIVE   Fall/MVAs: tripped on the sidewalk twice with one fall resulting in L wrist fracture     Imaging,  X-ray indicating some degeneration and a gluteal tear with striations from scar tissue -- per patient report.    Prior Level of Function: 4-4.5 miles, 3-4x/wk and aerobic step 3-4x/wk, Patient usually walks 1-15 miles/day when spending her summers in [place] with history of difficulty in rotating her L LE for pivoting type movements.    Current Level of Function: 4-4.5 miles, 3-4x/wk and aerobic step 3-4x/wk     Pain:  Current 0/10, worst 3/10, best 0/10   Location: pinching at L lumbosacral region and discomfort into L lateral hip      Patients goals: I want to strengthen whatever is causing the problem     Response: Pt is now experiencing decreased L hip pain. Pt cared for her grandchildren this weekend with no increase in hip pain.   Function: Improved tolerance to rolling over in bed.   HOME EXERCISE PROGRAM: reports compliance     OBJECTIVE   7/11/2022:   Posture:  R scapular winging, L sided scoliotic curve     Range of Motion:   LUMBAR  Degrees    Flexion 102   Extension 30  "  Left Side Bending 10   Right Side Bending 8         HIP     Internal rotation   (R) 40    (L) 35     External rotation   (R) 42    (L) 25 stiffness         Lower Extremity Strength  Right LE Left LE   Knee extension: 4+ Knee extension: 4+   Hip flexion: 4+ Hip flexion: 4   Hip extension: 4+ Hip extension: 4+   Hip abduction: 4+ Hip abduction:4+   Hip adduction: 4 Hip adduction: 4   Ankle dorsiflexion: 4+ Ankle dorsiflexion: 4+   Ankle plantarflexion: B 25 heel raises  Ankle plantarflexion:B 25 heel raises      Special Tests:  Bridge test: negative      SLR:  R: negative   L: negative      DAVID:  R: negative   L: negative     FADDIR:   R: negative   L: negative     Joint Mobility:   Thoracic, lumbar, hip - hypomobile, painful     Flexibility:   Ely's test: R = negative; L = negative   Popliteal Angle: R =  74 deg ; L = 70 deg    Function/balance:  Sit - stand: 21 x in 30"     R SLS: 10"     L SLS: 16"    Limitation/Restriction for FOTO Lumbar Survey    Therapist reviewed FOTO scores for Meredith Ramos on 7/11/2022.   FOTO documents entered into [employer] - see Media section.    Limitation Score: 17%  Predicted Score: 16%       TREATMENT   CHARGES BASED ON 1-1 TX:  therapeutic exercises for 15 minutes:   R SL open book stretch, 25"x5   L/R hip ER stretch, 30"x3    L/R piriformis stretch, 30"x3   Bridging with blue band above knees, half foam under feet, 3" hold, 3x10  Clam shell in SL, R/L, blue band above knees, 3x10     manual therapy techniques for 8 minutes:   L hip quadrants, LAD, APs Gr III/IV JM   R SL lumbar rotation and side gapping Gr III/IV JM     neuromuscular re-education for 15 minutes:   Supine R/L bent knee fall out with TA activation, blue band above knees, 3x10  B UE ext vs pink tube with alt LE marches, with TA activation, 3x10   Pallof press, green band, 3x10    therapeutic activities for 22 minutes:   Sit -stand with pilates ring squeeze, 3x10  Dynamic lunges, holding weighted ball +twist, x 2 " "laps   Dynamic marches x 1 lap   Dynamic drinking birds x 2 laps    PATIENT EDUCATION AND HOME EXERCISES     Education provided:   - yes    Home Exercises Provided: yes. Exercises were reviewed and Meredith was able to demonstrate them prior to the end of the session.  Meredith demonstrated good  understanding of the education provided. See EMR under Patient Instructions for exercises provided during therapy sessions.    ASSESSMENT   Pt tolerated tx session well today and completed all exercises with no increase in back or hip pain. Pt experiences relief with manual therapy interventions. Continue PT POC.    Patient prognosis is Good. Rehab potential is good.     Patient will benefit from skilled outpatient Physical Therapy to address the deficits stated above and in the chart below, provide patient /family education, and to maximize patientt's level of independence.     Plan of care discussed with patient: Yes  Patient's spiritual, cultural and educational needs considered and patient is agreeable to the plan of care and goals as stated.      Anticipated Barriers for therapy: osteoporosis, multiple tx areas, co morbidities     Goals:  Short Term Goals: 3 weeks   1 Patient will be I with HOME EXERCISE PROGRAM  Initial, not met   2 Patient will achieve 15 deg R lumbar side bending ACTIVE RANGE OF MOTION to facilitate improved ability to stretch for obtaining yoga positions  Initial, not met   3 Patient will achieve 42 deg L hip ER ACTIVE RANGE OF MOTION to facilitate improved tolerance for rolling over in bed  Initial, not met     Long Term Goals: 6 weeks   1 Patient will obtain 4+/5 L hip flex MMT to facilitate improved ability to walk 7 miles on intermittent incline in preparation for her summers in [place]  Initial, not met   2 Patient will obtain 30" R/L SLS to facilitate improved ability to negotiate uneven terrain safely  Initial, not met   3 Patient will complete pivoting type movements with no limitation due to " current condition to facilitate return to previous level of function for traveling  Initial, not met     PLAN   Plan of care Certification: 7/11/2022 to 9/11/2022    Outpatient Physical Therapy 2 times weekly for 6 weeks to include the following interventions: Cervical/Lumbar Traction, Electrical Stimulation , re-eval, dry needling, , Gait Training, Manual Therapy, Moist Heat/ Ice, Neuromuscular Re-ed, Patient Education, Self Care, Therapeutic Activities and Therapeutic Exercise.     Physical therapist and physical therapy assistant(s) met face to face to discuss patient's treatment plan and progress towards established goals. Pt will be seen by a physical therapist minimally every 6th visit or every 30 days.    Potential for KX modifier and additional visits based on subjective report, objective examination, nature of current condition, co morbidities and current functional status.     Anne Hager, PT

## 2022-08-02 NOTE — PROGRESS NOTES
"[facility]   Physical Therapy Daily Treatment Note     Date: 7/11/2022   Name: Meredith Ramos  Clinic Number: [id number]    Therapy Diagnosis:   Encounter Diagnosis   Name Primary?    Acute right-sided low back pain with sciatica, sciatica laterality unspecified      Physician: Jeremías Maynard,*    Physician Orders: PT Eval and Treat   Medical Diagnosis from Referral: M54.50 (ICD-10-CM) - Low back pain M41.9 (ICD-10-CM) - Scoliosis of thoracolumbar spine, unspecified scoliosis type M35.7 (ICD-10-CM) - Hypermobility syndrome     Evaluation Date: 7/11/2022  Authorization Period Expiration: 12/31/2022  Plan of Care Expiration: 9/11/2022  Visit # / Visits authorized: 6/19  FOTO: #2/3 on 7/26/2022     Precautions: Standard     Time In: 1100am  Time Out: 1202am  Total Appointment Time: 60 minutes    SUBJECTIVE   Fall/MVAs: tripped on the sidewalk twice with one fall resulting in L wrist fracture     Imaging,  X-ray indicating some degeneration and a gluteal tear with striations from scar tissue -- per patient report.    Prior Level of Function: 4-4.5 miles, 3-4x/wk and aerobic step 3-4x/wk, Patient usually walks 1-15 miles/day when spending her summers in [place] with history of difficulty in rotating her L LE for pivoting type movements.    Current Level of Function: 4-4.5 miles, 3-4x/wk and aerobic step 3-4x/wk     Pain:  Current 0/10, worst 3/10, best 0/10   Location: pinching at L lumbosacral region and discomfort into L lateral hip      Patients goals: I want to strengthen whatever is causing the problem     Response: Pt reports that her back is feeling "better everyday". Pt attributes this to manual therapy tx.   Function:improved tolerance to therapeutic exercises  HOME EXERCISE PROGRAM: reports compliance     OBJECTIVE   7/11/2022:   Posture:  R scapular winging, L sided scoliotic curve     Range of Motion:   LUMBAR  Degrees    Flexion 102   Extension 30   Left Side Bending 10 " "  Right Side Bending 8         HIP     Internal rotation   (R) 40    (L) 35     External rotation   (R) 42    (L) 25 stiffness         Lower Extremity Strength  Right LE Left LE   Knee extension: 4+ Knee extension: 4+   Hip flexion: 4+ Hip flexion: 4   Hip extension: 4+ Hip extension: 4+   Hip abduction: 4+ Hip abduction:4+   Hip adduction: 4 Hip adduction: 4   Ankle dorsiflexion: 4+ Ankle dorsiflexion: 4+   Ankle plantarflexion: B 25 heel raises  Ankle plantarflexion:B 25 heel raises      Special Tests:  Bridge test: negative      SLR:  R: negative   L: negative      DAVID:  R: negative   L: negative     FADDIR:   R: negative   L: negative     Joint Mobility:   Thoracic, lumbar, hip - hypomobile, painful     Flexibility:   Ely's test: R = negative; L = negative   Popliteal Angle: R =  74 deg ; L = 70 deg    Function/balance:  Sit - stand: 21 x in 30"     R SLS: 10"     L SLS: 16"    Limitation/Restriction for FOTO Lumbar Survey    Therapist reviewed FOTO scores for Meredith Ramos on 7/11/2022.   FOTO documents entered into [employer] - see Media section.    Limitation Score: 17%  Predicted Score: 16%       TREATMENT   CHARGES BASED ON 1-1 TX:  therapeutic exercises for 15 minutes:   R SL open book stretch, 25"x5   L/R hip ER stretch, 30"x3    L/R piriformis stretch, 30"x3   Bridging with blue band above knees, half foam under feet, 3" hold, 3x10  Clam shell in SL, R/L, blue band above knees, 3x10   +Dying bug exercise 3x8 repetitions  +Bird Dog exercise with yellow body blade for proper form 2x10 repetitions- VC's to decrease kick height to maintain neutral spine  +Cat/Camel 10x10" hold    manual therapy techniques for 8 minutes:   L hip quadrants, LAD, APs Gr III/IV JM   R SL lumbar rotation and side gapping Gr III/IV JM     neuromuscular re-education for 15 minutes:   Supine R/L bent knee fall out with TA activation, blue band above knees, 3x10  B UE ext vs pink tube with alt LE marches, with TA activation, 3x10 "   Pallof press, green band, 3x10    therapeutic activities for 22 minutes:   Sit -stand with pilates ring squeeze, 3x10  Dynamic lunges, holding weighted ball +twist, x 2 laps   Dynamic marches x 1 lap   Dynamic drinking birds x 2 laps    PATIENT EDUCATION AND HOME EXERCISES     Education provided:   - yes    Home Exercises Provided: yes. Exercises were reviewed and Meredith was able to demonstrate them prior to the end of the session.  Meredith demonstrated good  understanding of the education provided. See EMR under Patient Instructions for exercises provided during therapy sessions.    ASSESSMENT   Pt tolerated treatment session well today and completed all exercises with no increase in low back pain. Pt continues to progress with PT tx and would benefit from continued standing functional transverse abdominus strengthening. Progressed exercises today to include dying bug and bird dog exercises. Provided updated HEP.    Patient prognosis is Good. Rehab potential is good.     Patient will benefit from skilled outpatient Physical Therapy to address the deficits stated above and in the chart below, provide patient /family education, and to maximize patientt's level of independence.     Plan of care discussed with patient: Yes  Patient's spiritual, cultural and educational needs considered and patient is agreeable to the plan of care and goals as stated.      Anticipated Barriers for therapy: osteoporosis, multiple tx areas, co morbidities     Goals:  Short Term Goals: 3 weeks   1 Patient will be I with HOME EXERCISE PROGRAM  Initial, not met   2 Patient will achieve 15 deg R lumbar side bending ACTIVE RANGE OF MOTION to facilitate improved ability to stretch for obtaining yoga positions  Initial, not met   3 Patient will achieve 42 deg L hip ER ACTIVE RANGE OF MOTION to facilitate improved tolerance for rolling over in bed  Initial, not met     Long Term Goals: 6 weeks   1 Patient will obtain 4+/5 L hip flex MMT to  "facilitate improved ability to walk 7 miles on intermittent incline in preparation for her summers in [place]  Initial, not met   2 Patient will obtain 30" R/L SLS to facilitate improved ability to negotiate uneven terrain safely  Initial, not met   3 Patient will complete pivoting type movements with no limitation due to current condition to facilitate return to previous level of function for traveling  Initial, not met     PLAN   Plan of care Certification: 7/11/2022 to 9/11/2022    Outpatient Physical Therapy 2 times weekly for 6 weeks to include the following interventions: Cervical/Lumbar Traction, Electrical Stimulation , re-eval, dry needling, , Gait Training, Manual Therapy, Moist Heat/ Ice, Neuromuscular Re-ed, Patient Education, Self Care, Therapeutic Activities and Therapeutic Exercise.     Physical therapist and physical therapy assistant(s) met face to face to discuss patient's treatment plan and progress towards established goals. Pt will be seen by a physical therapist minimally every 6th visit or every 30 days.    Potential for KX modifier and additional visits based on subjective report, objective examination, nature of current condition, co morbidities and current functional status.                         "

## 2022-08-03 ENCOUNTER — CLINICAL SUPPORT (OUTPATIENT)
Dept: REHABILITATION | Facility: OTHER | Age: 72
End: 2022-08-03
Payer: MEDICARE

## 2022-08-03 DIAGNOSIS — M54.50 ACUTE LEFT-SIDED LOW BACK PAIN WITHOUT SCIATICA: Primary | ICD-10-CM

## 2022-08-03 PROCEDURE — 97140 MANUAL THERAPY 1/> REGIONS: CPT | Mod: PN

## 2022-08-03 PROCEDURE — 97110 THERAPEUTIC EXERCISES: CPT | Mod: PN

## 2022-08-03 PROCEDURE — 97112 NEUROMUSCULAR REEDUCATION: CPT | Mod: PN

## 2022-08-03 PROCEDURE — 97530 THERAPEUTIC ACTIVITIES: CPT | Mod: PN

## 2022-08-08 ENCOUNTER — CLINICAL SUPPORT (OUTPATIENT)
Dept: REHABILITATION | Facility: OTHER | Age: 72
End: 2022-08-08
Payer: MEDICARE

## 2022-08-08 DIAGNOSIS — M54.50 CHRONIC BILATERAL LOW BACK PAIN WITHOUT SCIATICA: Primary | ICD-10-CM

## 2022-08-08 DIAGNOSIS — G89.29 CHRONIC BILATERAL LOW BACK PAIN WITHOUT SCIATICA: Primary | ICD-10-CM

## 2022-08-08 PROCEDURE — 97110 THERAPEUTIC EXERCISES: CPT | Mod: PN

## 2022-08-08 PROCEDURE — 97530 THERAPEUTIC ACTIVITIES: CPT | Mod: PN

## 2022-08-08 PROCEDURE — 97112 NEUROMUSCULAR REEDUCATION: CPT | Mod: PN

## 2022-08-08 PROCEDURE — 97140 MANUAL THERAPY 1/> REGIONS: CPT | Mod: PN

## 2022-08-08 NOTE — PROGRESS NOTES
"[facility]   Physical Therapy Updated Plan of Care    Date: 7/11/2022   Name: Meredith Ramos  Clinic Number: [id number]    Therapy Diagnosis:   Encounter Diagnosis   Name Primary?    Acute right-sided low back pain with sciatica, sciatica laterality unspecified      Physician: Jeremías Maynard,*    Physician Orders: PT Eval and Treat   Medical Diagnosis from Referral: M54.50 (ICD-10-CM) - Low back pain M41.9 (ICD-10-CM) - Scoliosis of thoracolumbar spine, unspecified scoliosis type M35.7 (ICD-10-CM) - Hypermobility syndrome     Evaluation Date: 7/11/2022  Authorization Period Expiration: 12/31/2022  Plan of Care Expiration: 8/8/22- 10/8/22  Visit # / Visits authorized: 8/19  FOTO: #3/3 on 8/8/22     Precautions: Standard     Time In: 1100am  Time Out: 1202am  Total Appointment Time: 62 minutes    SUBJECTIVE   Fall/MVAs: tripped on the sidewalk twice with one fall resulting in L wrist fracture     Imaging,  X-ray indicating some degeneration and a gluteal tear with striations from scar tissue -- per patient report.    Prior Level of Function: 4-4.5 miles, 3-4x/wk and aerobic step 3-4x/wk, Patient usually walks 1-15 miles/day when spending her summers in [place] with history of difficulty in rotating her L LE for pivoting type movements.    Current Level of Function: 4-4.5 miles, 3-4x/wk and aerobic step 3-4x/wk     Pain:  Current 0/10, worst 3/10, best 0/10   Location: pinching at L lumbosacral region and discomfort into L lateral hip      Patients goals: I want to strengthen whatever is causing the problem     Response: Pt reports that her back is feeling "better everyday". Pt attributes this to manual therapy tx.   Function:improved tolerance to therapeutic exercises  HOME EXERCISE PROGRAM: reports compliance     OBJECTIVE   8/8/2022:   Posture:  R scapular winging, L sided scoliotic curve     Range of Motion:   LUMBAR  Degrees    Flexion 102   Extension 30   Left Side Bending 15 " "  Right Side Bending 14         HIP     Internal rotation   (R) 40    (L) 35     External rotation   (R) 42    (L) 25 stiffness         Lower Extremity Strength  Right LE Left LE   Knee extension: 4+ Knee extension: 4+   Hip flexion: 4+ Hip flexion: 4   Hip extension: 4+ Hip extension: 4+   Hip abduction: 4+ Hip abduction:4+   Hip adduction: 4 Hip adduction: 4   Ankle dorsiflexion: 4+ Ankle dorsiflexion: 4+   Ankle plantarflexion: B 25 heel raises  Ankle plantarflexion:B 25 heel raises      Special Tests:  Bridge test: negative      SLR:   R: negative   L: negative      DAVID:  R: negative   L: negative     FADDIR:   R: negative   L: negative     Joint Mobility:   Thoracic, lumbar, hip - hypomobile, painful     Flexibility:   Ely's test: R = negative; L = negative   Popliteal Angle: R =  74 deg ; L = 70 deg    Function/balance:  Sit - stand: 21 x in 30"     R SLS: 30"    L SLS: 31"     Limitation/Restriction for FOTO Lumbar Survey    Therapist reviewed FOTO scores for Meredith Ramos on 8/8/22  FOTO documents entered into [employer] - see Media section.    Limitation Score: 17%  Predicted Score: 16%       TREATMENT   CHARGES BASED ON 1-1 TX:  therapeutic exercises for 15 minutes:   R SL open book stretch, 25"x5   L/R hip ER stretch, 30"x3    L/R piriformis stretch, 30"x3   Bridging with blue band above knees, half foam under feet, 3" hold, 3x10  Clam shell in SL, R/L, blue band above knees, 3x10   Dying bug exercise 3x8 repetitions  Bird Dog exercise with yellow body blade for proper form 2x10 repetitions- VC's to decrease kick height to maintain neutral spine  Cat/Camel 10x10" hold  +Updated Plan of Care    manual therapy techniques for 8 minutes:   L hip quadrants, LAD, APs Gr III/IV JM   R SL lumbar rotation and side gapping Gr III/IV JM     neuromuscular re-education for 15 minutes:   Supine R/L bent knee fall out with TA activation, blue band above knees, 3x10  B UE ext vs pink tube with alt LE marches, with TA " activation, 3x10   Pallof press, green band, 3x10    therapeutic activities for 22 minutes:   Sit -stand with pilates ring squeeze, 3x10  Dynamic lunges, holding weighted ball +twist, x 2 laps   Dynamic marches x 1 lap   Dynamic drinking birds x 2 laps    PATIENT EDUCATION AND HOME EXERCISES     Education provided:   - yes    Home Exercises Provided: yes. Exercises were reviewed and Meredith was able to demonstrate them prior to the end of the session.  Meredith demonstrated good  understanding of the education provided. See EMR under Patient Instructions for exercises provided during therapy sessions.    ASSESSMENT   Pt presents to physical therapy treatment with decreased active range of motion, decreased strength, poor posture, and increased pain due to hip and back impairments. These factors are negatively impacting the patient's ability to complete their activities of daily living and work duties. Pt is progressing towards their short and long term goals as evidenced by decreased pain, improved strength and range of motion, and improved FOTO score. These goals remain appropriate for the plan of care. Pt would benefit from continued skilled physical therapy treatment to address these deficits so that they may return to their prior level of function with improved quality of life.      Patient prognosis is Good. Rehab potential is good.     Patient will benefit from skilled outpatient Physical Therapy to address the deficits stated above and in the chart below, provide patient /family education, and to maximize patientt's level of independence.     Plan of care discussed with patient: Yes  Patient's spiritual, cultural and educational needs considered and patient is agreeable to the plan of care and goals as stated.      Anticipated Barriers for therapy: osteoporosis, multiple tx areas, co morbidities     Goals:  Short Term Goals: 3 weeks   1 Patient will be I with HOME EXERCISE PROGRAM  MET, 8/8/22   2 Patient will  "achieve 15 deg R lumbar side bending ACTIVE RANGE OF MOTION to facilitate improved ability to stretch for obtaining yoga positions  Initial, not met   3 Patient will achieve 42 deg L hip ER ACTIVE RANGE OF MOTION to facilitate improved tolerance for rolling over in bed  Initial, not met     Long Term Goals: 6 weeks   1 Patient will obtain 4+/5 L hip flex MMT to facilitate improved ability to walk 7 miles on intermittent incline in preparation for her summers in [place]  MET, 8/8/22   2 Patient will obtain 30" R/L SLS to facilitate improved ability to negotiate uneven terrain safely  MET, 8/8/22   3 Patient will complete pivoting type movements with no limitation due to current condition to facilitate return to previous level of function for traveling  MET, 8/8/22     PLAN   Plan of care Certification: 8/8/22- 10/8/22    Outpatient Physical Therapy 2 times weekly for 6 weeks to include the following interventions: Cervical/Lumbar Traction, Electrical Stimulation , re-eval, dry needling, , Gait Training, Manual Therapy, Moist Heat/ Ice, Neuromuscular Re-ed, Patient Education, Self Care, Therapeutic Activities and Therapeutic Exercise.     Physical therapist and physical therapy assistant(s) met face to face to discuss patient's treatment plan and progress towards established goals. Pt will be seen by a physical therapist minimally every 6th visit or every 30 days.    Potential for KX modifier and additional visits based on subjective report, objective examination, nature of current condition, co morbidities and current functional status.             Gerri Morris , PT              "

## 2022-08-08 NOTE — PLAN OF CARE
"[facility]   Physical Therapy Updated Plan of Care    Date: 7/11/2022   Name: Meredith Ramos  Clinic Number: [id number]    Therapy Diagnosis:   Encounter Diagnosis   Name Primary?    Acute right-sided low back pain with sciatica, sciatica laterality unspecified      Physician: Jeremías Maynard,*    Physician Orders: PT Eval and Treat   Medical Diagnosis from Referral: M54.50 (ICD-10-CM) - Low back pain M41.9 (ICD-10-CM) - Scoliosis of thoracolumbar spine, unspecified scoliosis type M35.7 (ICD-10-CM) - Hypermobility syndrome     Evaluation Date: 7/11/2022  Authorization Period Expiration: 12/31/2022  Plan of Care Expiration: 8/8/22- 10/8/22  Visit # / Visits authorized: 8/19  FOTO: #3/3 on 8/8/22     Precautions: Standard     Time In: 1100am  Time Out: 1202am  Total Appointment Time: 62 minutes    SUBJECTIVE   Fall/MVAs: tripped on the sidewalk twice with one fall resulting in L wrist fracture     Imaging,  X-ray indicating some degeneration and a gluteal tear with striations from scar tissue -- per patient report.    Prior Level of Function: 4-4.5 miles, 3-4x/wk and aerobic step 3-4x/wk, Patient usually walks 1-15 miles/day when spending her summers in [place] with history of difficulty in rotating her L LE for pivoting type movements.    Current Level of Function: 4-4.5 miles, 3-4x/wk and aerobic step 3-4x/wk     Pain:  Current 0/10, worst 3/10, best 0/10   Location: pinching at L lumbosacral region and discomfort into L lateral hip      Patients goals: I want to strengthen whatever is causing the problem     Response: Pt reports that her back is feeling "better everyday". Pt attributes this to manual therapy tx.   Function:improved tolerance to therapeutic exercises  HOME EXERCISE PROGRAM: reports compliance     OBJECTIVE   8/8/2022:   Posture:  R scapular winging, L sided scoliotic curve     Range of Motion:   LUMBAR  Degrees    Flexion 102   Extension 30   Left Side Bending 15 " "  Right Side Bending 14         HIP     Internal rotation   (R) 40    (L) 35     External rotation   (R) 42    (L) 25 stiffness         Lower Extremity Strength  Right LE Left LE   Knee extension: 4+ Knee extension: 4+   Hip flexion: 4+ Hip flexion: 4   Hip extension: 4+ Hip extension: 4+   Hip abduction: 4+ Hip abduction:4+   Hip adduction: 4 Hip adduction: 4   Ankle dorsiflexion: 4+ Ankle dorsiflexion: 4+   Ankle plantarflexion: B 25 heel raises  Ankle plantarflexion:B 25 heel raises      Special Tests:  Bridge test: negative      SLR:   R: negative   L: negative      DAVID:  R: negative   L: negative     FADDIR:   R: negative   L: negative     Joint Mobility:   Thoracic, lumbar, hip - hypomobile, painful     Flexibility:   Ely's test: R = negative; L = negative   Popliteal Angle: R =  74 deg ; L = 70 deg    Function/balance:  Sit - stand: 21 x in 30"     R SLS: 30"    L SLS: 31"     Limitation/Restriction for FOTO Lumbar Survey    Therapist reviewed FOTO scores for Meredith Ramos on 8/8/22  FOTO documents entered into [employer] - see Media section.    Limitation Score: 17%  Predicted Score: 16%       TREATMENT   CHARGES BASED ON 1-1 TX:  therapeutic exercises for 15 minutes:   R SL open book stretch, 25"x5   L/R hip ER stretch, 30"x3    L/R piriformis stretch, 30"x3   Bridging with blue band above knees, half foam under feet, 3" hold, 3x10  Clam shell in SL, R/L, blue band above knees, 3x10   Dying bug exercise 3x8 repetitions  Bird Dog exercise with yellow body blade for proper form 2x10 repetitions- VC's to decrease kick height to maintain neutral spine  Cat/Camel 10x10" hold  +Updated Plan of Care    manual therapy techniques for 8 minutes:   L hip quadrants, LAD, APs Gr III/IV JM   R SL lumbar rotation and side gapping Gr III/IV JM     neuromuscular re-education for 15 minutes:   Supine R/L bent knee fall out with TA activation, blue band above knees, 3x10  B UE ext vs pink tube with alt LE marches, with TA " activation, 3x10   Pallof press, green band, 3x10    therapeutic activities for 22 minutes:   Sit -stand with pilates ring squeeze, 3x10  Dynamic lunges, holding weighted ball +twist, x 2 laps   Dynamic marches x 1 lap   Dynamic drinking birds x 2 laps    PATIENT EDUCATION AND HOME EXERCISES     Education provided:   - yes    Home Exercises Provided: yes. Exercises were reviewed and Meredith was able to demonstrate them prior to the end of the session.  Meredith demonstrated good  understanding of the education provided. See EMR under Patient Instructions for exercises provided during therapy sessions.    ASSESSMENT   Pt presents to physical therapy treatment with decreased active range of motion, decreased strength, poor posture, and increased pain due to hip and back impairments. These factors are negatively impacting the patient's ability to complete their activities of daily living and work duties. Pt is progressing towards their short and long term goals as evidenced by decreased pain, improved strength and range of motion, and improved FOTO score. These goals remain appropriate for the plan of care. Pt would benefit from continued skilled physical therapy treatment to address these deficits so that they may return to their prior level of function with improved quality of life.      Patient prognosis is Good. Rehab potential is good.     Patient will benefit from skilled outpatient Physical Therapy to address the deficits stated above and in the chart below, provide patient /family education, and to maximize patientt's level of independence.     Plan of care discussed with patient: Yes  Patient's spiritual, cultural and educational needs considered and patient is agreeable to the plan of care and goals as stated.      Anticipated Barriers for therapy: osteoporosis, multiple tx areas, co morbidities     Goals:  Short Term Goals: 3 weeks   1 Patient will be I with HOME EXERCISE PROGRAM  MET, 8/8/22   2 Patient will  "achieve 15 deg R lumbar side bending ACTIVE RANGE OF MOTION to facilitate improved ability to stretch for obtaining yoga positions  Initial, not met   3 Patient will achieve 42 deg L hip ER ACTIVE RANGE OF MOTION to facilitate improved tolerance for rolling over in bed  Initial, not met     Long Term Goals: 6 weeks   1 Patient will obtain 4+/5 L hip flex MMT to facilitate improved ability to walk 7 miles on intermittent incline in preparation for her summers in [place]  MET, 8/8/22   2 Patient will obtain 30" R/L SLS to facilitate improved ability to negotiate uneven terrain safely  MET, 8/8/22   3 Patient will complete pivoting type movements with no limitation due to current condition to facilitate return to previous level of function for traveling  MET, 8/8/22     PLAN   Plan of care Certification: 8/8/22- 10/8/22    Outpatient Physical Therapy 2 times weekly for 6 weeks to include the following interventions: Cervical/Lumbar Traction, Electrical Stimulation , re-eval, dry needling, , Gait Training, Manual Therapy, Moist Heat/ Ice, Neuromuscular Re-ed, Patient Education, Self Care, Therapeutic Activities and Therapeutic Exercise.     Physical therapist and physical therapy assistant(s) met face to face to discuss patient's treatment plan and progress towards established goals. Pt will be seen by a physical therapist minimally every 6th visit or every 30 days.    Potential for KX modifier and additional visits based on subjective report, objective examination, nature of current condition, co morbidities and current functional status.             Gerri Morris , PT                "

## 2022-08-10 ENCOUNTER — CLINICAL SUPPORT (OUTPATIENT)
Dept: REHABILITATION | Facility: OTHER | Age: 72
End: 2022-08-10
Payer: MEDICARE

## 2022-08-10 DIAGNOSIS — M54.40 ACUTE RIGHT-SIDED LOW BACK PAIN WITH SCIATICA, SCIATICA LATERALITY UNSPECIFIED: Primary | ICD-10-CM

## 2022-08-10 PROCEDURE — 97110 THERAPEUTIC EXERCISES: CPT | Mod: PN,CQ

## 2022-08-10 PROCEDURE — 97530 THERAPEUTIC ACTIVITIES: CPT | Mod: PN,CQ

## 2022-08-10 PROCEDURE — 97112 NEUROMUSCULAR REEDUCATION: CPT | Mod: PN,CQ

## 2022-08-10 NOTE — PROGRESS NOTES
[facility]   Physical Therapy Daily Treatment Note    Date: 7/11/2022   Name: Meredith Ramos  Clinic Number: [id number]    Therapy Diagnosis:   Encounter Diagnosis   Name Primary?    Acute right-sided low back pain with sciatica, sciatica laterality unspecified      Physician: Jeremías Maynard,*    Physician Orders: PT Eval and Treat   Medical Diagnosis from Referral: M54.50 (ICD-10-CM) - Low back pain M41.9 (ICD-10-CM) - Scoliosis of thoracolumbar spine, unspecified scoliosis type M35.7 (ICD-10-CM) - Hypermobility syndrome     Evaluation Date: 7/11/2022  Authorization Period Expiration: 12/31/2022  Plan of Care Expiration: 8/8/22- 10/8/22  Visit # / Visits authorized: 9/19  FOTO: #3/3 on 8/8/22     Precautions: Standard     Time In: 1100  Time Out: 1200  Total Appointment Time: 60 minutes    SUBJECTIVE   Fall/MVAs: tripped on the sidewalk twice with one fall resulting in L wrist fracture     Imaging,  X-ray indicating some degeneration and a gluteal tear with striations from scar tissue -- per patient report.    Prior Level of Function: 4-4.5 miles, 3-4x/wk and aerobic step 3-4x/wk, Patient usually walks 1-15 miles/day when spending her summers in [place] with history of difficulty in rotating her L LE for pivoting type movements.    Current Level of Function: 4-4.5 miles, 3-4x/wk and aerobic step 3-4x/wk     Pain:  Current 0/10, worst 3/10, best 0/10   Location: pinching at L lumbosacral region and discomfort into L lateral hip      Patients goals: I want to strengthen whatever is causing the problem     Response: States she is feeling well and is not having any pain.[facility] PT is helping and she has been seeing a lot of progress.   Function:improved tolerance to therapeutic exercises  HOME EXERCISE PROGRAM: reports compliance     OBJECTIVE   8/8/2022:   Posture:  R scapular winging, L sided scoliotic curve     Range of Motion:   LUMBAR  Degrees    Flexion 102   Extension 30  "  Left Side Bending 15   Right Side Bending 14         HIP     Internal rotation   (R) 40    (L) 35     External rotation   (R) 42    (L) 25 stiffness         Lower Extremity Strength  Right LE Left LE   Knee extension: 4+ Knee extension: 4+   Hip flexion: 4+ Hip flexion: 4   Hip extension: 4+ Hip extension: 4+   Hip abduction: 4+ Hip abduction:4+   Hip adduction: 4 Hip adduction: 4   Ankle dorsiflexion: 4+ Ankle dorsiflexion: 4+   Ankle plantarflexion: B 25 heel raises  Ankle plantarflexion:B 25 heel raises      Special Tests:  Bridge test: negative      SLR:   R: negative   L: negative      DAVID:  R: negative   L: negative     FADDIR:   R: negative   L: negative     Joint Mobility:   Thoracic, lumbar, hip - hypomobile, painful     Flexibility:   Ely's test: R = negative; L = negative   Popliteal Angle: R =  74 deg ; L = 70 deg    Function/balance:  Sit - stand: 21 x in 30"     R SLS: 30"    L SLS: 31"     Limitation/Restriction for FOTO Lumbar Survey    Therapist reviewed FOTO scores for Meredith Ramos on 8/8/22  FOTO documents entered into [employer] - see Media section.    Limitation Score: 17%  Predicted Score: 16%       TREATMENT   CHARGES BASED ON 1-1 TX:  therapeutic exercises for 25 minutes:   R SL open book stretch, 25"x5   L/R hip ER stretch, 30"x3    L/R piriformis stretch, 30"x3   Bridging with blue band above knees, half foam under feet, 3" hold, 3x10  Clam shell in SL, R/L, blue band above knees, 3x10   Dying bug exercise 3x8 repetitions  Bird Dog exercise with yellow body blade for proper form 2x10 repetitions- VC's to decrease kick height to maintain neutral spine  Cat/Camel 10x10" hold      manual therapy techniques for 00 minutes:   L hip quadrants, LAD, APs Gr III/IV JM   R SL lumbar rotation and side gapping Gr III/IV JM     neuromuscular re-education for 15 minutes:   Supine R/L bent knee fall out with TA activation, blue band above knees, 3x10  B UE ext vs pink tube with alt LE marches, with TA " activation, 3x10   Pallof press, green band, 3x10    therapeutic activities for 20 minutes:   Sit -stand with pilates ring squeeze, 3x10  Dynamic lunges, holding weighted ball +twist, x 2 laps   Dynamic marches 10# KB x 1 lap    Dynamic drinking birds x 2 laps    PATIENT EDUCATION AND HOME EXERCISES     Education provided:   - Exercise form and rationale     Home Exercises Provided: yes. Exercises were reviewed and Meredith was able to demonstrate them prior to the end of the session.  Meredith demonstrated good  understanding of the education provided. See EMR under Patient Instructions for exercises provided during therapy sessions.    ASSESSMENT   Pt tolerated exercise well with minimal complaints of increased pain. Pt was able to complete trunk/back rotational exercises with minimal to no muscle guarding. Strength appears to be improving in paraspinals/core musculature during exercises however, some weakness continues to remain notable at this time. Pt was able to demonstrate improved core/transverse abdominis activation after cuing.       Patient prognosis is Good. Rehab potential is good.     Patient will benefit from skilled outpatient Physical Therapy to address the deficits stated above and in the chart below, provide patient /family education, and to maximize patientt's level of independence.     Plan of care discussed with patient: Yes  Patient's spiritual, cultural and educational needs considered and patient is agreeable to the plan of care and goals as stated.      Anticipated Barriers for therapy: osteoporosis, multiple tx areas, co morbidities     Goals:  Short Term Goals: 3 weeks   1 Patient will be I with HOME EXERCISE PROGRAM  MET, 8/8/22   2 Patient will achieve 15 deg R lumbar side bending ACTIVE RANGE OF MOTION to facilitate improved ability to stretch for obtaining yoga positions  Initial, not met   3 Patient will achieve 42 deg L hip ER ACTIVE RANGE OF MOTION to facilitate improved tolerance  "for rolling over in bed  Initial, not met     Long Term Goals: 6 weeks   1 Patient will obtain 4+/5 L hip flex MMT to facilitate improved ability to walk 7 miles on intermittent incline in preparation for her summers in [place]  MET, 8/8/22   2 Patient will obtain 30" R/L SLS to facilitate improved ability to negotiate uneven terrain safely  MET, 8/8/22   3 Patient will complete pivoting type movements with no limitation due to current condition to facilitate return to previous level of function for traveling  MET, 8/8/22     PLAN   Plan of care Certification: 8/8/22- 10/8/22    Outpatient Physical Therapy 2 times weekly for 6 weeks to include the following interventions: Cervical/Lumbar Traction, Electrical Stimulation , re-eval, dry needling, , Gait Training, Manual Therapy, Moist Heat/ Ice, Neuromuscular Re-ed, Patient Education, Self Care, Therapeutic Activities and Therapeutic Exercise.     Physical therapist and physical therapy assistant(s) met face to face to discuss patient's treatment plan and progress towards established goals. Pt will be seen by a physical therapist minimally every 6th visit or every 30 days.    Potential for KX modifier and additional visits based on subjective report, objective examination, nature of current condition, co morbidities and current functional status.         James Mujica, PTA                  "

## 2022-08-15 ENCOUNTER — CLINICAL SUPPORT (OUTPATIENT)
Dept: REHABILITATION | Facility: OTHER | Age: 72
End: 2022-08-15
Payer: MEDICARE

## 2022-08-15 DIAGNOSIS — M54.40 ACUTE RIGHT-SIDED LOW BACK PAIN WITH SCIATICA, SCIATICA LATERALITY UNSPECIFIED: Primary | ICD-10-CM

## 2022-08-15 PROCEDURE — 97110 THERAPEUTIC EXERCISES: CPT | Mod: PN

## 2022-08-15 PROCEDURE — 97530 THERAPEUTIC ACTIVITIES: CPT | Mod: PN

## 2022-08-15 NOTE — PROGRESS NOTES
[facility] OUTPATIENT THERAPY AND WELLNESS   Physical Therapy Daily Treatment Note    Date: 7/11/2022   Name: Meredith Ramos  Clinic Number: [id number]    Therapy Diagnosis:   Encounter Diagnosis   Name Primary?    Acute right-sided low back pain with sciatica, sciatica laterality unspecified      Physician: Jeremías Maynard,*    Physician Orders: PT Eval and Treat   Medical Diagnosis from Referral: M54.50 (ICD-10-CM) - Low back pain M41.9 (ICD-10-CM) - Scoliosis of thoracolumbar spine, unspecified scoliosis type M35.7 (ICD-10-CM) - Hypermobility syndrome     Evaluation Date: 7/11/2022  Authorization Period Expiration: 12/31/2022  Plan of Care Expiration: 8/8/22- 10/8/22  Visit # / Visits authorized: 10/19  FOTO: #3/3 on 8/8/22     Precautions: Standard     Time In: 11:05am  Time Out: 12:00pm  Total Appointment Time: 55 minutes    SUBJECTIVE   Fall/MVAs: tripped on the sidewalk twice with one fall resulting in L wrist fracture     Imaging,  X-ray indicating some degeneration and a gluteal tear with striations from scar tissue -- per patient report.    Prior Level of Function: 4-4.5 miles, 3-4x/wk and aerobic step 3-4x/wk, Patient usually walks 1-15 miles/day when spending her summers in [place] with history of difficulty in rotating her L LE for pivoting type movements.    Current Level of Function: 4-4.5 miles, 3-4x/wk and aerobic step 3-4x/wk     Pain:  Current 0/10, worst 3/10, best 0/10   Location: pinching at L lumbosacral region and discomfort into L lateral hip      Patients goals: I want to strengthen whatever is causing the problem     Response: continues with decreased L hip pain following onset of PHYSICAL THERAPY POC   Function: able to complete rapid movements and bed mobility tasks with decreased L hip pain   HOME EXERCISE PROGRAM: reports compliance     OBJECTIVE   8/8/2022:   Posture:  R scapular winging, L sided scoliotic curve     Range of Motion:   LUMBAR  Degrees    Flexion 102   Extension 30  "  Left Side Bending 15   Right Side Bending 14         HIP     Internal rotation   (R) 40    (L) 35     External rotation   (R) 42    (L) 25 stiffness         Lower Extremity Strength  Right LE Left LE   Knee extension: 4+ Knee extension: 4+   Hip flexion: 4+ Hip flexion: 4   Hip extension: 4+ Hip extension: 4+   Hip abduction: 4+ Hip abduction:4+   Hip adduction: 4 Hip adduction: 4   Ankle dorsiflexion: 4+ Ankle dorsiflexion: 4+   Ankle plantarflexion: B 25 heel raises  Ankle plantarflexion:B 25 heel raises      Special Tests:  Bridge test: negative      SLR:   R: negative   L: negative      DAVID:  R: negative   L: negative     FADDIR:   R: negative   L: negative     Joint Mobility:   Thoracic, lumbar, hip - hypomobile, painful     Flexibility:   Ely's test: R = negative; L = negative   Popliteal Angle: R =  74 deg ; L = 70 deg    Function/balance:  Sit - stand: 21 x in 30"     R SLS: 30"    L SLS: 31"     Limitation/Restriction for FOTO Lumbar Survey    Therapist reviewed FOTO scores for Meredith Ramos on 8/8/22  FOTO documents entered into [employer] - see Media section.    Limitation Score: 17%  Predicted Score: 16%       TREATMENT   CHARGES BASED ON 1-1 TX:  therapeutic exercises for 24 minutes:   R SL open book stretch, 25"x5   L/R hip ER stretch, 30"x3    L/R piriformis stretch, 30"x3   Bridging with blue band above knees, half foam under feet, 3" hold, 3x10  Clam shell in SL, R/L, blue band above knees, 3x10   Dying bug exercise 3x10  -Bird Dog exercise with yellow body blade for proper form 2x10 repetitions- VC's to decrease kick height to maintain neutral spine  -Cat/Camel 10x10" hold    manual therapy techniques for 8 minutes:   L hip quadrants, LAD, APs Gr III/IV JM   -R SL lumbar rotation and side gapping Gr III/IV JM     neuromuscular re-education for 8 minutes:   -Supine R/L bent knee fall out with TA activation, blue band above knees, 3" hold, 3x10  -B UE ext vs pink tube with alt LE marches, with TA " activation, 3x10   Pallof press, blue band, 3x10    therapeutic activities for 15 minutes:   Sit -stand with pilates ring squeeze, 3x10  Dynamic lunges, holding weighted ball +twist, x 2 laps   Dynamic marches 10# KB x 1 lap    Dynamic drinking birds x 2 laps    PATIENT EDUCATION AND HOME EXERCISES     Education provided:   - Exercise form and rationale     Home Exercises Provided: yes. Exercises were reviewed and Meredith was able to demonstrate them prior to the end of the session.  Meredith demonstrated good  understanding of the education provided. See EMR under Patient Instructions for exercises provided during therapy sessions.    ASSESSMENT   Pt was able to demonstrate improved core/transverse abdominis activation during supine dying bug exercises today in addition to progression of blue TB for stabilization/strength training.     Patient prognosis is Good. Rehab potential is good.     Patient will benefit from skilled outpatient Physical Therapy to address the deficits stated above and in the chart below, provide patient /family education, and to maximize patientt's level of independence.     Plan of care discussed with patient: Yes  Patient's spiritual, cultural and educational needs considered and patient is agreeable to the plan of care and goals as stated.      Anticipated Barriers for therapy: osteoporosis, multiple tx areas, co morbidities     Goals:  Short Term Goals: 3 weeks   1 Patient will be I with HOME EXERCISE PROGRAM  MET, 8/8/22   2 Patient will achieve 15 deg R lumbar side bending ACTIVE RANGE OF MOTION to facilitate improved ability to stretch for obtaining yoga positions  Initial, not met   3 Patient will achieve 42 deg L hip ER ACTIVE RANGE OF MOTION to facilitate improved tolerance for rolling over in bed  Initial, not met     Long Term Goals: 6 weeks   1 Patient will obtain 4+/5 L hip flex MMT to facilitate improved ability to walk 7 miles on intermittent incline in preparation for her  "fraire in [place]  MET, 8/8/22   2 Patient will obtain 30" R/L SLS to facilitate improved ability to negotiate uneven terrain safely  MET, 8/8/22   3 Patient will complete pivoting type movements with no limitation due to current condition to facilitate return to previous level of function for traveling  MET, 8/8/22     PLAN   Plan of care Certification: 8/8/22- 10/8/22    Outpatient Physical Therapy 2 times weekly for 6 weeks to include the following interventions: Cervical/Lumbar Traction, Electrical Stimulation , re-eval, dry needling, , Gait Training, Manual Therapy, Moist Heat/ Ice, Neuromuscular Re-ed, Patient Education, Self Care, Therapeutic Activities and Therapeutic Exercise.     Physical therapist and physical therapy assistant(s) met face to face to discuss patient's treatment plan and progress towards established goals. Pt will be seen by a physical therapist minimally every 6th visit or every 30 days.    Potential for KX modifier and additional visits based on subjective report, objective examination, nature of current condition, co morbidities and current functional status.       Anne Hager, PT                    "

## 2022-08-17 ENCOUNTER — CLINICAL SUPPORT (OUTPATIENT)
Dept: REHABILITATION | Facility: OTHER | Age: 72
End: 2022-08-17
Payer: MEDICARE

## 2022-08-17 DIAGNOSIS — M54.40 ACUTE RIGHT-SIDED LOW BACK PAIN WITH SCIATICA, SCIATICA LATERALITY UNSPECIFIED: Primary | ICD-10-CM

## 2022-08-17 PROCEDURE — 97112 NEUROMUSCULAR REEDUCATION: CPT | Mod: PN,CQ

## 2022-08-17 PROCEDURE — 97530 THERAPEUTIC ACTIVITIES: CPT | Mod: PN,CQ

## 2022-08-17 PROCEDURE — 97110 THERAPEUTIC EXERCISES: CPT | Mod: PN,CQ

## 2022-08-17 NOTE — PROGRESS NOTES
[facility]   Physical Therapy Daily Treatment Note    Date: 7/11/2022   Name: Meredith Ramos  Clinic Number: [id number]    Therapy Diagnosis:   Encounter Diagnosis   Name Primary?    Acute right-sided low back pain with sciatica, sciatica laterality unspecified      Physician: Jeremías Maynard,*    Physician Orders: PT Eval and Treat   Medical Diagnosis from Referral: M54.50 (ICD-10-CM) - Low back pain M41.9 (ICD-10-CM) - Scoliosis of thoracolumbar spine, unspecified scoliosis type M35.7 (ICD-10-CM) - Hypermobility syndrome     Evaluation Date: 7/11/2022  Authorization Period Expiration: 12/31/2022  Plan of Care Expiration: 8/8/22- 10/8/22  Visit # / Visits authorized: 11/19  FOTO: #3/3 on 8/8/22     Precautions: Standard     Time In: 1100  Time Out: 1200  Total Appointment Time: 60 minutes    SUBJECTIVE   Fall/MVAs: tripped on the sidewalk twice with one fall resulting in L wrist fracture     Imaging,  X-ray indicating some degeneration and a gluteal tear with striations from scar tissue -- per patient report.    Prior Level of Function: 4-4.5 miles, 3-4x/wk and aerobic step 3-4x/wk, Patient usually walks 1-15 miles/day when spending her summers in [place] with history of difficulty in rotating her L LE for pivoting type movements.    Current Level of Function: 4-4.5 miles, 3-4x/wk and aerobic step 3-4x/wk     Pain:  Current 0/10, worst 3/10, best 0/10   Location: pinching at L lumbosacral region and discomfort into L lateral hip      Patients goals: I want to strengthen whatever is causing the problem     Response: feeling so much better. States she has seen a lot of improvement since coming to PT.   Function: able to complete rapid movements and bed mobility tasks with decreased L hip pain   HOME EXERCISE PROGRAM: reports compliance     OBJECTIVE   8/8/2022:   Posture:  R scapular winging, L sided scoliotic curve     Range of Motion:   LUMBAR  Degrees    Flexion 102   Extension  "30   Left Side Bending 15   Right Side Bending 14         HIP     Internal rotation   (R) 40    (L) 35     External rotation   (R) 42    (L) 25 stiffness         Lower Extremity Strength  Right LE Left LE   Knee extension: 4+ Knee extension: 4+   Hip flexion: 4+ Hip flexion: 4   Hip extension: 4+ Hip extension: 4+   Hip abduction: 4+ Hip abduction:4+   Hip adduction: 4 Hip adduction: 4   Ankle dorsiflexion: 4+ Ankle dorsiflexion: 4+   Ankle plantarflexion: B 25 heel raises  Ankle plantarflexion:B 25 heel raises      Special Tests:  Bridge test: negative      SLR:   R: negative   L: negative      DAVID:  R: negative   L: negative     FADDIR:   R: negative   L: negative     Joint Mobility:   Thoracic, lumbar, hip - hypomobile, painful     Flexibility:   Ely's test: R = negative; L = negative   Popliteal Angle: R =  74 deg ; L = 70 deg    Function/balance:  Sit - stand: 21 x in 30"     R SLS: 30"    L SLS: 31"     Limitation/Restriction for FOTO Lumbar Survey    Therapist reviewed FOTO scores for Meredith Ramos on 8/8/22  FOTO documents entered into [employer] - see Media section.    Limitation Score: 17%  Predicted Score: 16%       TREATMENT   CHARGES BASED ON 1-1 TX:  therapeutic exercises for 27 minutes:   R SL open book stretch, 25"x5   L/R hip ER stretch, 30"x3    L/R piriformis stretch, 30"x3   Bridging with blue band above knees, half foam under feet, 5" hold, 3x10  Clam shell in SL, R/L, blue band above knees, 3x10   Dying bug exercise 3x10  -Bird Dog exercise with yellow body blade for proper form 2x10 repetitions- VC's to decrease kick height to maintain neutral spine  -Cat/Camel 10x10" hold    manual therapy techniques for 00 minutes:   L hip quadrants, LAD, APs Gr III/IV JM   -R SL lumbar rotation and side gapping Gr III/IV JM     neuromuscular re-education for 8 minutes:   -Supine R/L bent knee fall out with TA activation, blue band above knees, 3" hold, 3x10  -B UE ext vs pink tube with alt LE norman, with " TA activation, 3x10   Pallof press, blue band, 3x10     therapeutic activities for 25 minutes:   Sit -stand with pilates ring squeeze, airex under feet, 3x10  Dynamic lunges, holding weighted ball +twist, x 2 laps   +Seated on blue SB, diagonals with yellow weighted ball, 15x ea  Dynamic marches 10# KB x 1 lap   +Monster walks mint cloth band, 1 lap  +Lateral stepping mint cloth band, 1 lap   Dynamic drinking birds x 2 laps    PATIENT EDUCATION AND HOME EXERCISES     Education provided:   - Exercise form and rationale     Home Exercises Provided:  YES and pt was advised to continue with previous HEP. Exercises were reviewed and Meredith was able to demonstrate them prior to the end of the session.  Meredith demonstrated good  understanding of the education provided. See EMR under Patient Instructions for exercises provided during therapy sessions.    ASSESSMENT   Pt was able to demonstrate improved core/transverse abdominis activation during exercise as well as improved postural stability with dynamic exercises with reduced CHRISSY. Pt is compliant/ independent with her HEP.      Patient prognosis is Good. Rehab potential is good.     Patient will benefit from skilled outpatient Physical Therapy to address the deficits stated above and in the chart below, provide patient /family education, and to maximize patientt's level of independence.     Plan of care discussed with patient: Yes  Patient's spiritual, cultural and educational needs considered and patient is agreeable to the plan of care and goals as stated.      Anticipated Barriers for therapy: osteoporosis, multiple tx areas, co morbidities     Goals:  Short Term Goals: 3 weeks   1 Patient will be I with HOME EXERCISE PROGRAM  MET, 8/8/22   2 Patient will achieve 15 deg R lumbar side bending ACTIVE RANGE OF MOTION to facilitate improved ability to stretch for obtaining yoga positions  Initial, not met   3 Patient will achieve 42 deg L hip ER ACTIVE RANGE OF MOTION to  "facilitate improved tolerance for rolling over in bed  Initial, not met     Long Term Goals: 6 weeks   1 Patient will obtain 4+/5 L hip flex MMT to facilitate improved ability to walk 7 miles on intermittent incline in preparation for her summers in [place]  MET, 8/8/22   2 Patient will obtain 30" R/L SLS to facilitate improved ability to negotiate uneven terrain safely  MET, 8/8/22   3 Patient will complete pivoting type movements with no limitation due to current condition to facilitate return to previous level of function for traveling  MET, 8/8/22     PLAN   Plan of care Certification: 8/8/22- 10/8/22    Outpatient Physical Therapy 2 times weekly for 6 weeks to include the following interventions: Cervical/Lumbar Traction, Electrical Stimulation , re-eval, dry needling, , Gait Training, Manual Therapy, Moist Heat/ Ice, Neuromuscular Re-ed, Patient Education, Self Care, Therapeutic Activities and Therapeutic Exercise.     Physical therapist and physical therapy assistant(s) met face to face to discuss patient's treatment plan and progress towards established goals. Pt will be seen by a physical therapist minimally every 6th visit or every 30 days.    Potential for KX modifier and additional visits based on subjective report, objective examination, nature of current condition, co morbidities and current functional status.       James Mujica, PTA                      "

## 2022-08-19 NOTE — PROGRESS NOTES
[facility]   Physical Therapy Daily Treatment Note    Date: 7/11/2022   Name: Meredith Ramos  Clinic Number: [id number]    Therapy Diagnosis:   Encounter Diagnosis   Name Primary?    Acute right-sided low back pain with sciatica, sciatica laterality unspecified      Physician: Jeremías Maynard,*    Physician Orders: PT Eval and Treat   Medical Diagnosis from Referral: M54.50 (ICD-10-CM) - Low back pain M41.9 (ICD-10-CM) - Scoliosis of thoracolumbar spine, unspecified scoliosis type M35.7 (ICD-10-CM) - Hypermobility syndrome     Evaluation Date: 7/11/2022  Authorization Period Expiration: 12/31/2022  Plan of Care Expiration: 8/8/22- 10/8/22  Visit # / Visits authorized: 13/19  FOTO: #3/3 on 8/8/22     Precautions: Standard     Time In: 1100am  Time Out: 1201pm  Total Appointment Time: 61 minutes    SUBJECTIVE   Fall/MVAs: tripped on the sidewalk twice with one fall resulting in L wrist fracture     Imaging,  X-ray indicating some degeneration and a gluteal tear with striations from scar tissue -- per patient report.    Prior Level of Function: 4-4.5 miles, 3-4x/wk and aerobic step 3-4x/wk, Patient usually walks 1-15 miles/day when spending her summers in [place] with history of difficulty in rotating her L LE for pivoting type movements.    Current Level of Function: 4-4.5 miles, 3-4x/wk and aerobic step 3-4x/wk     Pain:  Current 0/10, worst 3/10, best 0/10   Location: pinching at L lumbosacral region and discomfort into L lateral hip      Patients goals: I want to strengthen whatever is causing the problem     Response: feeling so much better. States she has seen a lot of improvement since coming to PT.   Function: able to complete rapid movements and bed mobility tasks with decreased L hip pain   HOME EXERCISE PROGRAM: reports compliance     OBJECTIVE   8/8/2022:   Posture:  R scapular winging, L sided scoliotic curve     Range of Motion:   LUMBAR  Degrees    Flexion 102  "  Extension 30   Left Side Bending 15   Right Side Bending 14         HIP     Internal rotation   (R) 40    (L) 35     External rotation   (R) 42    (L) 25 stiffness         Lower Extremity Strength  Right LE Left LE   Knee extension: 4+ Knee extension: 4+   Hip flexion: 4+ Hip flexion: 4   Hip extension: 4+ Hip extension: 4+   Hip abduction: 4+ Hip abduction:4+   Hip adduction: 4 Hip adduction: 4   Ankle dorsiflexion: 4+ Ankle dorsiflexion: 4+   Ankle plantarflexion: B 25 heel raises  Ankle plantarflexion:B 25 heel raises      Special Tests:  Bridge test: negative      SLR:   R: negative   L: negative      DAVID:  R: negative   L: negative     FADDIR:   R: negative   L: negative     Joint Mobility:   Thoracic, lumbar, hip - hypomobile, painful     Flexibility:   Ely's test: R = negative; L = negative   Popliteal Angle: R =  74 deg ; L = 70 deg    Function/balance:  Sit - stand: 21 x in 30"     R SLS: 30"    L SLS: 31"     Limitation/Restriction for FOTO Lumbar Survey    Therapist reviewed FOTO scores for Meredith Ramos on 8/8/22  FOTO documents entered into [employer] - see Media section.    Limitation Score: 17%  Predicted Score: 16%       TREATMENT   CHARGES BASED ON 1-1 TX:  therapeutic exercises for 30 minutes:   R SL open book stretch, 25"x5   L/R hip ER stretch, 30"x3    L/R piriformis stretch, 30"x3   Bridging with blue band above knees, half foam under feet, 5" hold, 3x10  Clam shell in SL, R/L, blue band above knees, 3x10   Dying bug exercise 3x10  Bird Dog exercise with yellow body blade for proper form 2x10 repetitions- VC's to decrease kick height to maintain neutral spine  Cat/Camel 10x10" hold    manual therapy techniques for 8 minutes:   L hip quadrants, LAD, APs Gr III/IV JM   R SL lumbar rotation and side gapping Gr III/IV JM     neuromuscular re-education for 8 minutes:   Supine R/L bent knee fall out with TA activation, blue band above knees, 3" hold, 3x10  B UE ext vs pink tube with alt LE norman, " with TA activation, 3x10   Pallof press, blue band, 3x10     therapeutic activities for 9 minutes:   Sit -stand with pilates ring squeeze, airex under feet, 3x10  Dynamic lunges, holding weighted ball +twist, x 2 laps   Seated on blue SB, diagonals with yellow weighted ball, 15x ea  Dynamic marches 10# KB x 1 lap   Monster walks mint cloth band, 1 lap  Lateral stepping mint cloth band, 1 lap   Dynamic drinking birds x 2 laps    PATIENT EDUCATION AND HOME EXERCISES     Education provided:   - Exercise form and rationale     Home Exercises Provided:  YES and pt was advised to continue with previous HEP. Exercises were reviewed and Meredith was able to demonstrate them prior to the end of the session.  Meredith demonstrated good  understanding of the education provided. See EMR under Patient Instructions for exercises provided during therapy sessions.    ASSESSMENT   Pt tolerated tx session well today and completed all exercises with little/no increase in hip pain. Plan to discharge pt with HEP next visit as she has reached her baseline and has shown immense improvement with PT.    Patient prognosis is Good. Rehab potential is good.     Patient will benefit from skilled outpatient Physical Therapy to address the deficits stated above and in the chart below, provide patient /family education, and to maximize patientt's level of independence.     Plan of care discussed with patient: Yes  Patient's spiritual, cultural and educational needs considered and patient is agreeable to the plan of care and goals as stated.      Anticipated Barriers for therapy: osteoporosis, multiple tx areas, co morbidities     Goals:  Short Term Goals: 3 weeks   1 Patient will be I with HOME EXERCISE PROGRAM  MET, 8/8/22   2 Patient will achieve 15 deg R lumbar side bending ACTIVE RANGE OF MOTION to facilitate improved ability to stretch for obtaining yoga positions  Initial, not met   3 Patient will achieve 42 deg L hip ER ACTIVE RANGE OF MOTION  "to facilitate improved tolerance for rolling over in bed  Initial, not met     Long Term Goals: 6 weeks   1 Patient will obtain 4+/5 L hip flex MMT to facilitate improved ability to walk 7 miles on intermittent incline in preparation for her summers in [place]  MET, 8/8/22   2 Patient will obtain 30" R/L SLS to facilitate improved ability to negotiate uneven terrain safely  MET, 8/8/22   3 Patient will complete pivoting type movements with no limitation due to current condition to facilitate return to previous level of function for traveling  MET, 8/8/22     PLAN   Plan of care Certification: 8/8/22- 10/8/22    Outpatient Physical Therapy 2 times weekly for 6 weeks to include the following interventions: Cervical/Lumbar Traction, Electrical Stimulation , re-eval, dry needling, , Gait Training, Manual Therapy, Moist Heat/ Ice, Neuromuscular Re-ed, Patient Education, Self Care, Therapeutic Activities and Therapeutic Exercise.     Physical therapist and physical therapy assistant(s) met face to face to discuss patient's treatment plan and progress towards established goals. Pt will be seen by a physical therapist minimally every 6th visit or every 30 days.    Potential for KX modifier and additional visits based on subjective report, objective examination, nature of current condition, co morbidities and current functional status.       Gerri Morris, PT                        "

## 2022-08-22 ENCOUNTER — CLINICAL SUPPORT (OUTPATIENT)
Dept: REHABILITATION | Facility: OTHER | Age: 72
End: 2022-08-22
Payer: MEDICARE

## 2022-08-22 DIAGNOSIS — G89.29 CHRONIC BILATERAL LOW BACK PAIN WITHOUT SCIATICA: Primary | ICD-10-CM

## 2022-08-22 DIAGNOSIS — M54.50 CHRONIC BILATERAL LOW BACK PAIN WITHOUT SCIATICA: Primary | ICD-10-CM

## 2022-08-22 PROCEDURE — 97140 MANUAL THERAPY 1/> REGIONS: CPT | Mod: PN

## 2022-08-22 PROCEDURE — 97110 THERAPEUTIC EXERCISES: CPT | Mod: PN

## 2022-08-22 PROCEDURE — 97112 NEUROMUSCULAR REEDUCATION: CPT | Mod: PN

## 2022-08-22 PROCEDURE — 97530 THERAPEUTIC ACTIVITIES: CPT | Mod: PN

## 2022-08-24 ENCOUNTER — CLINICAL SUPPORT (OUTPATIENT)
Dept: REHABILITATION | Facility: OTHER | Age: 72
End: 2022-08-24
Payer: MEDICARE

## 2022-08-24 DIAGNOSIS — M54.40 ACUTE RIGHT-SIDED LOW BACK PAIN WITH SCIATICA, SCIATICA LATERALITY UNSPECIFIED: Primary | ICD-10-CM

## 2022-08-24 PROCEDURE — 97112 NEUROMUSCULAR REEDUCATION: CPT | Mod: PN

## 2022-08-24 PROCEDURE — 97110 THERAPEUTIC EXERCISES: CPT | Mod: PN

## 2022-08-24 NOTE — PROGRESS NOTES
[facility]   Physical Therapy Discharge Note    Date: 7/11/2022   Name: Meredith Ramos  Clinic Number: [id number]    Therapy Diagnosis:   Encounter Diagnosis   Name Primary?    Acute right-sided low back pain with sciatica, sciatica laterality unspecified      Physician: Jeremías Maynard,*    Physician Orders: PT Eval and Treat   Medical Diagnosis from Referral: M54.50 (ICD-10-CM) - Low back pain M41.9 (ICD-10-CM) - Scoliosis of thoracolumbar spine, unspecified scoliosis type M35.7 (ICD-10-CM) - Hypermobility syndrome     Evaluation Date: 7/11/2022  Authorization Period Expiration: 12/31/2022  Plan of Care Expiration: 8/8/22- 10/8/22  Visit # / Visits authorized: 14/19  FOTO: #4 on 8/24/2022     Precautions: Standard     Time In: 1100am   Time Out: 12:00pm  Total Appointment Time: 60 minutes    SUBJECTIVE   Fall/MVAs: tripped on the sidewalk twice with one fall resulting in L wrist fracture     Imaging,  X-ray indicating some degeneration and a gluteal tear with striations from scar tissue -- per patient report.    Prior Level of Function: 4-4.5 miles, 3-4x/wk and aerobic step 3-4x/wk, Patient usually walks 1-15 miles/day when spending her summers in [place] with history of difficulty in rotating her L LE for pivoting type movements.    Current Level of Function: 4-4.5 miles, 3-4x/wk and aerobic step 3-4x/wk     Pain:  Current 0/10, worst 3/10, best 0/10   Location: pinching at L lumbosacral region and discomfort into L lateral hip      Patients goals: I want to strengthen whatever is causing the problem     Response: feeling so much better. States she has seen a lot of improvement since coming to PT.   Function: able to complete rapid movements and bed mobility tasks with decreased L hip pain   HOME EXERCISE PROGRAM: reports compliance     OBJECTIVE   8/24/2022:   Posture:  R scapular winging, L sided scoliotic curve     Range of Motion:   LUMBAR  Degrees    Flexion 102  "  Extension 30   Left Side Bending 15   Right Side Bending 14         HIP     Internal rotation   (R) 40    (L) 35     External rotation   (R) 42    (L) 28         Lower Extremity Strength  Right LE Left LE   Knee extension: 4+ Knee extension: 4+   Hip flexion: 4+ Hip flexion: 4+   Hip extension: 5 Hip extension: 5   Hip abduction: 4+ Hip abduction:4+   Hip adduction: 4 Hip adduction: 4+   Ankle dorsiflexion: 4+ Ankle dorsiflexion: 4+   Ankle plantarflexion: B 25 heel raises  Ankle plantarflexion:B 25 heel raises      Function/balance:  Sit - stand: 21 x in 30"   R SLS: 30"  L SLS: 31"     Limitation/Restriction for FOTO Lumbar Survey    Therapist reviewed FOTO scores for Meredith Ramos on 8/24/22  FOTO documents entered into [employer] - see Media section.    Limitation Score: 6%  Predicted Score: 16%       TREATMENT   CHARGES BASED ON 1-1 TX:  therapeutic exercises for 44 minutes:   R SL open book stretch, 30"x5   L/R hip ER stretch, 30"x5  L/R piriformis stretch, 30"x5   Bridging with blue band above knees, half foam under feet, 5" hold, 3x10  Clam shell in SL, R/L, blue band above knees, 3x10   Dying bug exercise 3x10  Bird Dog exercise with yellow body blade for proper form 2x10 repetitions  Cat/Camel 15x5" hold    manual therapy techniques for 8 minutes:   L hip quadrants, LAD, APs Gr III/IV JM   R SL lumbar rotation and side gapping Gr III/IV JM     neuromuscular re-education for 8 minutes:   Supine R/L bent knee fall out with TA activation, blue band above knees, 3" hold, 3x10  B UE ext vs pink tube with alt LE marches, with TA activation, 3x10   Pallof press, blue band, 3x10     therapeutic activities for 00 minutes:   Sit -stand with pilates ring squeeze, airex under feet, 3x10  Dynamic lunges, holding weighted ball +twist, x 2 laps   Seated on blue SB, diagonals with yellow weighted ball, 15x ea  Dynamic marches 10# KB x 1 lap   Monster walks mint cloth band, 1 lap  Lateral stepping mint cloth band, 1 lap " "  Dynamic drinking birds x 2 laps    PATIENT EDUCATION AND HOME EXERCISES     Education provided:   - Exercise form and rationale     Home Exercises Provided: Exercises were reviewed and Meredith was able to demonstrate them prior to the end of the session.  Meredith demonstrated good  understanding of the education provided. See EMR under Patient Instructions for exercises provided during therapy sessions.    ASSESSMENT   Pt is no longer experiencing pain at L hip, is able to complete ADL and social/recreational tasks with no limitation and shows improvement in function based on Foto outcome measure scores. Patient is I with HOME EXERCISE PROGRAM, has made excellent progress towards meeting set goals and has completed PHYSICAL THERAPY POC.     Patient prognosis is Good. Rehab potential is excellent.      Plan of care discussed with patient: Yes  Patient's spiritual, cultural and educational needs considered and patient is agreeable to the plan of care and goals as stated.      Anticipated Barriers for therapy: osteoporosis, multiple tx areas, co morbidities     Goals:  Short Term Goals: 3 weeks   1 Patient will be I with HOME EXERCISE PROGRAM  MET, 8/8/22   2 Patient will achieve 15 deg R lumbar side bending ACTIVE RANGE OF MOTION to facilitate improved ability to stretch for obtaining yoga positions  neraly met, 8/24   3 Patient will achieve 42 deg L hip ER ACTIVE RANGE OF MOTION to facilitate improved tolerance for rolling over in bed  partially met, 8/24     Long Term Goals: 6 weeks   1 Patient will obtain 4+/5 L hip flex MMT to facilitate improved ability to walk 7 miles on intermittent incline in preparation for her summers in [place]  MET, 8/8/22   2 Patient will obtain 30" R/L SLS to facilitate improved ability to negotiate uneven terrain safely  MET, 8/8/22   3 Patient will complete pivoting type movements with no limitation due to current condition to facilitate return to previous level of function for " traveling  MET, 8/8/22     PLAN   Plan of care Certification: 8/8/22- 10/8/22  Patient is being discharged from PHYSICAL THERAPY.     Potential for KX modifier and additional visits based on subjective report, objective examination, nature of current condition, co morbidities and current functional status.       Anne Hager, PT     *cosign sent in routine on 8/24/2022      I CERTIFY THE NEED FOR THESE SERVICES FURNISHED UNDER THIS PLAN OF TREATMENT AND WHILE UNDER MY CARE    Physician's comments:        Physician's Signature: ___________________________________________________

## 2022-09-22 ENCOUNTER — TELEPHONE (OUTPATIENT)
Dept: RADIOLOGY | Facility: HOSPITAL | Age: 72
End: 2022-09-22
Payer: MEDICARE

## 2022-10-03 ENCOUNTER — HOSPITAL ENCOUNTER (OUTPATIENT)
Dept: RADIOLOGY | Facility: HOSPITAL | Age: 72
Discharge: HOME | End: 2022-10-03
Payer: MEDICARE

## 2022-10-03 DIAGNOSIS — Z12.31 BREAST CANCER SCREENING BY MAMMOGRAM: ICD-10-CM

## 2022-10-03 PROCEDURE — 77067 SCR MAMMO BI INCL CAD: CPT | Mod: 26,,, | Performed by: RADIOLOGY

## 2022-10-03 PROCEDURE — 77063 BREAST TOMOSYNTHESIS BI: CPT | Mod: TC

## 2022-10-03 PROCEDURE — 77063 BREAST TOMOSYNTHESIS BI: CPT | Mod: 26,,, | Performed by: RADIOLOGY

## 2022-10-03 PROCEDURE — 77063 MAMMO DIGITAL SCREENING BILAT WITH TOMO: ICD-10-PCS | Mod: 26,,, | Performed by: RADIOLOGY

## 2022-10-03 PROCEDURE — 77067 MAMMO DIGITAL SCREENING BILAT WITH TOMO: ICD-10-PCS | Mod: 26,,, | Performed by: RADIOLOGY

## 2023-01-06 ENCOUNTER — OFFICE VISIT (OUTPATIENT)
Dept: INTERNAL MEDICINE | Facility: CLINIC | Age: 73
End: 2023-01-06
Payer: MEDICARE

## 2023-01-06 DIAGNOSIS — R45.84 ANHEDONIA: ICD-10-CM

## 2023-01-06 DIAGNOSIS — Z13.6 ENCOUNTER FOR SCREENING FOR CARDIOVASCULAR DISORDERS: ICD-10-CM

## 2023-01-06 DIAGNOSIS — R74.8 ABNORMAL LEVELS OF OTHER SERUM ENZYMES: ICD-10-CM

## 2023-01-06 DIAGNOSIS — M85.80 OSTEOPENIA, UNSPECIFIED LOCATION: Primary | ICD-10-CM

## 2023-01-06 DIAGNOSIS — Z78.0 ASYMPTOMATIC MENOPAUSAL STATE: ICD-10-CM

## 2023-01-06 PROCEDURE — 99213 PR OFFICE/OUTPT VISIT, EST, LEVL III, 20-29 MIN: ICD-10-PCS | Mod: S$PBB,,, | Performed by: INTERNAL MEDICINE

## 2023-01-06 PROCEDURE — 99999 PR PBB SHADOW E&M-EST. PATIENT-LVL III: CPT | Mod: PBBFAC,,, | Performed by: INTERNAL MEDICINE

## 2023-01-06 PROCEDURE — 99213 OFFICE O/P EST LOW 20 MIN: CPT | Mod: PBBFAC | Performed by: INTERNAL MEDICINE

## 2023-01-06 PROCEDURE — 99999 PR PBB SHADOW E&M-EST. PATIENT-LVL III: ICD-10-PCS | Mod: PBBFAC,,, | Performed by: INTERNAL MEDICINE

## 2023-01-06 PROCEDURE — 99213 OFFICE O/P EST LOW 20 MIN: CPT | Mod: S$PBB,,, | Performed by: INTERNAL MEDICINE

## 2023-01-06 RX ORDER — ASCORBIC ACID 125 MG
TABLET,CHEWABLE ORAL
COMMUNITY
End: 2023-03-27

## 2023-01-07 ENCOUNTER — LAB VISIT (OUTPATIENT)
Dept: LAB | Facility: HOSPITAL | Age: 73
End: 2023-01-07
Attending: INTERNAL MEDICINE
Payer: MEDICARE

## 2023-01-07 DIAGNOSIS — R74.8 ABNORMAL LEVELS OF OTHER SERUM ENZYMES: ICD-10-CM

## 2023-01-07 DIAGNOSIS — M85.80 OSTEOPENIA, UNSPECIFIED LOCATION: ICD-10-CM

## 2023-01-07 DIAGNOSIS — Z13.6 ENCOUNTER FOR SCREENING FOR CARDIOVASCULAR DISORDERS: ICD-10-CM

## 2023-01-07 DIAGNOSIS — R45.84 ANHEDONIA: ICD-10-CM

## 2023-01-07 DIAGNOSIS — Z78.0 ASYMPTOMATIC MENOPAUSAL STATE: ICD-10-CM

## 2023-01-07 LAB
ALBUMIN SERPL BCP-MCNC: 3.7 G/DL (ref 3.5–5.2)
ALP SERPL-CCNC: 79 U/L (ref 55–135)
ALT SERPL W/O P-5'-P-CCNC: 17 U/L (ref 10–44)
ANION GAP SERPL CALC-SCNC: 8 MMOL/L (ref 8–16)
AST SERPL-CCNC: 30 U/L (ref 10–40)
BASOPHILS # BLD AUTO: 0.03 K/UL (ref 0–0.2)
BASOPHILS NFR BLD: 0.6 % (ref 0–1.9)
BILIRUB SERPL-MCNC: 0.6 MG/DL (ref 0.1–1)
BUN SERPL-MCNC: 11 MG/DL (ref 8–23)
CALCIUM SERPL-MCNC: 9.5 MG/DL (ref 8.7–10.5)
CHLORIDE SERPL-SCNC: 105 MMOL/L (ref 95–110)
CHOLEST SERPL-MCNC: 157 MG/DL (ref 120–199)
CHOLEST/HDLC SERPL: 2.7 {RATIO} (ref 2–5)
CO2 SERPL-SCNC: 25 MMOL/L (ref 23–29)
CREAT SERPL-MCNC: 0.6 MG/DL (ref 0.5–1.4)
DIFFERENTIAL METHOD: ABNORMAL
EOSINOPHIL # BLD AUTO: 0.2 K/UL (ref 0–0.5)
EOSINOPHIL NFR BLD: 4.2 % (ref 0–8)
ERYTHROCYTE [DISTWIDTH] IN BLOOD BY AUTOMATED COUNT: 12.1 % (ref 11.5–14.5)
EST. GFR  (NO RACE VARIABLE): >60 ML/MIN/1.73 M^2
GLUCOSE SERPL-MCNC: 87 MG/DL (ref 70–110)
HCT VFR BLD AUTO: 41.1 % (ref 37–48.5)
HDLC SERPL-MCNC: 58 MG/DL (ref 40–75)
HDLC SERPL: 36.9 % (ref 20–50)
HGB BLD-MCNC: 13.1 G/DL (ref 12–16)
IMM GRANULOCYTES # BLD AUTO: 0.01 K/UL (ref 0–0.04)
IMM GRANULOCYTES NFR BLD AUTO: 0.2 % (ref 0–0.5)
LDLC SERPL CALC-MCNC: 88.8 MG/DL (ref 63–159)
LYMPHOCYTES # BLD AUTO: 1.1 K/UL (ref 1–4.8)
LYMPHOCYTES NFR BLD: 23.7 % (ref 18–48)
MCH RBC QN AUTO: 30.5 PG (ref 27–31)
MCHC RBC AUTO-ENTMCNC: 31.9 G/DL (ref 32–36)
MCV RBC AUTO: 96 FL (ref 82–98)
MONOCYTES # BLD AUTO: 0.3 K/UL (ref 0.3–1)
MONOCYTES NFR BLD: 7.1 % (ref 4–15)
NEUTROPHILS # BLD AUTO: 3.1 K/UL (ref 1.8–7.7)
NEUTROPHILS NFR BLD: 64.2 % (ref 38–73)
NONHDLC SERPL-MCNC: 99 MG/DL
NRBC BLD-RTO: 0 /100 WBC
PLATELET # BLD AUTO: 186 K/UL (ref 150–450)
PMV BLD AUTO: 13.5 FL (ref 9.2–12.9)
POTASSIUM SERPL-SCNC: 4 MMOL/L (ref 3.5–5.1)
PROT SERPL-MCNC: 6.3 G/DL (ref 6–8.4)
RBC # BLD AUTO: 4.3 M/UL (ref 4–5.4)
SODIUM SERPL-SCNC: 138 MMOL/L (ref 136–145)
TRIGL SERPL-MCNC: 51 MG/DL (ref 30–150)
VIT B12 SERPL-MCNC: 471 PG/ML (ref 210–950)
WBC # BLD AUTO: 4.81 K/UL (ref 3.9–12.7)

## 2023-01-07 PROCEDURE — 80061 LIPID PANEL: CPT | Performed by: INTERNAL MEDICINE

## 2023-01-07 PROCEDURE — 80053 COMPREHEN METABOLIC PANEL: CPT | Performed by: INTERNAL MEDICINE

## 2023-01-07 PROCEDURE — 36415 COLL VENOUS BLD VENIPUNCTURE: CPT | Performed by: INTERNAL MEDICINE

## 2023-01-07 PROCEDURE — 85025 COMPLETE CBC W/AUTO DIFF WBC: CPT | Performed by: INTERNAL MEDICINE

## 2023-01-07 PROCEDURE — 82607 VITAMIN B-12: CPT | Performed by: INTERNAL MEDICINE

## 2023-01-14 VITALS
DIASTOLIC BLOOD PRESSURE: 70 MMHG | SYSTOLIC BLOOD PRESSURE: 122 MMHG | HEIGHT: 65 IN | TEMPERATURE: 99 F | HEART RATE: 70 BPM | BODY MASS INDEX: 20.01 KG/M2 | OXYGEN SATURATION: 99 % | WEIGHT: 120.13 LBS

## 2023-01-14 NOTE — PROGRESS NOTES
Subjective:       Patient ID: Meredith Ramos is a 73 y.o. female.    Chief Complaint: Annual Exam    HPI  She has osteopenia.  Currently on calcium and vitamin-D supplement     Past medical history: Partial colectomy secondary to diverticular disease, osteopenia, nephrolithiasis, status post hysterectomy.  She had a colonoscopy August 2021,  repeat in 10 years     Medications: None     NO KNOWN DRUG ALLERGIES       Review of Systems   Constitutional:  Negative for chills, fatigue, fever and unexpected weight change.   Respiratory:  Negative for chest tightness and shortness of breath.    Cardiovascular:  Negative for chest pain and palpitations.   Gastrointestinal:  Negative for abdominal pain and blood in stool.   Neurological:  Negative for dizziness, syncope, numbness and headaches.     Objective:      Physical Exam  HENT:      Right Ear: External ear normal.      Left Ear: External ear normal.      Nose: Nose normal.      Mouth/Throat:      Mouth: Mucous membranes are moist.      Pharynx: Oropharynx is clear.   Eyes:      Pupils: Pupils are equal, round, and reactive to light.   Cardiovascular:      Rate and Rhythm: Normal rate and regular rhythm.      Heart sounds: No murmur heard.  Pulmonary:      Breath sounds: Normal breath sounds.   Abdominal:      General: There is no distension.      Palpations: There is no hepatomegaly or splenomegaly.      Tenderness: There is no abdominal tenderness.   Musculoskeletal:      Cervical back: Normal range of motion.   Lymphadenopathy:      Cervical: No cervical adenopathy.      Upper Body:      Right upper body: No axillary adenopathy.      Left upper body: No axillary adenopathy.   Neurological:      Cranial Nerves: No cranial nerve deficit.      Sensory: No sensory deficit.      Motor: Motor function is intact.      Deep Tendon Reflexes: Reflexes are normal and symmetric.       Assessment/Plan       Assessment and plan:  Osteopenia.  Check CMP, lipid panel, CBC.   Schedule bone density.  She reports having her Pap smear within the past year

## 2023-01-31 ENCOUNTER — TELEPHONE (OUTPATIENT)
Dept: SPORTS MEDICINE | Facility: CLINIC | Age: 73
End: 2023-01-31
Payer: MEDICARE

## 2023-01-31 ENCOUNTER — HOSPITAL ENCOUNTER (OUTPATIENT)
Dept: RADIOLOGY | Facility: CLINIC | Age: 73
Discharge: HOME | End: 2023-01-31
Attending: INTERNAL MEDICINE
Payer: MEDICARE

## 2023-01-31 DIAGNOSIS — Z78.0 ASYMPTOMATIC MENOPAUSAL STATE: ICD-10-CM

## 2023-01-31 DIAGNOSIS — M85.80 OSTEOPENIA, UNSPECIFIED LOCATION: ICD-10-CM

## 2023-01-31 PROCEDURE — 77080 DXA BONE DENSITY AXIAL: CPT | Mod: TC

## 2023-01-31 PROCEDURE — 77080 DXA BONE DENSITY AXIAL: CPT | Mod: 26,,, | Performed by: INTERNAL MEDICINE

## 2023-01-31 PROCEDURE — 77080 DEXA BONE DENSITY SPINE HIP: ICD-10-PCS | Mod: 26,,, | Performed by: INTERNAL MEDICINE

## 2023-01-31 NOTE — TELEPHONE ENCOUNTER
----- Message from Pauline Carlson sent at 1/31/2023  2:32 PM CST -----  Regarding: PT'S REQUESTING A CALL BACK ESTHELA GRIGSBY APPT THAN END OF FEB WITH PROVIDER  Contact: PT  Pt was previously seen in 2017 for knee and fibula pain..     Confirmed contact info below:  Contact Name: Meredith Ramos  Phone Number: [phone]

## 2023-01-31 NOTE — TELEPHONE ENCOUNTER
Returned call to patient in regards to appointment with Dr. Lu.  No answer. Left voicemail for patient to contact the office

## 2023-02-07 ENCOUNTER — OFFICE VISIT (OUTPATIENT)
Dept: SPORTS MEDICINE | Facility: CLINIC | Age: 73
End: 2023-02-07
Payer: MEDICARE

## 2023-02-07 ENCOUNTER — HOSPITAL ENCOUNTER (OUTPATIENT)
Facility: HOSPITAL | Age: 73
Discharge: HOME | End: 2023-02-08
Attending: EMERGENCY MEDICINE | Admitting: HOSPITALIST
Payer: MEDICARE

## 2023-02-07 ENCOUNTER — HOSPITAL ENCOUNTER (OUTPATIENT)
Dept: RADIOLOGY | Facility: HOSPITAL | Age: 73
Discharge: HOME | End: 2023-02-07
Attending: FAMILY MEDICINE
Payer: MEDICARE

## 2023-02-07 VITALS — WEIGHT: 120 LBS | BODY MASS INDEX: 19.97 KG/M2 | TEMPERATURE: 98 F

## 2023-02-07 DIAGNOSIS — M17.11 PRIMARY OSTEOARTHRITIS OF RIGHT KNEE: ICD-10-CM

## 2023-02-07 DIAGNOSIS — I48.91 ATRIAL FIBRILLATION WITH RVR: ICD-10-CM

## 2023-02-07 DIAGNOSIS — M25.551 RIGHT HIP PAIN: ICD-10-CM

## 2023-02-07 DIAGNOSIS — G89.29 CHRONIC PAIN OF RIGHT KNEE: Primary | ICD-10-CM

## 2023-02-07 DIAGNOSIS — R00.0 TACHYCARDIA: ICD-10-CM

## 2023-02-07 DIAGNOSIS — I48.91 ATRIAL FIBRILLATION: ICD-10-CM

## 2023-02-07 DIAGNOSIS — R26.89 ANTALGIC GAIT: ICD-10-CM

## 2023-02-07 DIAGNOSIS — I48.91 NEW ONSET ATRIAL FIBRILLATION: Primary | ICD-10-CM

## 2023-02-07 DIAGNOSIS — M25.561 CHRONIC PAIN OF RIGHT KNEE: Primary | ICD-10-CM

## 2023-02-07 DIAGNOSIS — R00.2 PALPITATION: ICD-10-CM

## 2023-02-07 PROBLEM — I47.10 SUPRAVENTRICULAR TACHYCARDIA: Status: ACTIVE | Noted: 2023-02-07

## 2023-02-07 LAB
ALBUMIN SERPL BCP-MCNC: 3.8 G/DL (ref 3.5–5.2)
ALP SERPL-CCNC: 100 U/L (ref 55–135)
ALT SERPL W/O P-5'-P-CCNC: 50 U/L (ref 10–44)
ANION GAP SERPL CALC-SCNC: 10 MMOL/L (ref 8–16)
AST SERPL-CCNC: 67 U/L (ref 10–40)
BASOPHILS # BLD AUTO: 0.01 K/UL (ref 0–0.2)
BASOPHILS NFR BLD: 0.2 % (ref 0–1.9)
BILIRUB SERPL-MCNC: 1 MG/DL (ref 0.1–1)
BNP SERPL-MCNC: 24 PG/ML (ref 0–99)
BUN SERPL-MCNC: 19 MG/DL (ref 8–23)
CALCIUM SERPL-MCNC: 9.9 MG/DL (ref 8.7–10.5)
CHLORIDE SERPL-SCNC: 106 MMOL/L (ref 95–110)
CO2 SERPL-SCNC: 22 MMOL/L (ref 23–29)
CREAT SERPL-MCNC: 1 MG/DL (ref 0.5–1.4)
DIFFERENTIAL METHOD: ABNORMAL
EOSINOPHIL # BLD AUTO: 0 K/UL (ref 0–0.5)
EOSINOPHIL NFR BLD: 0 % (ref 0–8)
ERYTHROCYTE [DISTWIDTH] IN BLOOD BY AUTOMATED COUNT: 12.6 % (ref 11.5–14.5)
EST. GFR  (NO RACE VARIABLE): >60 ML/MIN/1.73 M^2
GLUCOSE SERPL-MCNC: 244 MG/DL (ref 70–110)
HCT VFR BLD AUTO: 41.9 % (ref 37–48.5)
HCV AB SERPL QL IA: NORMAL
HGB BLD-MCNC: 13.4 G/DL (ref 12–16)
HIV 1+2 AB+HIV1 P24 AG SERPL QL IA: NORMAL
IMM GRANULOCYTES # BLD AUTO: 0.01 K/UL (ref 0–0.04)
IMM GRANULOCYTES NFR BLD AUTO: 0.2 % (ref 0–0.5)
INFLUENZA A, MOLECULAR: NOT DETECTED
INFLUENZA B, MOLECULAR: NOT DETECTED
LYMPHOCYTES # BLD AUTO: 0.4 K/UL (ref 1–4.8)
LYMPHOCYTES NFR BLD: 6.9 % (ref 18–48)
MAGNESIUM SERPL-MCNC: 2.1 MG/DL (ref 1.6–2.6)
MCH RBC QN AUTO: 30.8 PG (ref 27–31)
MCHC RBC AUTO-ENTMCNC: 32 G/DL (ref 32–36)
MCV RBC AUTO: 96 FL (ref 82–98)
MONOCYTES # BLD AUTO: 0 K/UL (ref 0.3–1)
MONOCYTES NFR BLD: 0.6 % (ref 4–15)
NEUTROPHILS # BLD AUTO: 4.8 K/UL (ref 1.8–7.7)
NEUTROPHILS NFR BLD: 92.1 % (ref 38–73)
NRBC BLD-RTO: 0 /100 WBC
PLATELET # BLD AUTO: 180 K/UL (ref 150–450)
PMV BLD AUTO: 12.9 FL (ref 9.2–12.9)
POTASSIUM SERPL-SCNC: 4 MMOL/L (ref 3.5–5.1)
PROT SERPL-MCNC: 6.5 G/DL (ref 6–8.4)
RBC # BLD AUTO: 4.35 M/UL (ref 4–5.4)
RSV AG BY MOLECULAR METHOD: NOT DETECTED
SARS-COV-2 RNA RESP QL NAA+PROBE: NOT DETECTED
SODIUM SERPL-SCNC: 138 MMOL/L (ref 136–145)
TROPONIN I SERPL DL<=0.01 NG/ML-MCNC: 0.04 NG/ML (ref 0–0.03)
TROPONIN I SERPL DL<=0.01 NG/ML-MCNC: 0.07 NG/ML (ref 0–0.03)
TSH SERPL DL<=0.005 MIU/L-ACNC: 1.24 UIU/ML (ref 0.4–4)
WBC # BLD AUTO: 5.19 K/UL (ref 3.9–12.7)

## 2023-02-07 PROCEDURE — 87389 HIV-1 AG W/HIV-1&-2 AB AG IA: CPT | Performed by: PHYSICIAN ASSISTANT

## 2023-02-07 PROCEDURE — 93010 ELECTROCARDIOGRAM REPORT: CPT | Mod: 76,,, | Performed by: INTERNAL MEDICINE

## 2023-02-07 PROCEDURE — G0378 HOSPITAL OBSERVATION PER HR: HCPCS

## 2023-02-07 PROCEDURE — 93010 EKG 12-LEAD: ICD-10-PCS | Mod: 76,,, | Performed by: INTERNAL MEDICINE

## 2023-02-07 PROCEDURE — 99204 PR OFFICE/OUTPT VISIT, NEW, LEVL IV, 45-59 MIN: ICD-10-PCS | Mod: 25,S$PBB,, | Performed by: FAMILY MEDICINE

## 2023-02-07 PROCEDURE — 99204 OFFICE O/P NEW MOD 45 MIN: CPT | Mod: 25,S$PBB,, | Performed by: FAMILY MEDICINE

## 2023-02-07 PROCEDURE — 99213 OFFICE O/P EST LOW 20 MIN: CPT | Mod: PBBFAC,25 | Performed by: FAMILY MEDICINE

## 2023-02-07 PROCEDURE — 99285 PR EMERGENCY DEPT VISIT,LEVEL V: ICD-10-PCS | Mod: CS,,, | Performed by: EMERGENCY MEDICINE

## 2023-02-07 PROCEDURE — 99285 EMERGENCY DEPT VISIT HI MDM: CPT | Mod: 25,27

## 2023-02-07 PROCEDURE — 96372 THER/PROPH/DIAG INJ SC/IM: CPT | Mod: 59 | Performed by: HOSPITALIST

## 2023-02-07 PROCEDURE — 25000003 PHARM REV CODE 250: Performed by: HOSPITALIST

## 2023-02-07 PROCEDURE — 0241U SARS-COV2 (COVID) WITH FLU/RSV BY PCR: CPT | Performed by: EMERGENCY MEDICINE

## 2023-02-07 PROCEDURE — 20611 DRAIN/INJ JOINT/BURSA W/US: CPT | Mod: PBBFAC,RT | Performed by: FAMILY MEDICINE

## 2023-02-07 PROCEDURE — 93005 ELECTROCARDIOGRAM TRACING: CPT | Mod: 59

## 2023-02-07 PROCEDURE — 20611 LARGE JOINT ASPIRATION/INJECTION: R KNEE: ICD-10-PCS | Mod: S$PBB,RT,, | Performed by: FAMILY MEDICINE

## 2023-02-07 PROCEDURE — 63600175 PHARM REV CODE 636 W HCPCS: Performed by: HOSPITALIST

## 2023-02-07 PROCEDURE — 84443 ASSAY THYROID STIM HORMONE: CPT | Performed by: EMERGENCY MEDICINE

## 2023-02-07 PROCEDURE — 99285 EMERGENCY DEPT VISIT HI MDM: CPT | Mod: CS,,, | Performed by: EMERGENCY MEDICINE

## 2023-02-07 PROCEDURE — 83735 ASSAY OF MAGNESIUM: CPT | Performed by: EMERGENCY MEDICINE

## 2023-02-07 PROCEDURE — 80053 COMPREHEN METABOLIC PANEL: CPT | Performed by: EMERGENCY MEDICINE

## 2023-02-07 PROCEDURE — 99999 PR PBB SHADOW E&M-EST. PATIENT-LVL III: ICD-10-PCS | Mod: PBBFAC,,, | Performed by: FAMILY MEDICINE

## 2023-02-07 PROCEDURE — 99999 PR PBB SHADOW E&M-EST. PATIENT-LVL III: CPT | Mod: PBBFAC,,, | Performed by: FAMILY MEDICINE

## 2023-02-07 PROCEDURE — 86803 HEPATITIS C AB TEST: CPT | Performed by: PHYSICIAN ASSISTANT

## 2023-02-07 PROCEDURE — 25000003 PHARM REV CODE 250: Performed by: EMERGENCY MEDICINE

## 2023-02-07 PROCEDURE — 83880 ASSAY OF NATRIURETIC PEPTIDE: CPT | Performed by: EMERGENCY MEDICINE

## 2023-02-07 PROCEDURE — 84484 ASSAY OF TROPONIN QUANT: CPT | Mod: 91 | Performed by: EMERGENCY MEDICINE

## 2023-02-07 PROCEDURE — 85025 COMPLETE CBC W/AUTO DIFF WBC: CPT | Performed by: EMERGENCY MEDICINE

## 2023-02-07 RX ORDER — IBUPROFEN 200 MG
16 TABLET ORAL
Status: DISCONTINUED | OUTPATIENT
Start: 2023-02-07 | End: 2023-02-08 | Stop reason: HOSPADM

## 2023-02-07 RX ORDER — TRIAMCINOLONE ACETONIDE 40 MG/ML
40 INJECTION, SUSPENSION INTRA-ARTICULAR; INTRAMUSCULAR
Status: DISCONTINUED | OUTPATIENT
Start: 2023-02-07 | End: 2023-02-07 | Stop reason: HOSPADM

## 2023-02-07 RX ORDER — IPRATROPIUM BROMIDE AND ALBUTEROL SULFATE 2.5; .5 MG/3ML; MG/3ML
3 SOLUTION RESPIRATORY (INHALATION) EVERY 6 HOURS PRN
Status: DISCONTINUED | OUTPATIENT
Start: 2023-02-07 | End: 2023-02-08 | Stop reason: HOSPADM

## 2023-02-07 RX ORDER — ONDANSETRON 2 MG/ML
4 INJECTION INTRAMUSCULAR; INTRAVENOUS EVERY 8 HOURS PRN
Status: DISCONTINUED | OUTPATIENT
Start: 2023-02-07 | End: 2023-02-08 | Stop reason: HOSPADM

## 2023-02-07 RX ORDER — METOPROLOL TARTRATE 25 MG/1
12.5 TABLET ORAL 2 TIMES DAILY
Status: DISCONTINUED | OUTPATIENT
Start: 2023-02-07 | End: 2023-02-08 | Stop reason: HOSPADM

## 2023-02-07 RX ORDER — IBUPROFEN 200 MG
24 TABLET ORAL
Status: DISCONTINUED | OUTPATIENT
Start: 2023-02-07 | End: 2023-02-08 | Stop reason: HOSPADM

## 2023-02-07 RX ORDER — TALC
6 POWDER (GRAM) TOPICAL NIGHTLY PRN
Status: DISCONTINUED | OUTPATIENT
Start: 2023-02-07 | End: 2023-02-08 | Stop reason: HOSPADM

## 2023-02-07 RX ORDER — ASPIRIN 325 MG
325 TABLET ORAL
Status: COMPLETED | OUTPATIENT
Start: 2023-02-07 | End: 2023-02-07

## 2023-02-07 RX ORDER — PROCHLORPERAZINE EDISYLATE 5 MG/ML
5 INJECTION INTRAMUSCULAR; INTRAVENOUS EVERY 6 HOURS PRN
Status: DISCONTINUED | OUTPATIENT
Start: 2023-02-07 | End: 2023-02-08 | Stop reason: HOSPADM

## 2023-02-07 RX ORDER — SODIUM CHLORIDE 0.9 % (FLUSH) 0.9 %
10 SYRINGE (ML) INJECTION
Status: DISCONTINUED | OUTPATIENT
Start: 2023-02-07 | End: 2023-02-08 | Stop reason: HOSPADM

## 2023-02-07 RX ORDER — NALOXONE HCL 0.4 MG/ML
0.02 VIAL (ML) INJECTION
Status: DISCONTINUED | OUTPATIENT
Start: 2023-02-07 | End: 2023-02-08 | Stop reason: HOSPADM

## 2023-02-07 RX ORDER — FERROUS SULFATE, DRIED 160(50) MG
1 TABLET, EXTENDED RELEASE ORAL DAILY
Status: DISCONTINUED | OUTPATIENT
Start: 2023-02-08 | End: 2023-02-08 | Stop reason: HOSPADM

## 2023-02-07 RX ORDER — METOPROLOL TARTRATE 25 MG/1
25 TABLET, FILM COATED ORAL 2 TIMES DAILY
Status: DISCONTINUED | OUTPATIENT
Start: 2023-02-07 | End: 2023-02-07

## 2023-02-07 RX ORDER — ACETAMINOPHEN 325 MG/1
650 TABLET ORAL EVERY 4 HOURS PRN
Status: DISCONTINUED | OUTPATIENT
Start: 2023-02-07 | End: 2023-02-08 | Stop reason: HOSPADM

## 2023-02-07 RX ORDER — NAPROXEN SODIUM 220 MG/1
81 TABLET, FILM COATED ORAL DAILY
Status: DISCONTINUED | OUTPATIENT
Start: 2023-02-08 | End: 2023-02-08 | Stop reason: HOSPADM

## 2023-02-07 RX ORDER — ENOXAPARIN SODIUM 100 MG/ML
40 INJECTION SUBCUTANEOUS EVERY 24 HOURS
Status: DISCONTINUED | OUTPATIENT
Start: 2023-02-07 | End: 2023-02-08 | Stop reason: HOSPADM

## 2023-02-07 RX ADMIN — METOPROLOL TARTRATE 12.5 MG: 25 TABLET, FILM COATED ORAL at 09:02

## 2023-02-07 RX ADMIN — ASPIRIN 325 MG: 325 TABLET ORAL at 07:02

## 2023-02-07 RX ADMIN — TRIAMCINOLONE ACETONIDE 40 MG: 40 INJECTION, SUSPENSION INTRA-ARTICULAR; INTRAMUSCULAR at 09:02

## 2023-02-07 RX ADMIN — ENOXAPARIN SODIUM 40 MG: 40 INJECTION SUBCUTANEOUS at 09:02

## 2023-02-07 NOTE — ED PROVIDER NOTES
"Encounter Date: 2/7/2023       History     Chief Complaint   Patient presents with    Dizziness     Pt from home. C/o dizziness, felt like she was going to "pass-out". Per EMS, SVT on arrival, received 12mg adenosine, went into a-fib with continued dizziness. Received 100 mg diltiazem. Pt now in sinus rhythm.        72 yo female presenting with palpitations, shortness of breath    PMH:  Diverticulitis, kidney stones, osteoporosis, ulcerative colitis     Context:  Patient states around 3:40 p.m. this afternoon she was closing a window when she developed palpitations and felt lightheaded like she might pass out.  Triage noted dizziness, but patient denies vertigo, states it was more of a sensation of lightheadedness.  The patient checked her watch and noted her heart rate was in the 150s.  She felt short of breath.  EMS reported the patient was initially in SVT and received adenosine, after which she went in to atrial fibrillation and received diltiazem.  She currently feels improved.  She denies having chest pain.  Onset:  Gradual  Duration:  This afternoon  Associated Symptoms:  Denies syncope       The history is provided by the patient and medical records. No  was used.   Review of patient's allergies indicates:  No Known Allergies  Past Medical History:   Diagnosis Date    Diverticulitis     Kidney stone     Osteoporosis     ostenopenia      Past Surgical History:   Procedure Laterality Date    BREAST BIOPSY      x1    BUNIONECTOMY      Hammer toe repair    BUNIONECTOMY      COLECTOMY  2004    partial for diverticulitis    COLECTOMY      partial for diverticulitis    HYSTERECTOMY  07/22/2013    complete laparoscopic hysterectomy at [facility] 7/22/13.  No cancer.  Found 3cm fibroid on right ovary.    kidney stones      removal of breast implants       Family History   Problem Relation Age of Onset    Hypertension Father     Cancer Father         bladder cancer    Breast cancer Sister 54    " Uterine cancer Maternal Grandmother     Colon cancer Maternal Grandfather     Ovarian cancer Neg Hx      Social History     Tobacco Use    Smoking status: Former     Types: Cigarettes     Quit date: 2003     Years since quittin.6    Smokeless tobacco: Never   Substance Use Topics    Alcohol use: No    Drug use: No     Review of Systems   Constitutional:  Negative for fever.   Respiratory:  Positive for shortness of breath.    Cardiovascular:  Positive for palpitations. Negative for leg swelling.   Neurological:  Positive for light-headedness. Negative for syncope, numbness and headaches.     Physical Exam     Initial Vitals [23 1648]   BP Pulse Resp Temp SpO2   106/68 88 20 98.3 °F (36.8 °C) 98 %      MAP       --         Physical Exam    Nursing note and vitals reviewed.  Constitutional: She is not diaphoretic. No distress.   HENT:   Head: Normocephalic and atraumatic.   Eyes: Right eye exhibits no discharge. Left eye exhibits no discharge.   Neck: Neck supple. No tracheal deviation present.   Cardiovascular:  Normal rate. An irregularly irregular rhythm present.     Exam reveals no gallop and no friction rub.       No murmur heard.  Pulmonary/Chest: Breath sounds normal. No respiratory distress.   Abdominal: Abdomen is soft. There is no abdominal tenderness.   Musculoskeletal:         General: No edema.      Cervical back: Neck supple.     Neurological: She is alert and oriented to person, place, and time.   Skin: Skin is warm. No rash noted.   Psychiatric: She has a normal mood and affect. Her behavior is normal.       ED Course   Procedures  Labs Reviewed   CBC W/ AUTO DIFFERENTIAL - Abnormal; Notable for the following components:       Result Value    Lymph # 0.4 (*)     Mono # 0.0 (*)     Gran % 92.1 (*)     Lymph % 6.9 (*)     Mono % 0.6 (*)     All other components within normal limits   COMPREHENSIVE METABOLIC PANEL - Abnormal; Notable for the following components:    CO2 22 (*)     Glucose  244 (*)     AST 67 (*)     ALT 50 (*)     All other components within normal limits   TROPONIN I - Abnormal; Notable for the following components:    Troponin I 0.038 (*)     All other components within normal limits   HIV 1 / 2 ANTIBODY    Narrative:     Release to patient->Immediate   HEPATITIS C ANTIBODY    Narrative:     Release to patient->Immediate   MAGNESIUM   B-TYPE NATRIURETIC PEPTIDE   TSH   SARS-COV2 (COVID) WITH FLU/RSV BY PCR   TROPONIN I     EKG Readings: (Independently Interpreted)   Initial Reading: No STEMI. Previous EKG: Compared with most recent EKG Previous EKG Date: 3/9/17. Rhythm: Atrial Fibrillation. Heart Rate: 100.   Other EKG Interpretations: Repeat EKG at 1923:    No STEMI  SR  Rate 81     Imaging Results              X-Ray Chest AP Portable (Final result)  Result time 02/07/23 18:53:36      Final result by Sacha Mason MD (02/07/23 18:53:36)                   Impression:      No detrimental change or radiographic acute intrathoracic process seen.      Electronically signed by: Sacha Mason MD  Date:    02/07/2023  Time:    18:53               Narrative:    EXAMINATION:  XR CHEST AP PORTABLE    CLINICAL HISTORY:  palpitations;    TECHNIQUE:  Single frontal view of the chest was performed.    COMPARISON:  Chest radiograph 02/25/2019    FINDINGS:  Monitoring leads overlie the chest.  Patient is slightly rotated.    No detrimental change.  Trachea is relatively midline and patent allowing for patient rotation and prominent dextrocurvature of the thoracic spine.  Cardiomediastinal silhouette is midline and within normal limits for age.  Pulmonary vasculature and hilar contours are within normal limits.  Left basilar minimal platelike scarring versus atelectasis.  The lungs are otherwise well expanded without consolidation, pleural effusion or pneumothorax.  Osseous structures appear stable without acute process seen.  PA and lateral views can be obtained.                                        Medications   sodium chloride 0.9% flush 10 mL (has no administration in time range)   melatonin tablet 6 mg (has no administration in time range)   aspirin tablet 325 mg (325 mg Oral Given 2/7/23 1940)     Medical Decision Making:   History:   Old Medical Records: I decided to obtain old medical records.  Old Records Summarized: other records.       <> Summary of Records: 2017 cardiac PET:  The EKG portion of this study is negative for ischemia at a peak heart rate of 88 bpm \    EMS rhythm strips not available, unfortunately  Initial Assessment:   Emergent evaluation of a 73-year-old female presenting with palpitations, shortness of breath, new onset AFib.  On arrival, she feels improved, afebrile.  Differential Diagnosis:   Including, but not limited to:    Electrolyte abnormality  Thyroid disorder  Structural heart disease  Arrhythmia  Doubt PE, no hypoxia or recent immobilization      Independently Interpreted Test(s):   I have ordered and independently interpreted X-rays - see summary below.  I have ordered and independently interpreted EKG Reading(s) - see prior notes  Clinical Tests:   Lab Tests: Ordered and Reviewed  Radiological Study: Reviewed and Ordered  Medical Tests: Ordered and Reviewed  ED Management:  Patient converted to sinus while in the ED.  Remained symptomatically improved.  Potassium and magnesium WNL.  CTNI noted, may be demand 2/2 rate PTA, but will trend and patient received ASA in the ED.    Patient understands and agrees with the plan for admission.  Case discussed with Hospital Medicine - plan for inpatient EP consult, echo, trend troponin, continue continuous cardiac monitoring.    ED Diagnoses:  Acute, new onset Afib  Abnormal troponin level  Other:   I have discussed this case with another health care provider.       <> Summary of the Discussion: Case discussed with Cardiology-recommend admit for inpatient EP consult and echo.           ED Course as of 02/07/23 2030 Tue Feb  07, 2023 1927 Troponin I(!): 0.038  Abnormal, will trend  [AB]   2022 WBC: 5.19  No leukocytosis  [AB]   2022 Potassium: 4.0  Normal  [AB]   2022 TSH: 1.240  Normal  [AB]   2023 Magnesium: 2.1  Normal  [AB]      ED Course User Index  [AB] Derek Sweet MD                   Clinical Impression:   Final diagnoses:  [R00.0] Tachycardia  [R00.2] Palpitation  [I48.91] New onset atrial fibrillation (Primary)        ED Disposition Condition    Observation Stable                Derek Sweet MD  02/07/23 2030

## 2023-02-07 NOTE — ED TRIAGE NOTES
"Patient presents from home for further evaluation of rapid heart rate. Patient reports calling 911 today after realizing her "heart was beating too fast" on her smart watch. Patient was found to be in SVT per EMS and then converted to Afib after receiving adenosine x 1. Patient reports feeling short of breath during episode. Patient denies chest pain or shortness of breath at this time.   "

## 2023-02-07 NOTE — PROCEDURES
"Large Joint Aspiration/Injection: R knee    Date/Time: 2/7/2023 9:45 AM  Performed by: Sarwat Lu MD  Authorized by: Sarwat Lu MD     Consent Done?:  Yes (Verbal)  Indications:  Pain  Site marked: the procedure site was marked    Timeout: prior to procedure the correct patient, procedure, and site was verified    Prep: patient was prepped and draped in usual sterile fashion      Details:  Needle Size:  20 G  Ultrasonic Guidance for needle placement?: Yes    Images are saved and documented.  Approach:  Lateral  Location:  Knee  Site:  R knee  Medications:  40 mg triamcinolone acetonide 40 mg/mL  Patient tolerance:  Patient tolerated the procedure well with no immediate complications     Description of ultrasound utilization for needle guidance:   Ultrasound guidance used for needle localization. Images saved and stored for documentation. The SUPRAPATELLAR BURSA / KNEE JOINT was visualized. Dynamic visualization of the 20g x 3.5" needle was continuous throughout the procedure.   "

## 2023-02-07 NOTE — PROGRESS NOTES
Meredith Ramos, a 73 y.o. female, presents today for evaluation of her right knee.     History of Present Illness (HPI)  Location: anterior knee   Onset: chronic, insidious  Palliative:    relative rest   oral analgesics, OTC   Physical Therapy for 6 weeks   Provocative: prolonged ambulation  Prior: none  Progression: plateau discomfort  Quality: sharp  Radiation: none  Severity: per nursing documentation  Timing: intermittent w/ use  Trauma: none    Review of Systems (ROS)  A 10+ review of systems was performed with pertinent positives and negatives noted above in the history of present illness. Other systems were negative unless otherwise specified.    Physical Examination (PE)  General:  The patient is alert and oriented x 3. Mood is pleasant. Observation of ears, eyes and nose reveal no gross abnormalities. HEENT: NCAT, sclera anicteric.   Lungs: Respirations are equal and unlabored.  Gait is coordinated. Patient can toe walk and heel walk without difficulty.    KNEE EXAMINATION    Observation/Inspection  Gait:   Nonantalgic   Alignment:  Neutral   Scars:   None   Muscle atrophy: Mild  Effusion:  None   Warmth:  None   Discoloration:   none     Tenderness / Crepitus (T / C):         T / C      T / C  Patella   - / -   Lateral joint line   - / -     Peripatellar medial  -  Medial joint line    + / -  Peripatellar lateral -  Medial plica   - / -  Patellar tendon -   Popliteal fossa   - / -  Quad tendon   -   Gastrocnemius   -  Prepatellar Bursa - / -   Quadricep   -  Tibial tubercle  -  Thigh/hamstring  -  Pes anserine/HS -  Fibula    -  ITB   - / -  Tibia     -  Tib/fib joint  - / -  LCL    -    MFC   - / -   MCL: Proximal  -    LFC   - / -   Distal    -          ROM: (* = pain)  PASSIVE   ACTIVE    Left :   5 / 0 / 145   5 / 0 / 145     Right :    5 / 0 / 145   5 / 0 / 145    Patellofemoral examination:  See above noted areas of tenderness.   Patella position    Subluxation / dislocation: Centered         Sup. / Inf;   Normal   Crepitus (PF):    Absent   Patellar Mobility:       Medial-lateral:   Normal    Superior-inferior:  Normal    Inferior tilt   Normal    Patellar tendon:  Normal   Lateral tilt:    Normal   J-sign:     None   Patellofemoral grind:   No pain     Meniscal Signs:     Pain on terminal extension:  +  Pain on terminal flexion:  +  Katies maneuver:  +*  Squat     NT  Thesaly    NT    Ligament Examination:  ACL / Lachman:  WNL  PCL-Post.  drawer: normal 0 to 2mm  MCL- Valgus:  normal 0 to 2mm  LCL- Varus:    normal 0 to 2mm  Pivot shift:  guarding   Dial Test:   difference c/w other side   At 30° flexion: normal (< 5°)    At 90° flexion: normal (< 5°)   Reverse Pivot Shift:   normal (Equal)     Strength: (* = with pain) Painful Side  Quadriceps   5/5  Hamstrin/5    Extremity Neuro-vascular Examination:   Sensation:  Grossly intact to light touch all dermatomal regions.   Motor Function:  Fully intact motor function at hip, knee, foot and ankle    DTRs;  quadriceps and  achilles 2+.  No clonus and downgoing Babinski.    Vascular status:  DP and PT pulses 2+, brisk capillary refill, symmetric.     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, right   W/ knee joint effusion    No evidence of neurologic pathology  No evidence of vascular pathology    Imaging studies reviewed:   X-ray knee, bilat NOT PERFORMED    Plan  We discussed the importance of appropriate diet, weight, and regular exercise    We discussed options including    Watchful waiting / relative rest    Physical therapy x   Injection therapy CSI iaknee r   Consultation    The patient chooses As above   x = prescribed  CSI = corticosteroid injection  VSI = viscosupplement injection  PRPI = platelet rich plasma injection  ia = intra articular  R = right  L = left  B = bilateral   nfSx = surgical consultation was recommended, but patient is not interested in  consultation at this time    Physical Therapy        Formal (fPT), @ [facility] facility t   Formal (fPT), @ [facility] facility        Homegoing (hgPT), per concurrent fPT recommendations t   Homegoing (hgPT), per prior fPT recommendations    Homegoing (hgPT), handout provided        w/  (atPT)    [blank] = not prescribed  x = prescribed  b = prescribed, and begin as indicated  t = continue as indicated  r = prescribed, and restart as indicated  p = completed prior as indicated  hs = prescribed, and with high school   col = prescribed, and with [employer] or university   nfPT = physical therapy was recommended, but patient is not interested in PT at this time    Activity (e.g. sports, work) restrictions    [blank] = as tolerated  pt = per physical therapist  at = per   NWB = non weight bearing on affected lower extremity, with crutches assistance for ambulation    Bracing    [blank] = not prescribed  r = recommended, but not fit with at todays visit  f = prescribed and fit with at todays visit  t = continue as indicated  d = d/c  p = as needed  rare = use on rare, as-needed basis; advised against chronic use    Pain management    [blank] = No prescription necessary. A handout detailing dosing of appropriate   over-the-counter musculoskeletal analgesics was made available to the patient.   m = meloxicam x 14 days  mp = 14 day course of meloxicam prescribed prior    Follow up    [blank] = as needed  [number] = in [number] weeks  CSI = for corticosteroid injection  VSI = for viscosupplement injection or injection series  PRP = for platelet rich plasma injection or injection series  MRI = after MRI imaging  ns = should surgical options be deferred (no surgery)  o = appointment offered, deferred by patient    Should symptoms worsen or fail to resolve, consider    Revisiting the above options and / or vsi     Vocation:

## 2023-02-08 ENCOUNTER — TELEPHONE (OUTPATIENT)
Dept: INTERNAL MEDICINE | Facility: CLINIC | Age: 73
End: 2023-02-08
Payer: MEDICARE

## 2023-02-08 VITALS
HEART RATE: 66 BPM | OXYGEN SATURATION: 97 % | RESPIRATION RATE: 20 BRPM | TEMPERATURE: 99 F | HEIGHT: 65 IN | SYSTOLIC BLOOD PRESSURE: 115 MMHG | WEIGHT: 119 LBS | BODY MASS INDEX: 19.83 KG/M2 | DIASTOLIC BLOOD PRESSURE: 64 MMHG

## 2023-02-08 LAB
ALBUMIN SERPL BCP-MCNC: 3.6 G/DL (ref 3.5–5.2)
ALP SERPL-CCNC: 86 U/L (ref 55–135)
ALT SERPL W/O P-5'-P-CCNC: 41 U/L (ref 10–44)
ANION GAP SERPL CALC-SCNC: 8 MMOL/L (ref 8–16)
ASCENDING AORTA: 3 CM
AST SERPL-CCNC: 40 U/L (ref 10–40)
AV INDEX (PROSTH): 0.88
AV MEAN GRADIENT: 3 MMHG
AV PEAK GRADIENT: 6 MMHG
AV VALVE AREA: 3.09 CM2
AV VELOCITY RATIO: 0.81
BASOPHILS # BLD AUTO: 0.01 K/UL (ref 0–0.2)
BASOPHILS NFR BLD: 0.1 % (ref 0–1.9)
BILIRUB SERPL-MCNC: 0.5 MG/DL (ref 0.1–1)
BSA FOR ECHO PROCEDURE: 1.57 M2
BUN SERPL-MCNC: 16 MG/DL (ref 8–23)
CALCIUM SERPL-MCNC: 9.3 MG/DL (ref 8.7–10.5)
CHLORIDE SERPL-SCNC: 111 MMOL/L (ref 95–110)
CO2 SERPL-SCNC: 21 MMOL/L (ref 23–29)
CREAT SERPL-MCNC: 0.7 MG/DL (ref 0.5–1.4)
CV ECHO LV RWT: 0.39 CM
DIFFERENTIAL METHOD: ABNORMAL
DOP CALC AO PEAK VEL: 1.21 M/S
DOP CALC AO VTI: 25.02 CM
DOP CALC LVOT AREA: 3.5 CM2
DOP CALC LVOT DIAMETER: 2.12 CM
DOP CALC LVOT PEAK VEL: 0.98 M/S
DOP CALC LVOT STROKE VOLUME: 77.37 CM3
DOP CALCLVOT PEAK VEL VTI: 21.93 CM
E WAVE DECELERATION TIME: 228.44 MSEC
E/A RATIO: 0.98
E/E' RATIO: 10.5 M/S
ECHO LV POSTERIOR WALL: 0.83 CM (ref 0.6–1.1)
EJECTION FRACTION: 60 %
EOSINOPHIL # BLD AUTO: 0 K/UL (ref 0–0.5)
EOSINOPHIL NFR BLD: 0 % (ref 0–8)
ERYTHROCYTE [DISTWIDTH] IN BLOOD BY AUTOMATED COUNT: 12.8 % (ref 11.5–14.5)
EST. GFR  (NO RACE VARIABLE): >60 ML/MIN/1.73 M^2
ESTIMATED AVG GLUCOSE: 97 MG/DL (ref 68–131)
FRACTIONAL SHORTENING: 35 % (ref 28–44)
GLUCOSE SERPL-MCNC: 121 MG/DL (ref 70–110)
HBA1C MFR BLD: 5 % (ref 4–5.6)
HCT VFR BLD AUTO: 39.6 % (ref 37–48.5)
HGB BLD-MCNC: 12.8 G/DL (ref 12–16)
IMM GRANULOCYTES # BLD AUTO: 0.02 K/UL (ref 0–0.04)
IMM GRANULOCYTES NFR BLD AUTO: 0.3 % (ref 0–0.5)
INTERVENTRICULAR SEPTUM: 0.91 CM (ref 0.6–1.1)
LA MAJOR: 5.47 CM
LA MINOR: 5.77 CM
LA WIDTH: 4.13 CM
LEFT ATRIUM SIZE: 3.33 CM
LEFT ATRIUM VOLUME INDEX MOD: 42.2 ML/M2
LEFT ATRIUM VOLUME INDEX: 41.3 ML/M2
LEFT ATRIUM VOLUME MOD: 67.08 CM3
LEFT ATRIUM VOLUME: 65.65 CM3
LEFT INTERNAL DIMENSION IN SYSTOLE: 2.79 CM (ref 2.1–4)
LEFT VENTRICLE DIASTOLIC VOLUME INDEX: 51.91 ML/M2
LEFT VENTRICLE DIASTOLIC VOLUME: 82.53 ML
LEFT VENTRICLE MASS INDEX: 74 G/M2
LEFT VENTRICLE SYSTOLIC VOLUME INDEX: 18.4 ML/M2
LEFT VENTRICLE SYSTOLIC VOLUME: 29.19 ML
LEFT VENTRICULAR INTERNAL DIMENSION IN DIASTOLE: 4.29 CM (ref 3.5–6)
LEFT VENTRICULAR MASS: 117.33 G
LV LATERAL E/E' RATIO: 10.5 M/S
LV SEPTAL E/E' RATIO: 10.5 M/S
LYMPHOCYTES # BLD AUTO: 0.6 K/UL (ref 1–4.8)
LYMPHOCYTES NFR BLD: 8.5 % (ref 18–48)
MCH RBC QN AUTO: 31.1 PG (ref 27–31)
MCHC RBC AUTO-ENTMCNC: 32.3 G/DL (ref 32–36)
MCV RBC AUTO: 96 FL (ref 82–98)
MONOCYTES # BLD AUTO: 0.2 K/UL (ref 0.3–1)
MONOCYTES NFR BLD: 3 % (ref 4–15)
MV PEAK A VEL: 0.64 M/S
MV PEAK E VEL: 0.63 M/S
MV STENOSIS PRESSURE HALF TIME: 66.25 MS
MV VALVE AREA P 1/2 METHOD: 3.32 CM2
NEUTROPHILS # BLD AUTO: 6.2 K/UL (ref 1.8–7.7)
NEUTROPHILS NFR BLD: 88.1 % (ref 38–73)
NRBC BLD-RTO: 0 /100 WBC
PISA TR MAX VEL: 2.45 M/S
PLATELET # BLD AUTO: 174 K/UL (ref 150–450)
PMV BLD AUTO: 12.9 FL (ref 9.2–12.9)
POTASSIUM SERPL-SCNC: 4.1 MMOL/L (ref 3.5–5.1)
PROT SERPL-MCNC: 5.9 G/DL (ref 6–8.4)
RA MAJOR: 5.1 CM
RA PRESSURE: 8 MMHG
RA WIDTH: 3.59 CM
RBC # BLD AUTO: 4.12 M/UL (ref 4–5.4)
RIGHT VENTRICULAR END-DIASTOLIC DIMENSION: 3.96 CM
SINUS: 3.08 CM
SODIUM SERPL-SCNC: 140 MMOL/L (ref 136–145)
STJ: 2.45 CM
TDI LATERAL: 0.06 M/S
TDI SEPTAL: 0.06 M/S
TDI: 0.06 M/S
TR MAX PG: 24 MMHG
TRICUSPID ANNULAR PLANE SYSTOLIC EXCURSION: 2.76 CM
TROPONIN I SERPL DL<=0.01 NG/ML-MCNC: 0.06 NG/ML (ref 0–0.03)
TV REST PULMONARY ARTERY PRESSURE: 32 MMHG
WBC # BLD AUTO: 6.98 K/UL (ref 3.9–12.7)

## 2023-02-08 PROCEDURE — 99232 PR SUBSEQUENT HOSPITAL CARE,LEVL II: ICD-10-PCS | Mod: ,,, | Performed by: INTERNAL MEDICINE

## 2023-02-08 PROCEDURE — 83036 HEMOGLOBIN GLYCOSYLATED A1C: CPT | Performed by: HOSPITALIST

## 2023-02-08 PROCEDURE — 94761 N-INVAS EAR/PLS OXIMETRY MLT: CPT

## 2023-02-08 PROCEDURE — 99222 1ST HOSP IP/OBS MODERATE 55: CPT | Mod: ,,, | Performed by: HOSPITALIST

## 2023-02-08 PROCEDURE — 99499 UNLISTED E&M SERVICE: CPT | Mod: ,,,

## 2023-02-08 PROCEDURE — 96361 HYDRATE IV INFUSION ADD-ON: CPT

## 2023-02-08 PROCEDURE — G0378 HOSPITAL OBSERVATION PER HR: HCPCS

## 2023-02-08 PROCEDURE — 25000003 PHARM REV CODE 250: Performed by: HOSPITALIST

## 2023-02-08 PROCEDURE — 36415 COLL VENOUS BLD VENIPUNCTURE: CPT | Performed by: HOSPITALIST

## 2023-02-08 PROCEDURE — 96360 HYDRATION IV INFUSION INIT: CPT | Mod: 59

## 2023-02-08 PROCEDURE — 80053 COMPREHEN METABOLIC PANEL: CPT | Performed by: HOSPITALIST

## 2023-02-08 PROCEDURE — 99232 SBSQ HOSP IP/OBS MODERATE 35: CPT | Mod: ,,, | Performed by: INTERNAL MEDICINE

## 2023-02-08 PROCEDURE — 99222 PR INITIAL HOSPITAL CARE,LEVL II: ICD-10-PCS | Mod: ,,, | Performed by: HOSPITALIST

## 2023-02-08 PROCEDURE — 85025 COMPLETE CBC W/AUTO DIFF WBC: CPT | Performed by: HOSPITALIST

## 2023-02-08 PROCEDURE — 84484 ASSAY OF TROPONIN QUANT: CPT | Performed by: HOSPITALIST

## 2023-02-08 PROCEDURE — 99499 NO LOS: ICD-10-PCS | Mod: ,,,

## 2023-02-08 RX ORDER — NAPROXEN SODIUM 220 MG/1
81 TABLET, FILM COATED ORAL DAILY
Qty: 30 TABLET | Refills: 11 | Status: SHIPPED | OUTPATIENT
Start: 2023-02-09 | End: 2023-03-27

## 2023-02-08 RX ORDER — METOPROLOL TARTRATE 25 MG/1
12.5 TABLET, FILM COATED ORAL 2 TIMES DAILY
Qty: 30 TABLET | Refills: 11 | Status: SHIPPED | OUTPATIENT
Start: 2023-02-08 | End: 2023-03-27

## 2023-02-08 RX ADMIN — SODIUM CHLORIDE 1000 ML: 9 INJECTION, SOLUTION INTRAVENOUS at 12:02

## 2023-02-08 RX ADMIN — ASPIRIN 81 MG: 81 TABLET, CHEWABLE ORAL at 09:02

## 2023-02-08 RX ADMIN — Medication 1 TABLET: at 09:02

## 2023-02-08 RX ADMIN — METOPROLOL TARTRATE 12.5 MG: 25 TABLET, FILM COATED ORAL at 09:02

## 2023-02-08 NOTE — PLAN OF CARE
[facility]  Discharge Assessment    Primary Care Provider: Lisa Childress MD     Discharge Assessment (most recent)       BRIEF DISCHARGE ASSESSMENT - 02/08/23 1249          Discharge Planning    Assessment Type Discharge Planning Brief Assessment     Resource/Environmental Concerns none     Support Systems Children     Equipment Currently Used at Home none     Current Living Arrangements home     Patient/Family Anticipates Transition to home     Patient/Family Anticipated Services at Transition none     DME Needed Upon Discharge  none     Discharge Plan A Home     Discharge Plan B Home        Physical Activity    On average, how many days per week do you engage in moderate to strenuous exercise (like a brisk walk)? 5 days     On average, how many minutes do you engage in exercise at this level? 60 min        Financial Resource Strain    How hard is it for you to pay for the very basics like food, housing, medical care, and heating? Not hard at all        Housing Stability    In the last 12 months, was there a time when you were not able to pay the mortgage or rent on time? No     In the last 12 months, how many places have you lived? 1     In the last 12 months, was there a time when you did not have a steady place to sleep or slept in a shelter (including now)? No        Transportation Needs    In the past 12 months, has lack of transportation kept you from medical appointments or from getting medications? No     In the past 12 months, has lack of transportation kept you from meetings, work, or from getting things needed for daily living? No        Food Insecurity    Within the past 12 months, you worried that your food would run out before you got the money to buy more. Never true     Within the past 12 months, the food you bought just didn't last and you didn't have money to get more. Never true        Social Connections    In a typical week, how many times do you talk on the phone with family,  friends, or neighbors? More than three times a week     How often do you get together with friends or relatives? Once a week     Do you belong to any clubs or organizations such as [employer] groups, unions, fraternal or athletic groups, or school groups? Yes     How often do you attend meetings of the clubs or organizations you belong to? More than 4 times per year     Are you , , , , never , or living with a partner?         Alcohol Use    Q1: How often do you have a drink containing alcohol? Never                   Pt is a 73 y.o. female admitted with SVT and has a PMH of diverticulitis. She lives alone, has not required DMEb in the past and is independent with her ADLs and IADls and drives. [facility] Discharge Packet given to patient and/or family with understanding verbalized.   name and number and estimated discharge date written on white board in patient's room with request to call for any questions or concerns.  Will continue to follow for needs.  Torito Vigil RN,BSN

## 2023-02-08 NOTE — ASSESSMENT & PLAN NOTE
Patient with history of short lived episodes of irregular heartbeats/palpitations hwoever reports worsening episode of palpitations assocaited with dyspnea and lightheadedness prior to presentation with HR 150s.  S/p adenosine and dilt x1 by EMS.  Most recent EKG on initial consultation with SR and PACs, HR 81.  Prior EKGs revealing of Afib.  On tele, patient in sinus rhythm.  CHADsVASC 2, HAS-BLED 1.      - F/u echo  - Continue lopressor 12.5 BID  - consider starting flecainide pending echo and rule out structural disease.  Recommend EP consult  - consider AC with ASA for now as low CHADsVASC  - continuous tele  - Maintain K > 4, Mag > 2 and Ca/iCal WNL to decrease arrhythmogenic potential

## 2023-02-08 NOTE — PLAN OF CARE
[facility] scheduled the Cardiology hospital follow up for February 22 @ 9:20am    [facility] unable to schedule the PCP hospital follow up. A message was sent to the provider requesting an appointment on the patients behalf. I added this information to the AVS as a reminder for the patient.

## 2023-02-08 NOTE — SUBJECTIVE & OBJECTIVE
Past Medical History:   Diagnosis Date    Diverticulitis     Kidney stone     Osteoporosis     ostenopenia        Past Surgical History:   Procedure Laterality Date    BREAST BIOPSY      x1    BUNIONECTOMY      Hammer toe repair    BUNIONECTOMY      COLECTOMY  2004    partial for diverticulitis    COLECTOMY      partial for diverticulitis    HYSTERECTOMY  2013    complete laparoscopic hysterectomy at [facility].  No cancer.  Found 3cm fibroid on right ovary.    kidney stones      removal of breast implants         Review of patient's allergies indicates:  No Known Allergies    Current Facility-Administered Medications on File Prior to Encounter   Medication    [DISCONTINUED] triamcinolone acetonide injection 40 mg     Current Outpatient Medications on File Prior to Encounter   Medication Sig    calcium-vitamin D3 (OS- + D3) 500 mg(1,250mg) -200 unit per tablet Calcium 500 + D    cholecalciferol, vitamin D3, (VITAMIN D3) 25 mcg (1,000 unit) capsule     MAGNESIUM CITRATE ORAL Take 250 mg by mouth once daily.    vitamin E acetate (VITAMIN E ORAL) Take 400 mg by mouth once daily.    vitamin K2 100 mcg Cap Take by mouth.     Family History       Problem Relation (Age of Onset)    Breast cancer Sister (54)    Cancer Father    Colon cancer Maternal Grandfather    Hypertension Father    Uterine cancer Maternal Grandmother          Tobacco Use    Smoking status: Former     Types: Cigarettes     Quit date: 2003     Years since quittin.6    Smokeless tobacco: Never   Substance and Sexual Activity    Alcohol use: No    Drug use: No    Sexual activity: Never     Review of Systems   Constitutional: Negative for chills and fever.   Cardiovascular:  Positive for irregular heartbeat and palpitations (now resolved). Negative for chest pain, leg swelling, near-syncope, orthopnea and syncope.   Respiratory:  Negative for cough and shortness of breath.    Gastrointestinal: Negative.    Genitourinary:  Negative.    Neurological:  Positive for light-headedness (now resolved). Negative for dizziness and headaches.   Objective:     Vital Signs (Most Recent):  Temp: 98.1 °F (36.7 °C) (02/08/23 0802)  Pulse: 75 (02/08/23 0806)  Resp: 20 (02/08/23 0802)  BP: 132/78 (02/08/23 0802)  SpO2: 96 % (02/08/23 0802) Vital Signs (24h Range):  Temp:  [98 °F (36.7 °C)-98.7 °F (37.1 °C)] 98.1 °F (36.7 °C)  Pulse:  [57-88] 75  Resp:  [18-20] 20  SpO2:  [94 %-98 %] 96 %  BP: ()/(61-78) 132/78     Weight: 54.4 kg (119 lb 14.9 oz)  Body mass index is 19.96 kg/m².    SpO2: 96 %       No intake or output data in the 24 hours ending 02/08/23 0842    Lines/Drains/Airways       Peripheral Intravenous Line  Duration                  Peripheral IV - Single Lumen 02/07/23 20 G Anterior;Right Forearm 1 day                    Physical Exam  Vitals and nursing note reviewed.   Constitutional:       General: She is not in acute distress.     Appearance: She is not ill-appearing.   HENT:      Head: Normocephalic and atraumatic.      Mouth/Throat:      Mouth: Mucous membranes are moist.      Pharynx: Oropharynx is clear.   Eyes:      General: No scleral icterus.     Extraocular Movements: Extraocular movements intact.      Conjunctiva/sclera: Conjunctivae normal.   Cardiovascular:      Rate and Rhythm: Normal rate and regular rhythm.      Heart sounds: Normal heart sounds.   Pulmonary:      Effort: Pulmonary effort is normal. No respiratory distress.      Breath sounds: Normal breath sounds. No wheezing or rales.   Abdominal:      General: Abdomen is flat. There is no distension.      Palpations: Abdomen is soft.      Tenderness: There is no abdominal tenderness. There is no guarding.   Musculoskeletal:         General: No tenderness. Normal range of motion.      Cervical back: Normal range of motion and neck supple. No tenderness.      Right lower leg: No edema.      Left lower leg: No edema.   Skin:     General: Skin is warm and dry.    Neurological:      General: No focal deficit present.      Mental Status: She is alert and oriented to person, place, and time.   Psychiatric:         Mood and Affect: Mood normal.         Behavior: Behavior normal.         Thought Content: Thought content normal.       Significant Labs: CMP   Recent Labs   Lab 02/07/23 1822 02/08/23  0303    140   K 4.0 4.1    111*   CO2 22* 21*   * 121*   BUN 19 16   CREATININE 1.0 0.7   CALCIUM 9.9 9.3   PROT 6.5 5.9*   ALBUMIN 3.8 3.6   BILITOT 1.0 0.5   ALKPHOS 100 86   AST 67* 40   ALT 50* 41   ANIONGAP 10 8   , CBC   Recent Labs   Lab 02/07/23 1822 02/08/23  0303   WBC 5.19 6.98   HGB 13.4 12.8   HCT 41.9 39.6    174   , Troponin   Recent Labs   Lab 02/07/23 1822 02/07/23 2115 02/08/23  0303   TROPONINI 0.038* 0.068* 0.064*   , and All pertinent lab results from the last 24 hours have been reviewed.    Significant Imaging: EKG: Most recent SR with PACs, HR 81

## 2023-02-08 NOTE — TELEPHONE ENCOUNTER
----- Message from Evie Benitez sent at 2/8/2023  1:49 PM CST -----  Contact: Pts mobile/home [phone] Ms. Tinsley pts  @ [facility] ext# [phone]  Caller is requesting an earlier appointment then we can schedule.  Caller is requesting a message be sent to the provider.  If this is for urgent care symptoms, did you offer other providers at this location, providers at other locations, or [facility]? (yes, no, n/a):  N/A  If this is for the patients physical, did you offer to schedule next available and put on wait list, or to see NP or PA for their physical?  (yes, no, n/a):  N/A  When is the next available appointment with their provider:  03/03/2023.  Reason for the appointment:  Patient need a hospital follow up.   Patient preference of timeframe to be scheduled:  As soon as possible.   Would the patient like a call back, or a response through their [facility] portal?:   Call  Comments:   Please call the patient.

## 2023-02-08 NOTE — HPI
"Ms. Ramos is a 73yoF with no significant PMHx who presented with palpitations and lightheadedness.  Patient reports history of intermittent palpitations "feeling heart beat" for a few seconds in past however states yesterday with increase in HR to 150s and feeling palpitations and lightheadedness for a few minutes that required her to sit down and rest.  Episode assocaited with dyspnea.  Denied chest pain or pre-syncope.  Patient called EMS, was found to be in SVT,  given adenosine x1 and was found to be in afib.  EMS subsequently gave diltiazem 100mg x1 with patient in sinus rhythm.  Patient denies repeat episodes since admission.  Denies cardiac history however reports having workup in 2017 with stress test negative.  "

## 2023-02-08 NOTE — ASSESSMENT & PLAN NOTE
-Pt. With palpitations at home, thought to be in SVT given adenosine, noted Afib after adenosine pushed-->Sinus with frequent PAC's. No prior known cardiac history. Given steroid joint injection earlier in day  -Unclear if pt. Was truly in SVT vs. Afib with RVR prior to adenosine  -Continue to monitor on telemetry, TTE ordered for further cardiac work-up  -Start low dose lopressor 12.5 mg BID for rate control. Case discussed with cardiology in ED who recommend EP consult in am  -[id number] 2 based on age and sex alone, will start antiplatelet, discuss FD AC at discharge further with patient and cards

## 2023-02-08 NOTE — PLAN OF CARE
[facility]  Discharge Final Note    Primary Care Provider: Lisa Childress MD    Expected Discharge Date: 2/8/2023    Future Appointments   Date Time Provider Department Center   2/22/2023  9:20 AM Bernadette Downs MD [facility]     Pt discharged home with no services.  Torito Vigil, RN,BSN        Final Discharge Note (most recent)       Final Note - 02/08/23 1434          Final Note    Assessment Type Final Discharge Note     Anticipated Discharge Disposition Home or Self Care     Hospital Resources/Appts/Education Provided Provided patient/caregiver with written discharge plan information;Appointments scheduled and added to AVS        Post-Acute Status    Discharge Delays None known at this time                     Important Message from Medicare             Contact Info       Lisa Childress MD   Specialty: Internal Medicine   Relationship: PCP - General    [address]  [facility] [address]   Phone: [phone]       Next Steps: Follow up    Instructions: A message has been sent to your PCP and the nurse will contact you to schedule this hospital follow up visit. However, if you do not hear from them within 24 to 48 hours of discharge please call to schedule the appointment.

## 2023-02-08 NOTE — CONSULTS
"[facility]  Cardiology  Consult Note    Patient Name: Mereidth Ramos  MRN: [id number]  Admission Date: 2/7/2023  Hospital Length of Stay: 0 days  Code Status: Full Code   Attending Provider: Pao Willett MD   Consulting Provider: Sarwat Durant MD  Primary Care Physician: Lisa Childress MD  Principal Problem:Supraventricular tachycardia    Patient information was obtained from patient, past medical records and ER records.     Inpatient consult to Cardiology  Consult performed by: Sarwat Durant MD  Consult ordered by: Zeb Mata MD        Subjective:     Chief Complaint:  palpitations     HPI:   Ms. Ramos is a 73yoF with no significant PMHx who presented with palpitations and lightheadedness.  Patient reports history of intermittent palpitations "feeling heart beat" for a few seconds in past however states yesterday with increase in HR to 150s and feeling palpitations and lightheadedness for a few minutes that required her to sit down and rest.  Episode assocaited with dyspnea.  Denied chest pain or pre-syncope.  Patient called EMS, was found to be in SVT,  given adenosine x1 and was found to be in afib.  EMS subsequently gave diltiazem 100mg x1 with patient in sinus rhythm.  Patient denies repeat episodes since admission.  Denies cardiac history however reports having workup in 2017 with stress test negative.      Past Medical History:   Diagnosis Date    Diverticulitis     Kidney stone     Osteoporosis     ostenopenia        Past Surgical History:   Procedure Laterality Date    BREAST BIOPSY      x1    BUNIONECTOMY      Hammer toe repair    BUNIONECTOMY      COLECTOMY  2004    partial for diverticulitis    COLECTOMY      partial for diverticulitis    HYSTERECTOMY  07/22/2013    complete laparoscopic hysterectomy at [facility] 7/22/13.  No cancer.  Found 3cm fibroid on right ovary.    kidney stones      removal of breast implants         Review of patient's allergies " indicates:  No Known Allergies    Current Facility-Administered Medications on File Prior to Encounter   Medication    [DISCONTINUED] triamcinolone acetonide injection 40 mg     Current Outpatient Medications on File Prior to Encounter   Medication Sig    calcium-vitamin D3 (OS- + D3) 500 mg(1,250mg) -200 unit per tablet Calcium 500 + D    cholecalciferol, vitamin D3, (VITAMIN D3) 25 mcg (1,000 unit) capsule     MAGNESIUM CITRATE ORAL Take 250 mg by mouth once daily.    vitamin E acetate (VITAMIN E ORAL) Take 400 mg by mouth once daily.    vitamin K2 100 mcg Cap Take by mouth.     Family History       Problem Relation (Age of Onset)    Breast cancer Sister (54)    Cancer Father    Colon cancer Maternal Grandfather    Hypertension Father    Uterine cancer Maternal Grandmother          Tobacco Use    Smoking status: Former     Types: Cigarettes     Quit date: 2003     Years since quittin.6    Smokeless tobacco: Never   Substance and Sexual Activity    Alcohol use: No    Drug use: No    Sexual activity: Never     Review of Systems   Constitutional: Negative for chills and fever.   Cardiovascular:  Positive for irregular heartbeat and palpitations (now resolved). Negative for chest pain, leg swelling, near-syncope, orthopnea and syncope.   Respiratory:  Negative for cough and shortness of breath.    Gastrointestinal: Negative.    Genitourinary: Negative.    Neurological:  Positive for light-headedness (now resolved). Negative for dizziness and headaches.   Objective:     Vital Signs (Most Recent):  Temp: 98.1 °F (36.7 °C) (23 08)  Pulse: 75 (23 0806)  Resp: 20 (23 08)  BP: 132/78 (23)  SpO2: 96 % (23) Vital Signs (24h Range):  Temp:  [98 °F (36.7 °C)-98.7 °F (37.1 °C)] 98.1 °F (36.7 °C)  Pulse:  [57-88] 75  Resp:  [18-20] 20  SpO2:  [94 %-98 %] 96 %  BP: ()/(61-78) 132/78     Weight: 54.4 kg (119 lb 14.9 oz)  Body mass index is 19.96  kg/m².    SpO2: 96 %       No intake or output data in the 24 hours ending 02/08/23 0842    Lines/Drains/Airways       Peripheral Intravenous Line  Duration                  Peripheral IV - Single Lumen 02/07/23 20 G Anterior;Right Forearm 1 day                    Physical Exam  Vitals and nursing note reviewed.   Constitutional:       General: She is not in acute distress.     Appearance: She is not ill-appearing.   HENT:      Head: Normocephalic and atraumatic.      Mouth/Throat:      Mouth: Mucous membranes are moist.      Pharynx: Oropharynx is clear.   Eyes:      General: No scleral icterus.     Extraocular Movements: Extraocular movements intact.      Conjunctiva/sclera: Conjunctivae normal.   Cardiovascular:      Rate and Rhythm: Normal rate and regular rhythm.      Heart sounds: Normal heart sounds.   Pulmonary:      Effort: Pulmonary effort is normal. No respiratory distress.      Breath sounds: Normal breath sounds. No wheezing or rales.   Abdominal:      General: Abdomen is flat. There is no distension.      Palpations: Abdomen is soft.      Tenderness: There is no abdominal tenderness. There is no guarding.   Musculoskeletal:         General: No tenderness. Normal range of motion.      Cervical back: Normal range of motion and neck supple. No tenderness.      Right lower leg: No edema.      Left lower leg: No edema.   Skin:     General: Skin is warm and dry.   Neurological:      General: No focal deficit present.      Mental Status: She is alert and oriented to person, place, and time.   Psychiatric:         Mood and Affect: Mood normal.         Behavior: Behavior normal.         Thought Content: Thought content normal.       Significant Labs: CMP   Recent Labs   Lab 02/07/23  1822 02/08/23  0303    140   K 4.0 4.1    111*   CO2 22* 21*   * 121*   BUN 19 16   CREATININE 1.0 0.7   CALCIUM 9.9 9.3   PROT 6.5 5.9*   ALBUMIN 3.8 3.6   BILITOT 1.0 0.5   ALKPHOS 100 86   AST 67* 40   ALT 50*  41   ANIONGAP 10 8   , CBC   Recent Labs   Lab 02/07/23  1822 02/08/23  0303   WBC 5.19 6.98   HGB 13.4 12.8   HCT 41.9 39.6    174   , Troponin   Recent Labs   Lab 02/07/23  1822 02/07/23 2115 02/08/23  0303   TROPONINI 0.038* 0.068* 0.064*   , and All pertinent lab results from the last 24 hours have been reviewed.    Significant Imaging: EKG: Most recent SR with PACs, HR 81    Assessment and Plan:     * Supraventricular tachycardia  Patient with history of short lived episodes of irregular heartbeats/palpitations hwoever reports worsening episode of palpitations assocaited with dyspnea and lightheadedness prior to presentation with HR 150s.  S/p adenosine and dilt x1 by EMS.  Most recent EKG on initial consultation with SR and PACs, HR 81.  Prior EKGs revealing of Afib.  On tele, patient in sinus rhythm.  CHADsVASC 2, HAS-BLED 1.      - F/u echo  - Continue lopressor 12.5 BID  - consider starting flecainide pending echo and rule out structural disease.  Recommend EP consult  - consider AC with ASA for now as low CHADsVASC  - continuous tele  - Maintain K > 4, Mag > 2 and Ca/iCal WNL to decrease arrhythmogenic potential        VTE Risk Mitigation (From admission, onward)         Ordered     enoxaparin injection 40 mg  Daily         02/07/23 2115     IP VTE HIGH RISK PATIENT  Once         02/07/23 2115     Place sequential compression device  Until discontinued         02/07/23 2115     Place sequential compression device  Until discontinued         02/07/23 2002                Thank you for your consult. I will follow-up with patient. Please contact us if you have any additional questions.    Sarwat Durant MD  Cardiology   [facility]

## 2023-02-08 NOTE — SUBJECTIVE & OBJECTIVE
Past Medical History:   Diagnosis Date    Diverticulitis     Kidney stone     Osteoporosis     ostenopenia        Past Surgical History:   Procedure Laterality Date    BREAST BIOPSY      x1    BUNIONECTOMY      Hammer toe repair    BUNIONECTOMY      COLECTOMY  2004    partial for diverticulitis    COLECTOMY      partial for diverticulitis    HYSTERECTOMY  2013    complete laparoscopic hysterectomy at [facility].  No cancer.  Found 3cm fibroid on right ovary.    kidney stones      removal of breast implants         Review of patient's allergies indicates:  No Known Allergies    Current Facility-Administered Medications on File Prior to Encounter   Medication    [DISCONTINUED] triamcinolone acetonide injection 40 mg     Current Outpatient Medications on File Prior to Encounter   Medication Sig    calcium-vitamin D3 (OS- + D3) 500 mg(1,250mg) -200 unit per tablet Calcium 500 + D    cholecalciferol, vitamin D3, (VITAMIN D3) 25 mcg (1,000 unit) capsule     MAGNESIUM CITRATE ORAL Take 250 mg by mouth once daily.    vitamin E acetate (VITAMIN E ORAL) Take 400 mg by mouth once daily.    vitamin K2 100 mcg Cap Take by mouth.     Family History       Problem Relation (Age of Onset)    Breast cancer Sister (54)    Cancer Father    Colon cancer Maternal Grandfather    Hypertension Father    Uterine cancer Maternal Grandmother          Tobacco Use    Smoking status: Former     Types: Cigarettes     Quit date: 2003     Years since quittin.6    Smokeless tobacco: Never   Substance and Sexual Activity    Alcohol use: No    Drug use: No    Sexual activity: Never     Review of Systems   Constitutional:  Negative for activity change, appetite change, chills, fever and unexpected weight change.   HENT:  Negative for congestion and sore throat.    Respiratory:  Positive for shortness of breath. Negative for cough.    Cardiovascular:  Positive for palpitations. Negative for chest pain and leg swelling.    Gastrointestinal:  Negative for abdominal distention, abdominal pain, blood in stool, constipation, diarrhea, nausea and vomiting.   Genitourinary:  Negative for difficulty urinating, dysuria and hematuria.   Musculoskeletal:  Negative for myalgias.   Skin:  Negative for color change and rash.   Neurological:  Positive for light-headedness. Negative for dizziness, tremors and seizures.   Objective:     Vital Signs (Most Recent):  Temp: 98.1 °F (36.7 °C) (02/07/23 1920)  Pulse: 65 (02/07/23 2130)  Resp: 18 (02/07/23 2100)  BP: 110/76 (02/07/23 2130)  SpO2: 97 % (02/07/23 2130) Vital Signs (24h Range):  Temp:  [98 °F (36.7 °C)-98.3 °F (36.8 °C)] 98.1 °F (36.7 °C)  Pulse:  [64-88] 65  Resp:  [18-20] 18  SpO2:  [95 %-98 %] 97 %  BP: (106-118)/(64-76) 110/76     Weight: 54.4 kg (119 lb 14.9 oz)  Body mass index is 19.96 kg/m².    Physical Exam  Vitals reviewed.   Constitutional:       General: She is not in acute distress.     Appearance: She is well-developed.   HENT:      Head: Normocephalic and atraumatic.   Eyes:      Extraocular Movements: Extraocular movements intact.      Pupils: Pupils are equal, round, and reactive to light.   Neck:      Vascular: No JVD.      Trachea: No tracheal deviation.   Cardiovascular:      Rate and Rhythm: Normal rate and regular rhythm.      Heart sounds: No murmur heard.    No friction rub. No gallop.   Pulmonary:      Effort: No respiratory distress.      Breath sounds: Normal breath sounds. No wheezing or rales.   Abdominal:      General: Bowel sounds are normal. There is no distension.      Palpations: Abdomen is soft. There is no mass.      Tenderness: There is no abdominal tenderness.   Musculoskeletal:         General: No deformity.      Cervical back: Neck supple.   Lymphadenopathy:      Cervical: No cervical adenopathy.   Skin:     General: Skin is warm and dry.      Findings: No rash.   Neurological:      Mental Status: She is alert and oriented to person, place, and time.          CRANIAL NERVES     CN III, IV, VI   Pupils are equal, round, and reactive to light.     Significant Labs: All pertinent labs within the past 24 hours have been reviewed.    Significant Imaging: I have reviewed all pertinent imaging results/findings within the past 24 hours.

## 2023-02-08 NOTE — HPI
72 yo F with no known significant PMHx who presents to the ED complaining of palpitations. Pt. Reports that was at her baseline wihtout any symptoms until about 4:00 pm today when she developed palpitations and lightheadedness. Pt. States she felt like she was going to pass out, so she sat down and checked her smart watch which reported a HR in the 150s. EMS was contacted and they reported SVT per ED, and the patient was given adenosine and repeat rhythm strip showed Afib. It is unclear if pt. Was truly in SVT vs. Afib with RVR with EMS as rhythm strip is no available. Pt. Denies any prior cardiac history/history of arrhthymias, but she has felt some palpitations in the past that have resolved on their own. She mentions one episode yesterday which she did have some palpitations, but they resolved after a few seconds. Of note, pt. Also receive a R knee joint steroid injection earlier on the morning of presentation.

## 2023-02-09 NOTE — DISCHARGE SUMMARY
[facility] - Observation [address] [place] [address] Medicine  Discharge Summary      Patient Name: Meredith Ramos  MRN: [id number]  WENDI: [id number]  Patient Class: OP- Observation  Admission Date: 2/7/2023  Hospital Length of Stay: 0 days  Discharge Date and Time:  02/08/2023 6:51 PM  Attending Physician: Pao Willett MD   Discharging Provider: Jeff Hollingsworth PA-C  Primary Care Provider: Lisa Childress MD  [facility] Jeff Hollingsworth PA-C  Primary Care Team: [facility]    HPI:   74 yo F with no known significant PMHx who presents to the ED complaining of palpitations. Pt. Reports that was at her baseline wihtout any symptoms until about 4:00 pm today when she developed palpitations and lightheadedness. Pt. States she felt like she was going to pass out, so she sat down and checked her smart watch which reported a HR in the 150s. EMS was contacted and they reported SVT per ED, and the patient was given adenosine and repeat rhythm strip showed Afib. It is unclear if pt. Was truly in SVT vs. Afib with RVR with EMS as rhythm strip is no available. Pt. Denies any prior cardiac history/history of arrhthymias, but she has felt some palpitations in the past that have resolved on their own. She mentions one episode yesterday which she did have some palpitations, but they resolved after a few seconds. Of note, pt. Also receive a R knee joint steroid injection earlier on the morning of presentation.      * No surgery found *      Hospital Course:   Meredith Ramos placed in  Observation for management of SVT. Cardiology consulted, recommendations implemented. Patient started on Lopressor BID and aspirin. Echo today with EF 60%, normal systolic function, and indeterminate diastolic function. Patient will continue Lopressor and ASA at discharge. Will follow up with PCP and Cardiology outpatient. Patient medically ready for discharge. Plan of care discussed with patient, patient agreeable to plan, and all  questions answered.       Goals of Care Treatment Preferences:  Code Status: Full Code      Consults:   Consults (From admission, onward)          Status Ordering Provider     Inpatient consult to Cardiology  Once        Provider:  (Not yet assigned)    Completed IRVIN BEAVER            * Supraventricular tachycardia  -Pt. With palpitations at home, thought to be in SVT given adenosine, noted Afib after adenosine pushed-->Sinus with frequent PAC's. No prior known cardiac history. Given steroid joint injection earlier in day  -Unclear if pt. Was truly in SVT vs. Afib with RVR prior to adenosine  -Continue to monitor on telemetry, TTE ordered for further cardiac work-up  -Start low dose lopressor 12.5 mg BID for rate control. Case discussed with cardiology in ED who recommend EP consult in am  -[id number] 2 based on age and sex alone, will start antiplatelet, discuss FD AC at discharge further with patient and cards  - Cardiology Recommendations  - F/u echo  - Continue lopressor 12.5 BID  - consider starting flecainide pending echo and rule out structural disease.  Recommend EP consult  - consider AC with ASA for now as low CHADsVASC  - continuous tele  - Maintain K > 4, Mag > 2 and Ca/iCal WNL to decrease arrhythmogenic potential    - continue Lopressor BID and Aspirin at discharge  - Cardiology follow up    Echo - 02/08/2023  Mild left atrial enlargement.  The left ventricle is normal in size with normal systolic function.  The estimated ejection fraction is 60%.  Indeterminate left ventricular diastolic function.  Normal right ventricular size with normal right ventricular systolic function.  There is bileaflet mitral prolapse.  At least moderate, eccentric, posteriorly directed mitral regurgitation.  Mild pulmonic regurgitation.  Intermediate central venous pressure (8 mmHg).  The estimated PA systolic pressure is 32 mmHg.      Final Active Diagnoses:    Diagnosis Date Noted POA    PRINCIPAL PROBLEM:   Supraventricular tachycardia [I47.1] 02/07/2023 Unknown      Problems Resolved During this Admission:       Discharged Condition: stable    Disposition: Home or Self Care    Follow Up:   Follow-up Information       Lisa Childress MD Follow up.    Specialty: Internal Medicine  Why: A message has been sent to your PCP and the nurse will contact you to schedule this hospital follow up visit. However, if you do not hear from them within 24 to 48 hours of discharge please call to schedule the appointment.  Contact information:  Bailee CALDWELL  [facility] [id number] [phone]                           Patient Instructions:      Ambulatory referral/consult to Cardiology   Standing Status: Future   Referral Priority: Routine Referral Type: Consultation   Referral Reason: Specialty Services Required   Requested Specialty: Cardiology   Number of Visits Requested: 1     Diet Cardiac     Notify your health care provider if you experience any of the following:  difficulty breathing or increased cough     Notify your health care provider if you experience any of the following:  persistent dizziness, light-headedness, or visual disturbances     Notify your health care provider if you experience any of the following:  increased confusion or weakness     Activity as tolerated       Significant Diagnostic Studies: Labs:   CMP   Recent Labs   Lab 02/07/23  1822 02/08/23  0303    140   K 4.0 4.1    111*   CO2 22* 21*   * 121*   BUN 19 16   CREATININE 1.0 0.7   CALCIUM 9.9 9.3   PROT 6.5 5.9*   ALBUMIN 3.8 3.6   BILITOT 1.0 0.5   ALKPHOS 100 86   AST 67* 40   ALT 50* 41   ANIONGAP 10 8    and CBC   Recent Labs   Lab 02/07/23  1822 02/08/23  0303   WBC 5.19 6.98   HGB 13.4 12.8   HCT 41.9 39.6    174     Cardiac Graphics: Echocardiogram:   Transthoracic echo (TTE) complete (Cupid Only):   Results for orders placed or performed during the hospital encounter of 02/07/23   Echo   Result Value Ref Range     BSA 1.57 m2    TDI SEPTAL 0.06 m/s    LV LATERAL E/E' RATIO 10.50 m/s    LV SEPTAL E/E' RATIO 10.50 m/s    LA WIDTH 4.13 cm    TDI LATERAL 0.06 m/s    LVIDd 4.29 3.5 - 6.0 cm    IVS 0.91 0.6 - 1.1 cm    Posterior Wall 0.83 0.6 - 1.1 cm    LVIDs 2.79 2.1 - 4.0 cm    FS 35 28 - 44 %    LA volume 65.65 cm3    Sinus 3.08 cm    STJ 2.45 cm    Ascending aorta 3.00 cm    LV mass 117.33 g    LA size 3.33 cm    RVDD 3.96 cm    TAPSE 2.76 cm    Left Ventricle Relative Wall Thickness 0.39 cm    AV mean gradient 3 mmHg    AV valve area 3.09 cm2    AV Velocity Ratio 0.81     AV index (prosthetic) 0.88     MV valve area p 1/2 method 3.32 cm2    E/A ratio 0.98     Mean e' 0.06 m/s    E wave deceleration time 228.44 msec    LVOT diameter 2.12 cm    LVOT area 3.5 cm2    LVOT peak tien 0.98 m/s    LVOT peak VTI 21.93 cm    Ao peak tien 1.21 m/s    Ao VTI 25.02 cm    LVOT stroke volume 77.37 cm3    AV peak gradient 6 mmHg    E/E' ratio 10.50 m/s    MV Peak E Tien 0.63 m/s    TR Max Tien 2.45 m/s    MV stenosis pressure 1/2 time 66.25 ms    MV Peak A Tien 0.64 m/s    LV Systolic Volume 29.19 mL    LV Systolic Volume Index 18.4 mL/m2    LV Diastolic Volume 82.53 mL    LV Diastolic Volume Index 51.91 mL/m2    LA Volume Index 41.3 mL/m2    LV Mass Index 74 g/m2    RA Major Axis 5.10 cm    Left Atrium Minor Axis 5.77 cm    Left Atrium Major Axis 5.47 cm    Triscuspid Valve Regurgitation Peak Gradient 24 mmHg    LA Volume Index (Mod) 42.2 mL/m2    LA volume (mod) 67.08 cm3    RA Width 3.59 cm    Right Atrial Pressure (from IVC) 8 mmHg    EF 60 %    TV rest pulmonary artery pressure 32 mmHg    Narrative    · Mild left atrial enlargement.  · The left ventricle is normal in size with normal systolic function.  · The estimated ejection fraction is 60%.  · Indeterminate left ventricular diastolic function.  · Normal right ventricular size with normal right ventricular systolic   function.  · There is bileaflet mitral prolapse.  · At least moderate,  eccentric, posteriorly directed mitral regurgitation.  · Mild pulmonic regurgitation.  · Intermediate central venous pressure (8 mmHg).  · The estimated PA systolic pressure is 32 mmHg.          Pending Diagnostic Studies:       None           Medications:  Reconciled Home Medications:      Medication List        START taking these medications      CHILDREN'S ASPIRIN 81 MG Chew  Generic drug: aspirin  Chew and swallow 1 tablet (81 mg total) by mouth once daily.  Start taking on: February 9, 2023     metoprolol tartrate 25 MG tablet  Commonly known as: LOPRESSOR  Take ½ tablet (12.5 mg total) by mouth 2 (two) times daily.            CONTINUE taking these medications      calcium-vitamin D3 500 mg-5 mcg (200 unit) per tablet  Commonly known as: OS- + D3  Calcium 500 + D     cholecalciferol (vitamin D3) 25 mcg (1,000 unit) capsule  Commonly known as: VITAMIN D3     MAGNESIUM CITRATE ORAL  Take 250 mg by mouth once daily.     VITAMIN E ORAL  Take 400 mg by mouth once daily.     vitamin K2 100 mcg Cap  Take by mouth.              Indwelling Lines/Drains at time of discharge:   Lines/Drains/Airways       None                   Time spent on the discharge of patient: 33 minutes         Jeff Hollingsworth PA-C  Department of Hospital Medicine  [facility] - Observation 11H

## 2023-02-09 NOTE — ASSESSMENT & PLAN NOTE
-Pt. With palpitations at home, thought to be in SVT given adenosine, noted Afib after adenosine pushed-->Sinus with frequent PAC's. No prior known cardiac history. Given steroid joint injection earlier in day  -Unclear if pt. Was truly in SVT vs. Afib with RVR prior to adenosine  -Continue to monitor on telemetry, TTE ordered for further cardiac work-up  -Start low dose lopressor 12.5 mg BID for rate control. Case discussed with cardiology in ED who recommend EP consult in am  -[id number] 2 based on age and sex alone, will start antiplatelet, discuss FD AC at discharge further with patient and cards  - Cardiology Recommendations  - F/u echo  - Continue lopressor 12.5 BID  - consider starting flecainide pending echo and rule out structural disease.  Recommend EP consult  - consider AC with ASA for now as low CHADsVASC  - continuous tele  - Maintain K > 4, Mag > 2 and Ca/iCal WNL to decrease arrhythmogenic potential    - continue Lopressor BID and Aspirin at discharge  - Cardiology follow up    Echo - 02/08/2023   Mild left atrial enlargement.   The left ventricle is normal in size with normal systolic function.   The estimated ejection fraction is 60%.   Indeterminate left ventricular diastolic function.   Normal right ventricular size with normal right ventricular systolic function.   There is bileaflet mitral prolapse.   At least moderate, eccentric, posteriorly directed mitral regurgitation.   Mild pulmonic regurgitation.   Intermediate central venous pressure (8 mmHg).   The estimated PA systolic pressure is 32 mmHg.

## 2023-02-09 NOTE — HOSPITAL COURSE
Meredith Ramos placed in  Observation for management of SVT. Cardiology consulted, recommendations implemented. Patient started on Lopressor BID and aspirin. Echo today with EF 60%, normal systolic function, and indeterminate diastolic function. Patient will continue Lopressor and ASA at discharge. Will follow up with PCP and Cardiology outpatient. Patient medically ready for discharge. Plan of care discussed with patient, patient agreeable to plan, and all questions answered.

## 2023-02-11 ENCOUNTER — TELEPHONE (OUTPATIENT)
Dept: INTERNAL MEDICINE | Facility: CLINIC | Age: 73
End: 2023-02-11
Payer: MEDICARE

## 2023-02-13 ENCOUNTER — TELEPHONE (OUTPATIENT)
Dept: INTERNAL MEDICINE | Facility: CLINIC | Age: 73
End: 2023-02-13
Payer: MEDICARE

## 2023-02-13 ENCOUNTER — OFFICE VISIT (OUTPATIENT)
Dept: CARDIOLOGY | Facility: CLINIC | Age: 73
End: 2023-02-13
Payer: MEDICARE

## 2023-02-13 DIAGNOSIS — I34.0 NONRHEUMATIC MITRAL VALVE REGURGITATION: ICD-10-CM

## 2023-02-13 DIAGNOSIS — I47.10 SUPRAVENTRICULAR TACHYCARDIA: ICD-10-CM

## 2023-02-13 DIAGNOSIS — I34.1 MVP (MITRAL VALVE PROLAPSE): ICD-10-CM

## 2023-02-13 DIAGNOSIS — I48.0 PAF (PAROXYSMAL ATRIAL FIBRILLATION): Primary | ICD-10-CM

## 2023-02-13 PROCEDURE — 99213 OFFICE O/P EST LOW 20 MIN: CPT | Mod: PBBFAC | Performed by: INTERNAL MEDICINE

## 2023-02-13 PROCEDURE — 99214 PR OFFICE/OUTPT VISIT, EST, LEVL IV, 30-39 MIN: ICD-10-PCS | Mod: S$PBB,,, | Performed by: INTERNAL MEDICINE

## 2023-02-13 PROCEDURE — 99214 OFFICE O/P EST MOD 30 MIN: CPT | Mod: S$PBB,,, | Performed by: INTERNAL MEDICINE

## 2023-02-13 PROCEDURE — 99999 PR PBB SHADOW E&M-EST. PATIENT-LVL III: CPT | Mod: PBBFAC,,, | Performed by: INTERNAL MEDICINE

## 2023-02-13 PROCEDURE — 99999 PR PBB SHADOW E&M-EST. PATIENT-LVL III: ICD-10-PCS | Mod: PBBFAC,,, | Performed by: INTERNAL MEDICINE

## 2023-02-13 RX ORDER — METOPROLOL SUCCINATE 25 MG/1
25 TABLET, EXTENDED RELEASE ORAL DAILY
Qty: 90 TABLET | Refills: 3 | Status: SHIPPED | OUTPATIENT
Start: 2023-02-13 | End: 2024-02-26 | Stop reason: SDUPTHER

## 2023-02-13 NOTE — PATIENT INSTRUCTIONS
Discussed diet , achieving and maintaining ideal body weight, and exercise.   We reviewed meds in detail.  Reassured-Discussed goals, options, plan.  Discussed Eliquis and risk of stroke with PAF  Discussed EP   Discussed that MV is abnormal and could be repaired in future and will need to be followed  Eliquis 5 mg twice daily ; Metoprolol XL just half ( or fourth) in am

## 2023-02-13 NOTE — PROGRESS NOTES
Subjective:   Patient ID:  Meredith Ramos is a 73 y.o. female who presents for evaluation of PAF    HPI: She was in  last week with PAF ( ECGs show AF not SVT but Holter in past showed short PAT) The patient has no chest pain, SOB, TIA, palpitations, syncope or pre-syncope.Patient does exercise.        Review of Systems   Constitutional: Negative for chills, decreased appetite, diaphoresis, fever, malaise/fatigue, night sweats, weight gain and weight loss.   HENT:  Negative for congestion, hoarse voice, nosebleeds, sore throat and tinnitus.    Eyes:  Negative for blurred vision, double vision, vision loss in left eye, vision loss in right eye, visual disturbance and visual halos.   Cardiovascular:  Positive for irregular heartbeat and palpitations. Negative for chest pain, claudication, cyanosis, dyspnea on exertion, leg swelling, near-syncope, orthopnea, paroxysmal nocturnal dyspnea and syncope.   Respiratory:  Negative for cough, hemoptysis, shortness of breath, sleep disturbances due to breathing, snoring, sputum production and wheezing.    Endocrine: Negative for cold intolerance, heat intolerance, polydipsia, polyphagia and polyuria.   Hematologic/Lymphatic: Negative for adenopathy and bleeding problem. Does not bruise/bleed easily.   Skin:  Negative for color change, dry skin, flushing, itching, nail changes, poor wound healing, rash, skin cancer, suspicious lesions and unusual hair distribution.   Musculoskeletal:  Negative for arthritis, back pain, falls, gout, joint pain, joint swelling, muscle cramps, muscle weakness, myalgias and stiffness.   Gastrointestinal:  Negative for abdominal pain, anorexia, change in bowel habit, constipation, diarrhea, dysphagia, heartburn, hematemesis, hematochezia, melena and vomiting.   Genitourinary:  Negative for decreased libido, dysuria, hematuria, hesitancy and urgency.   Neurological:  Negative for excessive daytime sleepiness, dizziness, focal weakness, headaches,  light-headedness, loss of balance, numbness, paresthesias, seizures, sensory change, tremors, vertigo and weakness.   Psychiatric/Behavioral:  Negative for altered mental status, depression, hallucinations, memory loss, substance abuse and suicidal ideas. The patient does not have insomnia and is not nervous/anxious.    Allergic/Immunologic: Negative for environmental allergies and hives.     Objective: There were no vitals taken for this visit.  P70 bp 120/70   Physical Exam  Constitutional:       Appearance: She is well-developed.   HENT:      Head: Normocephalic.   Eyes:      Pupils: Pupils are equal, round, and reactive to light.   Neck:      Thyroid: No thyromegaly.      Vascular: Normal carotid pulses. No carotid bruit, hepatojugular reflux or JVD.   Cardiovascular:      Rate and Rhythm: Normal rate and regular rhythm.      Pulses: Intact distal pulses.      Heart sounds: Murmur heard.   High-pitched blowing holosystolic murmur is present with a grade of 1/6 at the apex.     No friction rub. No gallop.   Pulmonary:      Effort: Pulmonary effort is normal. No tachypnea or respiratory distress.      Breath sounds: Normal breath sounds. No wheezing or rales.   Chest:      Chest wall: No tenderness.   Abdominal:      General: Bowel sounds are normal. There is no distension.      Palpations: Abdomen is soft. There is no mass.      Tenderness: There is no abdominal tenderness. There is no guarding or rebound.   Musculoskeletal:         General: No tenderness. Normal range of motion.      Cervical back: Normal range of motion.   Lymphadenopathy:      Cervical: No cervical adenopathy.   Skin:     General: Skin is warm.      Findings: No erythema or rash.   Neurological:      Mental Status: She is alert and oriented to person, place, and time.      Cranial Nerves: No cranial nerve deficit.      Coordination: Coordination normal.   Psychiatric:         Behavior: Behavior normal.         Thought Content: Thought content  normal.         Judgment: Judgment normal.       Assessment:     1. PAF (paroxysmal atrial fibrillation)    2. Nonrheumatic mitral valve regurgitation    3. Supraventricular tachycardia    4. MVP (mitral valve prolapse)        Plan:   Discussed diet , achieving and maintaining ideal body weight, and exercise.   We reviewed meds in detail.  Reassured-Discussed goals, options, plan.  Discussed Eliquis and risk of stroke with PAF  Discussed EP   Discussed that MV is abnormal and could be repaired in future and will need to be followed  Eliquis 5 mg twice daily ; Metoprolol XL just half ( or fourth) in am  Meredith was seen today for establish care, medication management and hospital follow up.    Diagnoses and all orders for this visit:    PAF (paroxysmal atrial fibrillation)  -     metoprolol succinate (TOPROL-XL) 25 MG 24 hr tablet; Take 1 tablet (25 mg total) by mouth once daily. .5-1 once or twice daily or as directed  -     apixaban (ELIQUIS) 5 mg Tab; Take 1 tablet (5 mg total) by mouth 2 (two) times daily.  -     Echo; Future; Expected date: 11/13/2023  -     Ambulatory referral/consult to Cardiac Electrophysiology; Future; Expected date: 02/14/2023    Nonrheumatic mitral valve regurgitation  -     Ambulatory referral/consult to Cardiac Electrophysiology; Future; Expected date: 02/14/2023    Supraventricular tachycardia  -     metoprolol succinate (TOPROL-XL) 25 MG 24 hr tablet; Take 1 tablet (25 mg total) by mouth once daily. .5-1 once or twice daily or as directed  -     apixaban (ELIQUIS) 5 mg Tab; Take 1 tablet (5 mg total) by mouth 2 (two) times daily.  -     Ambulatory referral/consult to Cardiac Electrophysiology; Future; Expected date: 02/14/2023    MVP (mitral valve prolapse)  -     Echo; Future; Expected date: 11/13/2023            Follow up in about 9 months (around 11/13/2023) for with JAMIL Ferguson to read; see Diana lemos.

## 2023-02-14 ENCOUNTER — OFFICE VISIT (OUTPATIENT)
Dept: INTERNAL MEDICINE | Facility: CLINIC | Age: 73
End: 2023-02-14
Payer: MEDICARE

## 2023-02-14 DIAGNOSIS — G47.9 SLEEP DISORDER: ICD-10-CM

## 2023-02-14 DIAGNOSIS — M81.0 OSTEOPOROSIS, UNSPECIFIED OSTEOPOROSIS TYPE, UNSPECIFIED PATHOLOGICAL FRACTURE PRESENCE: Primary | ICD-10-CM

## 2023-02-14 DIAGNOSIS — I48.0 PAF (PAROXYSMAL ATRIAL FIBRILLATION): ICD-10-CM

## 2023-02-14 DIAGNOSIS — I47.10 SUPRAVENTRICULAR TACHYCARDIA: ICD-10-CM

## 2023-02-14 PROCEDURE — 99999 PR PBB SHADOW E&M-EST. PATIENT-LVL IV: ICD-10-PCS | Mod: PBBFAC,,, | Performed by: INTERNAL MEDICINE

## 2023-02-14 PROCEDURE — 99213 OFFICE O/P EST LOW 20 MIN: CPT | Mod: S$PBB,,, | Performed by: INTERNAL MEDICINE

## 2023-02-14 PROCEDURE — 99213 PR OFFICE/OUTPT VISIT, EST, LEVL III, 20-29 MIN: ICD-10-PCS | Mod: S$PBB,,, | Performed by: INTERNAL MEDICINE

## 2023-02-14 PROCEDURE — 99999 PR PBB SHADOW E&M-EST. PATIENT-LVL IV: CPT | Mod: PBBFAC,,, | Performed by: INTERNAL MEDICINE

## 2023-02-14 PROCEDURE — 99214 OFFICE O/P EST MOD 30 MIN: CPT | Mod: PBBFAC | Performed by: INTERNAL MEDICINE

## 2023-02-14 NOTE — TELEPHONE ENCOUNTER
PA for Eliquis denied, Xarelto preferred. Informed pt including cost of $600 per month. Pt did research and decided that she would rather pay cash for Eliquis. She said that she researched the drug and increased chance of bleed w. Xarelto. She called back after talking to her daughter and stated w. take the Xarelto after all.

## 2023-02-15 ENCOUNTER — PATIENT MESSAGE (OUTPATIENT)
Dept: CARDIOLOGY | Facility: CLINIC | Age: 73
End: 2023-02-15
Payer: MEDICARE

## 2023-02-17 ENCOUNTER — PATIENT MESSAGE (OUTPATIENT)
Dept: SPORTS MEDICINE | Facility: CLINIC | Age: 73
End: 2023-02-17
Payer: MEDICARE

## 2023-02-17 ENCOUNTER — HOSPITAL ENCOUNTER (EMERGENCY)
Facility: HOSPITAL | Age: 73
Discharge: HOME | End: 2023-02-17
Attending: EMERGENCY MEDICINE
Payer: MEDICARE

## 2023-02-17 ENCOUNTER — NURSE TRIAGE (OUTPATIENT)
Dept: ADMINISTRATIVE | Facility: CLINIC | Age: 73
End: 2023-02-17
Payer: MEDICARE

## 2023-02-17 VITALS
OXYGEN SATURATION: 99 % | TEMPERATURE: 98 F | BODY MASS INDEX: 19.3 KG/M2 | HEART RATE: 68 BPM | RESPIRATION RATE: 16 BRPM | SYSTOLIC BLOOD PRESSURE: 126 MMHG | DIASTOLIC BLOOD PRESSURE: 63 MMHG | WEIGHT: 116 LBS

## 2023-02-17 DIAGNOSIS — R07.9 CHEST PAIN: Primary | ICD-10-CM

## 2023-02-17 DIAGNOSIS — R47.89 WORD FINDING DIFFICULTY: ICD-10-CM

## 2023-02-17 LAB
ALBUMIN SERPL BCP-MCNC: 4 G/DL (ref 3.5–5.2)
ALP SERPL-CCNC: 86 U/L (ref 55–135)
ALT SERPL W/O P-5'-P-CCNC: 21 U/L (ref 10–44)
ANION GAP SERPL CALC-SCNC: 6 MMOL/L (ref 8–16)
AST SERPL-CCNC: 25 U/L (ref 10–40)
BASOPHILS # BLD AUTO: 0.04 K/UL (ref 0–0.2)
BASOPHILS NFR BLD: 0.6 % (ref 0–1.9)
BILIRUB SERPL-MCNC: 0.8 MG/DL (ref 0.1–1)
BUN SERPL-MCNC: 11 MG/DL (ref 8–23)
CALCIUM SERPL-MCNC: 9.5 MG/DL (ref 8.7–10.5)
CHLORIDE SERPL-SCNC: 105 MMOL/L (ref 95–110)
CO2 SERPL-SCNC: 29 MMOL/L (ref 23–29)
CREAT SERPL-MCNC: 0.7 MG/DL (ref 0.5–1.4)
DIFFERENTIAL METHOD: ABNORMAL
EOSINOPHIL # BLD AUTO: 0.1 K/UL (ref 0–0.5)
EOSINOPHIL NFR BLD: 1.1 % (ref 0–8)
ERYTHROCYTE [DISTWIDTH] IN BLOOD BY AUTOMATED COUNT: 12.9 % (ref 11.5–14.5)
EST. GFR  (NO RACE VARIABLE): >60 ML/MIN/1.73 M^2
GLUCOSE SERPL-MCNC: 90 MG/DL (ref 70–110)
HCT VFR BLD AUTO: 45.9 % (ref 37–48.5)
HGB BLD-MCNC: 14.7 G/DL (ref 12–16)
IMM GRANULOCYTES # BLD AUTO: 0.02 K/UL (ref 0–0.04)
IMM GRANULOCYTES NFR BLD AUTO: 0.3 % (ref 0–0.5)
LYMPHOCYTES # BLD AUTO: 1.2 K/UL (ref 1–4.8)
LYMPHOCYTES NFR BLD: 18.6 % (ref 18–48)
MCH RBC QN AUTO: 30.4 PG (ref 27–31)
MCHC RBC AUTO-ENTMCNC: 32 G/DL (ref 32–36)
MCV RBC AUTO: 95 FL (ref 82–98)
MONOCYTES # BLD AUTO: 0.4 K/UL (ref 0.3–1)
MONOCYTES NFR BLD: 6.1 % (ref 4–15)
NEUTROPHILS # BLD AUTO: 4.6 K/UL (ref 1.8–7.7)
NEUTROPHILS NFR BLD: 73.3 % (ref 38–73)
NRBC BLD-RTO: 0 /100 WBC
PLATELET # BLD AUTO: 182 K/UL (ref 150–450)
PMV BLD AUTO: 12.8 FL (ref 9.2–12.9)
POC CARDIAC TROPONIN I: 0 NG/ML (ref 0–0.08)
POC CARDIAC TROPONIN I: 0 NG/ML (ref 0–0.08)
POTASSIUM SERPL-SCNC: 3.8 MMOL/L (ref 3.5–5.1)
PROT SERPL-MCNC: 6.5 G/DL (ref 6–8.4)
RBC # BLD AUTO: 4.84 M/UL (ref 4–5.4)
SAMPLE: NORMAL
SAMPLE: NORMAL
SODIUM SERPL-SCNC: 140 MMOL/L (ref 136–145)
TROPONIN I SERPL DL<=0.01 NG/ML-MCNC: 0.01 NG/ML (ref 0–0.03)
TROPONIN I SERPL DL<=0.01 NG/ML-MCNC: <0.006 NG/ML (ref 0–0.03)
WBC # BLD AUTO: 6.25 K/UL (ref 3.9–12.7)

## 2023-02-17 PROCEDURE — 99285 EMERGENCY DEPT VISIT HI MDM: CPT | Mod: 25

## 2023-02-17 PROCEDURE — 93010 EKG 12-LEAD: ICD-10-PCS | Mod: ,,, | Performed by: INTERNAL MEDICINE

## 2023-02-17 PROCEDURE — 85025 COMPLETE CBC W/AUTO DIFF WBC: CPT | Performed by: EMERGENCY MEDICINE

## 2023-02-17 PROCEDURE — 25000003 PHARM REV CODE 250: Performed by: EMERGENCY MEDICINE

## 2023-02-17 PROCEDURE — 93005 ELECTROCARDIOGRAM TRACING: CPT

## 2023-02-17 PROCEDURE — 84484 ASSAY OF TROPONIN QUANT: CPT | Mod: 91 | Performed by: EMERGENCY MEDICINE

## 2023-02-17 PROCEDURE — 99284 EMERGENCY DEPT VISIT MOD MDM: CPT | Mod: ,,, | Performed by: EMERGENCY MEDICINE

## 2023-02-17 PROCEDURE — 99284 PR EMERGENCY DEPT VISIT,LEVEL IV: ICD-10-PCS | Mod: ,,, | Performed by: EMERGENCY MEDICINE

## 2023-02-17 PROCEDURE — 84484 ASSAY OF TROPONIN QUANT: CPT

## 2023-02-17 PROCEDURE — 93010 ELECTROCARDIOGRAM REPORT: CPT | Mod: ,,, | Performed by: INTERNAL MEDICINE

## 2023-02-17 PROCEDURE — 80053 COMPREHEN METABOLIC PANEL: CPT | Performed by: EMERGENCY MEDICINE

## 2023-02-17 RX ORDER — ASPIRIN 325 MG
325 TABLET ORAL
Status: COMPLETED | OUTPATIENT
Start: 2023-02-17 | End: 2023-02-17

## 2023-02-17 RX ADMIN — ASPIRIN 325 MG: 325 TABLET ORAL at 11:02

## 2023-02-17 NOTE — ED NOTES
Bed: [facility]  Expected date:   Expected time:   Means of arrival:   Comments:

## 2023-02-17 NOTE — TELEPHONE ENCOUNTER
Mrs. Ramos is calling this am to report intermittent left sided chest pain for the last several days.  She has a recent hx of SVT and afib, starte on Xeralto on 2/14.  She has had daily episodes of left sided chest pain since then, usually lasts more than 5 minutes.  On 2/15 she reports she felt light headed, foggy, difficulty concentrating and difficulty finding her words.  She states the light headedness has resolved but the other symptoms are still present, but not as significant.  I advised she call 911 now; she verbalized understanding and states she will call 911.      Reason for Disposition   [1] Chest pain lasts > 5 minutes AND [2] age > 44    Additional Information   Negative: SEVERE difficulty breathing (e.g., struggling for each breath, speaks in single words)   Negative: Difficult to awaken or acting confused (e.g., disoriented, slurred speech)   Negative: Shock suspected (e.g., cold/pale/clammy skin, too weak to stand, low BP, rapid pulse)   Negative: Passed out (i.e., lost consciousness, collapsed and was not responding)    Protocols used: Chest Pain-A-AH

## 2023-02-17 NOTE — ED TRIAGE NOTES
Meredith Ramos, a 73 y.o. female presents to the ED w/ complaint of Chest discomfort.    Triage note:  Chief Complaint   Patient presents with    Palpitations     Recently dx. With Afib. Began having light headedness and chest discomfort last night     Review of patient's allergies indicates:   Allergen Reactions    Cortisone (hydrocortisone) [hydrocortisone]      Past Medical History:   Diagnosis Date    Diverticulitis     Kidney stone     Osteoporosis     ostenopenia

## 2023-02-17 NOTE — ED PROVIDER NOTES
Chief Complaint   Palpitations (Recently dx. With Afib. Began having light headedness and chest discomfort last night)      History Of Present Illness   Meredith Ramos is a 73 y.o. female presenting with lightheadedness, chest discomfort and palpitations that has been occurring intermittently for several days but worsened last night and this morning.  She called EMS for same.  She was recently diagnosed with paroxysmal atrial fibrillation and put on metoprolol and anticoagulation.  Since his diagnosis she feels that she is had some trouble concentrating and occasionally finding words, but no slurred speech, weakness or numbness.  Family states that she has had some mild memory issues for months.  She is otherwise healthy and frequently runs; she walked 5 miles the other day with no issues.  Denies high blood pressure, diabetes, tobacco use, amphetamine/cocaine use, cholesterol issues, family history of heart disease.    History obtained from:  Patient and family    Review of patient's allergies indicates:   Allergen Reactions    Cortisone (hydrocortisone) [hydrocortisone]        No current facility-administered medications on file prior to encounter.     Current Outpatient Medications on File Prior to Encounter   Medication Sig Dispense Refill    aspirin 81 MG Chew Chew and swallow 1 tablet (81 mg total) by mouth once daily. 30 tablet 11    calcium-vitamin D3 (OS- + D3) 500 mg(1,250mg) -200 unit per tablet Calcium 500 + D      cholecalciferol, vitamin D3, (VITAMIN D3) 25 mcg (1,000 unit) capsule       MAGNESIUM CITRATE ORAL Take 250 mg by mouth once daily.      metoprolol succinate (TOPROL-XL) 25 MG 24 hr tablet Take 1 tablet (25 mg total) by mouth once daily. .5-1 once or twice daily or as directed (Patient not taking: Reported on 2/14/2023) 90 tablet 3    metoprolol tartrate (LOPRESSOR) 25 MG tablet Take ½ tablet (12.5 mg total) by mouth 2 (two) times daily. (Patient not taking: Reported on 2/14/2023) 30  tablet 11    rivaroxaban (XARELTO) 20 mg Tab Take 1 tablet (20 mg total) by mouth daily with dinner or evening meal. 30 tablet 11    vitamin E acetate (VITAMIN E ORAL) Take 400 mg by mouth once daily.      vitamin K2 100 mcg Cap Take by mouth.         Past History   As per HPI and below:  Past Medical History:   Diagnosis Date    Diverticulitis     Kidney stone     Osteoporosis     ostenopenia      Past Surgical History:   Procedure Laterality Date    BREAST BIOPSY      x1    BUNIONECTOMY      Hammer toe repair    BUNIONECTOMY      COLECTOMY  2004    partial for diverticulitis    COLECTOMY      partial for diverticulitis    HYSTERECTOMY  2013    complete laparoscopic hysterectomy at [facility].  No cancer.  Found 3cm fibroid on right ovary.    kidney stones      removal of breast implants         Social History     Socioeconomic History    Marital status:    Tobacco Use    Smoking status: Former     Types: Cigarettes     Quit date: 2003     Years since quittin.6    Smokeless tobacco: Never   Substance and Sexual Activity    Alcohol use: No    Drug use: No    Sexual activity: Never     Social Determinants of Health     Financial Resource Strain: Low Risk     Difficulty of Paying Living Expenses: Not hard at all   Food Insecurity: No Food Insecurity    Worried About Running Out of Food in the Last Year: Never true    Ran Out of Food in the Last Year: Never true   Transportation Needs: No Transportation Needs    Lack of Transportation (Medical): No    Lack of Transportation (Non-Medical): No   Physical Activity: Sufficiently Active    Days of Exercise per Week: 5 days    Minutes of Exercise per Session: 60 min   Stress: Stress Concern Present    Feeling of Stress : Very much   Social Connections: Unknown    Frequency of Communication with Friends and Family: More than three times a week    Frequency of Social Gatherings with Friends and Family: Three times a week    Active Member of  Clubs or Organizations: Yes    Attends Club or Organization Meetings: More than 4 times per year    Marital Status:    Housing Stability: Low Risk     Unable to Pay for Housing in the Last Year: No    Number of Places Lived in the Last Year: 1    Unstable Housing in the Last Year: No       Family History   Problem Relation Age of Onset    Hypertension Father     Cancer Father         bladder cancer    Breast cancer Sister 54    Uterine cancer Maternal Grandmother     Colon cancer Maternal Grandfather     Ovarian cancer Neg Hx        Physical Exam     Vitals:    02/17/23 0955 02/17/23 1130 02/17/23 1530 02/17/23 1715   BP: (!) 157/85 114/72 133/77 129/60   BP Location: Right arm Left arm Left arm Left arm   Patient Position: Sitting Sitting Sitting Sitting   Pulse: 70 (!) 59 73 65   Resp: 18 16 16 18   Temp: 98.5 °F (36.9 °C) 98.6 °F (37 °C) 98 °F (36.7 °C) 97.9 °F (36.6 °C)   TempSrc: Oral Oral Oral Oral   SpO2: 100% 99% 96% 97%   Weight: 52.6 kg (116 lb)        Appearance: No acute distress.  Skin: No rashes seen.  Good turgor.  No abrasions.  No ecchymoses.  Eyes: No conjunctival injection.  ENT: Oropharynx clear.    Chest: Clear to auscultation bilaterally.  Good air movement.  No wheezes.  No rhonchi.  Cardiovascular: Regular rate and rhythm.  No murmurs. No gallops. No rubs.  Abdomen: Soft.  Not distended.  Nontender.  No guarding.  No rebound.  Musculoskeletal: Good range of motion all joints.  No deformities.  Neck supple.  No meningismus.  Neurologic: Motor intact.  Sensation intact.  Cerebellar intact.  Cranial nerves intact.  Mental Status:  Alert and oriented x 3.  Appropriate, conversant.      Initial MDM   Intermittent chest discomfort in this patient with recently diagnosed paroxysmal atrial fibrillation.  She is also having some palpitations and occasional concentration/word-finding issues.  I doubt stroke as she only recently developed her arrhythmia and her neuro exam is completely benign.   No speech issues currently.  I am going to start with a cardiac workup and reassess.    Medications Given     Medications   aspirin tablet 325 mg (325 mg Oral Given 2/17/23 1124)       Results and Course     Labs Reviewed   CBC W/ AUTO DIFFERENTIAL - Abnormal; Notable for the following components:       Result Value    Gran % 73.3 (*)     All other components within normal limits   COMPREHENSIVE METABOLIC PANEL - Abnormal; Notable for the following components:    Anion Gap 6 (*)     All other components within normal limits   TROPONIN I   TROPONIN I   TROPONIN ISTAT   TROPONIN ISTAT   POCT TROPONIN   POCT TROPONIN       Imaging Results              MRI Brain Without Contrast (Final result)  Result time 02/17/23 17:30:16      Final result by Darleen Hernandez MD (02/17/23 17:30:16)                   Impression:      No evidence of acute intracranial pathology.  No evidence of acute infarction.    Electronically signed by resident: Matilda Davila  Date:    02/17/2023  Time:    17:23    Electronically signed by: Darleen Hernandez MD  Date:    02/17/2023  Time:    17:30               Narrative:    EXAMINATION:  MRI BRAIN WITHOUT CONTRAST    CLINICAL HISTORY:  Transient ischemic attack (TIA);.    TECHNIQUE:  Multiplanar multisequence MR imaging of the brain was performed without contrast.    COMPARISON:  No priors.    FINDINGS:  Mild prominence of the sulci and ventricles suggesting mild cerebral volume loss.  No hydrocephalus.    Brain parenchyma is unremarkable.  No diffusion restriction to suggest acute infarct. No areas of gradient susceptibility to suggest intraparenchymal hemorrhage.  No mass lesion or edema.  No extra-axial blood or fluid collections.    Normal T2 skull base vascular flow voids are preserved.    Bone marrow signal intensity is within normal limits.                                       X-Ray Chest AP Portable (Final result)  Result time 02/17/23 10:59:44      Final result by Chris Pleitez MD  "(02/17/23 10:59:44)                   Impression:      No acute cardiopulmonary finding.      Electronically signed by: Chris Pleitez MD  Date:    02/17/2023  Time:    10:59               Narrative:    EXAMINATION:  XR CHEST AP PORTABLE    CLINICAL HISTORY:  Provided history is "Chest Pain;  ".    TECHNIQUE:  Frontal and lateral views of the chest.    COMPARISON:  02/07/2023.    FINDINGS:  Frontal and lateral views obtained.  Cardiomediastinal silhouette is not enlarged.  Atherosclerotic calcifications overlie the aortic arch.  No confluent area of consolidation.  No sizable pleural effusion.  No pneumothorax.  Significant scoliotic curvature of the spine again noted, similar to prior study.                                      ED Course as of 02/17/23 1815   Fri Feb 17, 2023   1035 X-Ray Chest AP Portable  CXR shows no acute disease per my independent interpretation.     [DC]   1051 EKG 12-lead  EKG shows normal sinus rhythm and no acute ischemia per my independent interpretation.  Similar to 2/7/23 EKG.     [DC]   1134 WBC: 6.25 [DC]   1136 Hemoglobin: 14.7 [DC]   1136 Platelets: 182 [DC]   1136 POC Cardiac Troponin I: 0.00 [DC]   1232 Troponin I: <0.006 [DC]   1232 Creatinine: 0.7 [DC]   1554 POC Cardiac Troponin I: 0.00 [DC]   1554 Troponin I: 0.006 [DC]   1631 Pain free throughout stay, pending MRI [DC]      ED Course User Index  [DC] Oniel Lawson MD     Additional MDM:   Heart Score:    History:          Slightly suspicious.  ECG:             Nonspecific repolarisation disturbance  Age:               >65 years  Risk factors: no risk factors known  Troponin:       Less than or equal to normal limit  Final Score: 3          MDM, Impression and Plan   73 y.o. female with recent diagnosis of AFib, now on Xarelto.  For several days, she has had intermittent vague chest discomfort that lasts for random durations.  She was able to walk 5 miles yesterday with 0 issues and has been an exercise runner for years.  " This does not seem to be cardiac, and 2 troponins were negative.  I feel comfortable with discharging her as her heart score is 3.  She admitted to some word-finding difficulties with no slurred speech or numbness/weakness.  MRI showed no stroke.  I think it is highly unlikely that she is developed embolic stroke in the few days she has had atrial fibrillation.  She is already anticoagulated.  I think she can continue the workup as an outpatient, but I think is very unlikely that this represents TIA.  Will refer back to her cardiologist and to vascular Neurology.  The patient and I have discussed all these possibilities extensively.  She is comfortable with the plan, and I have answered all of her questions to her satisfaction.         Final diagnoses:  [R07.9] Chest pain (Primary)  [R47.89] Word finding difficulty        ED Disposition Condition    Discharge Stable          ED Prescriptions    None       Follow-up Information       Follow up With Specialties Details Why Contact Info Additional Information    Manolo Ferguson MD Cardiology Call   [address] [facility] [address]  [phone]       [facility] - Neurology [facility] Neurology Call   [address] [facility] [id number] [phone] [facility] [facility], 7th Floor Please park in [facility] and take Clinic elevator               Oniel Lawson MD  02/17/23 2969

## 2023-02-19 VITALS
HEART RATE: 76 BPM | OXYGEN SATURATION: 96 % | DIASTOLIC BLOOD PRESSURE: 68 MMHG | WEIGHT: 116.38 LBS | TEMPERATURE: 99 F | BODY MASS INDEX: 19.39 KG/M2 | SYSTOLIC BLOOD PRESSURE: 108 MMHG | HEIGHT: 65 IN

## 2023-02-19 NOTE — PROGRESS NOTES
Subjective:       Patient ID: Meredith Ramos is a 73 y.o. female.    Chief Complaint: Osteoporosis    HPI  Discussed with her her recent bone density results  Review of Systems   Constitutional:  Negative for chills, fatigue, fever and unexpected weight change.   Respiratory:  Negative for chest tightness and shortness of breath.    Cardiovascular:  Negative for chest pain and palpitations.   Gastrointestinal:  Negative for abdominal pain and blood in stool.   Neurological:  Negative for dizziness, syncope, numbness and headaches.     Objective:      Physical Exam  HENT:      Right Ear: External ear normal.      Left Ear: External ear normal.      Nose: Nose normal.      Mouth/Throat:      Mouth: Mucous membranes are moist.      Pharynx: Oropharynx is clear.   Eyes:      Pupils: Pupils are equal, round, and reactive to light.   Cardiovascular:      Rate and Rhythm: Normal rate and regular rhythm.      Heart sounds: No murmur heard.  Pulmonary:      Breath sounds: Normal breath sounds.   Abdominal:      General: There is no distension.      Palpations: There is no hepatomegaly or splenomegaly.      Tenderness: There is no abdominal tenderness.   Musculoskeletal:      Cervical back: Normal range of motion.   Lymphadenopathy:      Cervical: No cervical adenopathy.      Upper Body:      Right upper body: No axillary adenopathy.      Left upper body: No axillary adenopathy.   Neurological:      Cranial Nerves: No cranial nerve deficit.      Sensory: No sensory deficit.      Motor: Motor function is intact.      Deep Tendon Reflexes: Reflexes are normal and symmetric.       Assessment/Plan       Assessment and plan:  Osteoporosis:  She is hesitant to start medication.  Schedule endocrinology appointment for further advice

## 2023-02-23 DIAGNOSIS — I48.0 PAF (PAROXYSMAL ATRIAL FIBRILLATION): Primary | ICD-10-CM

## 2023-03-07 ENCOUNTER — TELEPHONE (OUTPATIENT)
Dept: ELECTROPHYSIOLOGY | Facility: CLINIC | Age: 73
End: 2023-03-07
Payer: MEDICARE

## 2023-03-07 ENCOUNTER — PATIENT MESSAGE (OUTPATIENT)
Dept: SPORTS MEDICINE | Facility: CLINIC | Age: 73
End: 2023-03-07
Payer: MEDICARE

## 2023-03-07 NOTE — PROGRESS NOTES
Subjective:    Patient ID:  Meredith Ramos is a 73 y.o. female who presents for evaluation of Palpitations and Atrial Fibrillation      HPI  Patient is a 72 yo F pmhx of pAF, SVT (AT) seen on holter monitor, mitral valve prolapse, episode of reported SVT on 2/8 given adenosine by EMS at a rate of 150bpm which then was repeated and AF. No scanned in strip in media. Of note her apple watch just noted high heart rates. It did not note AF per patient. Patient states frequent palpitations. Usually short bursts.  Sometimes nightly sometimes every few days.        2017 Holter with AT.    2/2023 ECGs with coarse AF with follow up ekg shortly afterwards in normal sinus rhythm    Echo EF 60% MVP    PET/STRESS negative    ECG today normal sinus rhythm rate 68bpm     Review of Systems   Constitutional: Negative.   HENT: Negative.     Eyes: Negative.    Cardiovascular: Negative.    Respiratory: Negative.     Endocrine: Negative.    Hematologic/Lymphatic: Negative.    Skin: Negative.    Musculoskeletal: Negative.    Gastrointestinal: Negative.    Genitourinary: Negative.    Neurological: Negative.    Psychiatric/Behavioral: Negative.     Allergic/Immunologic: Negative.       Objective:    Physical Exam  Vitals reviewed.   Constitutional:       Appearance: Normal appearance. She is normal weight.   HENT:      Head: Normocephalic and atraumatic.      Right Ear: External ear normal.      Left Ear: External ear normal.      Nose: Nose normal.   Eyes:      Extraocular Movements: Extraocular movements intact.      Pupils: Pupils are equal, round, and reactive to light.   Cardiovascular:      Rate and Rhythm: Normal rate and regular rhythm.      Pulses: Normal pulses.      Heart sounds: Normal heart sounds.   Pulmonary:      Effort: Pulmonary effort is normal.   Abdominal:      General: Abdomen is flat.      Palpations: Abdomen is soft.   Musculoskeletal:         General: Normal range of motion.      Cervical back: Normal range of  motion.   Skin:     General: Skin is warm.      Capillary Refill: Capillary refill takes less than 2 seconds.   Neurological:      Mental Status: She is alert and oriented to person, place, and time.   Psychiatric:         Mood and Affect: Mood normal.         Assessment:       1. Palpitations    2. PAF (paroxysmal atrial fibrillation)    3. Supraventricular tachycardia    4. Nonrheumatic mitral valve regurgitation         Plan:       Patient has palpitations and has ECG with AF. Patient has an elevated [id number] (age and sex) and is on Xarelto. Patient is on toprol 12.5mg daily as well. Patient would like to be off as many medications as possible. Interestingly from her history her Apple watch did not state AF and per ER note EMS was initially concerned for SVT and didn't see AF until after adenosine which can certainly cause someone to degenerate into AF.   -Discussed options of EPS+ILR vs ILR, patient will consider and call back

## 2023-03-08 ENCOUNTER — OFFICE VISIT (OUTPATIENT)
Dept: ELECTROPHYSIOLOGY | Facility: CLINIC | Age: 73
End: 2023-03-08
Payer: MEDICARE

## 2023-03-08 ENCOUNTER — PATIENT MESSAGE (OUTPATIENT)
Dept: SPORTS MEDICINE | Facility: CLINIC | Age: 73
End: 2023-03-08
Payer: MEDICARE

## 2023-03-08 ENCOUNTER — PATIENT MESSAGE (OUTPATIENT)
Dept: ELECTROPHYSIOLOGY | Facility: CLINIC | Age: 73
End: 2023-03-08

## 2023-03-08 ENCOUNTER — HOSPITAL ENCOUNTER (OUTPATIENT)
Dept: CARDIOLOGY | Facility: CLINIC | Age: 73
Discharge: HOME | End: 2023-03-08
Payer: MEDICARE

## 2023-03-08 VITALS
DIASTOLIC BLOOD PRESSURE: 68 MMHG | SYSTOLIC BLOOD PRESSURE: 128 MMHG | HEART RATE: 68 BPM | BODY MASS INDEX: 19.79 KG/M2 | WEIGHT: 118.81 LBS | HEIGHT: 65 IN

## 2023-03-08 DIAGNOSIS — M70.50 PES ANSERINE BURSITIS: ICD-10-CM

## 2023-03-08 DIAGNOSIS — I47.10 SUPRAVENTRICULAR TACHYCARDIA: ICD-10-CM

## 2023-03-08 DIAGNOSIS — M17.11 PRIMARY OSTEOARTHRITIS OF RIGHT KNEE: Primary | ICD-10-CM

## 2023-03-08 DIAGNOSIS — I48.0 PAF (PAROXYSMAL ATRIAL FIBRILLATION): ICD-10-CM

## 2023-03-08 DIAGNOSIS — I34.0 NONRHEUMATIC MITRAL VALVE REGURGITATION: ICD-10-CM

## 2023-03-08 DIAGNOSIS — R00.2 PALPITATIONS: Primary | ICD-10-CM

## 2023-03-08 PROCEDURE — 93010 RHYTHM STRIP: ICD-10-PCS | Mod: S$PBB,,, | Performed by: INTERNAL MEDICINE

## 2023-03-08 PROCEDURE — 99213 OFFICE O/P EST LOW 20 MIN: CPT | Mod: PBBFAC | Performed by: INTERNAL MEDICINE

## 2023-03-08 PROCEDURE — 93010 ELECTROCARDIOGRAM REPORT: CPT | Mod: S$PBB,,, | Performed by: INTERNAL MEDICINE

## 2023-03-08 PROCEDURE — 99999 PR PBB SHADOW E&M-EST. PATIENT-LVL III: ICD-10-PCS | Mod: PBBFAC,,, | Performed by: INTERNAL MEDICINE

## 2023-03-08 PROCEDURE — 99205 OFFICE O/P NEW HI 60 MIN: CPT | Mod: S$PBB,,, | Performed by: INTERNAL MEDICINE

## 2023-03-08 PROCEDURE — 99205 PR OFFICE/OUTPT VISIT, NEW, LEVL V, 60-74 MIN: ICD-10-PCS | Mod: S$PBB,,, | Performed by: INTERNAL MEDICINE

## 2023-03-08 PROCEDURE — 93005 ELECTROCARDIOGRAM TRACING: CPT | Mod: PBBFAC | Performed by: INTERNAL MEDICINE

## 2023-03-08 PROCEDURE — 99999 PR PBB SHADOW E&M-EST. PATIENT-LVL III: CPT | Mod: PBBFAC,,, | Performed by: INTERNAL MEDICINE

## 2023-03-12 ENCOUNTER — PATIENT MESSAGE (OUTPATIENT)
Dept: INTERNAL MEDICINE | Facility: CLINIC | Age: 73
End: 2023-03-12
Payer: MEDICARE

## 2023-03-12 ENCOUNTER — PATIENT MESSAGE (OUTPATIENT)
Dept: ELECTROPHYSIOLOGY | Facility: CLINIC | Age: 73
End: 2023-03-12
Payer: MEDICARE

## 2023-03-14 ENCOUNTER — CLINICAL SUPPORT (OUTPATIENT)
Dept: REHABILITATION | Facility: OTHER | Age: 73
End: 2023-03-14
Attending: FAMILY MEDICINE
Payer: MEDICARE

## 2023-03-14 DIAGNOSIS — M70.50 PES ANSERINE BURSITIS: ICD-10-CM

## 2023-03-14 DIAGNOSIS — M25.561 RIGHT MEDIAL KNEE PAIN: ICD-10-CM

## 2023-03-14 DIAGNOSIS — M17.11 PRIMARY OSTEOARTHRITIS OF RIGHT KNEE: ICD-10-CM

## 2023-03-14 PROBLEM — M54.50 LOW BACK PAIN: Status: RESOLVED | Noted: 2022-07-11 | Resolved: 2023-03-14

## 2023-03-14 PROBLEM — M25.342: Status: RESOLVED | Noted: 2021-09-23 | Resolved: 2023-03-14

## 2023-03-14 PROBLEM — M25.532 PAIN IN LEFT WRIST: Status: RESOLVED | Noted: 2021-03-30 | Resolved: 2023-03-14

## 2023-03-14 PROBLEM — M25.60 RANGE OF MOTION DEFICIT: Status: RESOLVED | Noted: 2021-03-30 | Resolved: 2023-03-14

## 2023-03-14 PROCEDURE — 97162 PT EVAL MOD COMPLEX 30 MIN: CPT | Mod: PN | Performed by: PHYSICAL THERAPIST

## 2023-03-14 PROCEDURE — 97530 THERAPEUTIC ACTIVITIES: CPT | Mod: PN | Performed by: PHYSICAL THERAPIST

## 2023-03-14 NOTE — PLAN OF CARE
[facility]  Physical Therapy Initial Evaluation    Name: Meredith Ramos  Clinic Number: [id number]    Therapy Diagnosis:   Encounter Diagnoses   Name Primary?    Primary osteoarthritis of right knee     Pes anserine bursitis     Right medial knee pain      Physician: Sarwat Lu, *    Physician Orders: PT Eval and Treat:   Start neuromuscular retraining program and HEP. Goals of increased pelvic and core stability, IT band, hamstring, adductor stretching, and quadriceps/glute strengthening   Medical Diagnosis from Referral: M17.11 (ICD-10-CM) - Primary osteoarthritis of right knee M70.50 (ICD-10-CM) - Pes anserine bursitis   Evaluation Date: 3/14/2023  Authorization Period Expiration: 4/14/2023  Plan of Care Expiration: 6/9/2023  Progress Note Due: 4/14/2023  Visit # / Visits authorized: 1/ 1   FOTO: 1/ 3:  3/14/2023    Precautions: Standard and blood thinners    Time In: 1040  Time Out: 1120  Total Appointment Time (timed & untimed codes): 40 minutes    Subjective   Date of onset: 3 months ago  History of current condition - Meredith reports: started having swelling to R knee and pain to medial side (pes anserine). Went to [facility] for PT, no relief with exercises. 2/7 went to Dr. Lu. Had steroid shot and later went to A-fib so doesn't want to have any more injections. She's laid off of exercising for the past week and has felt better, but knows she needs to work out.   With going down stairs she has to lead with R LE or she gets a shocking pain to medial knee. She's also had to decrease her walking speed.        Past Medical History:   Diagnosis Date    Diverticulitis     Kidney stone     Osteoporosis     ostenopenia      Meredith Ramos  has a past surgical history that includes Bunionectomy; removal of breast implants; Colectomy (2004); Bunionectomy; Colectomy; kidney stones; Breast biopsy; and Hysterectomy (07/22/2013).    Meredith has a current medication list  which includes the following prescription(s): aspirin, calcium-vitamin d3, cholecalciferol (vitamin d3), magnesium citrate, metoprolol succinate, metoprolol tartrate, rivaroxaban, vitamin e acetate, and vitamin k2.    Review of patient's allergies indicates:   Allergen Reactions    Cortisone (hydrocortisone) [hydrocortisone]         Imaging, none:     Prior Therapy: yes, none for c/c  Social History: Pt lives alone in [facility] with a few steps to enter  Occupation: retired  Prior Level of Function: I with ADL's and driving. 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 Some difficulty with standing from the floor. Very limited with exercise - biking instead of walking, avoiding lower body exercises.     Pain:  Current 0/10, worst 7/10, best 0/10   Location: right medial knee  Description: stabbing, deep ache   Aggravating Factors: walking too fast, going down stairs (leading with L LE)  Easing Factors: elevation, ice    Pts goals: return to normal exercise routine    Objective     Observation: Pt is alert and oriented, good historian.     Gait: no deficits noted on level surface in clinic      Range of Motion:   Knee Left active Left Passive Right Active R passive   Flexion 135 140 135 140   Extension 0 +5 0 +5           Lower Extremity Strength  Right LE  Left LE    Ankle dorsiflexion: 5/5 Ankle dorsiflexion: 5/5   Ankle plantarflexion: 5/5 Ankle plantarflexion: 5/5   Knee extension: 5/5 Knee extension: 5/5   Knee flexion: 4+/5 Knee flexion: 4+/5   Hip flexion: 4+/5 Hip flexion: 4+/5   Hip external rotation 4/5 Hip external rotation 4+/5   Hip internal rotation 4+/5 Hip internal rotation 4+/5   Hip abduction:  4+/5 Hip abduction: 4+/5   Hip adduction: 5/5 Hip adduction: 5/5   Hip extension: 4-/5 Hip extension 4-/5       Flexibility:    Hamstring: R = WFL; L = WFL   Quad: R = slight;  "L = WFL   Piriformis: R: WFL; L WFL   Jere's test: R = mod  ; L = mild       Special Tests:   Left Right   Thessaly's Test - -   Patellar Grind Test - +         Function:    - SLS R: fair  - SLS L: fair  - Squat: valgus, quad dominance   - Sit <--> Stand: WFL without UE use   - Bed Mobility: no difficulty        Joint Mobility: patellar glide limited laterally B      Palpation: tenderness to R lateral distal patella, pes anserine. Increased tissue tension noted to IT band R>L    Sensation: grossly intact to light touch B LE            CMS Impairment/Limitation/Restriction for FOTO Knee Survey    Therapist reviewed FOTO scores for Meredith Ramos on 3/14/2023.   FOTO documents entered into [employer] - see Media section.    Limitation Score: 37%    Goal: 32%         TREATMENT   Treatment Time In: 1105  Treatment Time Out: 1120  Total Treatment time separate from Evaluation: 15 minutes      Meredith received the following manual therapy techniques: taping were applied to the: R knee for 5 minutes, including:  Preparation and application of Ktape to R knee. Y strip for patellar tracking, I strip decompression and pain relief to medial knee    Meredith participated in dynamic functional therapeutic activities to improve functional performance for 10  minutes, including:  Development and review of HEP, all pt questions answered.  ITB stretch 3 x 30" for HEP        Home Exercises and Patient Education Provided    Education provided:   - Therapy rationale and plan of care. Patient received education regarding appropriate care and removal of Kinesiotape. Patient instructed in proper removal techniques if skin irritation occurs.    Written Home Exercises Provided: yes.  Exercises were reviewed and Meredith was able to demonstrate them prior to the end of the session.  Meredith demonstrated good  understanding of the education provided.         Assessment   Meredith is a 73 y.o. female referred to outpatient Physical Therapy with a " medical diagnosis of M17.11 (ICD-10-CM) - Primary osteoarthritis of right knee M70.50 (ICD-10-CM) - Pes anserine bursitis . Pt presents with s/s consistent with referring diagnosis. Tenderness to lateral distal patellar pole with palpation, and marked tension noted to R>L IT band with palpation as well as restriction noted with Jere's test. Mild weakness noted to hips R>L with MMT as noted. B knee ROM is equal and WFL. Crepitus noted to patella with grind test. Limited lateral patellar glide noted B.     Pt prognosis is Good.   Pt will benefit from skilled outpatient Physical Therapy to address the deficits stated above and in the chart below, provide pt/family education, and to maximize pt's level of independence.     Plan of care discussed with patient: Yes  Pt's spiritual, cultural and educational needs considered and patient is agreeable to the plan of care and goals as stated below:     Anticipated Barriers for therapy: standard    Medical Necessity is demonstrated by the following  History  Co-morbidities and personal factors that may impact the plan of care Co-morbidities:   coping style/mechanism and OA, osteoporosis, prior surgery to feet with balance deficits reported    Personal Factors:   lifestyle     moderate   Examination  Body Structures and Functions, activity limitations and participation restrictions that may impact the plan of care Body Regions:   lower extremities    Body Systems:    gross symmetry  strength  balance  gait  transfers  transitions    Participation Restrictions:   Walking at normal pace, descending stairs, gym exercise    Activity limitations:   Learning and applying knowledge  no deficits    General Tasks and Commands  no deficits    Communication  no deficits    Mobility  walking  Stair navigation    Self care  no deficits    Domestic Life  shopping    Interactions/Relationships  no deficits    Life Areas  no deficits    Community and Social Life  community life  recreation and  leisure         moderate   Clinical Presentation evolving clinical presentation with changing clinical characteristics moderate   Decision Making/ Complexity Score: moderate     Goals:  Short Term Goals (4 weeks)  1. Pt will demonstrate independence with initial HEP to maintain progress made with therapy.  2. Pt will demonstrate ability to descend 3 steps with reciprocal gait pattern and use of HR.  3. Pt will report <5/10 pain at worst within the R knee for ease with ADL's    Long Term Goals (12 weeks)  1. Pt will demonstrate ability to descend stairs reciprocally without assistance or pain exacerbation to improve mobility in community and in her home.   2. Pt will demonstrate 4+/5 strength within the B LEfor ease with return to gym exercise and climbing stairs  3. Pt will report being independent with his/her HEP for maintenance of improvements gained during therapy sessions  4. Pt will report <2/10 pain at worst within the R knee for ease with ADLs  5. Pt will demonstrate ambulation x 300 ft without AD or antalgic gait to improve functional mobility in community.       Plan   Plan of care Certification: 3/14/2023 to 6/9/2023.    Outpatient Physical Therapy 2 times weekly for 12 weeks to include the following interventions: Aquatic Therapy, Gait Training, Iontophoresis (with dexamethasone), Manual Therapy, Moist Heat/ Ice, Neuromuscular Re-ed, Patient Education, Self Care, Therapeutic Activities, Therapeutic Exercise, and dry needling .     Alice Caldwell, PT

## 2023-03-17 ENCOUNTER — CLINICAL SUPPORT (OUTPATIENT)
Dept: REHABILITATION | Facility: OTHER | Age: 73
End: 2023-03-17
Payer: MEDICARE

## 2023-03-17 DIAGNOSIS — M25.561 RIGHT MEDIAL KNEE PAIN: Primary | ICD-10-CM

## 2023-03-17 PROCEDURE — 97110 THERAPEUTIC EXERCISES: CPT | Mod: PN | Performed by: PHYSICAL THERAPIST

## 2023-03-17 PROCEDURE — 97140 MANUAL THERAPY 1/> REGIONS: CPT | Mod: PN | Performed by: PHYSICAL THERAPIST

## 2023-03-17 NOTE — PROGRESS NOTES
"[facility]   Physical Therapy Treatment Note     Name: Meredith Ramos  Clinic Number: [id number]    Therapy Diagnosis:   Encounter Diagnosis   Name Primary?    Right medial knee pain Yes     Physician: Sarwat Lu, *    Visit Date: 3/17/2023    Physician Orders: PT Eval and Treat:   Start neuromuscular retraining program and HEP. Goals of increased pelvic and core stability, IT band, hamstring, adductor stretching, and quadriceps/glute strengthening   Medical Diagnosis from Referral: M17.11 (ICD-10-CM) - Primary osteoarthritis of right knee M70.50 (ICD-10-CM) - Pes anserine bursitis   Evaluation Date: 3/14/2023  Authorization Period Expiration: 4/14/2023  Plan of Care Expiration: 6/9/2023  Progress Note Due: 4/14/2023  Visit # / Visits authorized: 2/ 1   FOTO: 1/ 3:  3/14/2023     Precautions: Standard and blood thinners    PTA Visit #: 0/5     Time In: 0705  Time Out: 0745  Total Billable Time: 40 minutes    SUBJECTIVE     Pt reports: no significant changes. She did feels some relief with the tap, but continues with pain to medial knee.  She was compliant with home exercise program.  Response to previous treatment: some relief with taping  Functional change: no change    Pain: 0/10 at rest  Location: right medial knee     OBJECTIVE     Objective Measures updated at progress report unless specified.     Treatment     Meredith received the treatments listed below:      therapeutic exercises to develop strength, endurance, ROM, flexibility, posture, and core stabilization for 25 minutes including:  SLR flex with ER 2 x 10  Bridges 20x  SLR abd (slight ext) 20x increase resistance nv  Clams 10" x 10 increase resistance nv  Prone over EOM hip extension 2 x 10 increase resistance nv  Prone over EOM donkey kick 2 x 10 increase resistance nv    Add as tolerated: resisted sidestepping, eccentric HS    manual therapy techniques: Soft tissue Mobilization were applied to the: R LE for 15 " minutes, including:  Vacuum cupping to R ITB with static placement and gliding   Active release to quad and HS along ITB            Patient Education and Home Exercises     Home Exercises Provided and Patient Education Provided     Education provided:   - therex rationale    Written Home Exercises Provided: yes. Exercises were reviewed and Meredith was able to demonstrate them prior to the end of the session.  Meredith demonstrated good  understanding of the education provided. See EMR under Patient Instructions for exercises provided during therapy sessions    ASSESSMENT     Good tolerance to initial treatment session today. Verbal and tactile cuing for technique with exercises with good return technique. Vacuum cupping performed to R ITB today as well as active release.     Meredith Is progressing well towards her goals.   Pt prognosis is Good.     Pt will continue to benefit from skilled outpatient physical therapy to address the deficits listed in the problem list box on initial evaluation, provide pt/family education and to maximize pt's level of independence in the home and community environment.     Pt's spiritual, cultural and educational needs considered and pt agreeable to plan of care and goals.     Anticipated barriers to physical therapy: standard     Goals: updated 03/17/2023  Short Term Goals (4 weeks)  1. Pt will demonstrate independence with initial HEP to maintain progress made with therapy. (Progressing, not met)  2. Pt will demonstrate ability to descend 3 steps with reciprocal gait pattern and use of HR. (Progressing, not met)  3. Pt will report <5/10 pain at worst within the R knee for ease with ADL's. (Progressing, not met)     Long Term Goals (12 weeks)  1. Pt will demonstrate ability to descend stairs reciprocally without assistance or pain exacerbation to improve mobility in community and in her home. (Progressing, not met)  2. Pt will demonstrate 4+/5 strength within the B LEfor ease with  return to gym exercise and climbing stairs. (Progressing, not met)  3. Pt will report being independent with his/her HEP for maintenance of improvements gained during therapy sessions. (Progressing, not met)  4. Pt will report <2/10 pain at worst within the R knee for ease with ADLs. (Progressing, not met)  5. Pt will demonstrate ambulation x 300 ft without AD or antalgic gait to improve functional mobility in community.  (Progressing, not met)    PLAN   Plan of care Certification: 3/14/2023 to 6/9/2023.     Continue per plan of care with focus on B LE strength and soft tissue mobility.     Alice Caldwell, PT

## 2023-03-22 ENCOUNTER — CLINICAL SUPPORT (OUTPATIENT)
Dept: REHABILITATION | Facility: OTHER | Age: 73
End: 2023-03-22
Payer: MEDICARE

## 2023-03-22 DIAGNOSIS — M25.561 RIGHT MEDIAL KNEE PAIN: Primary | ICD-10-CM

## 2023-03-22 PROCEDURE — 97140 MANUAL THERAPY 1/> REGIONS: CPT | Mod: PN | Performed by: PHYSICAL THERAPIST

## 2023-03-22 PROCEDURE — 97110 THERAPEUTIC EXERCISES: CPT | Mod: PN | Performed by: PHYSICAL THERAPIST

## 2023-03-22 NOTE — PROGRESS NOTES
"[facility]   Physical Therapy Treatment Note     Name: Meredith Ramos  Clinic Number: [id number]    Therapy Diagnosis:   Encounter Diagnosis   Name Primary?    Right medial knee pain Yes     Physician: Sarwat Lu, *    Visit Date: 3/22/2023    Physician Orders: PT Eval and Treat:   Start neuromuscular retraining program and HEP. Goals of increased pelvic and core stability, IT band, hamstring, adductor stretching, and quadriceps/glute strengthening   Medical Diagnosis from Referral: M17.11 (ICD-10-CM) - Primary osteoarthritis of right knee M70.50 (ICD-10-CM) - Pes anserine bursitis   Evaluation Date: 3/14/2023  Authorization Period Expiration: 4/14/2023  Plan of Care Expiration: 6/9/2023  Progress Note Due: 4/14/2023  Visit # / Visits authorized: 3/ 21   FOTO: 1/ 3:  3/14/2023     Precautions: Standard and blood thinners    PTA Visit #: 0/5     Time In: 1520  Time Out: 1600  Total Billable Time: 40 minutes    SUBJECTIVE     Pt reports: still having pain with weightbearing, but thinks it's improving a little. She's been foam rolling her ITB and it's very uncomfortable but she does think it's helpful .   She was compliant with home exercise program.  Response to previous treatment: some relief with taping  Functional change: no change    Pain: 0/10 at rest  Location: right medial knee     OBJECTIVE     Objective Measures updated at progress report unless specified.     Treatment     Meredith received the treatments listed below:      therapeutic exercises to develop strength, endurance, ROM, flexibility, posture, and core stabilization for 10 minutes including:  +Shuttle DL x 3 black cord x 20  +Shuttle SL R x 1 black cord x 10  SLR flex with ER 2 x 10  Bridges 20x  SLR abd (slight ext) 20x increase resistance nv  Clams 10" x 10 increase resistance nv  Prone over EOM hip extension 2 x 10 increase resistance nv  Prone over EOM donkey kick 2 x 10 increase resistance nv    Add as " tolerated: resisted sidestepping, eccentric HS    manual therapy techniques: Soft tissue Mobilization were applied to the: R LE for 30 minutes, including:  Vacuum cupping to R ITB with static placement and gliding   IASTM with MFR tools to ITB and distal HS  Active release to quad and HS along ITB            Patient Education and Home Exercises     Home Exercises Provided and Patient Education Provided     Education provided:   - therex rationale    Written Home Exercises Provided: yes. Exercises were reviewed and Meredith was able to demonstrate them prior to the end of the session.  Meredith demonstrated good  understanding of the education provided. See EMR under Patient Instructions for exercises provided during therapy sessions    ASSESSMENT     Pt continues with marked restrictions along R IT band, good tolerance to instrument assisted STM today. HEP reviewed and green theraband issued for abner. Shuttle initiated today, no pain with DL, mild c/o medial knee discomfort in 60-70 deg flexion.     Meredith Is progressing well towards her goals.   Pt prognosis is Good.     Pt will continue to benefit from skilled outpatient physical therapy to address the deficits listed in the problem list box on initial evaluation, provide pt/family education and to maximize pt's level of independence in the home and community environment.     Pt's spiritual, cultural and educational needs considered and pt agreeable to plan of care and goals.     Anticipated barriers to physical therapy: standard     Goals: updated 03/22/2023  Short Term Goals (4 weeks)  1. Pt will demonstrate independence with initial HEP to maintain progress made with therapy. (Progressing, not met)  2. Pt will demonstrate ability to descend 3 steps with reciprocal gait pattern and use of HR. (Progressing, not met)  3. Pt will report <5/10 pain at worst within the R knee for ease with ADL's. (Progressing, not met)     Long Term Goals (12 weeks)  1. Pt will  demonstrate ability to descend stairs reciprocally without assistance or pain exacerbation to improve mobility in community and in her home. (Progressing, not met)  2. Pt will demonstrate 4+/5 strength within the B LEfor ease with return to gym exercise and climbing stairs. (Progressing, not met)  3. Pt will report being independent with his/her HEP for maintenance of improvements gained during therapy sessions. (Progressing, not met)  4. Pt will report <2/10 pain at worst within the R knee for ease with ADLs. (Progressing, not met)  5. Pt will demonstrate ambulation x 300 ft without AD or antalgic gait to improve functional mobility in community.  (Progressing, not met)    PLAN   Plan of care Certification: 3/14/2023 to 6/9/2023.     Continue per plan of care with focus on B LE strength and soft tissue mobility.     Alice Caldwell, PT

## 2023-03-23 ENCOUNTER — OFFICE VISIT (OUTPATIENT)
Dept: ENDOCRINOLOGY | Facility: CLINIC | Age: 73
End: 2023-03-23
Payer: MEDICARE

## 2023-03-23 VITALS
HEIGHT: 65 IN | SYSTOLIC BLOOD PRESSURE: 126 MMHG | WEIGHT: 118.5 LBS | HEART RATE: 81 BPM | DIASTOLIC BLOOD PRESSURE: 60 MMHG | OXYGEN SATURATION: 97 % | BODY MASS INDEX: 19.74 KG/M2

## 2023-03-23 DIAGNOSIS — M81.0 OSTEOPOROSIS, UNSPECIFIED OSTEOPOROSIS TYPE, UNSPECIFIED PATHOLOGICAL FRACTURE PRESENCE: ICD-10-CM

## 2023-03-23 DIAGNOSIS — M80.00XD AGE-RELATED OSTEOPOROSIS WITH CURRENT PATHOLOGICAL FRACTURE WITH ROUTINE HEALING: ICD-10-CM

## 2023-03-23 DIAGNOSIS — Z87.442 HISTORY OF KIDNEY STONES: ICD-10-CM

## 2023-03-23 PROCEDURE — 99999 PR PBB SHADOW E&M-EST. PATIENT-LVL IV: ICD-10-PCS | Mod: PBBFAC,,, | Performed by: INTERNAL MEDICINE

## 2023-03-23 PROCEDURE — 99999 PR PBB SHADOW E&M-EST. PATIENT-LVL IV: CPT | Mod: PBBFAC,,, | Performed by: INTERNAL MEDICINE

## 2023-03-23 PROCEDURE — 99214 OFFICE O/P EST MOD 30 MIN: CPT | Mod: PBBFAC | Performed by: INTERNAL MEDICINE

## 2023-03-23 PROCEDURE — 99204 OFFICE O/P NEW MOD 45 MIN: CPT | Mod: S$PBB,,, | Performed by: INTERNAL MEDICINE

## 2023-03-23 PROCEDURE — 99204 PR OFFICE/OUTPT VISIT, NEW, LEVL IV, 45-59 MIN: ICD-10-PCS | Mod: S$PBB,,, | Performed by: INTERNAL MEDICINE

## 2023-03-23 RX ORDER — CETIRIZINE HYDROCHLORIDE 10 MG/1
10 TABLET ORAL
COMMUNITY
Start: 2022-12-28 | End: 2023-10-11

## 2023-03-23 NOTE — PROGRESS NOTES
"Subjective:      Patient ID: Meredith Ramos is a 73 y.o. female.    Chief Complaint:  Osteoporosis      History of Present Illness  Osteoporosis/osteopenia diagnosed just recently.  Current treatment: none   Previous medication use and side effects:none   Falls: fractured left wrist while walking with her phone. Tripped over a uneven ground and fell froward.   Fractures: deneis other falls or fractures     DXA: 2023    Dental visits: up to date    Diet:   Calcium intake: yes    Supplements:   Calcium: once daily   Vitamin D: 1500 IUs  MVI: denies     Exercise:  yes,  PT  Balance: pretty good      Menopause: 52  HRT history:denies     Glucocorticoid History: recently injection over her knee  PPI: denies   Diabetes medicines: denies   Anti-seizure medication: denies     Personal history of kidney stones: 1980s and   Family history of kidney stones: denies   Family history of osteoporosis or other bone disease: mother   Family history of fractures:  mother --> hip, wrist and ankle    Review of Systems  Denies fever chills or diarrhea   Objective:   Physical Exam  Vitals:    03/23/23 1009   BP: 126/60   BP Location: Right arm   Patient Position: Sitting   BP Method: Medium (Manual)   Pulse: 81   SpO2: 97%   Weight: 53.8 kg (118 lb 8 oz)   Height: 5' 5" (1.651 m)       BP Readings from Last 3 Encounters:   03/23/23 126/60   03/08/23 128/68   02/17/23 126/63     Wt Readings from Last 1 Encounters:   03/23/23 1009 53.8 kg (118 lb 8 oz)         Body mass index is 19.72 kg/m².    Lab Review:   Lab Results   Component Value Date    HGBA1C 5.0 02/08/2023     Lab Results   Component Value Date    CHOL 157 01/07/2023    HDL 58 01/07/2023    LDLCALC 88.8 01/07/2023    TRIG 51 01/07/2023    CHOLHDL 36.9 01/07/2023     Lab Results   Component Value Date     02/17/2023    K 3.8 02/17/2023     02/17/2023    CO2 29 02/17/2023    GLU 90 02/17/2023    BUN 11 02/17/2023    CREATININE 0.7 02/17/2023    CALCIUM 9.5 02/17/2023 "    PROT 6.5 02/17/2023    ALBUMIN 4.0 02/17/2023    BILITOT 0.8 02/17/2023    ALKPHOS 86 02/17/2023    AST 25 02/17/2023    ALT 21 02/17/2023    ANIONGAP 6 (L) 02/17/2023    ESTGFRAFRICA >60.0 07/02/2021    EGFRNONAA >60.0 07/02/2021    TSH 1.240 02/07/2023     Order: [id number]  Details    Reading Physician Reading Date Result Priority   Alida Uriarte MD  [phone]  [phone] 2/10/2023      Narrative & Impression  EXAMINATION:  DEXA BONE DENSITY SPINE HIP     CLINICAL HISTORY:  Other specified disorders of bone density and structure, unspecified site.  72 y/o female with a left wrist fracture.  She had hysterectomy at 50 y/o.  Pt's Mother had a hip fracture.  She is taking Calcium and Vit D supplements.  She has a history of Renal Stones.  She exercise regularly and does not smoke.     TECHNIQUE:  DXA specification: Main [employer] [employer] Horizon A ([id number])     Bone Mineral Density scanning was performed over the hip and lumbar spine.     Review of the images confirms satisfactory positioning and technique.     COMPARISON:  Comparison study done on 07/29/2020.  [facility]     FINDINGS:  Lumbar spine (L1-L4):               BMD is 1.103 g/cm2, T-score is 0.5, and Z-score is 2.7.     Total hip:                                BMD is 0.803 g/cm2, T-score is -1.1, and Z-score is 0.4.     Femoral neck:                          BMD is 0.636 g/cm2, T-score is -1.9, and Z-score is -0.1.     Distal 1/3 radius:                      Not applicable     FRAX:     24% risk of a major osteoporotic fracture in the next 10 years.     7.2% risk of hip fracture in the next 10 years.     Assessment and Plan     Age-related osteoporosis with current pathological fracture with routine healing  Secondary evaluation   Discussed different medication in detail   Has kidney stones, needs eval for this     Balance is good   Dietary calcium and vitamin D supplements daily     History of kidney stones  24 hr urine collection  for ca/creat

## 2023-03-23 NOTE — ASSESSMENT & PLAN NOTE
Secondary evaluation   Discussed different medication in detail   Has kidney stones, needs eval for this     Balance is good   Dietary calcium and vitamin D supplements daily

## 2023-03-23 NOTE — PROGRESS NOTES
"[facility]   Physical Therapy Treatment Note     Name: Meredith Ramos  Clinic Number: [id number]    Therapy Diagnosis:   Encounter Diagnosis   Name Primary?    Right medial knee pain Yes       Physician: Sarwat Lu, *    Visit Date: 3/24/2023    Physician Orders: PT Eval and Treat:   Start neuromuscular retraining program and HEP. Goals of increased pelvic and core stability, IT band, hamstring, adductor stretching, and quadriceps/glute strengthening   Medical Diagnosis from Referral: M17.11 (ICD-10-CM) - Primary osteoarthritis of right knee M70.50 (ICD-10-CM) - Pes anserine bursitis   Evaluation Date: 3/14/2023  Authorization Period Expiration: 4/14/2023  Plan of Care Expiration: 6/9/2023  Progress Note Due: 4/14/2023  Visit # / Visits authorized: 3/ 21   FOTO: 1/ 3:  3/14/2023     Precautions: Standard and blood thinners    PTA Visit #: 0/5     Time In: 9:15am  Time Out: 10:00am  Total Billable Time: 45 minutes    SUBJECTIVE     Pt reports: being able to walk 4 miles with less knee pain today.    She was compliant with home exercise program.  Response to previous treatment: some relief with taping  Functional change: no change    Pain: 0/10 at rest  Location: right medial knee     OBJECTIVE     Objective Measures updated at progress report unless specified.     Treatment     Meredith received the treatments listed below:      therapeutic exercises to develop strength, endurance, ROM, flexibility, posture, and core stabilization for 15 minutes including:  +Shuttle DL x 3 black cord x 3x10  +Shuttle SL R x 1 black cord 3x10  +resisted side stepping 2 x 50 ft x RTB at ankles    SLR flex with ER 2 x 10  Bridges 20x  SLR abd (slight ext) 20x increase resistance nv  Clams 10" x 10 increase resistance nv  Prone over EOM hip extension 2 x 10 increase resistance nv  Prone over EOM donkey kick 2 x 10 increase resistance nv    Add as tolerated: resisted sidestepping, eccentric " HS    manual therapy techniques: Soft tissue Mobilization were applied to the: R LE for 30 minutes, including:  Vacuum cupping to R ITB with static placement and gliding   IASTM with MFR tools to ITB and distal HS  Active release to quad and HS along ITB            Patient Education and Home Exercises     Home Exercises Provided and Patient Education Provided     Education provided:   - therex rationale    Written Home Exercises Provided: yes. Exercises were reviewed and Meredith was able to demonstrate them prior to the end of the session.  Meredith demonstrated good  understanding of the education provided. See EMR under Patient Instructions for exercises provided during therapy sessions    ASSESSMENT     Pt notes increased medial knee pain over bursa during increased repetitions with SL press on shuttle and side stepping. Heavy HC for knee tracking towards outer toes, with VC for glute squeeze for improved gluteal activation. Pt notes resolution of bursa pain with VC/TC for hip abd and derotation of knee.    Meredith Is progressing well towards her goals.   Pt prognosis is Good.     Pt will continue to benefit from skilled outpatient physical therapy to address the deficits listed in the problem list box on initial evaluation, provide pt/family education and to maximize pt's level of independence in the home and community environment.     Pt's spiritual, cultural and educational needs considered and pt agreeable to plan of care and goals.     Anticipated barriers to physical therapy: standard     Goals: updated 03/24/2023  Short Term Goals (4 weeks)  1. Pt will demonstrate independence with initial HEP to maintain progress made with therapy. (Progressing, not met)  2. Pt will demonstrate ability to descend 3 steps with reciprocal gait pattern and use of HR. (Progressing, not met)  3. Pt will report <5/10 pain at worst within the R knee for ease with ADL's. (Progressing, not met)     Long Term Goals (12 weeks)  1. Pt  will demonstrate ability to descend stairs reciprocally without assistance or pain exacerbation to improve mobility in community and in her home. (Progressing, not met)  2. Pt will demonstrate 4+/5 strength within the B LEfor ease with return to gym exercise and climbing stairs. (Progressing, not met)  3. Pt will report being independent with his/her HEP for maintenance of improvements gained during therapy sessions. (Progressing, not met)  4. Pt will report <2/10 pain at worst within the R knee for ease with ADLs. (Progressing, not met)  5. Pt will demonstrate ambulation x 300 ft without AD or antalgic gait to improve functional mobility in community.  (Progressing, not met)    PLAN   Plan of care Certification: 3/14/2023 to 6/9/2023.     Continue per plan of care with focus on B LE strength and soft tissue mobility.     Sofiya Lopez, PT

## 2023-03-23 NOTE — PATIENT INSTRUCTIONS
"Osteoporosis medications  These are the medications we discussed for osteoporosis treatment:    - Bisphosphonates:         - these slow down bone loss         - oral forms (Fosamax and Actonel are examples) - taken once weekly or once monthly         - IV form (Reclast) - given intravenously once yearly    - Prolia (denosumab):          - slows down bone loss           - it is a subcutaneous injection (under the skin) every 6 months, given in the office    - Forteo (teriparatide) or Tymlos (abaloparatide):           - medications that "build bone" or increases bone formation           - subcutaneous injection (under the skin) taken once daily that you self-administer at home    - Evenity   - medicatinos that build bone and slow down bone loss     For more information on medications: https://www.nof.org/patients/treatment/medicationadherence/    HOW TO COLLECT AN ACCURATE   24 HOUR URINE SPECIMEN     I want you to collect EVERY drop of your urine during a 24 hour period. I do not care what the volume of the urine is as long as it represents every drop that you pass during that 24 hour period. If you need to have a bowel movement, you may separate the urine but I want every drop.     Begin the collection on a morning which is convenient for you. At that time, pass your urine, flush it down the toilet and note the exact time. Now you have an empty bladder and empty bottle. That starts the collection. Collect every drop all during the day and night until exactly the same time the next morning, when you should pass your urine and add it to the bottle.     Bring the closed container back to the same laboratory that issued the empty container.     "

## 2023-03-24 ENCOUNTER — CLINICAL SUPPORT (OUTPATIENT)
Dept: REHABILITATION | Facility: OTHER | Age: 73
End: 2023-03-24
Payer: MEDICARE

## 2023-03-24 ENCOUNTER — PATIENT MESSAGE (OUTPATIENT)
Dept: CARDIOLOGY | Facility: CLINIC | Age: 73
End: 2023-03-24
Payer: MEDICARE

## 2023-03-24 DIAGNOSIS — M25.561 RIGHT MEDIAL KNEE PAIN: Primary | ICD-10-CM

## 2023-03-24 PROCEDURE — 97110 THERAPEUTIC EXERCISES: CPT | Mod: PN

## 2023-03-24 PROCEDURE — 97140 MANUAL THERAPY 1/> REGIONS: CPT | Mod: PN

## 2023-03-24 NOTE — TELEPHONE ENCOUNTER
Pt updated on ok to go up to 25 mg as per dr Ferguson. She verbalized understanding and stated that she will keep her eye on her HR/ trends with her watch.

## 2023-03-27 ENCOUNTER — CLINICAL SUPPORT (OUTPATIENT)
Dept: REHABILITATION | Facility: OTHER | Age: 73
End: 2023-03-27
Payer: MEDICARE

## 2023-03-27 DIAGNOSIS — M25.561 RIGHT MEDIAL KNEE PAIN: Primary | ICD-10-CM

## 2023-03-27 PROCEDURE — 97140 MANUAL THERAPY 1/> REGIONS: CPT | Mod: PN | Performed by: PHYSICAL THERAPIST

## 2023-03-27 PROCEDURE — 97110 THERAPEUTIC EXERCISES: CPT | Mod: PN | Performed by: PHYSICAL THERAPIST

## 2023-03-28 ENCOUNTER — LAB VISIT (OUTPATIENT)
Dept: LAB | Facility: HOSPITAL | Age: 73
End: 2023-03-28
Attending: INTERNAL MEDICINE
Payer: MEDICARE

## 2023-03-28 DIAGNOSIS — M80.00XD AGE-RELATED OSTEOPOROSIS WITH CURRENT PATHOLOGICAL FRACTURE WITH ROUTINE HEALING: ICD-10-CM

## 2023-03-28 DIAGNOSIS — M81.0 OSTEOPOROSIS, UNSPECIFIED OSTEOPOROSIS TYPE, UNSPECIFIED PATHOLOGICAL FRACTURE PRESENCE: ICD-10-CM

## 2023-03-28 LAB
25(OH)D3+25(OH)D2 SERPL-MCNC: 43 NG/ML (ref 30–96)
ALBUMIN SERPL BCP-MCNC: 4.1 G/DL (ref 3.5–5.2)
ANION GAP SERPL CALC-SCNC: 8 MMOL/L (ref 8–16)
BUN SERPL-MCNC: 15 MG/DL (ref 8–23)
CALCIUM SERPL-MCNC: 9.9 MG/DL (ref 8.7–10.5)
CHLORIDE SERPL-SCNC: 104 MMOL/L (ref 95–110)
CO2 SERPL-SCNC: 30 MMOL/L (ref 23–29)
CREAT SERPL-MCNC: 0.7 MG/DL (ref 0.5–1.4)
EST. GFR  (NO RACE VARIABLE): >60 ML/MIN/1.73 M^2
GLUCOSE SERPL-MCNC: 82 MG/DL (ref 70–110)
PHOSPHATE SERPL-MCNC: 3.7 MG/DL (ref 2.7–4.5)
POTASSIUM SERPL-SCNC: 3.7 MMOL/L (ref 3.5–5.1)
SODIUM SERPL-SCNC: 142 MMOL/L (ref 136–145)

## 2023-03-28 PROCEDURE — 80069 RENAL FUNCTION PANEL: CPT | Performed by: INTERNAL MEDICINE

## 2023-03-28 PROCEDURE — 82306 VITAMIN D 25 HYDROXY: CPT | Performed by: INTERNAL MEDICINE

## 2023-03-28 PROCEDURE — 86364 TISS TRNSGLTMNASE EA IG CLAS: CPT | Performed by: INTERNAL MEDICINE

## 2023-03-28 PROCEDURE — 84165 PATHOLOGIST INTERPRETATION SPE: ICD-10-PCS | Mod: 26,,, | Performed by: PATHOLOGY

## 2023-03-28 PROCEDURE — 84165 PROTEIN E-PHORESIS SERUM: CPT | Performed by: INTERNAL MEDICINE

## 2023-03-28 PROCEDURE — 84165 PROTEIN E-PHORESIS SERUM: CPT | Mod: 26,,, | Performed by: PATHOLOGY

## 2023-03-29 LAB
ALBUMIN SERPL ELPH-MCNC: 4.13 G/DL (ref 3.35–5.55)
ALPHA1 GLOB SERPL ELPH-MCNC: 0.26 G/DL (ref 0.17–0.41)
ALPHA2 GLOB SERPL ELPH-MCNC: 0.57 G/DL (ref 0.43–0.99)
B-GLOBULIN SERPL ELPH-MCNC: 0.67 G/DL (ref 0.5–1.1)
GAMMA GLOB SERPL ELPH-MCNC: 0.67 G/DL (ref 0.67–1.58)
PATHOLOGIST INTERPRETATION SPE: NORMAL
PROT SERPL-MCNC: 6.3 G/DL (ref 6–8.4)

## 2023-03-30 ENCOUNTER — CLINICAL SUPPORT (OUTPATIENT)
Dept: REHABILITATION | Facility: OTHER | Age: 73
End: 2023-03-30
Payer: MEDICARE

## 2023-03-30 DIAGNOSIS — M25.561 RIGHT MEDIAL KNEE PAIN: Primary | ICD-10-CM

## 2023-03-30 LAB — TTG IGA SER-ACNC: 0.4 U/ML

## 2023-03-30 PROCEDURE — 97110 THERAPEUTIC EXERCISES: CPT | Mod: PN | Performed by: PHYSICAL THERAPIST

## 2023-03-30 PROCEDURE — 97140 MANUAL THERAPY 1/> REGIONS: CPT | Mod: PN | Performed by: PHYSICAL THERAPIST

## 2023-03-30 NOTE — PROGRESS NOTES
"[facility]   Physical Therapy Treatment Note     Name: Meredith Ramos  Clinic Number: [id number]    Therapy Diagnosis:   Encounter Diagnosis   Name Primary?    Right medial knee pain Yes       Physician: Sarwat Lu, *    Visit Date: 3/30/2023    Physician Orders: PT Eval and Treat:   Start neuromuscular retraining program and HEP. Goals of increased pelvic and core stability, IT band, hamstring, adductor stretching, and quadriceps/glute strengthening   Medical Diagnosis from Referral: M17.11 (ICD-10-CM) - Primary osteoarthritis of right knee M70.50 (ICD-10-CM) - Pes anserine bursitis   Evaluation Date: 3/14/2023  Authorization Period Expiration: 4/14/2023  Plan of Care Expiration: 6/9/2023  Progress Note Due: 4/14/2023  Visit # / Visits authorized: 6/ 21   FOTO: 1/ 3:  3/14/2023     Precautions: Standard and blood thinners    PTA Visit #: 0/5     Time In: 1115  Time Out: 1200  Total Billable Time: 45 minutes    SUBJECTIVE     Pt reports: noted a little swelling to posterior and medial knee with doing exercise bike.   She was compliant with home exercise program.  Response to previous treatment: some relief with taping  Functional change: no change    Pain: 0/10 at rest  Location: right medial knee     OBJECTIVE     Objective Measures updated at progress report unless specified.     Treatment     Meredith received the treatments listed below:      therapeutic exercises to develop strength, endurance, ROM, flexibility, posture, and core stabilization for 25 minutes including:  SL bridges 10x  3D HS 2 x 10  Shuttle DL x 3 black cord x 3x10  Shuttle SL R x 1 black cord 3x10  resisted side stepping 2 x 50 ft x RTB at ankles    SLR flex with ER 2 x 10  +LAQ with ball squeeze 2 x 10 with 10" hold at 10th rep, 2# cw  +Prone quad stretch 3 x 30" with towel roll behind knee, 1 x 30" towel roll under knee  Bridges 20x  SLR abd (slight ext) 20x increase resistance nv  Clams 10" x 10 " increase resistance nv  Prone over EOM hip extension 2 x 10 increase resistance nv  Prone over EOM donkey kick 2 x 10 increase resistance nv    Add as tolerated: resisted sidestepping, eccentric HS    manual therapy techniques: Soft tissue Mobilization were applied to the: R LE for 20 minutes, including:  Vacuum cupping to R ITB with static placement and gliding   IASTM with MFR tools to ITB and distal HS  Active release to quad and HS along ITB  Seated tibiofemoral distraction  Preparation and application of Ktape to R knee. Y strip to decrease lateral patellar tracking. I strip to medial joint line for pain relief.           Patient Education and Home Exercises     Home Exercises Provided and Patient Education Provided     Education provided:   - therex rationale    Written Home Exercises Provided: yes. Exercises were reviewed and Meredith was able to demonstrate them prior to the end of the session.  Meredith demonstrated good  understanding of the education provided. See EMR under Patient Instructions for exercises provided during therapy sessions    ASSESSMENT     Pt continues with pain to medial and anterior knee. Lateral patellar tracking noted with knee flex/ext. Pain to patellar tendon with mid-range flexion. Good improvement in tolerance to end range flexion following distraction with towel roll in prone quad stretch. No pain with active flex/ext following manual joint distraction and application of Ktape to reduce lateral tracking.     Meredith Is progressing well towards her goals.   Pt prognosis is Good.     Pt will continue to benefit from skilled outpatient physical therapy to address the deficits listed in the problem list box on initial evaluation, provide pt/family education and to maximize pt's level of independence in the home and community environment.     Pt's spiritual, cultural and educational needs considered and pt agreeable to plan of care and goals.     Anticipated barriers to physical  therapy: standard     Goals: updated 03/30/2023  Short Term Goals (4 weeks)  1. Pt will demonstrate independence with initial HEP to maintain progress made with therapy. (Progressing, not met)  2. Pt will demonstrate ability to descend 3 steps with reciprocal gait pattern and use of HR. (Progressing, not met)  3. Pt will report <5/10 pain at worst within the R knee for ease with ADL's. (Progressing, not met)     Long Term Goals (12 weeks)  1. Pt will demonstrate ability to descend stairs reciprocally without assistance or pain exacerbation to improve mobility in community and in her home. (Progressing, not met)  2. Pt will demonstrate 4+/5 strength within the B LEfor ease with return to gym exercise and climbing stairs. (Progressing, not met)  3. Pt will report being independent with his/her HEP for maintenance of improvements gained during therapy sessions. (Progressing, not met)  4. Pt will report <2/10 pain at worst within the R knee for ease with ADLs. (Progressing, not met)  5. Pt will demonstrate ambulation x 300 ft without AD or antalgic gait to improve functional mobility in community.  (Progressing, not met)    PLAN   Plan of care Certification: 3/14/2023 to 6/9/2023.     Continue per plan of care with focus on B LE strength and soft tissue mobility.     Alice Caldwell, PT

## 2023-03-30 NOTE — PATIENT INSTRUCTIONS
[email or web address].[email or web address]/BraceAbility-Basketball-Stabilizer-Dislocation-Patellofemoral/dp/[email or web address]/ref=sr_1_16?keywords=lateral+buttress+knee+brace&aei=[id number]=lateral+buttr%[email or web address]&sr=8-16

## 2023-04-03 ENCOUNTER — CLINICAL SUPPORT (OUTPATIENT)
Dept: REHABILITATION | Facility: OTHER | Age: 73
End: 2023-04-03
Payer: MEDICARE

## 2023-04-03 DIAGNOSIS — M25.561 RIGHT MEDIAL KNEE PAIN: Primary | ICD-10-CM

## 2023-04-03 PROCEDURE — 97110 THERAPEUTIC EXERCISES: CPT | Mod: PN

## 2023-04-05 ENCOUNTER — OFFICE VISIT (OUTPATIENT)
Dept: SLEEP MEDICINE | Facility: CLINIC | Age: 73
End: 2023-04-05
Payer: MEDICARE

## 2023-04-05 VITALS
BODY MASS INDEX: 19.83 KG/M2 | HEIGHT: 65 IN | DIASTOLIC BLOOD PRESSURE: 71 MMHG | SYSTOLIC BLOOD PRESSURE: 122 MMHG | WEIGHT: 119 LBS | HEART RATE: 61 BPM

## 2023-04-05 DIAGNOSIS — F51.09 OTHER INSOMNIA NOT DUE TO A SUBSTANCE OR KNOWN PHYSIOLOGICAL CONDITION: Primary | ICD-10-CM

## 2023-04-05 DIAGNOSIS — G47.9 SLEEP DISORDER: ICD-10-CM

## 2023-04-05 DIAGNOSIS — I48.0 PAF (PAROXYSMAL ATRIAL FIBRILLATION): ICD-10-CM

## 2023-04-05 DIAGNOSIS — R06.83 SNORING: ICD-10-CM

## 2023-04-05 DIAGNOSIS — R35.1 NOCTURIA: ICD-10-CM

## 2023-04-05 PROCEDURE — 99999 PR PBB SHADOW E&M-EST. PATIENT-LVL III: CPT | Mod: PBBFAC,,, | Performed by: INTERNAL MEDICINE

## 2023-04-05 PROCEDURE — 99204 OFFICE O/P NEW MOD 45 MIN: CPT | Mod: S$PBB,,, | Performed by: INTERNAL MEDICINE

## 2023-04-05 PROCEDURE — 99999 PR PBB SHADOW E&M-EST. PATIENT-LVL III: ICD-10-PCS | Mod: PBBFAC,,, | Performed by: INTERNAL MEDICINE

## 2023-04-05 PROCEDURE — 99213 OFFICE O/P EST LOW 20 MIN: CPT | Mod: PBBFAC | Performed by: INTERNAL MEDICINE

## 2023-04-05 PROCEDURE — 99204 PR OFFICE/OUTPT VISIT, NEW, LEVL IV, 45-59 MIN: ICD-10-PCS | Mod: S$PBB,,, | Performed by: INTERNAL MEDICINE

## 2023-04-05 NOTE — PROGRESS NOTES
"  Referred by Lisa Childress MD     NEW PATIENT VISIT    Meredith Ramos  is a pleasant 73 y.o. female  with PMH significant for pAF who presents for evaluation of sleepiness and trouble sleeping and snoring.    SLEEP SCHEDULE   Environment    Bed Time 9:30P-10:30P    Sleep Latency Not long   Arousals 1-2   Nocturia 1-2   Back to sleep variable   Wake time 6A -7A   Naps 15-25 minutes   Work        Past Medical History:   Diagnosis Date    Diverticulitis     Kidney stone     Osteoporosis     ostenopenia      Patient Active Problem List   Diagnosis    Abnormal Pap smear    Atypical glandular cells on Pap smear    Palpitations    Left ureteral stone    Osteopenia    History of kidney stones    History of ulcerative colitis    Supraventricular tachycardia    PAF (paroxysmal atrial fibrillation)    MVP (mitral valve prolapse)    Nonrheumatic mitral valve regurgitation    Right medial knee pain    Age-related osteoporosis with current pathological fracture with routine healing       Current Outpatient Medications:     calcium-vitamin D3 (OS- + D3) 500 mg(1,250mg) -200 unit per tablet, Calcium 500 + D, Disp: , Rfl:     cetirizine (ZYRTEC) 10 MG tablet, Take 10 mg by mouth., Disp: , Rfl:     cholecalciferol, vitamin D3, (VITAMIN D3) 25 mcg (1,000 unit) capsule, , Disp: , Rfl:     MAGNESIUM CITRATE ORAL, Take 250 mg by mouth once daily., Disp: , Rfl:     metoprolol succinate (TOPROL-XL) 25 MG 24 hr tablet, Take 1 tablet (25 mg total) by mouth once daily. .5-1 once or twice daily or as directed, Disp: 90 tablet, Rfl: 3    rivaroxaban (XARELTO) 20 mg Tab, Take 1 tablet (20 mg total) by mouth daily with dinner or evening meal., Disp: 30 tablet, Rfl: 11    vitamin E acetate (VITAMIN E ORAL), Take 400 mg by mouth once daily., Disp: , Rfl:      Vitals:    04/05/23 1411   BP: 122/71   BP Location: Left arm   Patient Position: Sitting   BP Method: Small (Automatic)   Pulse: 61   Weight: 54 kg (119 lb)   Height: 5' 5" " (1.651 m)     Physical Exam:    GEN:   Well-appearing  Psych:  Appropriate affect, demonstrates insight  SKIN:  No rash on the face or bridge of the nose      LABS:   Lab Results   Component Value Date    HGB 14.7 02/17/2023    CO2 30 (H) 03/28/2023       RECORDS REVIEWED PREVIOUSLY:    No prior sleep testing.    ASSESSMENT      PROBLEM DESCRIPTION/ Sx on Presentation  STATUS   possible NOEMI   + snoring, + snoring arousals, no witnessed apneas , no recent bed partner  Never wakes up rested  Her mother has NOEMI    New   Daytime Sx   + sleepiness when inactive  ESS 10/24 on intake  New   Insomnia   Trouble falling asleep:   Maintenance:         waking frequently, sometimes hard to get back to sleep  Prior hypnotics:        Current hypnotics:     New   Nocturia   x 1-2 per sleep period, drinks a gallon of water a day (now stopping 1 hour before bedtime)  New   Other issues:     PLAN     -recommend sleep testing   -discussed trial therapy if NOEMI present and the patient is open to a trial of CPAP therapy    RTC          The patient was given open opportunity to ask questions and/or express concerns about treatment plan.   All questions/concerns were discussed.     Two patient identifiers used prior to evaluation.

## 2023-04-06 ENCOUNTER — CLINICAL SUPPORT (OUTPATIENT)
Dept: REHABILITATION | Facility: OTHER | Age: 73
End: 2023-04-06
Payer: MEDICARE

## 2023-04-06 DIAGNOSIS — M25.561 RIGHT MEDIAL KNEE PAIN: Primary | ICD-10-CM

## 2023-04-06 PROCEDURE — 97110 THERAPEUTIC EXERCISES: CPT | Mod: PN | Performed by: PHYSICAL THERAPIST

## 2023-04-06 NOTE — PROGRESS NOTES
"[facility]   Physical Therapy Treatment Note     Name: Meredith Ramos  Clinic Number: [id number]    Therapy Diagnosis:   Encounter Diagnosis   Name Primary?    Right medial knee pain Yes       Physician: Sarwat Lu, *    Visit Date: 4/6/2023    Physician Orders: PT Eval and Treat:   Start neuromuscular retraining program and HEP. Goals of increased pelvic and core stability, IT band, hamstring, adductor stretching, and quadriceps/glute strengthening   Medical Diagnosis from Referral: M17.11 (ICD-10-CM) - Primary osteoarthritis of right knee M70.50 (ICD-10-CM) - Pes anserine bursitis   Evaluation Date: 3/14/2023  Authorization Period Expiration: 4/14/2023  Plan of Care Expiration: 6/9/2023  Progress Note Due: 4/14/2023  Visit # / Visits authorized: 6/ 21   FOTO: 1/ 3:  3/14/2023     Precautions: Standard and blood thinners    PTA Visit #: 0/5     Time In: 1115  Time Out: 1200  Total Billable Time: 45 minutes    SUBJECTIVE     Pt reports: noted a little swelling to posterior and medial knee with doing exercise bike.   She was compliant with home exercise program.  Response to previous treatment: some relief with taping  Functional change: no change    Pain: 0/10 at rest  Location: right medial knee     OBJECTIVE     Objective Measures updated at progress report unless specified.     Treatment     Meredith received the treatments listed below:      therapeutic exercises to develop strength, endurance, ROM, flexibility, posture, and core stabilization for 45 minutes including:  SL bridges 10x  3D HS 15x  +Prone HS curl with slow eccentric 2 x 10 4#  Shuttle DL x 3 black cord x 3x10  Shuttle SL R x 1 black cord 3x10  resisted side stepping 2 x 50 ft x RTB at ankles    SLR flex with ER 2 x 10 with 2#  LAQ with ball squeeze 2 x 10 with 10" hold at 10th rep, 4# cw  +Prone quad stretch 3 x 30" with towel roll behind knee, 1 x 30" towel roll under knee  Bridges 20x  SLR abd (slight ext) " "20x increase resistance nv  Clams 10" x 10 increase resistance nv  Prone over EOM hip extension 2 x 10 increase resistance nv  Prone over EOM donkey kick 2 x 10 with 10x pulse at 10th rep2#  +[place] with red loop 2 x 10      manual therapy techniques: Soft tissue Mobilization were applied to the: R LE for 00 minutes, including:  Vacuum cupping to R ITB with static placement and gliding   IASTM with MFR tools to ITB and distal HS  Active release to quad and HS along ITB  Seated tibiofemoral distraction  Preparation and application of Ktape to R knee. Y strip to decrease lateral patellar tracking. I strip to medial joint line for pain relief.           Patient Education and Home Exercises     Home Exercises Provided and Patient Education Provided     Education provided:   - therex rationale    Written Home Exercises Provided: yes. Exercises were reviewed and Meredtih was able to demonstrate them prior to the end of the session.  Meredith demonstrated good  understanding of the education provided. See EMR under Patient Instructions for exercises provided during therapy sessions    ASSESSMENT     Good tolerance to treatment today. Initial cramping to HS with 3D on ball, but  resolves and she is able to complete activity. Good tolerance to increased resistance with isotonics today.     Meredith Is progressing well towards her goals.   Pt prognosis is Good.     Pt will continue to benefit from skilled outpatient physical therapy to address the deficits listed in the problem list box on initial evaluation, provide pt/family education and to maximize pt's level of independence in the home and community environment.     Pt's spiritual, cultural and educational needs considered and pt agreeable to plan of care and goals.     Anticipated barriers to physical therapy: standard     Goals: updated 04/07/2023  Short Term Goals (4 weeks)  1. Pt will demonstrate independence with initial HEP to maintain progress made with therapy. " (Progressing, not met)  2. Pt will demonstrate ability to descend 3 steps with reciprocal gait pattern and use of HR. (Progressing, not met)  3. Pt will report <5/10 pain at worst within the R knee for ease with ADL's. (Progressing, not met)     Long Term Goals (12 weeks)  1. Pt will demonstrate ability to descend stairs reciprocally without assistance or pain exacerbation to improve mobility in community and in her home. (Progressing, not met)  2. Pt will demonstrate 4+/5 strength within the B LEfor ease with return to gym exercise and climbing stairs. (Progressing, not met)  3. Pt will report being independent with his/her HEP for maintenance of improvements gained during therapy sessions. (Progressing, not met)  4. Pt will report <2/10 pain at worst within the R knee for ease with ADLs. (Progressing, not met)  5. Pt will demonstrate ambulation x 300 ft without AD or antalgic gait to improve functional mobility in community.  (Progressing, not met)    PLAN   Plan of care Certification: 3/14/2023 to 6/9/2023.     Continue per plan of care with focus on B LE strength and soft tissue mobility.     Alice Caldwell, PT

## 2023-04-06 NOTE — PROGRESS NOTES
"[facility]   Physical Therapy Treatment Note     Name: Meredith Ramos  Clinic Number: [id number]    Therapy Diagnosis:   Encounter Diagnosis   Name Primary?    Right medial knee pain Yes       Physician: Sarwat Lu, *    Visit Date: 4/3/2023    Physician Orders: PT Eval and Treat:   Start neuromuscular retraining program and HEP. Goals of increased pelvic and core stability, IT band, hamstring, adductor stretching, and quadriceps/glute strengthening   Medical Diagnosis from Referral: M17.11 (ICD-10-CM) - Primary osteoarthritis of right knee M70.50 (ICD-10-CM) - Pes anserine bursitis   Evaluation Date: 3/14/2023  Authorization Period Expiration: 4/14/2023  Plan of Care Expiration: 6/9/2023  Progress Note Due: 4/14/2023  Visit # / Visits authorized: 7/ 21   FOTO: 1/ 3:  3/14/2023     Precautions: Standard and blood thinners    PTA Visit #: 0/5     Time In: 10:45  Time Out: 1130  Total Billable Time: 45 minutes    SUBJECTIVE     Pt reports: "my knee brace came in this morning."    She was compliant with home exercise program.  Response to previous treatment: some relief with taping  Functional change: no change    Pain: 0/10 at rest  Location: right medial knee     OBJECTIVE     Objective Measures updated at progress report unless specified.     Treatment     Meredith received the treatments listed below:      therapeutic exercises to develop strength, endurance, ROM, flexibility, posture, and core stabilization for 45 minutes including:  SL bridges 10x  3D HS 2 x 10  Shuttle DL x 3 black cord x 3x10  Shuttle SL R x 1 black cord 3x10  resisted side stepping 2 x 50 ft x RTB at ankles    SLR flex with ER 2 x 10  LAQ with ball squeeze 2 x 10 with 10" hold at 10th rep, 2# cw  Prone quad stretch 3 x 30" with towel roll behind knee, 1 x 30" towel roll under knee  Bridges 20x  SLR abd (slight ext) 20x increase resistance nv  Clams 10" x 10 increase resistance nv  Prone over EOM hip " extension 2 x 10 increase resistance nv  Prone over EOM donkey kick 2 x 10 increase resistance nv    +resisted sidestepping 2 x 40 ft x RB at ankles    Add as tolerated: eccentric HS    manual therapy techniques: Soft tissue Mobilization were applied to the: R LE for 00 minutes, including:  Vacuum cupping to R ITB with static placement and gliding   IASTM with MFR tools to ITB and distal HS  Active release to quad and HS along ITB  Seated tibiofemoral distraction  Preparation and application of Ktape to R knee. Y strip to decrease lateral patellar tracking. I strip to medial joint line for pain relief.           Patient Education and Home Exercises     Home Exercises Provided and Patient Education Provided     Education provided:   - therex rationale    Written Home Exercises Provided: yes. Exercises were reviewed and Meredith was able to demonstrate them prior to the end of the session.  Meredith demonstrated good  understanding of the education provided. See EMR under Patient Instructions for exercises provided during therapy sessions    ASSESSMENT     Pt continues with pain to medial and anterior knee. Resumed global strengthening today with appropriate training effect noted. Updated HEP to include 3D HS and LAQ with ball squeeze to promote glute and quad strength at home. Deferred MPT today as pt notes getting brace at home today.    Meredith Is progressing well towards her goals.   Pt prognosis is Good.     Pt will continue to benefit from skilled outpatient physical therapy to address the deficits listed in the problem list box on initial evaluation, provide pt/family education and to maximize pt's level of independence in the home and community environment.     Pt's spiritual, cultural and educational needs considered and pt agreeable to plan of care and goals.     Anticipated barriers to physical therapy: standard     Goals: updated 04/06/2023  Short Term Goals (4 weeks)  1. Pt will demonstrate independence  with initial HEP to maintain progress made with therapy. (Progressing, not met)  2. Pt will demonstrate ability to descend 3 steps with reciprocal gait pattern and use of HR. (Progressing, not met)  3. Pt will report <5/10 pain at worst within the R knee for ease with ADL's. (Progressing, not met)     Long Term Goals (12 weeks)  1. Pt will demonstrate ability to descend stairs reciprocally without assistance or pain exacerbation to improve mobility in community and in her home. (Progressing, not met)  2. Pt will demonstrate 4+/5 strength within the B LEfor ease with return to gym exercise and climbing stairs. (Progressing, not met)  3. Pt will report being independent with his/her HEP for maintenance of improvements gained during therapy sessions. (Progressing, not met)  4. Pt will report <2/10 pain at worst within the R knee for ease with ADLs. (Progressing, not met)  5. Pt will demonstrate ambulation x 300 ft without AD or antalgic gait to improve functional mobility in community.  (Progressing, not met)    PLAN   Plan of care Certification: 3/14/2023 to 6/9/2023.     Continue per plan of care with focus on B LE strength and soft tissue mobility.     Sofiya Lopez, PT

## 2023-04-10 ENCOUNTER — TELEPHONE (OUTPATIENT)
Dept: SLEEP MEDICINE | Facility: OTHER | Age: 73
End: 2023-04-10
Payer: MEDICARE

## 2023-04-14 ENCOUNTER — TELEPHONE (OUTPATIENT)
Dept: SLEEP MEDICINE | Facility: OTHER | Age: 73
End: 2023-04-14
Payer: MEDICARE

## 2023-04-17 ENCOUNTER — CLINICAL SUPPORT (OUTPATIENT)
Dept: REHABILITATION | Facility: OTHER | Age: 73
End: 2023-04-17
Payer: MEDICARE

## 2023-04-17 DIAGNOSIS — M25.561 RIGHT MEDIAL KNEE PAIN: Primary | ICD-10-CM

## 2023-04-17 PROCEDURE — 97110 THERAPEUTIC EXERCISES: CPT | Mod: PN | Performed by: PHYSICAL THERAPIST

## 2023-04-17 NOTE — PROGRESS NOTES
[facility]   Physical Therapy Treatment Note     Name: Meredith Ramos  Clinic Number: [id number]    Therapy Diagnosis:   Encounter Diagnosis   Name Primary?    Right medial knee pain Yes       Physician: Sarwat Lu, *    Visit Date: 4/17/2023    Physician Orders: PT Eval and Treat:   Start neuromuscular retraining program and HEP. Goals of increased pelvic and core stability, IT band, hamstring, adductor stretching, and quadriceps/glute strengthening   Medical Diagnosis from Referral: M17.11 (ICD-10-CM) - Primary osteoarthritis of right knee M70.50 (ICD-10-CM) - Pes anserine bursitis   Evaluation Date: 3/14/2023  Authorization Period Expiration: 4/14/2023  Plan of Care Expiration: 6/9/2023  Progress Note Due: 5/14/2023   Visit # / Visits authorized: 9/ 21   FOTO: 2/ 3:  3/14/2023, 4/17/2023     Precautions: Standard and blood thinners    PTA Visit #: 0/5     Time In: 1045  Time Out: 1130  Total Billable Time: 45 minutes    SUBJECTIVE     Pt reports: she had a lot of pain on Friday, but after resting over the weekend her knee feels much better. She says she feels like the tape helps more than the brace. She says she's still having some trouble with going down stairs.   She was compliant with home exercise program.  Response to previous treatment: some relief with taping  Functional change: no change    Pain: 0/10 at rest  Location: right medial knee     OBJECTIVE     Objective Measures updated at progress report unless specified.       CMS Impairment/Limitation/Restriction for FOTO Knee Survey    Therapist reviewed FOTO scores for Meredith Ramos on 4/17/2023   FOTO documents entered into [employer] - see Media section.    Evaluation: 37%    Current : 37%    Goal: 32%         Treatment     Meredith received the treatments listed below:      therapeutic exercises to develop strength, endurance, ROM, flexibility, posture, and core stabilization for 40 minutes including:  SL bridges  "10x  3D HS 15x  Prone HS curl with slow eccentric 2 x 10 4#  Shuttle DL x 3 black cord x 3x10  Shuttle SL R x 1 black cord 3x10  resisted side stepping 2 x 50 ft x RTB at ankles    SLR flex with ER 2 x 10 with 2#  LAQ with ball squeeze 2 x 10 with 10" hold at 10th rep, 4# cw  Prone quad stretch 3 x 30" with towel roll behind knee, 1 x 30" towel roll under knee  Bridges 20x  SLR abd (slight ext) 20x increase resistance nv  Clams 10" x 10 increase resistance nv  Prone over EOM hip extension 2 x 10 increase resistance nv  Prone over EOM donkey kick 2 x 10 with 10x pulse at 10th rep2#  [place] with red loop 2 x 10      manual therapy techniques: Soft tissue Mobilization were applied to the: R LE for 5 minutes, including:  Vacuum cupping to R ITB with static placement and gliding   IASTM with MFR tools to ITB and distal HS  Active release to quad and HS along ITB  Seated tibiofemoral distraction  Preparation and application of Ktape to R knee. Y strip to decrease lateral patellar tracking. I strip to medial joint line for pain relief.           Patient Education and Home Exercises     Home Exercises Provided and Patient Education Provided     Education provided:   - therex rationale    Written Home Exercises Provided: yes. Exercises were reviewed and Meredith was able to demonstrate them prior to the end of the session.  Meredith demonstrated good  understanding of the education provided. See EMR under Patient Instructions for exercises provided during therapy sessions    ASSESSMENT     Good tolerance to treatment overall today. Ktape applied and education provided regarding method to allow pt to reproduce at home. Good tolerance to all therex today, no c/o pain exacerbation. Pt issued red theraband and printout of steamboats to add to HEP, instructed to stop exercises that have become too easy so she can decrease time spent with HEP performance daily.     Meredith Is progressing well towards her goals.   Pt prognosis is " Good.     Pt will continue to benefit from skilled outpatient physical therapy to address the deficits listed in the problem list box on initial evaluation, provide pt/family education and to maximize pt's level of independence in the home and community environment.     Pt's spiritual, cultural and educational needs considered and pt agreeable to plan of care and goals.     Anticipated barriers to physical therapy: standard     Goals: updated 04/17/2023  Short Term Goals (4 weeks)  1. Pt will demonstrate independence with initial HEP to maintain progress made with therapy. (met)  2. Pt will demonstrate ability to descend 3 steps with reciprocal gait pattern and use of HR. (Progressing, not met)  3. Pt will report <5/10 pain at worst within the R knee for ease with ADL's. (met)     Long Term Goals (12 weeks)  1. Pt will demonstrate ability to descend stairs reciprocally without assistance or pain exacerbation to improve mobility in community and in her home. (Progressing, not met)  2. Pt will demonstrate 4+/5 strength within the B LEfor ease with return to gym exercise and climbing stairs. (Progressing, not met)  3. Pt will report being independent with his/her HEP for maintenance of improvements gained during therapy sessions. (Progressing, not met)  4. Pt will report <2/10 pain at worst within the R knee for ease with ADLs. (Progressing, not met)  5. Pt will demonstrate ambulation x 300 ft without AD or antalgic gait to improve functional mobility in community.  (Progressing, not met)    PLAN   Plan of care Certification: 3/14/2023 to 6/9/2023.     Continue per plan of care with focus on B LE strength and soft tissue mobility.     Alice Caldwell, PT

## 2023-04-19 ENCOUNTER — CLINICAL SUPPORT (OUTPATIENT)
Dept: REHABILITATION | Facility: OTHER | Age: 73
End: 2023-04-19
Payer: MEDICARE

## 2023-04-19 ENCOUNTER — TELEPHONE (OUTPATIENT)
Dept: SLEEP MEDICINE | Facility: OTHER | Age: 73
End: 2023-04-19
Payer: MEDICARE

## 2023-04-19 DIAGNOSIS — M25.561 RIGHT MEDIAL KNEE PAIN: Primary | ICD-10-CM

## 2023-04-19 PROCEDURE — 97110 THERAPEUTIC EXERCISES: CPT | Mod: PN

## 2023-04-19 NOTE — PROGRESS NOTES
"[facility]   Physical Therapy Treatment Note     Name: Meredith Ramos  Clinic Number: [id number]    Therapy Diagnosis:   Encounter Diagnosis   Name Primary?    Right medial knee pain Yes       Physician: Sarwat Lu, *    Visit Date: 4/19/2023    Physician Orders: PT Eval and Treat:   Start neuromuscular retraining program and HEP. Goals of increased pelvic and core stability, IT band, hamstring, adductor stretching, and quadriceps/glute strengthening   Medical Diagnosis from Referral: M17.11 (ICD-10-CM) - Primary osteoarthritis of right knee M70.50 (ICD-10-CM) - Pes anserine bursitis   Evaluation Date: 3/14/2023  Authorization Period Expiration: 4/14/2023  Plan of Care Expiration: 6/9/2023  Progress Note Due: 5/14/2023   Visit # / Visits authorized: 10/ 21   FOTO: 2/ 3:  3/14/2023, 4/17/2023     Precautions: Standard and blood thinners    PTA Visit #: 0/5     Time In: 1050  Time Out: 1135  Total Billable Time: 38 minutes 1:1    SUBJECTIVE     Pt reports: she took the weekend off from exercising. Notes her knee felt better by Monday. "I was doing all of the exercises we do in here at home, and I ws exercising 1.5 hrs every day. I learned that I was overdoing it and needed a rest break."      She was compliant with home exercise program.  Response to previous treatment: some relief with taping  Functional change: no change    Pain: 0/10 at rest  Location: right medial knee     OBJECTIVE     Objective Measures updated at progress report unless specified.       CMS Impairment/Limitation/Restriction for FOTO Knee Survey    Therapist reviewed FOTO scores for Meredith Ramos on 4/17/2023   FOTO documents entered into [employer] - see Media section.    Evaluation: 37%    Current : 37%    Goal: 32%         Treatment     Meredith received the treatments listed below:      therapeutic exercises to develop strength, endurance, ROM, flexibility, posture, and core stabilization for 45 minutes " "including:  SL bridges 10x  3D HS 15x  Prone HS curl with slow eccentric 2 x 10 4#  Shuttle DL x 3 black cord x 3x10  Shuttle SL R x 1 black cord 3x10  resisted side stepping 2 x 50 ft x RTB at ankles  +railroad tracks 2 x 50 ft x RTB at ankles    SLR flex with ER 2 x 10 with 2#  LAQ with ball squeeze 2 x 10 with 10" hold at 10th rep, 4# cw  Prone quad stretch 3 x 30" with towel roll behind knee, 1 x 30" towel roll under knee  Bridges 20x  SLR abd (slight ext) 20x increase resistance nv  Clams 10" x 10 increase resistance nv  Prone over EOM hip extension 2 x 10 increase resistance nv  Prone over EOM donkey kick 3 x 10 with 10x pulse at 10th rep2#  [place] with red loop 2 x 10      manual therapy techniques: Soft tissue Mobilization were applied to the: R LE for 00 minutes, including:  Vacuum cupping to R ITB with static placement and gliding   IASTM with MFR tools to ITB and distal HS  Active release to quad and HS along ITB  Seated tibiofemoral distraction  Preparation and application of Ktape to R knee. Y strip to decrease lateral patellar tracking. I strip to medial joint line for pain relief.           Patient Education and Home Exercises     Home Exercises Provided and Patient Education Provided     Education provided:   - therex rationale    Written Home Exercises Provided: yes. Exercises were reviewed and Meredith was able to demonstrate them prior to the end of the session.  Meredith demonstrated good  understanding of the education provided. See EMR under Patient Instructions for exercises provided during therapy sessions    ASSESSMENT     Defer kinesiotape application as pt continues to don tape today. Resumed donkey kicks, side stepping, and added railroad track (forward walking with sustained hip abd) to promote continued multidirectional glute strengthening, dynamic knee stabilization. Good tolerance with appropriate training effect noted, able to perform without increased knee pain.      Meredith Is " progressing well towards her goals.   Pt prognosis is Good.     Pt will continue to benefit from skilled outpatient physical therapy to address the deficits listed in the problem list box on initial evaluation, provide pt/family education and to maximize pt's level of independence in the home and community environment.     Pt's spiritual, cultural and educational needs considered and pt agreeable to plan of care and goals.     Anticipated barriers to physical therapy: standard     Goals: updated 04/19/2023  Short Term Goals (4 weeks)  1. Pt will demonstrate independence with initial HEP to maintain progress made with therapy. (met)  2. Pt will demonstrate ability to descend 3 steps with reciprocal gait pattern and use of HR. (Progressing, not met)  3. Pt will report <5/10 pain at worst within the R knee for ease with ADL's. (met)     Long Term Goals (12 weeks)  1. Pt will demonstrate ability to descend stairs reciprocally without assistance or pain exacerbation to improve mobility in community and in her home. (Progressing, not met)  2. Pt will demonstrate 4+/5 strength within the B LEfor ease with return to gym exercise and climbing stairs. (Progressing, not met)  3. Pt will report being independent with his/her HEP for maintenance of improvements gained during therapy sessions. (Progressing, not met)  4. Pt will report <2/10 pain at worst within the R knee for ease with ADLs. (Progressing, not met)  5. Pt will demonstrate ambulation x 300 ft without AD or antalgic gait to improve functional mobility in community.  (Progressing, not met)    PLAN   Plan of care Certification: 3/14/2023 to 6/9/2023.     Continue per plan of care with focus on B LE strength and soft tissue mobility.     Sofiya Lopez, PT

## 2023-04-20 ENCOUNTER — PATIENT MESSAGE (OUTPATIENT)
Dept: SPORTS MEDICINE | Facility: CLINIC | Age: 73
End: 2023-04-20
Payer: MEDICARE

## 2023-04-21 ENCOUNTER — TELEPHONE (OUTPATIENT)
Dept: SLEEP MEDICINE | Facility: OTHER | Age: 73
End: 2023-04-21
Payer: MEDICARE

## 2023-04-22 ENCOUNTER — PATIENT MESSAGE (OUTPATIENT)
Dept: SLEEP MEDICINE | Facility: OTHER | Age: 73
End: 2023-04-22
Payer: MEDICARE

## 2023-04-22 ENCOUNTER — HOSPITAL ENCOUNTER (OUTPATIENT)
Dept: SLEEP MEDICINE | Facility: OTHER | Age: 73
Discharge: HOME | End: 2023-04-22
Attending: INTERNAL MEDICINE
Payer: MEDICARE

## 2023-04-22 DIAGNOSIS — G47.9 SLEEP DISORDER: ICD-10-CM

## 2023-04-22 DIAGNOSIS — R06.83 SNORING: ICD-10-CM

## 2023-04-22 DIAGNOSIS — F51.09 OTHER INSOMNIA NOT DUE TO A SUBSTANCE OR KNOWN PHYSIOLOGICAL CONDITION: ICD-10-CM

## 2023-04-22 DIAGNOSIS — R35.1 NOCTURIA: ICD-10-CM

## 2023-04-22 DIAGNOSIS — I48.0 PAF (PAROXYSMAL ATRIAL FIBRILLATION): ICD-10-CM

## 2023-04-22 PROCEDURE — 95810 POLYSOM 6/> YRS 4/> PARAM: CPT

## 2023-04-23 PROBLEM — G47.9 SLEEP DISORDER: Status: ACTIVE | Noted: 2023-04-23

## 2023-04-24 ENCOUNTER — CLINICAL SUPPORT (OUTPATIENT)
Dept: REHABILITATION | Facility: OTHER | Age: 73
End: 2023-04-24
Payer: MEDICARE

## 2023-04-24 ENCOUNTER — OFFICE VISIT (OUTPATIENT)
Dept: SPORTS MEDICINE | Facility: CLINIC | Age: 73
End: 2023-04-24
Payer: MEDICARE

## 2023-04-24 VITALS — TEMPERATURE: 98 F | BODY MASS INDEX: 19.99 KG/M2 | WEIGHT: 120 LBS | HEIGHT: 65 IN

## 2023-04-24 DIAGNOSIS — M25.561 CHRONIC PAIN OF RIGHT KNEE: Primary | ICD-10-CM

## 2023-04-24 DIAGNOSIS — M25.561 RIGHT MEDIAL KNEE PAIN: Primary | ICD-10-CM

## 2023-04-24 DIAGNOSIS — M25.461 EFFUSION OF RIGHT KNEE JOINT: ICD-10-CM

## 2023-04-24 DIAGNOSIS — M17.11 PRIMARY OSTEOARTHRITIS OF RIGHT KNEE: ICD-10-CM

## 2023-04-24 DIAGNOSIS — G89.29 CHRONIC PAIN OF RIGHT KNEE: Primary | ICD-10-CM

## 2023-04-24 PROCEDURE — 99214 PR OFFICE/OUTPT VISIT, EST, LEVL IV, 30-39 MIN: ICD-10-PCS | Mod: S$PBB,,, | Performed by: FAMILY MEDICINE

## 2023-04-24 PROCEDURE — 99999 PR PBB SHADOW E&M-EST. PATIENT-LVL III: CPT | Mod: PBBFAC,,, | Performed by: FAMILY MEDICINE

## 2023-04-24 PROCEDURE — 97110 THERAPEUTIC EXERCISES: CPT | Mod: PN

## 2023-04-24 PROCEDURE — 99999 PR PBB SHADOW E&M-EST. PATIENT-LVL III: ICD-10-PCS | Mod: PBBFAC,,, | Performed by: FAMILY MEDICINE

## 2023-04-24 PROCEDURE — 97112 NEUROMUSCULAR REEDUCATION: CPT | Mod: PN

## 2023-04-24 PROCEDURE — 99214 OFFICE O/P EST MOD 30 MIN: CPT | Mod: S$PBB,,, | Performed by: FAMILY MEDICINE

## 2023-04-24 PROCEDURE — 99213 OFFICE O/P EST LOW 20 MIN: CPT | Mod: PBBFAC | Performed by: FAMILY MEDICINE

## 2023-04-24 NOTE — PROGRESS NOTES
"Meredith Ramos, a 73 y.o. female, presents today for evaluation of her right knee. Pt notes good relief with CSI R knee 2/7/23 but notes  palpitations and "jitters" following injectio and that she went into afib that afternoon. She does not want to repeat CSI. States she is feeling improvement with PT but continued effusion of right knee and pain at pes anserine bursa.     History of Present Illness (HPI)  Location: anterior knee   Onset: chronic, insidious  Palliative:    relative rest   oral analgesics, OTC   Physical Therapy for 6 weeks    CSI iaknee r 2/7/23- good relief, ongoing but not tolerated due to side effects  Provocative: prolonged ambulation  Prior: none  Progression: plateau discomfort  Quality: sharp  Radiation: none  Severity: per nursing documentation  Timing: intermittent w/ use  Trauma: none    Review of Systems (ROS)  A 10+ review of systems was performed with pertinent positives and negatives noted above in the history of present illness. Other systems were negative unless otherwise specified.    Physical Examination (PE)  General:  The patient is alert and oriented x 3. Mood is pleasant. Observation of ears, eyes and nose reveal no gross abnormalities. HEENT: NCAT, sclera anicteric.   Lungs: Respirations are equal and unlabored.  Gait is coordinated. Patient can toe walk and heel walk without difficulty.    KNEE EXAMINATION    Observation/Inspection  Gait:   Nonantalgic   Alignment:  Neutral   Scars:   None   Muscle atrophy: Mild  Effusion:  None   Warmth:  None   Discoloration:   none     Tenderness / Crepitus (T / C):         T / C      T / C  Patella   - / -   Lateral joint line   - / -     Peripatellar medial  -  Medial joint line    + / -  Peripatellar lateral -  Medial plica   - / -  Patellar tendon -   Popliteal fossa   - / -  Quad tendon   -   Gastrocnemius   -  Prepatellar Bursa - / -   Quadricep   -  Tibial tubercle  -  Thigh/hamstring  -  Pes anserine/HS -  Fibula    -  ITB   - / " -  Tibia     -  Tib/fib joint  - / -  LCL    -    MFC   - / -   MCL: Proximal  -    LFC   - / -   Distal    -          ROM: (* = pain)  PASSIVE   ACTIVE    Left :   5 / 0 / 145   5 / 0 / 145     Right :    5 / 0 / 145   5 / 0 / 145    Patellofemoral examination:  See above noted areas of tenderness.   Patella position    Subluxation / dislocation: Centered        Sup. / Inf;   Normal   Crepitus (PF):    Absent   Patellar Mobility:       Medial-lateral:   Normal    Superior-inferior:  Normal    Inferior tilt   Normal    Patellar tendon:  Normal   Lateral tilt:    Normal   J-sign:     None   Patellofemoral grind:   No pain     Meniscal Signs:     Pain on terminal extension:  +  Pain on terminal flexion:  +  Katies maneuver:  +*  Squat     NT  Thesaly    NT    Ligament Examination:  ACL / Lachman:  WNL  PCL-Post.  drawer: normal 0 to 2mm  MCL- Valgus:  normal 0 to 2mm  LCL- Varus:    normal 0 to 2mm  Pivot shift:  guarding   Dial Test:   difference c/w other side   At 30° flexion: normal (< 5°)    At 90° flexion: normal (< 5°)   Reverse Pivot Shift:   normal (Equal)     Strength: (* = with pain) Painful Side  Quadriceps   5/5  Hamstrin/5    Extremity Neuro-vascular Examination:   Sensation:  Grossly intact to light touch all dermatomal regions.   Motor Function:  Fully intact motor function at hip, knee, foot and ankle    DTRs;  quadriceps and  achilles 2+.  No clonus and downgoing Babinski.    Vascular status:  DP and PT pulses 2+, brisk capillary refill, symmetric.     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, right  w/ knee joint effusion    No evidence of neurologic pathology  No evidence of vascular pathology    Imaging studies reviewed:   X-ray knee, bilat NOT PERFORMED    Plan  We discussed the importance of appropriate diet, weight, and regular exercise    We discussed options including    Watchful waiting / relative rest    Physical  therapy x   Injection therapy Does not do well w/ iaCSI  Vsi iaknee right   Consultation    The patient chooses As above   x = prescribed  CSI = corticosteroid injection  VSI = viscosupplement injection  PRPI = platelet rich plasma injection  ia = intra articular  R = right  L = left  B = bilateral   nfSx = surgical consultation was recommended, but patient is not interested in consultation at this time    Physical Therapy        Formal (fPT), @ [facility] facility T, [facility]   Formal (fPT), @ [facility] facility        Homegoing (hgPT), per concurrent fPT recommendations t   Homegoing (hgPT), per prior fPT recommendations    Homegoing (hgPT), handout provided        w/  (atPT)    [blank] = not prescribed  x = prescribed  b = prescribed, and begin as indicated  t = continue as indicated  r = prescribed, and restart as indicated  p = completed prior as indicated  hs = prescribed, and with high school   col = prescribed, and with college or university   nfPT = physical therapy was recommended, but patient is not interested in PT at this time    Activity (e.g. sports, work) restrictions    [blank] = as tolerated  pt = per physical therapist  at = per   NWB = non weight bearing on affected lower extremity, with crutches assistance for ambulation    Bracing    [blank] = not prescribed  r = recommended, but not fit with at todays visit  f = prescribed and fit with at todays visit  t = continue as indicated  d = d/c  p = as needed  rare = use on rare, as-needed basis; advised against chronic use    Pain management    [blank] = No prescription necessary. A handout detailing dosing of appropriate   over-the-counter musculoskeletal analgesics was made available to the patient.   m = meloxicam x 14 days  mp = 14 day course of meloxicam prescribed prior    Follow up Vsi  Need x-ray knee bilat   [blank] = as needed  [number] = in [number] weeks  CSI = for  corticosteroid injection  VSI = for viscosupplement injection or injection series  PRP = for platelet rich plasma injection or injection series  MRI = after MRI imaging  ns = should surgical options be deferred (no surgery)  o = appointment offered, deferred by patient    Should symptoms worsen or fail to resolve, consider    Revisiting the above options and / or Mri knee     Vocation:

## 2023-04-24 NOTE — PROGRESS NOTES
"[facility]   Physical Therapy Treatment Note     Name: Meredith Ramos  Clinic Number: [id number]    Therapy Diagnosis:   Encounter Diagnosis   Name Primary?    Right medial knee pain Yes       Physician: Sarwat Lu, *    Visit Date: 4/24/2023    Physician Orders: PT Eval and Treat:   Start neuromuscular retraining program and HEP. Goals of increased pelvic and core stability, IT band, hamstring, adductor stretching, and quadriceps/glute strengthening   Medical Diagnosis from Referral: M17.11 (ICD-10-CM) - Primary osteoarthritis of right knee M70.50 (ICD-10-CM) - Pes anserine bursitis   Evaluation Date: 3/14/2023  Authorization Period Expiration: 4/14/2023  Plan of Care Expiration: 6/9/2023  Progress Note Due: 5/14/2023   Visit # / Visits authorized: 10/ 21   FOTO: 2/ 3:  3/14/2023, 4/17/2023     Precautions: Standard and blood thinners    PTA Visit #: 0/5     Time In: 1045  Time Out: 1130  Total Billable Time: 45 minutes 1:1    SUBJECTIVE     Pt reports: feeling frustrated that her knee feels the same. Continues to have medial knee pain [indicates over bursa] that will become sharp and radiate down her shin with squatting and stair climbing. Going down stairs is much more painful and limiting than going up.   F/U with MD today "to get my knee drained. I'm hoping that helps."    She was compliant with home exercise program.  Response to previous treatment: some relief with taping  Functional change: no change    Pain: 0/10 at rest  Location: right medial knee     OBJECTIVE     Objective Measures updated at progress report unless specified.       CMS Impairment/Limitation/Restriction for FOTO Knee Survey    Therapist reviewed FOTO scores for Meredith Ramos on 4/17/2023   FOTO documents entered into [employer] - see Media section.    Evaluation: 37%    Current : 37%    Goal: 32%         Treatment     Meredith received the treatments listed below:      therapeutic exercises to develop " "strength, endurance, ROM, flexibility, posture, and core stabilization for 27 minutes including:    SL bridges [id number] at knees  +sustained bridge with ABD pulses 3 x 30" x RTB at knees  3D HS 15x  Prone HS curl with slow eccentric 2 x 10 4#  Shuttle DL x 3 black cord x 3x10  Shuttle SL R x 1 black cord 3x10  resisted side stepping 2 x 50 ft x RTB at ankles  railroad tracks 2 x 50 ft x RTB at ankles    LAQ with ball squeeze 2 x 10 with 10" hold at 10th rep, 4# cw  Clams 10" x 10 x RTB at knees  Prone over EOM hip extension 2 x 10 increase resistance nv  Prone over EOM donkey kick 3 x 10 with 10x pulse at 10th rep2#  [place] with red loop 2 x 10      neuromuscular re-education activities to improve: Balance, Coordination, Kinesthetic, Sense, Proprioception, and Posture for 10 minutes. The following activities were included:   +modified side plank + clams 2 x 10 x RTB at knees  +modified side plank + hip abd 2 x 10 x RTB at knees    dynamic functional therapeutic activities to improve functional performance for 8 minutes. Including:  +Mini squat with RTB at knees 2 x 10 x RTB at knees - visual assist in front of mirror      manual therapy techniques: Soft tissue Mobilization were applied to the: R LE for 00 minutes, including:  Vacuum cupping to R ITB with static placement and gliding   IASTM with MFR tools to ITB and distal HS  Active release to quad and HS along ITB  Seated tibiofemoral distraction  Preparation and application of Ktape to R knee. Y strip to decrease lateral patellar tracking. I strip to medial joint line for pain relief.     Patient Education and Home Exercises     Home Exercises Provided and Patient Education Provided     Education provided:   - therex rationale    Written Home Exercises Provided: yes. Exercises were reviewed and Meredith was able to demonstrate them prior to the end of the session.  Meredith demonstrated good  understanding of the education provided. See EMR under Patient " Instructions for exercises provided during therapy sessions    ASSESSMENT     Functional mobility assessment performed today as pt notes min improvement in pain and continued difficulty with descending > ascending stairs. Pt presents with increased knee valgus collapse and contralat pelvic drop with increased weight bearing on involved LE despite VC due to continued weakness. Modified program today to emphasize glute med strength, hip ER mobility, and stabilization/motor control of the hip/knee. Fatigue noted by end of session but pt able to perform new exercises, including mini squats, without increased sx, but did require TC of resistance band and modification in ROM to achieve performance.  Plan to progress as tolerated next visit.    Meredith Is progressing well towards her goals.   Pt prognosis is Good.     Pt will continue to benefit from skilled outpatient physical therapy to address the deficits listed in the problem list box on initial evaluation, provide pt/family education and to maximize pt's level of independence in the home and community environment.     Pt's spiritual, cultural and educational needs considered and pt agreeable to plan of care and goals.     Anticipated barriers to physical therapy: standard     Goals: updated 04/24/2023  Short Term Goals (4 weeks)  1. Pt will demonstrate independence with initial HEP to maintain progress made with therapy. (met)  2. Pt will demonstrate ability to descend 3 steps with reciprocal gait pattern and use of HR. (Progressing, not met)  3. Pt will report <5/10 pain at worst within the R knee for ease with ADL's. (met)     Long Term Goals (12 weeks)  1. Pt will demonstrate ability to descend stairs reciprocally without assistance or pain exacerbation to improve mobility in community and in her home. (Progressing, not met)  2. Pt will demonstrate 4+/5 strength within the B LEfor ease with return to gym exercise and climbing stairs. (Progressing, not met)  3. Pt  will report being independent with his/her HEP for maintenance of improvements gained during therapy sessions. (Progressing, not met)  4. Pt will report <2/10 pain at worst within the R knee for ease with ADLs. (Progressing, not met)  5. Pt will demonstrate ambulation x 300 ft without AD or antalgic gait to improve functional mobility in community.  (Progressing, not met)    PLAN   Plan of care Certification: 3/14/2023 to 6/9/2023.     Continue per plan of care with focus on B LE strength and soft tissue mobility.     Sofiya Lopez, PT

## 2023-04-26 ENCOUNTER — PATIENT MESSAGE (OUTPATIENT)
Dept: SLEEP MEDICINE | Facility: CLINIC | Age: 73
End: 2023-04-26

## 2023-04-26 ENCOUNTER — CLINICAL SUPPORT (OUTPATIENT)
Dept: REHABILITATION | Facility: OTHER | Age: 73
End: 2023-04-26
Payer: MEDICARE

## 2023-04-26 DIAGNOSIS — M25.561 RIGHT MEDIAL KNEE PAIN: Primary | ICD-10-CM

## 2023-04-26 PROCEDURE — 97110 THERAPEUTIC EXERCISES: CPT | Mod: PN | Performed by: PHYSICAL THERAPIST

## 2023-04-26 PROCEDURE — 97112 NEUROMUSCULAR REEDUCATION: CPT | Mod: PN | Performed by: PHYSICAL THERAPIST

## 2023-04-26 PROCEDURE — 95810 POLYSOM 6/> YRS 4/> PARAM: CPT | Mod: 26,,, | Performed by: INTERNAL MEDICINE

## 2023-04-26 PROCEDURE — 95810 PR POLYSOMNOGRAPHY, 4 OR MORE: ICD-10-PCS | Mod: 26,,, | Performed by: INTERNAL MEDICINE

## 2023-04-26 NOTE — PROGRESS NOTES
[facility]   Physical Therapy Treatment Note     Name: Meredith Ramos  Clinic Number: [id number]    Therapy Diagnosis:   Encounter Diagnosis   Name Primary?    Right medial knee pain Yes       Physician: Sarwat Lu, *    Visit Date: 4/26/2023    Physician Orders: PT Eval and Treat:   Start neuromuscular retraining program and HEP. Goals of increased pelvic and core stability, IT band, hamstring, adductor stretching, and quadriceps/glute strengthening   Medical Diagnosis from Referral: M17.11 (ICD-10-CM) - Primary osteoarthritis of right knee M70.50 (ICD-10-CM) - Pes anserine bursitis   Evaluation Date: 3/14/2023  Authorization Period Expiration: 4/14/2023  Plan of Care Expiration: 6/9/2023  Progress Note Due: 5/14/2023   Visit # / Visits authorized: 12/ 21   FOTO: 2/ 3:  3/14/2023, 4/17/2023     Precautions: Standard and blood thinners    PTA Visit #: 0/5     Time In: 1045  Time Out: 1130  Total Billable Time: 45 minutes 1:1    SUBJECTIVE     Pt reports: she notices improvement in knee pain on stairs if she focuses on glut contraction. She saw MD and says they waited on aspirating her knee because they're going to try hyaluronic acid injections soon.     She was compliant with home exercise program.  Response to previous treatment: some relief with taping  Functional change: no change    Pain: 0/10 at rest  Location: right medial knee     OBJECTIVE     Objective Measures updated at progress report unless specified.       CMS Impairment/Limitation/Restriction for FOTO Knee Survey    Therapist reviewed FOTO scores for Meredith Ramos on 4/17/2023   FOTO documents entered into [employer] - see Media section.    Evaluation: 37%    Current : 37%    Goal: 32%         Treatment     Meredith received the treatments listed below:      therapeutic exercises to develop strength, endurance, ROM, flexibility, posture, and core stabilization for 30 minutes including:    SL bridges [id number] at  "knees  sustained bridge with ABD pulses 3 x 30" x RTB at knees  3D HS 15x  Prone HS curl with slow eccentric 2 x 10 4#  Shuttle DL x 3 black cord x 3x10  Shuttle SL R x 1 black cord 3x10  resisted side stepping 2 x 50 ft x RTB at ankles  railroad tracks 2 x 50 ft x RTB at ankles    LAQ with ball squeeze 2 x 10 with 10" hold at 10th rep, 4# cw  Elevated clams 10" x 10 x RTB at knees  Prone over EOM hip extension 2 x 10 increase resistance nv  Prone over EOM donkey kick 3 x 10 with 10x pulse at 10th rep2#  [place] with red loop 2 x 10 (1 set loop at knees, 1 set at ankles)      neuromuscular re-education activities to improve: Balance, Coordination, Kinesthetic, Sense, Proprioception, and Posture for 10 minutes. The following activities were included:   modified side plank + clams 2 x 10 x RTB at knees  modified side plank + hip abd 2 x 10 x RTB at knees    dynamic functional therapeutic activities to improve functional performance for 0 minutes. Including:  +Mini squat with RTB at knees 2 x 10 x RTB at knees - visual assist in front of mirror  Add as tolerated: lateral tap downs on 2" step    manual therapy techniques: Soft tissue Mobilization were applied to the: R LE for 5 minutes, including:  Vacuum cupping to R ITB with static placement and gliding   IASTM with MFR tools to ITB and distal HS  Active release to quad and HS along ITB  Seated tibiofemoral distraction  Preparation and application of Ktape to R knee. Y strip to decrease lateral patellar tracking. I strip to medial joint line for pain relief.     Patient Education and Home Exercises     Home Exercises Provided and Patient Education Provided     Education provided:   - therex rationale    Written Home Exercises Provided: yes. Exercises were reviewed and Meredith was able to demonstrate them prior to the end of the session.  Meredith demonstrated good  understanding of the education provided. See EMR under Patient Instructions for exercises provided " during therapy sessions    ASSESSMENT     Good tolerance to treatment today. Progression of clamshells to elevated position with good training effect noted. Increased difficulty with [place] with adjustment of band position. Tape applied to knee per pt request.     Meredith Is progressing well towards her goals.   Pt prognosis is Good.     Pt will continue to benefit from skilled outpatient physical therapy to address the deficits listed in the problem list box on initial evaluation, provide pt/family education and to maximize pt's level of independence in the home and community environment.     Pt's spiritual, cultural and educational needs considered and pt agreeable to plan of care and goals.     Anticipated barriers to physical therapy: standard     Goals: updated 04/26/2023  Short Term Goals (4 weeks)  1. Pt will demonstrate independence with initial HEP to maintain progress made with therapy. (met)  2. Pt will demonstrate ability to descend 3 steps with reciprocal gait pattern and use of HR. (Progressing, not met)  3. Pt will report <5/10 pain at worst within the R knee for ease with ADL's. (met)     Long Term Goals (12 weeks)  1. Pt will demonstrate ability to descend stairs reciprocally without assistance or pain exacerbation to improve mobility in community and in her home. (Progressing, not met)  2. Pt will demonstrate 4+/5 strength within the B LEfor ease with return to gym exercise and climbing stairs. (Progressing, not met)  3. Pt will report being independent with his/her HEP for maintenance of improvements gained during therapy sessions. (Progressing, not met)  4. Pt will report <2/10 pain at worst within the R knee for ease with ADLs. (Progressing, not met)  5. Pt will demonstrate ambulation x 300 ft without AD or antalgic gait to improve functional mobility in community.  (Progressing, not met)    PLAN   Plan of care Certification: 3/14/2023 to 6/9/2023.     Continue per plan of care with focus on  B LE strength and soft tissue mobility.     Alice Caldwell, PT

## 2023-04-27 NOTE — PROCEDURES
"[facility]    Polysomnography Interpretation Report    Patient Name:  Meredith Ramos  MRN#:  [id number]  :  1950  Study Date:  2023  Referring Provider:  Bhargav Nieto    Indications for Polysomnography:  The patient is a 73 year old Female who is 5' 5" and weighs 119.0 lbs.  Her BMI equals 19.8.  [place] was - and Neck Circumference was -.  A full night polysomnogram was performed to evaluate for -.    Polysomnogram Data  A full night polysomnogram recorded the standard physiologic parameters including EEG, EOG, EMG, EKG, nasal and oral airflow.  Respiratory parameters of chest and abdominal movements were recorded with [place] motion transducers.  Oxygen saturation was recorded by pulse oximetry.    Sleep Architecture  The total recording time of the polysomnogram was 444.1 minutes.  The total sleep time was 353.5 minutes.  The patient spent 15.1% of total sleep time in Stage N1, 59.3% in Stage N2, 7.9% in Stages N3, and 17.7% in REM.  Sleep latency was 11.7 minutes.  REM latency was 116.5 minutes.  Sleep Efficiency was 79.6%.  Wake after sleep onset was 78.5 minutes.    Respiratory Events  The polysomnogram revealed a presence of 1 obstructive, - central, and - mixed apneas resulting in a Total Apnea index of 0.2 events per hour.  There were 10 hypopneas resulting in a Total Hypopnea index of 1.7 events per hour.  The combined Apnea/Hypopnea index was 1.9 events per hour.  There were a total of 33 RERA events resulting in a Respiratory Disturbance Index (RDI) of 7.5 events per hour.     Mean oxygen saturation was 93.6%.  The lowest oxygen saturation during sleep was 89.0%.  Time spent ?88% oxygen saturation was - minutes (-).    End Tidal CO2 during sleep ranged from - to - mmHg. End Tidal CO2 was greater than 50 mmHg for - minutes and greater than 55 mmHg for - minutes.  Transcutaneous CO2 during sleep ranged from - to - mmHg. Transcutaneous CO2 was greater than 50 mmHg " "for - minutes and greater than 55 mmHg for - minutes.    Limb Activity  There were 118 limb movements recorded.  Of this total, 118 were classified as PLMs.  Of the PLMs, 24 were associated with arousals.  The Limb Movement index was 20.0 per hour while the PLM index was 20.0 per hour and PLM with arousals index was 4.1 per hour.    Cardiac:  single lead EKG revealed normal sinus rhythm     Oxygenation:  Hypoxemia was seen only in relation to obstructive events.    Impression:  -The sleep-disordered breathing in this study did not meet Medicare criteria for obstructive sleep apnea, however, with an RDI of 7, this would meet criteria for mild NOEMI based on AASM 1A scoring criteria.    Recommendations:      -though not covered by CMS, treatment for the mild NOEMI seen in this study could be considered  -the patient has follow up with Sleep Medicine      Bhargav Nieto MD    (This Sleep Study was interpreted by a Board Certified Sleep Specialist who conducted an epoch-by-epoch review of the entire raw data recording.)  (The indication for this sleep study was reviewed and deemed appropriate by AAS Practice Parameters or other reasons by a Board Certified Sleep Specialist.)      [facility]    Diagnostic PSG Report    Patient Name: Meredith Ramos Study Date: 4/22/2023   YOB: 1950 MRN #: [id number]   Age: 73 year WENDI #: [id number]   Sex: Female Referring Provider: Bhargav Nieto   Height: 5' 5" Recording Tech: Jez Wasserman RPSGT   Weight: 119.0 lbs Scoring Tech: Torito Adam RRT RPSGT   BMI: 19.8 Interpreting Physician: -   ESS: - Neck Circumference: -     Study Overview    Lights Off: 10:05:26 PM  Count Index   Lights On: 05:29:29 AM Awakenings: 42 7.1   Time in Bed: 444.1 min. Arousals: 128 21.7   Total Sleep Time: 353.5 min. Apneas & Hypopneas: 11 1.9    Sleep Efficiency: 79.6% Limb Movements: 118 20.0   Sleep Latency: 11.7 min. Snores: - -   Wake After Sleep Onset: 78.5 min. " Desaturations: 13 2.2    REM Latency from Sleep Onset: 116.5 min. Minimum SpO2 TST: 89.0%        Sleep Architecture   % of Time in Bed  Stages Time (mins) % Sleep Time   Wake 91.0    Stage N1 53.5 15.1%   Stage N2 209.5 59.3%   Stage N3 28.0 7.9%   REM 62.5 17.7%         Arousal Summary     NREM REM Sleep Index   Respiratory Arousals 14 8 22 3.7   PLM Arousals 24 - 24 4.1   Isolated Limb Movement Arousals - - - -   Spontaneous Arousals 71 11 82 13.9   Total 109 19 128 21.7       Limb Movement Summary     Count Index   Isolated Limb Movements - -   Periodic Limb Movements (PLMs) 118 20.0   Total Limb Movements 118 20.0         Respiratory Summary     By Sleep Stage By Body Position Total    NREM REM Supine Non-Supine    Time (min) 291.0 62.5 304.5 49.0 353.5           Obstructive Apnea - 1 1 - 1   Mixed Apnea - - - - -   Central Apnea - - - - -   Total Apneas - 1 1 - 1   Total Apnea Index - 1.0 0.2 - 0.2           Hypopnea 3 7 10 - 10   Hypopnea Index 0.6 6.7 2.0 - 1.7           Apnea & Hypopnea 3 8 11 - 11   Apnea & Hypopnea Index 0.6 7.7 2.2 - 1.9           RERAs 21 12 33 - 33   RERA Index 4.3 11.5 6.5 - 5.6           RDI 4.9 19.2 8.7 - 7.5     Scoring Criteria: Hypopneas scored at 4% desaturation criteria.    Respiratory Event Durations     Apnea Hypopnea    NREM REM NREM REM   Average (seconds) - 16.7 17.2 18.3   Maximum (seconds) - 16.7 20.0 25.7       Oxygen Saturation Summary     Wake NREM REM TST TIB   Average SpO2 94.1% 93.4% 93.9% 93.5% 93.6%   Minimum SpO2 90.0% 91.0% 89.0% 89.0% 89.0%   Maximum SpO2 99.0% 97.0% 97.0% 97.0% 99.0%       Oxygen Saturation Distribution    Range (%) Time in range (min) Time in range (%)   90.0 - 100.0 436.7 99.6%   80.0 - 90.0 1.8 0.4%   70.0 - 80.0 - -   60.0 - 70.0 - -   50.0 - 60.0 - -   0.0 - 50.0 - -   Time Spent ?88% SpO2    Range (%) Time in range (min) Time in range (%)   0.0 - 88.0 - -          Count Index   Desaturations 13 2.2      Cardiac Summary     Wake NREM REM  Sleep Total   Average Pulse Rate (BPM) 62.4 59.9 62.3 60.3 60.7   Minimum Pulse Rate (BPM) 33.0 49.0 50.0 49.0 33.0   Maximum Pulse Rate (BPM) 85.0 81.0 84.0 84.0 85.0     Pulse Rate Distribution    Range (bpm) Time in range (min) Time in range (%)   0.0 - 40.0 0.1 0.0%   40.0 - 60.0 250.2 57.1%   60.0 - 80.0 187.8 42.8%   80.0 - 100.0 0.5 0.1%   100.0 - 120.0 - -   120.0 - 140.0 - -   140.0 - 200.0 - -     EtCO2 Summary    Stage Min (mmHg) Average (mmHg) Max (mmHg)   Wake - - -   NREM(1+2+3) - - -   REM - - -     Range (mmHg) Time in range (min) Time in range (%)   20.0 - 40.0 - -   40.0 - 50.0 - -   50.0 - 55.0 - -   55.0 - 100.0 - -   Excluded data <20.0 & >65.0 444.5 100.0%     TcCO2 Summary    Stage Min (mmHg) Average (mmHg) Max (mmHg)   Wake - - -   NREM(1+2+3) - - -   REM - - -     Range (mmHg) Time in range (min) Time in range (%)   20.0 - 40.0 - -   40.0 - 50.0 - -   50.0 - 55.0 - -   55.0 - 100.0 - -   Excluded data <20.0 & >65.0 444.5 100.0%     Comments    -

## 2023-05-01 ENCOUNTER — CLINICAL SUPPORT (OUTPATIENT)
Dept: REHABILITATION | Facility: OTHER | Age: 73
End: 2023-05-01
Payer: MEDICARE

## 2023-05-01 ENCOUNTER — PATIENT MESSAGE (OUTPATIENT)
Dept: SPORTS MEDICINE | Facility: CLINIC | Age: 73
End: 2023-05-01
Payer: MEDICARE

## 2023-05-01 DIAGNOSIS — M25.561 RIGHT MEDIAL KNEE PAIN: Primary | ICD-10-CM

## 2023-05-01 PROCEDURE — 97110 THERAPEUTIC EXERCISES: CPT | Mod: PN

## 2023-05-01 PROCEDURE — 97112 NEUROMUSCULAR REEDUCATION: CPT | Mod: PN

## 2023-05-01 NOTE — PROGRESS NOTES
"[facility]   Physical Therapy Treatment Note     Name: Meredith Ramos  Clinic Number: [id number]    Therapy Diagnosis:   Encounter Diagnosis   Name Primary?    Right medial knee pain Yes       Physician: Sarwat Lu, *    Visit Date: 5/1/2023    Physician Orders: PT Eval and Treat:   Start neuromuscular retraining program and HEP. Goals of increased pelvic and core stability, IT band, hamstring, adductor stretching, and quadriceps/glute strengthening   Medical Diagnosis from Referral: M17.11 (ICD-10-CM) - Primary osteoarthritis of right knee M70.50 (ICD-10-CM) - Pes anserine bursitis   Evaluation Date: 3/14/2023  Authorization Period Expiration: 4/14/2023  Plan of Care Expiration: 6/9/2023  Progress Note Due: 5/14/2023   Visit # / Visits authorized: 13/ 21   FOTO: 2/ 3:  3/14/2023, 4/17/2023     Precautions: Standard and blood thinners    PTA Visit #: 0/5     Time In: 12:15pm  Time Out: 1:00pm  Total Billable Time: 45 minutes 1:1    SUBJECTIVE     Pt reports: "knee feels about the same."     She was compliant with home exercise program.  Response to previous treatment: some relief with taping  Functional change: no change    Pain: 0/10 at rest  Location: right medial knee     OBJECTIVE     Objective Measures updated at progress report unless specified.       CMS Impairment/Limitation/Restriction for FOTO Knee Survey    Therapist reviewed FOTO scores for Meredith Ramos on 4/17/2023   FOTO documents entered into [employer] - see Media section.    Evaluation: 37%    Current : 37%    Goal: 32%         Treatment     Meredith received the treatments listed below:      therapeutic exercises to develop strength, endurance, ROM, flexibility, posture, and core stabilization for 30 minutes including:    SL bridges [id number] at knees  sustained bridge with ABD pulses 3 x 30" x RTB at knees  3D HS 15x  Prone HS curl with slow eccentric 2 x 10 4#  Shuttle DL x 3 black cord x 3x10  Shuttle SL R x 1 " "black cord 3x10  resisted side stepping 2 x 50 ft x RTB at ankles  railroad tracks 2 x 50 ft x RTB at ankles    LAQ with ball squeeze 2 x 10 with 10" hold at 10th rep, 4# cw  Elevated clams 10" x 10 x RTB at knees  Prone over EOM hip extension 2 x 10 increase resistance nv  Prone over EOM donkey kick 3 x 10 with 10x pulse at 10th rep2#  Panther [place] with red loop 2 x 10 (1 set loop at knees, 1 set at ankles)      neuromuscular re-education activities to improve: Balance, Coordination, Kinesthetic, Sense, Proprioception, and Posture for 10 minutes. The following activities were included:   modified side plank + clams 2 x 10 x RTB at knees  modified side plank + hip abd 2 x 10 x RTB at knees    dynamic functional therapeutic activities to improve functional performance for 0 minutes. Including:  +Mini squat with RTB at knees 2 x 10 x RTB at knees - visual assist in front of mirror  Add as tolerated: lateral tap downs on 2" step    manual therapy techniques: Soft tissue Mobilization were applied to the: R LE for 5 minutes, including:  Vacuum cupping to R ITB with static placement and gliding   IASTM with MFR tools to ITB and distal HS  Active release to quad and HS along ITB  Seated tibiofemoral distraction  Preparation and application of Ktape to R knee. Y strip to decrease lateral patellar tracking. I strip to medial joint line for pain relief.     Patient Education and Home Exercises     Home Exercises Provided and Patient Education Provided     Education provided:   - therex rationale    Written Home Exercises Provided: yes. Exercises were reviewed and Meredith was able to demonstrate them prior to the end of the session.  Meredith demonstrated good  understanding of the education provided. See EMR under Patient Instructions for exercises provided during therapy sessions    ASSESSMENT     Fatigue with modified planks with difficulty maintaining trunk stabilization without compensatory trunk rotation. Good tolerance to " treatment today. Tape applied to knee per pt request.     Meredith Is progressing well towards her goals.   Pt prognosis is Good.     Pt will continue to benefit from skilled outpatient physical therapy to address the deficits listed in the problem list box on initial evaluation, provide pt/family education and to maximize pt's level of independence in the home and community environment.     Pt's spiritual, cultural and educational needs considered and pt agreeable to plan of care and goals.     Anticipated barriers to physical therapy: standard     Goals: updated 05/01/2023  Short Term Goals (4 weeks)  1. Pt will demonstrate independence with initial HEP to maintain progress made with therapy. (met)  2. Pt will demonstrate ability to descend 3 steps with reciprocal gait pattern and use of HR. (Progressing, not met)  3. Pt will report <5/10 pain at worst within the R knee for ease with ADL's. (met)     Long Term Goals (12 weeks)  1. Pt will demonstrate ability to descend stairs reciprocally without assistance or pain exacerbation to improve mobility in community and in her home. (Progressing, not met)  2. Pt will demonstrate 4+/5 strength within the B LEfor ease with return to gym exercise and climbing stairs. (Progressing, not met)  3. Pt will report being independent with his/her HEP for maintenance of improvements gained during therapy sessions. (Progressing, not met)  4. Pt will report <2/10 pain at worst within the R knee for ease with ADLs. (Progressing, not met)  5. Pt will demonstrate ambulation x 300 ft without AD or antalgic gait to improve functional mobility in community.  (Progressing, not met)    PLAN   Plan of care Certification: 3/14/2023 to 6/9/2023.     Continue per plan of care with focus on B LE strength and soft tissue mobility.     Sofiya Lopez, PT

## 2023-05-02 DIAGNOSIS — G47.33 OSA (OBSTRUCTIVE SLEEP APNEA): Primary | ICD-10-CM

## 2023-05-03 DIAGNOSIS — G47.33 OSA (OBSTRUCTIVE SLEEP APNEA): Primary | ICD-10-CM

## 2023-05-05 ENCOUNTER — CLINICAL SUPPORT (OUTPATIENT)
Dept: REHABILITATION | Facility: OTHER | Age: 73
End: 2023-05-05
Payer: MEDICARE

## 2023-05-05 DIAGNOSIS — M25.561 RIGHT MEDIAL KNEE PAIN: Primary | ICD-10-CM

## 2023-05-05 PROCEDURE — 97112 NEUROMUSCULAR REEDUCATION: CPT | Mod: PN | Performed by: PHYSICAL THERAPIST

## 2023-05-05 NOTE — PROGRESS NOTES
[facility]   Physical Therapy Treatment Note     Name: Meredith Ramos  Clinic Number: [id number]    Therapy Diagnosis:   Encounter Diagnosis   Name Primary?    Right medial knee pain Yes       Physician: Sarwat Lu, *    Visit Date: 5/5/2023    Physician Orders: PT Eval and Treat:   Start neuromuscular retraining program and HEP. Goals of increased pelvic and core stability, IT band, hamstring, adductor stretching, and quadriceps/glute strengthening   Medical Diagnosis from Referral: M17.11 (ICD-10-CM) - Primary osteoarthritis of right knee M70.50 (ICD-10-CM) - Pes anserine bursitis   Evaluation Date: 3/14/2023  Authorization Period Expiration: 4/14/2023  Plan of Care Expiration: 6/9/2023  Progress Note Due: 5/14/2023   Visit # / Visits authorized: 14/ 21   FOTO: 2/ 3:  3/14/2023, 4/17/2023     Precautions: Standard and blood thinners    PTA Visit #: 0/5     Time In: 0750  Time Out: 0830  Total Billable Time: 25 minutes 1:1    SUBJECTIVE     Pt reports: overall seeing some progress. She says in the morning she can go upstairs as long as she's engaging through gluts, but with progression of the day that gets more uncomfortable. She still getting a lot of tightness to hips despite frequent stretching, and feels like her quads are still weak.      She was compliant with home exercise program.  Response to previous treatment: some relief with taping  Functional change: no change    Pain: 0/10 at rest  Location: right medial knee     OBJECTIVE     Objective Measures updated at progress report unless specified.       CMS Impairment/Limitation/Restriction for FOTO Knee Survey    Therapist reviewed FOTO scores for Meredith Ramos on 4/17/2023   FOTO documents entered into [employer] - see Media section.    Evaluation: 37%    Current : 37%    Goal: 32%         Treatment     Meredith received the treatments listed below:      therapeutic exercises to develop strength, endurance, ROM,  "flexibility, posture, and core stabilization for 00 minutes including:    SL bridges [id number] at knees  sustained bridge with ABD pulses 3 x 30" x RTB at knees  3D HS 15x  Prone HS curl with slow eccentric 2 x 10 4#  Shuttle DL x 3 black cord x 3x10  Shuttle SL R x 1 black cord 3x10  resisted side stepping 2 x 50 ft x RTB at ankles  railroad tracks 2 x 50 ft x RTB at ankles    LAQ with ball squeeze 2 x 10 with 10" hold at 10th rep, 4# cw  Elevated clams 10" x 10 x RTB at knees  Prone over EOM hip extension 2 x 10 increase resistance nv  Prone over EOM donkey kick 3 x 10 with 10x pulse at 10th rep2#  [place] with red loop 2 x 10 (1 set loop at knees, 1 set at ankles)      neuromuscular re-education activities to improve: Balance, Coordination, Kinesthetic, Sense, Proprioception, and Posture for 40 minutes. The following activities were included:   modified side plank + clams 2 x 10 x RTB at knees  modified side plank + hip abd 2 x 10 x RTB at knees  +SFMA rolling - upper body flexion pattern  +Palloff press 2 x 10 x red power band  +Chops 2 x 10 x red power band  +Lifts 2 x 10 x red power band    dynamic functional therapeutic activities to improve functional performance for 0 minutes. Including:  +Mini squat with RTB at knees 2 x 10 x RTB at knees - visual assist in front of mirror  Add as tolerated: lateral tap downs on 2" step    manual therapy techniques: Soft tissue Mobilization were applied to the: R LE for 0 minutes, including:  Vacuum cupping to R ITB with static placement and gliding   IASTM with MFR tools to ITB and distal HS  Active release to quad and HS along ITB  Seated tibiofemoral distraction  Preparation and application of Ktape to R knee. Y strip to decrease lateral patellar tracking. I strip to medial joint line for pain relief.     Patient Education and Home Exercises     Home Exercises Provided and Patient Education Provided     Education provided:   - therex rationale    Written Home Exercises " Provided: yes. Exercises were reviewed and Meredith was able to demonstrate them prior to the end of the session.  Meredith demonstrated good  understanding of the education provided. See EMR under Patient Instructions for exercises provided during therapy sessions    ASSESSMENT     Modification of treatment today to assess neuromuscular sequencing and movement patterns. With SFMA rolling, pt is unable to perform rolling without significant lower body recruitment. Good improvement with verbal and tactile cuing and propping onto side. Initiated dynamic core training with palloff press, chops, and lifts with good training effect noted.     Meredith Is progressing well towards her goals.   Pt prognosis is Good.     Pt will continue to benefit from skilled outpatient physical therapy to address the deficits listed in the problem list box on initial evaluation, provide pt/family education and to maximize pt's level of independence in the home and community environment.     Pt's spiritual, cultural and educational needs considered and pt agreeable to plan of care and goals.     Anticipated barriers to physical therapy: standard     Goals: updated 05/04/2023  Short Term Goals (4 weeks)  1. Pt will demonstrate independence with initial HEP to maintain progress made with therapy. (met)  2. Pt will demonstrate ability to descend 3 steps with reciprocal gait pattern and use of HR. (Progressing, not met)  3. Pt will report <5/10 pain at worst within the R knee for ease with ADL's. (met)     Long Term Goals (12 weeks)  1. Pt will demonstrate ability to descend stairs reciprocally without assistance or pain exacerbation to improve mobility in community and in her home. (Progressing, not met)  2. Pt will demonstrate 4+/5 strength within the B LEfor ease with return to gym exercise and climbing stairs. (Progressing, not met)  3. Pt will report being independent with his/her HEP for maintenance of improvements gained during therapy  sessions. (Progressing, not met)  4. Pt will report <2/10 pain at worst within the R knee for ease with ADLs. (Progressing, not met)  5. Pt will demonstrate ambulation x 300 ft without AD or antalgic gait to improve functional mobility in community.  (Progressing, not met)    PLAN   Plan of care Certification: 3/14/2023 to 6/9/2023.     Continue per plan of care with focus on B LE strength and soft tissue mobility.     Alice Caldwell, PT

## 2023-05-08 ENCOUNTER — OFFICE VISIT (OUTPATIENT)
Dept: SPORTS MEDICINE | Facility: CLINIC | Age: 73
End: 2023-05-08
Payer: MEDICARE

## 2023-05-08 ENCOUNTER — HOSPITAL ENCOUNTER (OUTPATIENT)
Dept: RADIOLOGY | Facility: HOSPITAL | Age: 73
Discharge: HOME | End: 2023-05-08
Attending: FAMILY MEDICINE
Payer: MEDICARE

## 2023-05-08 ENCOUNTER — TELEPHONE (OUTPATIENT)
Dept: SPORTS MEDICINE | Facility: CLINIC | Age: 73
End: 2023-05-08
Payer: MEDICARE

## 2023-05-08 VITALS — HEIGHT: 65 IN | WEIGHT: 118 LBS | TEMPERATURE: 98 F | BODY MASS INDEX: 19.66 KG/M2

## 2023-05-08 DIAGNOSIS — M25.561 RIGHT KNEE PAIN, UNSPECIFIED CHRONICITY: ICD-10-CM

## 2023-05-08 DIAGNOSIS — M17.11 PRIMARY OSTEOARTHRITIS OF RIGHT KNEE: Primary | ICD-10-CM

## 2023-05-08 DIAGNOSIS — G89.29 CHRONIC PAIN OF RIGHT KNEE: ICD-10-CM

## 2023-05-08 DIAGNOSIS — R26.89 ANTALGIC GAIT: ICD-10-CM

## 2023-05-08 DIAGNOSIS — M25.561 CHRONIC PAIN OF RIGHT KNEE: ICD-10-CM

## 2023-05-08 PROCEDURE — 73562 X-RAY EXAM OF KNEE 3: CPT | Mod: 26,LT,, | Performed by: RADIOLOGY

## 2023-05-08 PROCEDURE — 20611 DRAIN/INJ JOINT/BURSA W/US: CPT | Mod: PBBFAC,RT | Performed by: FAMILY MEDICINE

## 2023-05-08 PROCEDURE — 99499 UNLISTED E&M SERVICE: CPT | Mod: S$PBB,,, | Performed by: FAMILY MEDICINE

## 2023-05-08 PROCEDURE — 73564 XR KNEE ORTHO RIGHT WITH FLEXION: ICD-10-PCS | Mod: 26,RT,, | Performed by: RADIOLOGY

## 2023-05-08 PROCEDURE — 99999 PR PBB SHADOW E&M-EST. PATIENT-LVL III: ICD-10-PCS | Mod: PBBFAC,,, | Performed by: FAMILY MEDICINE

## 2023-05-08 PROCEDURE — 99499 NO LOS: ICD-10-PCS | Mod: S$PBB,,, | Performed by: FAMILY MEDICINE

## 2023-05-08 PROCEDURE — 20611 LARGE JOINT ASPIRATION/INJECTION: R KNEE: ICD-10-PCS | Mod: S$PBB,RT,, | Performed by: FAMILY MEDICINE

## 2023-05-08 PROCEDURE — 73562 XR KNEE ORTHO RIGHT WITH FLEXION: ICD-10-PCS | Mod: 26,LT,, | Performed by: RADIOLOGY

## 2023-05-08 PROCEDURE — 99213 OFFICE O/P EST LOW 20 MIN: CPT | Mod: PBBFAC,25 | Performed by: FAMILY MEDICINE

## 2023-05-08 PROCEDURE — 73564 X-RAY EXAM KNEE 4 OR MORE: CPT | Mod: 26,RT,, | Performed by: RADIOLOGY

## 2023-05-08 PROCEDURE — 73564 X-RAY EXAM KNEE 4 OR MORE: CPT | Mod: TC,RT

## 2023-05-08 PROCEDURE — 99999 PR PBB SHADOW E&M-EST. PATIENT-LVL III: CPT | Mod: PBBFAC,,, | Performed by: FAMILY MEDICINE

## 2023-05-08 RX ADMIN — Medication 20 MG: at 11:05

## 2023-05-08 NOTE — TELEPHONE ENCOUNTER
----- Message from Patricia Horowitz sent at 5/8/2023  9:48 AM CDT -----  Contact: patient  Type:  Patient Call          Who Called: Patient         Does the patient know what this is regarding?: Requesting a call back about the appt she have schedule for 5/15 ; please advise           Would the patient rather a call back or a response via [facility]? Call           Best Call Back Number: [phone]             Additional Information:

## 2023-05-08 NOTE — TELEPHONE ENCOUNTER
Called and spoke to patient.  Explained that her Euflexxa is a series of three injection and 5/15/23 will be her second injection.  Patient verbalized understanding

## 2023-05-08 NOTE — PROGRESS NOTES
Euflexxa knee joint, right [1/3]  Lot number: [id number]  Expiration date: 04/14/2024    MEDICAL NECESSITY FOR VISCOSUPPLEMENTATION USE: After thorough evaluation of the patient, I have determined that viscosupplementation treatment is medically necessary. 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 Oral analgesics including NSAIDs have not adequately controlled the patient's symptoms. There is radiographic evidence of Kellgren-Guillermo grade II (or greater) osteoarthritic (OA) changes, or if lack of radiographic evidence, there is arthroscopic or other evidence of chondrosis of the knee(s).

## 2023-05-08 NOTE — PROCEDURES
"Large Joint Aspiration/Injection: R knee    Date/Time: 5/8/2023 11:45 AM  Performed by: Sarwat Lu MD  Authorized by: Sarwat Lu MD     Consent Done?:  Yes (Verbal)  Indications:  Pain  Site marked: the procedure site was marked    Timeout: prior to procedure the correct patient, procedure, and site was verified    Prep: patient was prepped and draped in usual sterile fashion      Details:  Needle Size:  20 G  Ultrasonic Guidance for needle placement?: Yes    Images are saved and documented.  Approach:  Lateral  Location:  Knee  Site:  R knee  Medications:  20 mg sodium hyaluronate (EUFLEXXA) 10 mg/mL(mw 2.4 -3.6 million)  Patient tolerance:  Patient tolerated the procedure well with no immediate complications     Description of ultrasound utilization for needle guidance:   Ultrasound guidance used for needle localization. Images saved and stored for documentation. The SUPRAPATELLAR BURSA / KNEE JOINT was visualized. Dynamic visualization of the 20g x 3.5" needle was continuous throughout the procedure.   "

## 2023-05-08 NOTE — PROGRESS NOTES
Meredith Ramos, a 73 y.o. female, presents today for evaluation of her right knee.     History of Present Illness (HPI)  Location: anterior knee   Onset: chronic, insidious  Palliative:    relative rest   oral analgesics, OTC   Physical Therapy for 6 weeks    CSI iaknee r 2/7/23- good relief, ongoing but not tolerated due to side effects  Provocative: prolonged ambulation  Prior: none  Progression: plateau discomfort  Quality: sharp  Radiation: none  Severity: per nursing documentation  Timing: intermittent w/ use  Trauma: none    Review of Systems (ROS)  A 10+ review of systems was performed with pertinent positives and negatives noted above in the history of present illness. Other systems were negative unless otherwise specified.    Physical Examination (PE)  General:  The patient is alert and oriented x 3. Mood is pleasant. Observation of ears, eyes and nose reveal no gross abnormalities. HEENT: NCAT, sclera anicteric.   Lungs: Respirations are equal and unlabored.  Gait is coordinated. Patient can toe walk and heel walk without difficulty.    KNEE EXAMINATION    Observation/Inspection  Gait:   Nonantalgic   Alignment:  Neutral   Scars:   None   Muscle atrophy: Mild  Effusion:  None   Warmth:  None   Discoloration:   none     Tenderness / Crepitus (T / C):         T / C      T / C  Patella   - / -   Lateral joint line   - / -     Peripatellar medial  -  Medial joint line    + / -  Peripatellar lateral -  Medial plica   - / -  Patellar tendon -   Popliteal fossa   - / -  Quad tendon   -   Gastrocnemius   -  Prepatellar Bursa - / -   Quadricep   -  Tibial tubercle  -  Thigh/hamstring  -  Pes anserine/HS -  Fibula    -  ITB   - / -  Tibia     -  Tib/fib joint  - / -  LCL    -    MFC   - / -   MCL: Proximal  -    LFC   - / -   Distal    -          ROM: (* = pain)  PASSIVE   ACTIVE    Left :   5 / 0 / 145   5 / 0 / 145     Right :    5 / 0 / 145   5 / 0 / 145    Patellofemoral examination:  See above noted areas  of tenderness.   Patella position    Subluxation / dislocation: Centered        Sup. / Inf;   Normal   Crepitus (PF):    Absent   Patellar Mobility:       Medial-lateral:   Normal    Superior-inferior:  Normal    Inferior tilt   Normal    Patellar tendon:  Normal   Lateral tilt:    Normal   J-sign:     None   Patellofemoral grind:   No pain     Meniscal Signs:     Pain on terminal extension:  +  Pain on terminal flexion:  +  Katies maneuver:  +*  Squat     NT  Thesaly    NT    Ligament Examination:  ACL / Lachman:  WNL  PCL-Post.  drawer: normal 0 to 2mm  MCL- Valgus:  normal 0 to 2mm  LCL- Varus:    normal 0 to 2mm  Pivot shift:  guarding   Dial Test:   difference c/w other side   At 30° flexion: normal (< 5°)    At 90° flexion: normal (< 5°)   Reverse Pivot Shift:   normal (Equal)     Strength: (* = with pain) Painful Side  Quadriceps   5/5  Hamstrin/5    Extremity Neuro-vascular Examination:   Sensation:  Grossly intact to light touch all dermatomal regions.   Motor Function:  Fully intact motor function at hip, knee, foot and ankle    DTRs;  quadriceps and  achilles 2+.  No clonus and downgoing Babinski.    Vascular status:  DP and PT pulses 2+, brisk capillary refill, symmetric.     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, rose medial compartment, right  w/ prior knee joint effusion    No evidence of neurologic pathology  No evidence of vascular pathology    Imaging studies reviewed:   X-ray knee, bilat 23.05    Plan  We discussed the importance of appropriate diet, weight, and regular exercise    We discussed options including    Watchful waiting / relative rest    Physical therapy x   Injection therapy Does not do well w/ iaCSI  Vsi #1/3 iaknee right   Consultation    The patient chooses As above   x = prescribed  CSI = corticosteroid injection  VSI = viscosupplement injection  PRPI = platelet rich plasma injection  ia = intra  articular  R = right  L = left  B = bilateral   nfSx = surgical consultation was recommended, but patient is not interested in consultation at this time    Physical Therapy        Formal (fPT), @ [facility] facility T, [facility]   Formal (fPT), @ [facility] facility        Homegoing (hgPT), per concurrent fPT recommendations t   Homegoing (hgPT), per prior fPT recommendations    Homegoing (hgPT), handout provided        w/  (atPT)    [blank] = not prescribed  x = prescribed  b = prescribed, and begin as indicated  t = continue as indicated  r = prescribed, and restart as indicated  p = completed prior as indicated  hs = prescribed, and with high school   col = prescribed, and with college or university   nfPT = physical therapy was recommended, but patient is not interested in PT at this time    Activity (e.g. sports, work) restrictions    [blank] = as tolerated  pt = per physical therapist  at = per   NWB = non weight bearing on affected lower extremity, with crutches assistance for ambulation    Bracing    [blank] = not prescribed  r = recommended, but not fit with at todays visit  f = prescribed and fit with at todays visit  t = continue as indicated  d = d/c  p = as needed  rare = use on rare, as-needed basis; advised against chronic use    Pain management    [blank] = No prescription necessary. A handout detailing dosing of appropriate   over-the-counter musculoskeletal analgesics was made available to the patient.   m = meloxicam x 14 days  mp = 14 day course of meloxicam prescribed prior    Follow up Vsi #2/3   [blank] = as needed  [number] = in [number] weeks  CSI = for corticosteroid injection  VSI = for viscosupplement injection or injection series  PRP = for platelet rich plasma injection or injection series  MRI = after MRI imaging  ns = should surgical options be deferred (no surgery)  o = appointment offered, deferred by patient    Should  symptoms worsen or fail to resolve, consider    Revisiting the above options and / or Could try csi PAbursa, though I'm skeptical  Could re eval post knee, though no free fluid there today  Most likely-->TKA     Vocation:

## 2023-05-09 ENCOUNTER — DOCUMENTATION ONLY (OUTPATIENT)
Dept: REHABILITATION | Facility: OTHER | Age: 73
End: 2023-05-09

## 2023-05-09 ENCOUNTER — CLINICAL SUPPORT (OUTPATIENT)
Dept: REHABILITATION | Facility: OTHER | Age: 73
End: 2023-05-09
Payer: MEDICARE

## 2023-05-09 DIAGNOSIS — M25.561 RIGHT MEDIAL KNEE PAIN: Primary | ICD-10-CM

## 2023-05-09 PROCEDURE — 97112 NEUROMUSCULAR REEDUCATION: CPT | Mod: PN,CQ

## 2023-05-09 NOTE — PROGRESS NOTES
"[facility]   Physical Therapy Treatment Note     Name: Meredith Ramos  Clinic Number: [id number]    Therapy Diagnosis:   Encounter Diagnosis   Name Primary?    Right medial knee pain Yes       Physician: Sarwat Lu, *    Visit Date: 5/9/2023    Physician Orders: PT Eval and Treat:   Start neuromuscular retraining program and HEP. Goals of increased pelvic and core stability, IT band, hamstring, adductor stretching, and quadriceps/glute strengthening   Medical Diagnosis from Referral: M17.11 (ICD-10-CM) - Primary osteoarthritis of right knee M70.50 (ICD-10-CM) - Pes anserine bursitis   Evaluation Date: 3/14/2023  Authorization Period Expiration: 4/14/2023  Plan of Care Expiration: 6/9/2023  Progress Note Due: 5/14/2023   Visit # / Visits authorized: 15/ 21   FOTO: 2/ 3:  3/14/2023, 4/17/2023     Precautions: Standard and blood thinners    PTA Visit #: 1/5     Time In: 12:19 pm  Time Out: 1:02 pm  Total Billable Time: 43 minutes     SUBJECTIVE   Pt reports: No pain currently, but at the end of the day it swells and she has some pain    She was compliant with home exercise program.  Response to previous treatment: "It was fine"  Functional change: no change    Pain: 0/10 at rest  Location: right medial knee     OBJECTIVE     Objective Measures updated at progress report unless specified.       CMS Impairment/Limitation/Restriction for FOTO Knee Survey    Therapist reviewed FOTO scores for Meredith Ramos on 4/17/2023   FOTO documents entered into [employer] - see Media section.    Evaluation: 37%    Current : 37%    Goal: 32%       Treatment     Meredith performed the bolded treatments listed below:      therapeutic exercises to develop strength, endurance, ROM, flexibility, posture, and core stabilization for 00 minutes including:    SL bridges [id number] at knees  sustained bridge with ABD pulses 3 x 30" x RTB at knees  3D HS 15x  Prone HS curl with slow eccentric 2 x 10 4#  Shuttle DL " "x 3 black cord x 3x10  Shuttle SL R x 1 black cord 3x10  resisted side stepping 2 x 50 ft x RTB at ankles  railroad tracks 2 x 50 ft x RTB at ankles    LAQ with ball squeeze 2 x 10 with 10" hold at 10th rep, 4# cw  Elevated clams 10" x 10 x RTB at knees  Prone over EOM hip extension 2 x 10 increase resistance nv  Prone over EOM donkey kick 3 x 10 with 10x pulse at 10th rep2#  [place] with red loop 2 x 10 (1 set loop at knees, 1 set at ankles)      neuromuscular re-education activities to improve: Balance, Coordination, Kinesthetic, Sense, Proprioception, and Posture for 43 minutes. The following activities were included:   modified side plank + clams 2 x 10 x RTB at knees  modified side plank + hip abd 2 x 10 x RTB at knees  SFMA rolling - upper body flexion pattern  Palloff press 2 x 10 x red power band  Chops 2 x 10 x red power band  Lifts 2 x 10 x red power band  +Around the [place] x 20 CW/CCW  +Bird dog x 20 R/L  +Dead bug x 20 R/L  +Hesitation marches 10# KB 40ft x 2  +Marching with TrA activation and iso shoulder ext RTB  x 15    dynamic functional therapeutic activities to improve functional performance for 00 minutes. Including:  +Mini squat with RTB at knees 2 x 10 x RTB at knees - visual assist in front of mirror  Add as tolerated: lateral tap downs on 2" step    manual therapy techniques: Soft tissue Mobilization were applied to the: R LE for 00 minutes, including:  Vacuum cupping to R ITB with static placement and gliding   IASTM with MFR tools to ITB and distal HS  Active release to quad and HS along ITB  Seated tibiofemoral distraction  Preparation and application of Ktape to R knee. Y strip to decrease lateral patellar tracking. I strip to medial joint line for pain relief.     Patient Education and Home Exercises     Home Exercises Provided and Patient Education Provided     Education provided:   - Exercise form and rationale    Written Home Exercises Provided: Patient instructed to cont prior HEP. " Exercises were reviewed and Meredith was able to demonstrate them prior to the end of the session.  Meredith demonstrated good  understanding of the education provided. See EMR under Patient Instructions for exercises provided during therapy sessions    ASSESSMENT   Pt tolerated treatment well today with no adverse effects. Continued with dynamic core strengthening/stabilization training. Some cuing required for sequencing and to correct form. Good training effect noted following session.    Meredith Is progressing well towards her goals.   Pt prognosis is Good.     Pt will continue to benefit from skilled outpatient physical therapy to address the deficits listed in the problem list box on initial evaluation, provide pt/family education and to maximize pt's level of independence in the home and community environment.     Pt's spiritual, cultural and educational needs considered and pt agreeable to plan of care and goals.     Anticipated barriers to physical therapy: standard     Goals:   Short Term Goals (4 weeks)  1. Pt will demonstrate independence with initial HEP to maintain progress made with therapy. (met)  2. Pt will demonstrate ability to descend 3 steps with reciprocal gait pattern and use of HR. (Progressing, not met)  3. Pt will report <5/10 pain at worst within the R knee for ease with ADL's. (met)     Long Term Goals (12 weeks)  1. Pt will demonstrate ability to descend stairs reciprocally without assistance or pain exacerbation to improve mobility in community and in her home. (Progressing, not met)  2. Pt will demonstrate 4+/5 strength within the B LEfor ease with return to gym exercise and climbing stairs. (Progressing, not met)  3. Pt will report being independent with his/her HEP for maintenance of improvements gained during therapy sessions. (Progressing, not met)  4. Pt will report <2/10 pain at worst within the R knee for ease with ADLs. (Progressing, not met)  5. Pt will demonstrate ambulation x  300 ft without AD or antalgic gait to improve functional mobility in community.  (Progressing, not met)    PLAN   Plan of care Certification: 3/14/2023 to 6/9/2023.     Continue per plan of care with focus on B LE strength and soft tissue mobility.     Nasima Keith III, PTA

## 2023-05-09 NOTE — PROGRESS NOTES
PT/PTA met face to face to discuss pt's treatment plan and progress towards established goals. Pt will be seen by a physical therapist minimally every 6th visit or every 30 days.    Nasima Keith III, PTA

## 2023-05-15 ENCOUNTER — OFFICE VISIT (OUTPATIENT)
Dept: SPORTS MEDICINE | Facility: CLINIC | Age: 73
End: 2023-05-15
Payer: MEDICARE

## 2023-05-15 VITALS
DIASTOLIC BLOOD PRESSURE: 64 MMHG | SYSTOLIC BLOOD PRESSURE: 100 MMHG | HEART RATE: 69 BPM | WEIGHT: 120 LBS | HEIGHT: 65 IN | BODY MASS INDEX: 19.99 KG/M2

## 2023-05-15 DIAGNOSIS — M17.11 PRIMARY OSTEOARTHRITIS OF RIGHT KNEE: Primary | ICD-10-CM

## 2023-05-15 PROCEDURE — 99213 OFFICE O/P EST LOW 20 MIN: CPT | Mod: PBBFAC | Performed by: FAMILY MEDICINE

## 2023-05-15 PROCEDURE — 20611 DRAIN/INJ JOINT/BURSA W/US: CPT | Mod: PBBFAC,RT | Performed by: FAMILY MEDICINE

## 2023-05-15 PROCEDURE — 99499 UNLISTED E&M SERVICE: CPT | Mod: S$PBB,,, | Performed by: FAMILY MEDICINE

## 2023-05-15 PROCEDURE — 99499 NO LOS: ICD-10-PCS | Mod: S$PBB,,, | Performed by: FAMILY MEDICINE

## 2023-05-15 PROCEDURE — 20611 LARGE JOINT ASPIRATION/INJECTION: R KNEE: ICD-10-PCS | Mod: S$PBB,RT,, | Performed by: FAMILY MEDICINE

## 2023-05-15 PROCEDURE — 99999 PR PBB SHADOW E&M-EST. PATIENT-LVL III: ICD-10-PCS | Mod: PBBFAC,,, | Performed by: FAMILY MEDICINE

## 2023-05-15 PROCEDURE — 99999 PR PBB SHADOW E&M-EST. PATIENT-LVL III: CPT | Mod: PBBFAC,,, | Performed by: FAMILY MEDICINE

## 2023-05-15 RX ADMIN — Medication 20 MG: at 11:05

## 2023-05-15 NOTE — PROCEDURES
"Large Joint Aspiration/Injection: R knee    Date/Time: 5/15/2023 11:45 AM  Performed by: Sarwat Lu MD  Authorized by: Sarwat Lu MD     Consent Done?:  Yes (Verbal)  Indications:  Pain  Site marked: the procedure site was marked    Timeout: prior to procedure the correct patient, procedure, and site was verified    Prep: patient was prepped and draped in usual sterile fashion      Details:  Needle Size:  20 G  Ultrasonic Guidance for needle placement?: Yes    Images are saved and documented.  Approach:  Lateral  Location:  Knee  Site:  R knee  Medications:  20 mg sodium hyaluronate (EUFLEXXA) 10 mg/mL(mw 2.4 -3.6 million)  Patient tolerance:  Patient tolerated the procedure well with no immediate complications     Description of ultrasound utilization for needle guidance:   Ultrasound guidance used for needle localization. Images saved and stored for documentation. The SUPRAPATELLAR BURSA / KNEE JOINT was visualized. Dynamic visualization of the 20g x 3.5" needle was continuous throughout the procedure.   "

## 2023-05-15 NOTE — PROGRESS NOTES
Euflexxa knee joint right 2/3  Lot number: [id number]  Expiration date: 24.05.07    MEDICAL NECESSITY FOR VISCOSUPPLEMENTATION USE: After thorough evaluation of the patient, I have determined that viscosupplementation treatment is medically necessary. 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 Oral analgesics including NSAIDs have not adequately controlled the patient's symptoms. There is radiographic evidence of Kellgren-Guillermo grade II (or greater) osteoarthritic (OA) changes, or if lack of radiographic evidence, there is arthroscopic or other evidence of chondrosis of the knee(s).

## 2023-05-17 ENCOUNTER — CLINICAL SUPPORT (OUTPATIENT)
Dept: REHABILITATION | Facility: OTHER | Age: 73
End: 2023-05-17
Payer: MEDICARE

## 2023-05-17 DIAGNOSIS — M25.561 RIGHT MEDIAL KNEE PAIN: Primary | ICD-10-CM

## 2023-05-17 PROCEDURE — 97112 NEUROMUSCULAR REEDUCATION: CPT | Mod: PN | Performed by: PHYSICAL THERAPIST

## 2023-05-17 NOTE — PROGRESS NOTES
"[facility]   Physical Therapy Treatment Note     Name: Meredith Ramos  Clinic Number: [id number]    Therapy Diagnosis:   Encounter Diagnosis   Name Primary?    Right medial knee pain Yes       Physician: Sarwat Lu, *    Visit Date: 5/17/2023    Physician Orders: PT Eval and Treat:   Start neuromuscular retraining program and HEP. Goals of increased pelvic and core stability, IT band, hamstring, adductor stretching, and quadriceps/glute strengthening   Medical Diagnosis from Referral: M17.11 (ICD-10-CM) - Primary osteoarthritis of right knee M70.50 (ICD-10-CM) - Pes anserine bursitis   Evaluation Date: 3/14/2023  Authorization Period Expiration: 4/14/2023  Plan of Care Expiration: 6/9/2023  Progress Note Due: 5/14/2023   Visit # / Visits authorized: 16/ 21   FOTO: 2/ 3:  3/14/2023, 4/17/2023     Precautions: Standard and blood thinners    PTA Visit #: 0/5     Time In: 0915  Time Out: 1000  Total Billable Time: 45 minutes     SUBJECTIVE   Pt reports: no pain coming in this morning. She's still having some trouble with stairs, particularly as the day progresses. She got her second shot, and is feeling some improvement. Third shot will be next week.     She was compliant with home exercise program.  Response to previous treatment: "It was fine"  Functional change: no change    Pain: 0/10 at rest  Location: right medial knee     OBJECTIVE     Objective Measures updated at progress report unless specified.       CMS Impairment/Limitation/Restriction for FOTO Knee Survey    Therapist reviewed FOTO scores for Meredith Ramos on 4/17/2023   FOTO documents entered into [employer] - see Media section.    Evaluation: 37%    Current : 37%    Goal: 32%       Treatment     Meredith performed the bolded treatments listed below:      therapeutic exercises to develop strength, endurance, ROM, flexibility, posture, and core stabilization for 00 minutes including:    SL bridges [id number] at " "knees  sustained bridge with ABD pulses 3 x 30" x RTB at knees  3D HS 15x  Prone HS curl with slow eccentric 2 x 10 4#  Shuttle DL x 3 black cord x 3x10  Shuttle SL R x 1 black cord 3x10  resisted side stepping 2 x 50 ft x RTB at ankles  railroad tracks 2 x 50 ft x RTB at ankles    LAQ with ball squeeze 2 x 10 with 10" hold at 10th rep, 4# cw  Elevated clams 10" x 10 x RTB at knees  Prone over EOM hip extension 2 x 10 increase resistance nv  Prone over EOM donkey kick 3 x 10 with 10x pulse at 10th rep2#  [place] with red loop 2 x 10 (1 set loop at knees, 1 set at ankles)      neuromuscular re-education activities to improve: Balance, Coordination, Kinesthetic, Sense, Proprioception, and Posture for 45 minutes. The following activities were included:   modified side plank + clams 2 x 10 x RTB at knees  modified side plank + hip abd 2 x 10 x RTB at knees  SFMA rolling - upper body flexion pattern  Bird dog x 20 R/L  Dead bug x 2 x 10 R/L with red pball  Palloff press 2 x 10 x red power band  Chops 2 x 15 x red power band 1/2  kneeling  Lifts 2 x 15 x red power band 1/2 kneeling  Around [place] x 20 CW/CCW SLS (toe touch as needed) with 8# kb  +SLS with uni [place] press 10x ipsi/10x CL with 3#  +SLS with CL row orange power band 2 x 10  Hesitation marches 10# KB 40ft x 2  Marching with TrA activation and iso shoulder ext RTB  x 15    dynamic functional therapeutic activities to improve functional performance for 00 minutes. Including:  +Mini squat with RTB at knees 2 x 10 x RTB at knees - visual assist in front of mirror  Add as tolerated: lateral tap downs on 2" step    manual therapy techniques: Soft tissue Mobilization were applied to the: R LE for 00 minutes, including:  Vacuum cupping to R ITB with static placement and gliding   IASTM with MFR tools to ITB and distal HS  Active release to quad and HS along ITB  Seated tibiofemoral distraction  Preparation and application of Ktape to R knee. Y strip to decrease " lateral patellar tracking. I strip to medial joint line for pain relief.     Patient Education and Home Exercises     Home Exercises Provided and Patient Education Provided     Education provided:   - Exercise form and rationale    Written Home Exercises Provided: Patient instructed to cont prior HEP. Exercises were reviewed and Meredith was able to demonstrate them prior to the end of the session.  Meredith demonstrated good  understanding of the education provided. See EMR under Patient Instructions for exercises provided during therapy sessions    ASSESSMENT   Good tolerance to progression of core stabilization activities today. Toe touch support needed intermittently for SLS activities.     Meredith Is progressing well towards her goals.   Pt prognosis is Good.     Pt will continue to benefit from skilled outpatient physical therapy to address the deficits listed in the problem list box on initial evaluation, provide pt/family education and to maximize pt's level of independence in the home and community environment.     Pt's spiritual, cultural and educational needs considered and pt agreeable to plan of care and goals.     Anticipated barriers to physical therapy: standard     Goals:   Short Term Goals (4 weeks)  1. Pt will demonstrate independence with initial HEP to maintain progress made with therapy. (met)  2. Pt will demonstrate ability to descend 3 steps with reciprocal gait pattern and use of HR. (met)  3. Pt will report <5/10 pain at worst within the R knee for ease with ADL's. (met)     Long Term Goals (12 weeks)  1. Pt will demonstrate ability to descend stairs reciprocally without assistance or pain exacerbation to improve mobility in community and in her home. (Progressing, not met)  2. Pt will demonstrate 4+/5 strength within the B LEfor ease with return to gym exercise and climbing stairs. (Progressing, not met)  3. Pt will report being independent with his/her HEP for maintenance of improvements  gained during therapy sessions. (Progressing, not met)  4. Pt will report <2/10 pain at worst within the R knee for ease with ADLs. (Progressing, not met)  5. Pt will demonstrate ambulation x 300 ft without AD or antalgic gait to improve functional mobility in community.  (met)    PLAN   Plan of care Certification: 3/14/2023 to 6/9/2023.     Continue per plan of care with focus on B LE strength and soft tissue mobility.     Alice Caldwell, PT

## 2023-05-19 ENCOUNTER — CLINICAL SUPPORT (OUTPATIENT)
Dept: REHABILITATION | Facility: OTHER | Age: 73
End: 2023-05-19
Payer: MEDICARE

## 2023-05-19 DIAGNOSIS — M25.561 RIGHT MEDIAL KNEE PAIN: Primary | ICD-10-CM

## 2023-05-19 PROCEDURE — 97112 NEUROMUSCULAR REEDUCATION: CPT | Mod: PN

## 2023-05-19 NOTE — PROGRESS NOTES
"[facility]   Physical Therapy Treatment Note     Name: Meredith Ramos  Clinic Number: [id number]    Therapy Diagnosis:   Encounter Diagnosis   Name Primary?    Right medial knee pain Yes         Physician: Sarwat Lu, *    Visit Date: 5/19/2023    Physician Orders: PT Eval and Treat:   Start neuromuscular retraining program and HEP. Goals of increased pelvic and core stability, IT band, hamstring, adductor stretching, and quadriceps/glute strengthening   Medical Diagnosis from Referral: M17.11 (ICD-10-CM) - Primary osteoarthritis of right knee M70.50 (ICD-10-CM) - Pes anserine bursitis   Evaluation Date: 3/14/2023  Authorization Period Expiration: 4/14/2023  Plan of Care Expiration: 6/9/2023  Progress Note Due: 5/14/2023   Visit # / Visits authorized: 17/ 21   FOTO: 2/ 3:  3/14/2023, 4/17/2023     Precautions: Standard and blood thinners    PTA Visit #: 0/5     Time In: 0830  Time Out: 9:15  Total Billable Time: 45 minutes     SUBJECTIVE   Pt reports: denies pain after last visit. Overall feeling ok today.    She was compliant with home exercise program.  Response to previous treatment: "It was fine"  Functional change: no change    Pain: 0/10 at rest  Location: right medial knee     OBJECTIVE     Objective Measures updated at progress report unless specified.       CMS Impairment/Limitation/Restriction for FOTO Knee Survey    Therapist reviewed FOTO scores for Meredith Ramos on 4/17/2023   FOTO documents entered into [facility] - see Media section.    Evaluation: 37%    Current : 37%    Goal: 32%       Treatment     Meredith performed the bolded treatments listed below:      therapeutic exercises to develop strength, endurance, ROM, flexibility, posture, and core stabilization for 00 minutes including:    SL bridges [id number] at knees  sustained bridge with ABD pulses 3 x 30" x RTB at knees  3D HS 15x  Shuttle DL x 3 black cord x 3x10  Shuttle SL R x 1 black cord 3x10  resisted side " "stepping 2 x 50 ft x RTB at ankles  [place] with red loop 2 x 10 (1 set loop at knees, 1 set at ankles)      neuromuscular re-education activities to improve: Balance, Coordination, Kinesthetic, Sense, Proprioception, and Posture for 45 minutes. The following activities were included:   modified side plank + clams 2 x 10 x RTB at knees  modified side plank + hip abd 2 x 10 x RTB at knees  SFMA rolling - upper body flexion pattern  Bird dog x 20 R/L  Dead bug x 3 x 10 R/L with red pball  Palloff press 2 x 10 x red power band  Chops 2 x 15 x red power band 1/2  kneeling  Lifts 2 x 15 x red power band 1/2 kneeling  Around [place] x 20 CW/CCW SLS (toe touch as needed) with 8# kb  SLS with uni [place] press 10x ipsi/10x CL with 3#  SLS with CL row RED power band 2 x 10  Hesitation marches 10# KB 40ft x 2  Marching with TrA activation and iso shoulder ext RTB  x 15    dynamic functional therapeutic activities to improve functional performance for 00 minutes. Including:  +Mini squat with RTB at knees 2 x 10 x RTB at knees - visual assist in front of mirror  Add as tolerated: lateral tap downs on 2" step      Patient Education and Home Exercises     Home Exercises Provided and Patient Education Provided     Education provided:   - Exercise form and rationale    Written Home Exercises Provided: Patient instructed to cont prior HEP. Exercises were reviewed and Meredith was able to demonstrate them prior to the end of the session.  Meredith demonstrated good  understanding of the education provided. See EMR under Patient Instructions for exercises provided during therapy sessions    ASSESSMENT     Frequent toe touch down for assist with SLS exercises, including single arm rows and kettle bell passes ("around the [place]") due to continued motor control/coordination deficits in addition to mm fatigue. Able to perform increased reps with deadbugs today, indicating improving core strength necessary for dynamic trunk " stabilization.    Discussed possible use of dexamethasone in an iontophoresis patch for future use. Patch would be a transdermal application of dex (1.3 mL at 80 hx dispersed). Advised pt to F/U with MD, cardiology, to determine appropriateness of use considering Hx of cardiac care.    Plan to continue PT x 2-3 weeks with progression towards d/c and updated HEP by end of POC. Plan to focus on trunk strength, stabilization next visit as tolerated.    Meredith Is progressing well towards her goals.   Pt prognosis is Good.     Pt will continue to benefit from skilled outpatient physical therapy to address the deficits listed in the problem list box on initial evaluation, provide pt/family education and to maximize pt's level of independence in the home and community environment.     Pt's spiritual, cultural and educational needs considered and pt agreeable to plan of care and goals.     Anticipated barriers to physical therapy: standard     Goals:   Short Term Goals (4 weeks)  1. Pt will demonstrate independence with initial HEP to maintain progress made with therapy. (met)  2. Pt will demonstrate ability to descend 3 steps with reciprocal gait pattern and use of HR. (met)  3. Pt will report <5/10 pain at worst within the R knee for ease with ADL's. (met)     Long Term Goals (12 weeks)  1. Pt will demonstrate ability to descend stairs reciprocally without assistance or pain exacerbation to improve mobility in community and in her home. (Progressing, not met)  2. Pt will demonstrate 4+/5 strength within the B LEfor ease with return to gym exercise and climbing stairs. (Progressing, not met)  3. Pt will report being independent with his/her HEP for maintenance of improvements gained during therapy sessions. (Progressing, not met)  4. Pt will report <2/10 pain at worst within the R knee for ease with ADLs. (Progressing, not met)  5. Pt will demonstrate ambulation x 300 ft without AD or antalgic gait to improve functional  mobility in community.  (met)    PLAN   Plan of care Certification: 3/14/2023 to 6/9/2023.     Continue per plan of care with focus on B LE strength and soft tissue mobility.     Sofiya Lopez, PT

## 2023-05-19 NOTE — PROGRESS NOTES
Euflexxa knee joint right 3/3  Lot number: [id number]  Expiration date: 05/07/2024    MEDICAL NECESSITY FOR VISCOSUPPLEMENTATION USE: After thorough evaluation of the patient, I have determined that viscosupplementation treatment is medically necessary. 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 Oral analgesics including NSAIDs have not adequately controlled the patient's symptoms. There is radiographic evidence of Kellgren-Guillermo grade II (or greater) osteoarthritic (OA) changes, or if lack of radiographic evidence, there is arthroscopic or other evidence of chondrosis of the knee(s).

## 2023-05-22 ENCOUNTER — OFFICE VISIT (OUTPATIENT)
Dept: SPORTS MEDICINE | Facility: CLINIC | Age: 73
End: 2023-05-22
Payer: MEDICARE

## 2023-05-22 ENCOUNTER — CLINICAL SUPPORT (OUTPATIENT)
Dept: REHABILITATION | Facility: OTHER | Age: 73
End: 2023-05-22
Payer: MEDICARE

## 2023-05-22 VITALS
HEART RATE: 70 BPM | HEIGHT: 65 IN | DIASTOLIC BLOOD PRESSURE: 72 MMHG | SYSTOLIC BLOOD PRESSURE: 106 MMHG | BODY MASS INDEX: 19.49 KG/M2 | WEIGHT: 117 LBS

## 2023-05-22 DIAGNOSIS — M25.561 RIGHT MEDIAL KNEE PAIN: Primary | ICD-10-CM

## 2023-05-22 DIAGNOSIS — M17.11 PRIMARY OSTEOARTHRITIS OF RIGHT KNEE: Primary | ICD-10-CM

## 2023-05-22 PROCEDURE — 20611 LARGE JOINT ASPIRATION/INJECTION: R KNEE: ICD-10-PCS | Mod: S$PBB,RT,, | Performed by: FAMILY MEDICINE

## 2023-05-22 PROCEDURE — 99499 NO LOS: ICD-10-PCS | Mod: S$PBB,,, | Performed by: FAMILY MEDICINE

## 2023-05-22 PROCEDURE — 97112 NEUROMUSCULAR REEDUCATION: CPT | Mod: PN | Performed by: PHYSICAL THERAPIST

## 2023-05-22 PROCEDURE — 99999 PR PBB SHADOW E&M-EST. PATIENT-LVL III: ICD-10-PCS | Mod: PBBFAC,,, | Performed by: FAMILY MEDICINE

## 2023-05-22 PROCEDURE — 99999 PR PBB SHADOW E&M-EST. PATIENT-LVL III: CPT | Mod: PBBFAC,,, | Performed by: FAMILY MEDICINE

## 2023-05-22 PROCEDURE — 99499 UNLISTED E&M SERVICE: CPT | Mod: S$PBB,,, | Performed by: FAMILY MEDICINE

## 2023-05-22 PROCEDURE — 99213 OFFICE O/P EST LOW 20 MIN: CPT | Mod: PBBFAC,25 | Performed by: FAMILY MEDICINE

## 2023-05-22 PROCEDURE — 20611 DRAIN/INJ JOINT/BURSA W/US: CPT | Mod: PBBFAC,RT | Performed by: FAMILY MEDICINE

## 2023-05-22 RX ADMIN — Medication 20 MG: at 01:05

## 2023-05-22 NOTE — PROGRESS NOTES
"[facility]   Physical Therapy Treatment Note     Name: Meredith Ramos  Clinic Number: [id number]    Therapy Diagnosis:   Encounter Diagnosis   Name Primary?    Right medial knee pain Yes         Physician: Sarwat Lu, *    Visit Date: 5/22/2023    Physician Orders: PT Eval and Treat:   Start neuromuscular retraining program and HEP. Goals of increased pelvic and core stability, IT band, hamstring, adductor stretching, and quadriceps/glute strengthening   Medical Diagnosis from Referral: M17.11 (ICD-10-CM) - Primary osteoarthritis of right knee M70.50 (ICD-10-CM) - Pes anserine bursitis   Evaluation Date: 3/14/2023  Authorization Period Expiration: 4/14/2023  Plan of Care Expiration: 6/9/2023  Progress Note Due: 6/9/2023   Visit # / Visits authorized: 18/ 21   FOTO: 2/ 3:  3/14/2023, 4/17/2023     Precautions: Standard and blood thinners    PTA Visit #: 0/5     Time In: 1055  Time Out: 1133  Total Billable Time: 38 minutes     SUBJECTIVE   Pt reports: overall doing well, no pain this morning. She's still having difficulty with stairs and has to be cautious with how she goes up/down.     She was compliant with home exercise program.  Response to previous treatment: "It was fine"  Functional change: no change    Pain: 0/10 at rest  Location: right medial knee     OBJECTIVE     Objective Measures updated at progress report unless specified.       CMS Impairment/Limitation/Restriction for FOTO Knee Survey    Therapist reviewed FOTO scores for Meredith Ramos on 5/22/2023   FOTO documents entered into [employer] - see Media section.    Evaluation: 37%    Current : 33%    Goal: 32%       Treatment     Meredith performed the bolded treatments listed below:      therapeutic exercises to develop strength, endurance, ROM, flexibility, posture, and core stabilization for 00 minutes including:    SL bridges [id number] at knees  sustained bridge with ABD pulses 3 x 30" x RTB at knees  3D HS " "15x  Shuttle DL x 3 black cord x 3x10  Shuttle SL R x 1 black cord 3x10  resisted side stepping 2 x 50 ft x RTB at ankles  [place] with red loop 2 x 10 (1 set loop at knees, 1 set at ankles)      neuromuscular re-education activities to improve: Balance, Coordination, Kinesthetic, Sense, Proprioception, and Posture for 38 minutes. The following activities were included:   modified side plank + clams 2 x 10 x RTB at knees  modified side plank + hip abd 2 x 10 x RTB at knees  SFMA rolling - upper body flexion pattern  Bird dog x 20 R/L  Dead bug x 3 x 10 R/L with red pball  Palloff press 2 x 10 x red power band  SLS with CL row RED power band 2 x 10  Chops 2 x 15 x red power band 1/2  kneeling  Lifts 2 x 15 x red power band 1/2 kneeling    [place] x 20 CW/CCW SLS (toe touch as needed) with 3# kb (increase to 5# nv)  SLS with uni [place] press 10x ipsi/10x CL with 3#  Hesitation marches 10# KB 40ft x 2  +Crossover hesitation march 10# KB 2 x 40 ft  Marching with TrA activation and iso shoulder ext RTB  x 15    dynamic functional therapeutic activities to improve functional performance for 00 minutes. Including:  +Mini squat with RTB at knees 2 x 10 x RTB at knees - visual assist in front of mirror  Add as tolerated: lateral tap downs on 2" step      Patient Education and Home Exercises     Home Exercises Provided and Patient Education Provided     Education provided:   - Exercise form and rationale    Written Home Exercises Provided: Patient instructed to cont prior HEP. Exercises were reviewed and Meredith was able to demonstrate them prior to the end of the session.  Meredith demonstrated good  understanding of the education provided. See EMR under Patient Instructions for exercises provided during therapy sessions    ASSESSMENT     Good tolerance to treatment today. Decreased weight with around-the-world with improvement in stability noted.     Plan to continue PT x 2-3 weeks with progression towards d/c and " updated HEP by end of POC. Plan to focus on trunk strength, stabilization next visit as tolerated.    Meredith Is progressing well towards her goals.   Pt prognosis is Good.     Pt will continue to benefit from skilled outpatient physical therapy to address the deficits listed in the problem list box on initial evaluation, provide pt/family education and to maximize pt's level of independence in the home and community environment.     Pt's spiritual, cultural and educational needs considered and pt agreeable to plan of care and goals.     Anticipated barriers to physical therapy: standard     Goals:   Short Term Goals (4 weeks)  1. Pt will demonstrate independence with initial HEP to maintain progress made with therapy. (met)  2. Pt will demonstrate ability to descend 3 steps with reciprocal gait pattern and use of HR. (met)  3. Pt will report <5/10 pain at worst within the R knee for ease with ADL's. (met)     Long Term Goals (12 weeks)  1. Pt will demonstrate ability to descend stairs reciprocally without assistance or pain exacerbation to improve mobility in community and in her home. (Progressing, not met)  2. Pt will demonstrate 4+/5 strength within the B LEfor ease with return to gym exercise and climbing stairs. (Progressing, not met)  3. Pt will report being independent with his/her HEP for maintenance of improvements gained during therapy sessions. (met)  4. Pt will report <2/10 pain at worst within the R knee for ease with ADLs. (Progressing, not met)  5. Pt will demonstrate ambulation x 300 ft without AD or antalgic gait to improve functional mobility in community.  (met)    PLAN   Plan of care Certification: 3/14/2023 to 6/9/2023.     Continue per plan of care with focus on B LE strength and soft tissue mobility.     Alice Caldwell, PT

## 2023-05-23 NOTE — PROCEDURES
"Large Joint Aspiration/Injection: R knee    Date/Time: 5/22/2023 1:00 PM  Performed by: Sarwat Lu MD  Authorized by: Sarwat Lu MD     Consent Done?:  Yes (Verbal)  Indications:  Pain  Site marked: the procedure site was marked    Timeout: prior to procedure the correct patient, procedure, and site was verified    Prep: patient was prepped and draped in usual sterile fashion      Details:  Needle Size:  20 G  Ultrasonic Guidance for needle placement?: Yes    Images are saved and documented.  Approach:  Lateral  Location:  Knee  Site:  R knee  Medications:  20 mg sodium hyaluronate (EUFLEXXA) 10 mg/mL(mw 2.4 -3.6 million)  Patient tolerance:  Patient tolerated the procedure well with no immediate complications     Description of ultrasound utilization for needle guidance:   Ultrasound guidance used for needle localization. Images saved and stored for documentation. The SUPRAPATELLAR BURSA / KNEE JOINT was visualized. Dynamic visualization of the 20g x 3.5" needle was continuous throughout the procedure.   "

## 2023-05-24 ENCOUNTER — PATIENT MESSAGE (OUTPATIENT)
Dept: INTERNAL MEDICINE | Facility: CLINIC | Age: 73
End: 2023-05-24
Payer: MEDICARE

## 2023-05-24 ENCOUNTER — CLINICAL SUPPORT (OUTPATIENT)
Dept: REHABILITATION | Facility: OTHER | Age: 73
End: 2023-05-24
Payer: MEDICARE

## 2023-05-24 DIAGNOSIS — M25.561 RIGHT MEDIAL KNEE PAIN: Primary | ICD-10-CM

## 2023-05-24 PROCEDURE — 97112 NEUROMUSCULAR REEDUCATION: CPT | Mod: PN | Performed by: PHYSICAL THERAPIST

## 2023-05-24 NOTE — PROGRESS NOTES
"[facility]   Physical Therapy Treatment Note     Name: Meredith Ramos  Clinic Number: [id number]    Therapy Diagnosis:   Encounter Diagnosis   Name Primary?    Right medial knee pain Yes         Physician: Sarwat Lu, *    Visit Date: 5/24/2023    Physician Orders: PT Eval and Treat:   Start neuromuscular retraining program and HEP. Goals of increased pelvic and core stability, IT band, hamstring, adductor stretching, and quadriceps/glute strengthening   Medical Diagnosis from Referral: M17.11 (ICD-10-CM) - Primary osteoarthritis of right knee M70.50 (ICD-10-CM) - Pes anserine bursitis   Evaluation Date: 3/14/2023  Authorization Period Expiration: 4/14/2023  Plan of Care Expiration: 6/9/2023  Progress Note Due: 6/9/2023   Visit # / Visits authorized: 19/ 21   FOTO: 2/ 3:  3/14/2023, 4/17/2023     Precautions: Standard and blood thinners    PTA Visit #: 0/5     Time In: 1050  Time Out: 1130  Total Billable Time: 40 minutes     SUBJECTIVE   Pt reports: she got her third injection this week, and is feeling better. She was able to go for a walk, and isn't quite up to her 15 min mile pace, but was under 16 minutes.     She was compliant with home exercise program.  Response to previous treatment: "It was fine"  Functional change: no change    Pain: 0/10 at rest  Location: right medial knee     OBJECTIVE     Objective Measures updated at progress report unless specified.       CMS Impairment/Limitation/Restriction for FOTO Knee Survey    Therapist reviewed FOTO scores for Meredith Ramos on 5/22/2023   FOTO documents entered into [facility] - see Media section.    Evaluation: 37%    Current : 33%    Goal: 32%       Treatment     Meredith performed the bolded treatments listed below:      therapeutic exercises to develop strength, endurance, ROM, flexibility, posture, and core stabilization for 2 minutes including:    +Lat stretch with door frame 2 x 30"  SL bridges [id number] at " "knees  sustained bridge with ABD pulses 3 x 30" x RTB at knees  3D HS 15x  Shuttle DL x 3 black cord x 3x10  Shuttle SL R x 1 black cord 3x10  resisted side stepping 2 x 50 ft x RTB at ankles  [place] with red loop 2 x 10 (1 set loop at knees, 1 set at ankles)      neuromuscular re-education activities to improve: Balance, Coordination, Kinesthetic, Sense, Proprioception, and Posture for 38 minutes. The following activities were included:   modified side plank + clams 2 x 10 x RTB at knees  modified side plank + hip abd 2 x 10 x RTB at knees  SFMA rolling - upper body flexion pattern  Bird dog x 20 R/L  Dead bug x 3 x 10 R/L with red pball  Palloff press 2 x 10 x red power band  SLS with CL row RED power band 2 x 10  Chops 2 x 15 x black power band 1/2  kneeling  Lifts 2 x 15 x black power band 1/2 kneeling    [place] x 20 CW/CCW SLS (toe touch as needed) with 5# kb   SLS with uni [place] press 10x ipsi/10x CL with 3#  Hesitation marches 10# KB 40ft x 2  +Crossover hesitation march 10# KB 2 x 40 ft  Marching with TrA activation and iso shoulder ext RTB  x 15    dynamic functional therapeutic activities to improve functional performance for 00 minutes. Including:  +Mini squat with RTB at knees 2 x 10 x RTB at knees - visual assist in front of mirror  Add as tolerated: lateral tap downs on 2" step      Patient Education and Home Exercises     Home Exercises Provided and Patient Education Provided     Education provided:   - Exercise form and rationale    Written Home Exercises Provided: Patient instructed to cont prior HEP. Exercises were reviewed and Meredith was able to demonstrate them prior to the end of the session.  Meredith demonstrated good  understanding of the education provided. See EMR under Patient Instructions for exercises provided during therapy sessions    ASSESSMENT     Good tolerance to treatment today. Increased weight to 5# with around-the-world passes today. Min c/o tightness to R mid back at " end of session, resolves with lat stretch.     Plan to continue PT x 2-3 weeks with progression towards d/c and updated HEP by end of POC. Plan to focus on trunk strength, stabilization next visit as tolerated.    Meredith Is progressing well towards her goals.   Pt prognosis is Good.     Pt will continue to benefit from skilled outpatient physical therapy to address the deficits listed in the problem list box on initial evaluation, provide pt/family education and to maximize pt's level of independence in the home and community environment.     Pt's spiritual, cultural and educational needs considered and pt agreeable to plan of care and goals.     Anticipated barriers to physical therapy: standard     Goals:   Short Term Goals (4 weeks)  1. Pt will demonstrate independence with initial HEP to maintain progress made with therapy. (met)  2. Pt will demonstrate ability to descend 3 steps with reciprocal gait pattern and use of HR. (met)  3. Pt will report <5/10 pain at worst within the R knee for ease with ADL's. (met)     Long Term Goals (12 weeks)  1. Pt will demonstrate ability to descend stairs reciprocally without assistance or pain exacerbation to improve mobility in community and in her home. (Progressing, not met)  2. Pt will demonstrate 4+/5 strength within the B LE for ease with return to gym exercise and climbing stairs. (Progressing, not met)  3. Pt will report being independent with his/her HEP for maintenance of improvements gained during therapy sessions. (met)  4. Pt will report <2/10 pain at worst within the R knee for ease with ADLs. (Progressing, not met)  5. Pt will demonstrate ambulation x 300 ft without AD or antalgic gait to improve functional mobility in community.  (met)    PLAN   Plan of care Certification: 3/14/2023 to 6/9/2023.     Continue per plan of care with focus on B LE strength and soft tissue mobility.     Alice Caldwell, PT

## 2023-05-26 NOTE — PROGRESS NOTES
[facility]   Physical Therapy Treatment Note     Name: Meredith Ramos  Clinic Number: [id number]    Therapy Diagnosis:   Encounter Diagnosis   Name Primary?    Right medial knee pain Yes       Physician: Sarwat Lu, *    Visit Date: 5/29/2023    Physician Orders: PT Eval and Treat:   Start neuromuscular retraining program and HEP. Goals of increased pelvic and core stability, IT band, hamstring, adductor stretching, and quadriceps/glute strengthening   Medical Diagnosis from Referral: M17.11 (ICD-10-CM) - Primary osteoarthritis of right knee M70.50 (ICD-10-CM) - Pes anserine bursitis   Evaluation Date: 3/14/2023  Authorization Period Expiration: 4/14/2023  Plan of Care Expiration: 6/9/2023  Progress Note Due: 6/9/2023   Visit # / Visits authorized: 20/ 21   FOTO: 2/ 3:  3/14/2023, 4/17/2023     Precautions: Standard and blood thinners    PTA Visit #: 1/5     Time In: 10:46 am  Time Out: 11:30 am  Total Billable Time: 44 minutes     SUBJECTIVE   Pt reports: Hasn't been sleeping well, so she's feeling tired. But not experiencing any pain and feels the injections have allowed her to ambulate on stairs with a reciprocal pattern.    She was compliant with home exercise program.  Response to previous treatment: No adverse effect  Functional change: Able to perform reciprocal stepping pattern on stairs    Pain: 0/10 at rest  Location: right medial knee     OBJECTIVE     Objective Measures updated at progress report unless specified.       CMS Impairment/Limitation/Restriction for FOTO Knee Survey    Therapist reviewed FOTO scores for Meredith Ramos on 5/22/2023   FOTO documents entered into [employer] - see Media section.    Evaluation: 37%    Current : 33%    Goal: 32%       Treatment     Meredith performed the bolded treatments listed below:      therapeutic exercises to develop strength, endurance, ROM, flexibility, posture, and core stabilization for 00 minutes including:  Lat stretch  "with door frame 2 x 30"  SL bridges [id number] at knees  sustained bridge with ABD pulses 3 x 30" x RTB at knees  3D HS 15x  Shuttle DL x 3 black cord x 3x10  Shuttle SL R x 1 black cord 3x10  resisted side stepping 2 x 50 ft x RTB at ankles  [place] with red loop 2 x 10 (1 set loop at knees, 1 set at ankles)      neuromuscular re-education activities to improve: Balance, Coordination, Kinesthetic, Sense, Proprioception, and Posture for 44 minutes. The following activities were included:   modified side plank + clams 2 x 10 x RTB at knees  modified side plank + hip abd 2 x 10 x RTB at knees  SFMA rolling - upper body flexion pattern  Bird dog x 20 R/L  Dead bug x 3 x 10 R/L with red pball  Palloff press 2 x 10 x red power band  SLS with CL row RED power band 2 x 10  Chops 2 x 15 x black power band 1/2  kneeling  Lifts 2 x 15 x black power band 1/2 kneeling    [place] x 20 CW/CCW SLS (toe touch as needed) with 5# kb   SLS with uni [place] press 10x ipsi/10x CL with 3#  Hesitation marches 10# KB 40ft x 2  Crossover hesitation march 10# KB 2 x 40 ft  Marching with TrA activation and iso shoulder ext RTB  x 15    dynamic functional therapeutic activities to improve functional performance for 00 minutes. Including:  +Mini squat with RTB at knees 2 x 10 x RTB at knees - visual assist in front of mirror  Add as tolerated: lateral tap downs on 2" step      Patient Education and Home Exercises     Home Exercises Provided and Patient Education Provided     Education provided:   - Continuance of HEP    Written Home Exercises Provided: Patient instructed to cont prior HEP. Exercises were reviewed and Meredith was able to demonstrate them prior to the end of the session.  Meredith demonstrated good  understanding of the education provided. See EMR under Patient Instructions for exercises provided during therapy sessions    ASSESSMENT   Pt tolerated today's treatment well with no pain or c/o of tightness in back. Continued with " core stabilization and strengthening today.    Plan to continue PT x 2-3 weeks with progression towards d/c and updated HEP by end of POC. Plan to focus on trunk strength, stabilization next visit as tolerated.    Meredith Is progressing well towards her goals.   Pt prognosis is Good.     Pt will continue to benefit from skilled outpatient physical therapy to address the deficits listed in the problem list box on initial evaluation, provide pt/family education and to maximize pt's level of independence in the home and community environment.     Pt's spiritual, cultural and educational needs considered and pt agreeable to plan of care and goals.     Anticipated barriers to physical therapy: standard     Goals:   Short Term Goals (4 weeks)  1. Pt will demonstrate independence with initial HEP to maintain progress made with therapy. (met)  2. Pt will demonstrate ability to descend 3 steps with reciprocal gait pattern and use of HR. (met)  3. Pt will report <5/10 pain at worst within the R knee for ease with ADL's. (met)     Long Term Goals (12 weeks)  1. Pt will demonstrate ability to descend stairs reciprocally without assistance or pain exacerbation to improve mobility in community and in her home. (Progressing, not met)  2. Pt will demonstrate 4+/5 strength within the B LE for ease with return to gym exercise and climbing stairs. (Progressing, not met)  3. Pt will report being independent with his/her HEP for maintenance of improvements gained during therapy sessions. (met)  4. Pt will report <2/10 pain at worst within the R knee for ease with ADLs. (Progressing, not met)  5. Pt will demonstrate ambulation x 300 ft without AD or antalgic gait to improve functional mobility in community.  (met)    PLAN   Plan of care Certification: 3/14/2023 to 6/9/2023.     Continue per plan of care with focus on B LE strength and soft tissue mobility.     Nasima Keith III, PTA

## 2023-05-29 ENCOUNTER — CLINICAL SUPPORT (OUTPATIENT)
Dept: REHABILITATION | Facility: OTHER | Age: 73
End: 2023-05-29
Payer: MEDICARE

## 2023-05-29 DIAGNOSIS — M25.561 RIGHT MEDIAL KNEE PAIN: Primary | ICD-10-CM

## 2023-05-29 PROCEDURE — 97112 NEUROMUSCULAR REEDUCATION: CPT | Mod: PN,CQ

## 2023-05-30 NOTE — PROGRESS NOTES
"[facility]   Physical Therapy Treatment Note     Name: Meredith Ramos  Clinic Number: [id number]    Therapy Diagnosis:   Encounter Diagnosis   Name Primary?    Right medial knee pain Yes       Physician: Sarwat Lu, *    Visit Date: 5/31/2023    Physician Orders: PT Eval and Treat:   Start neuromuscular retraining program and HEP. Goals of increased pelvic and core stability, IT band, hamstring, adductor stretching, and quadriceps/glute strengthening   Medical Diagnosis from Referral: M17.11 (ICD-10-CM) - Primary osteoarthritis of right knee M70.50 (ICD-10-CM) - Pes anserine bursitis   Evaluation Date: 3/14/2023  Authorization Period Expiration: 4/14/2023  Plan of Care Expiration: 6/9/2023  Progress Note Due: 6/9/2023   Visit # / Visits authorized: 21/ 21   FOTO: 2/ 3:  3/14/2023, 4/17/2023     Precautions: Standard and blood thinners    PTA Visit #: 2/5     Time In: 10:46 am  Time Out: 11:29 am  Total Billable Time: 43 minutes     SUBJECTIVE   Pt reports: Went for a 4.5 mile walk this morning and only having a slight discomfort in R knee, but not painful    She was compliant with home exercise program.  Response to previous treatment: "I felt good"  Functional change: Able to perform reciprocal stepping pattern on stairs    Pain: 0/10 at rest  Location: right medial knee     OBJECTIVE     Objective Measures updated at progress report unless specified.       CMS Impairment/Limitation/Restriction for FOTO Knee Survey    Therapist reviewed FOTO scores for Meredith Ramos on 5/22/2023   FOTO documents entered into [employer] - see Media section.    Evaluation: 37%    Current : 33%    Goal: 32%       Treatment     Meredith performed the bolded treatments listed below:      therapeutic exercises to develop strength, endurance, ROM, flexibility, posture, and core stabilization for 00 minutes including:  Lat stretch with door frame 2 x 30"  SL bridges [id number] at knees  sustained bridge " "with ABD pulses 3 x 30" x RTB at knees  3D HS 15x  Shuttle DL x 3 black cord x 3x10  Shuttle SL R x 1 black cord 3x10  resisted side stepping 2 x 50 ft x RTB at ankles  [place] with red loop 2 x 10 (1 set loop at knees, 1 set at ankles)      neuromuscular re-education activities to improve: Balance, Coordination, Kinesthetic, Sense, Proprioception, and Posture for 35 minutes. The following activities were included:   modified side plank + clams 2 x 10 x RTB at knees  modified side plank + hip abd 2 x 10 x RTB at knees  SFMA rolling - upper body flexion pattern  Bird dog x 20 R/L  Dead bug x 2 x 10 R/L with red pball  Palloff press 2 x 10 x red power band  SLS with CL row RED power band 2 x 10  Chops 1 x 15 x black power band 1/2  kneeling  Lifts 1 x 15 x black power band 1/2 kneeling    Around the world x 20 CW/CCW SLS (toe touch as needed) with 5# kb   SLS with uni OH press 10x ipsi/10x CL with 4#  Hesitation marches 10# KB 40ft x 2  Crossover hesitation march 10# KB 2 x 40 ft  Marching with TrA activation and iso shoulder ext RTB  x 15    dynamic functional therapeutic activities to improve functional performance for 8 minutes. Including:  HEP review for discharge  +Mini squat with RTB at knees 2 x 10 x RTB at knees - visual assist in front of mirror  Add as tolerated: lateral tap downs on 2" step        Patient Education and Home Exercises     Home Exercises Provided and Patient Education Provided     Education provided:   - Reviewed HEP for discharge    Written Home Exercises Provided: Patient instructed to cont prior HEP. Exercises were reviewed and Meredith was able to demonstrate them prior to the end of the session.  Meredith demonstrated good  understanding of the education provided. See EMR under Patient Instructions for exercises provided during therapy sessions    ASSESSMENT   Pt tolerated treatment today with no adverse effects. Increased weight with SLS OH press. Reviewed HEP with pt to continue after " discharge.     Meredith Is progressing well towards her goals.   Pt prognosis is Good.     Pt will continue to benefit from skilled outpatient physical therapy to address the deficits listed in the problem list box on initial evaluation, provide pt/family education and to maximize pt's level of independence in the home and community environment.     Pt's spiritual, cultural and educational needs considered and pt agreeable to plan of care and goals.     Anticipated barriers to physical therapy: standard     Goals:   Short Term Goals (4 weeks)  1. Pt will demonstrate independence with initial HEP to maintain progress made with therapy. (met)  2. Pt will demonstrate ability to descend 3 steps with reciprocal gait pattern and use of HR. (met)  3. Pt will report <5/10 pain at worst within the R knee for ease with ADL's. (met)     Long Term Goals (12 weeks)  1. Pt will demonstrate ability to descend stairs reciprocally without assistance or pain exacerbation to improve mobility in community and in her home. (Progressing, not met)  2. Pt will demonstrate 4+/5 strength within the B LE for ease with return to gym exercise and climbing stairs. (Progressing, not met)  3. Pt will report being independent with his/her HEP for maintenance of improvements gained during therapy sessions. (met)  4. Pt will report <2/10 pain at worst within the R knee for ease with ADLs. (Progressing, not met)  5. Pt will demonstrate ambulation x 300 ft without AD or antalgic gait to improve functional mobility in community.  (met)    PLAN   Plan of care Certification: 3/14/2023 to 6/9/2023.     Pt discharged    Nasima Keith III, PTA

## 2023-05-31 ENCOUNTER — TELEPHONE (OUTPATIENT)
Dept: SLEEP MEDICINE | Facility: CLINIC | Age: 73
End: 2023-05-31
Payer: MEDICARE

## 2023-05-31 ENCOUNTER — CLINICAL SUPPORT (OUTPATIENT)
Dept: REHABILITATION | Facility: OTHER | Age: 73
End: 2023-05-31
Payer: MEDICARE

## 2023-05-31 DIAGNOSIS — M25.561 RIGHT MEDIAL KNEE PAIN: Primary | ICD-10-CM

## 2023-05-31 PROCEDURE — 97112 NEUROMUSCULAR REEDUCATION: CPT | Mod: KX,PN,CQ

## 2023-05-31 PROCEDURE — 97530 THERAPEUTIC ACTIVITIES: CPT | Mod: KX,PN,CQ

## 2023-06-07 ENCOUNTER — PATIENT MESSAGE (OUTPATIENT)
Dept: INTERNAL MEDICINE | Facility: CLINIC | Age: 73
End: 2023-06-07
Payer: MEDICARE

## 2023-06-08 ENCOUNTER — OFFICE VISIT (OUTPATIENT)
Dept: NEUROLOGY | Facility: CLINIC | Age: 73
End: 2023-06-08
Payer: MEDICARE

## 2023-06-08 VITALS
DIASTOLIC BLOOD PRESSURE: 66 MMHG | SYSTOLIC BLOOD PRESSURE: 101 MMHG | BODY MASS INDEX: 19.78 KG/M2 | WEIGHT: 118.69 LBS | HEIGHT: 65 IN | HEART RATE: 67 BPM

## 2023-06-08 DIAGNOSIS — R47.89 WORD FINDING DIFFICULTY: ICD-10-CM

## 2023-06-08 PROCEDURE — 99204 OFFICE O/P NEW MOD 45 MIN: CPT | Mod: S$PBB,,, | Performed by: STUDENT IN AN ORGANIZED HEALTH CARE EDUCATION/TRAINING PROGRAM

## 2023-06-08 PROCEDURE — 99204 PR OFFICE/OUTPT VISIT, NEW, LEVL IV, 45-59 MIN: ICD-10-PCS | Mod: S$PBB,,, | Performed by: STUDENT IN AN ORGANIZED HEALTH CARE EDUCATION/TRAINING PROGRAM

## 2023-06-08 PROCEDURE — 99213 OFFICE O/P EST LOW 20 MIN: CPT | Mod: PBBFAC | Performed by: STUDENT IN AN ORGANIZED HEALTH CARE EDUCATION/TRAINING PROGRAM

## 2023-06-08 PROCEDURE — 99999 PR PBB SHADOW E&M-EST. PATIENT-LVL III: CPT | Mod: PBBFAC,,, | Performed by: STUDENT IN AN ORGANIZED HEALTH CARE EDUCATION/TRAINING PROGRAM

## 2023-06-08 PROCEDURE — 99999 PR PBB SHADOW E&M-EST. PATIENT-LVL III: ICD-10-PCS | Mod: PBBFAC,,, | Performed by: STUDENT IN AN ORGANIZED HEALTH CARE EDUCATION/TRAINING PROGRAM

## 2023-06-08 NOTE — PROGRESS NOTES
Vascular Neurology Clinic  Hospital Follow-up Clinic Visit    Patient Name: Meredith Ramos  MRN: [id number]  Date: 6/8/23  Referring Provider: Dr. Oniel Lawson    CC: Lightheadedness    SUBJECTIVE:      History of Present Illness: Meredith Ramos is a 73 y.o. female with a past medical history significant for atrial fibrillation on xarelto who presents to clinic for follow-up for a recent evaluation for lightheadedness.     She was seen in the ED 02/17/2023 presenting with palpitations, lightheadedness, chest discomfort. This had been occurring intermittently for several days, but worsened the day of her presentation. Had an admission a week prior to this for atrial fibrillation with RVR with concern for possible SVT per EMS, for which she was given adenosine with improvement. She states that she was having tightness around her bra band. Also started feeling brain fog - specifically, her words wouldn't come out how she wanted them to come out. MRI brain was negative for acute infarction or other intracranial abnormality. Since she was seen in the ED, she denies recurrence of symptoms.    Work-up:    Imaging:  - MRI brain without contrast (02/17/2023): No evidence of acute intracranial pathology.  No evidence of acute infarction.    Cardiac Evaluation:  - TTE (02/08/2023):  Mild left atrial enlargement.  The left ventricle is normal in size with normal systolic function.  The estimated ejection fraction is 60%.  Indeterminate left ventricular diastolic function.  Normal right ventricular size with normal right ventricular systolic function.  There is bileaflet mitral prolapse.  At least moderate, eccentric, posteriorly directed mitral regurgitation.  Mild pulmonic regurgitation.  Intermediate central venous pressure (8 mmHg).  The estimated PA systolic pressure is 32 mmHg.    Labs:  Recent Labs   Lab 01/07/23  0826 02/08/23  0303   Hemoglobin A1C  --  5.0   LDL Cholesterol 88.8  --    HDL 58  --    Triglycerides  51  --    Cholesterol 157  --          Review of Systems: The following systems were reviewed with pertinent positives and negatives documented in the HPI: constitutional, eyes, CV, respiratory, GI, , musculoskeletal, skin, neurological, psychiatric    Past Medical History:  Past Medical History:   Diagnosis Date    Diverticulitis     Kidney stone     Osteoporosis     ostenopenia        Medications:    Current Outpatient Medications:     calcium-vitamin D3 (OS- + D3) 500 mg(1,250mg) -200 unit per tablet, Calcium 500 + D, Disp: , Rfl:     cetirizine (ZYRTEC) 10 MG tablet, Take 10 mg by mouth., Disp: , Rfl:     cholecalciferol, vitamin D3, (VITAMIN D3) 25 mcg (1,000 unit) capsule, , Disp: , Rfl:     MAGNESIUM CITRATE ORAL, Take 250 mg by mouth once daily., Disp: , Rfl:     metoprolol succinate (TOPROL-XL) 25 MG 24 hr tablet, Take 1 tablet (25 mg total) by mouth once daily. .5-1 once or twice daily or as directed, Disp: 90 tablet, Rfl: 3    rivaroxaban (XARELTO) 20 mg Tab, Take 1 tablet (20 mg total) by mouth daily with dinner or evening meal., Disp: 30 tablet, Rfl: 11    vitamin E acetate (VITAMIN E ORAL), Take 400 mg by mouth once daily., Disp: , Rfl:   Any other notable medications as documented in HPI.    Allergies:  Review of patient's allergies indicates:   Allergen Reactions    Cortisone (hydrocortisone) [hydrocortisone]        Social History:  Social History     Socioeconomic History    Marital status:    Tobacco Use    Smoking status: Former     Types: Cigarettes     Quit date: 2003     Years since quittin.9    Smokeless tobacco: Never   Substance and Sexual Activity    Alcohol use: No    Drug use: No    Sexual activity: Never     Social Determinants of Health     Financial Resource Strain: Low Risk     Difficulty of Paying Living Expenses: Not hard at all   Food Insecurity: No Food Insecurity    Worried About Running Out of Food in the Last Year: Never true    Ran Out of Food in the  Last Year: Never true   Transportation Needs: No Transportation Needs    Lack of Transportation (Medical): No    Lack of Transportation (Non-Medical): No   Physical Activity: Sufficiently Active    Days of Exercise per Week: 5 days    Minutes of Exercise per Session: 60 min   Stress: Stress Concern Present    Feeling of Stress : Very much   Social Connections: Unknown    Frequency of Communication with Friends and Family: More than three times a week    Frequency of Social Gatherings with Friends and Family: Three times a week    Active Member of Clubs or Organizations: Yes    Attends Club or Organization Meetings: More than 4 times per year    Marital Status:    Housing Stability: Low Risk     Unable to Pay for Housing in the Last Year: No    Number of Places Lived in the Last Year: 1    Unstable Housing in the Last Year: No     Any other notable Social History as documented in HPI.    Family History:  Family History   Problem Relation Age of Onset    Hypertension Father     Cancer Father         bladder cancer    Cancer Sister         breast    Breast cancer Sister 54    Uterine cancer Maternal Grandmother     Colon cancer Maternal Grandfather     Ovarian cancer Neg Hx      Any other notable FMH as documented in HPI.      OBJECTIVE:     Physical Exam:   Vitals:    06/08/23 0934   BP: 101/66   Pulse: 67       GEN: NAD, pleasant, cooperative  CV: RRR  PULM: No signs of resp distress, on room air  EXT: No cyanosis, clubbing, or edema.    NEURO:  Mental status: The patient is alert, attentive, and oriented to self, age, month, year  Speech: Fluent speech with intact repetition, comprehension, and naming. Phonation is normal.    Cranial nerves:  CN II: Visual fields are full to confrontation. Pupils are 3mm and reactive to light OU.  CN III, IV, VI: EOMI, no nystagmus, no ptosis  CN V: Facial sensation is intact in all 3 divisions bilaterally.  CN VII: Face is symmetric with normal eye closure and smile.  CN  VIII: Hearing is normal bilaterally.  CN IX, X: Palate elevates symmetrically.   CN XI: Head turning and shoulder shrug are intact.  CN XII: Tongue is midline with normal movements and no atrophy.    Motor: Muscle bulk and tone are normal. No pronator drift. 5/5 strength throughout.    Reflexes: Reflexes are 2+ and symmetric at the biceps, triceps, knees, and ankles.     Sensory: Light touch is intact in her bilateral upper and lower extremities.     Coordination: Rapid alternating movements and fine finger movements are intact. There is no dysmetria on finger-to-nose and heel-knee-shin. There are no abnormal or extraneous movements.    Gait/Stance: Posture is normal. Gait is steady with normal steps, base, arm swing, and turning.       ASSESSMENT/PLAN:     Diagnosis/Etiology: Symptoms of word-finding difficulties, lightheadedness, palpitations in the setting of newly diagnosed atrial fibrillation. Given her reassuring imaging and clinical presentation, there is overall less suspicion for a TIA or ischemic stroke. No recurrence of symptoms since she was seen in the ED in February. No further work-up is needed. Recommend continuing rivaroxaban at this time.  Stroke Risk Factors: Atrial fibrillation, remote tobacco use    Recommendations:   - Additional studies: None  - Antiplatelet/Anticoagulation: Continue xarelto 20mg daily for atrial fibrillation. Continue close follow-up with cardiology.   - Lipid Management: Per ACC/AHA guidelines.  - Diabetes: Target hemoglobin A1c <7%, measured 2X/year or quarterly if not meeting goals  - Hypertension: Target systolic BP <130mmHg. At goal today. Continue regular follow-up with her PCP.   - Smoking: Goal is smoking cessation and encouragement not to start smoking in the future. There is also concern for secondary smoke exposure from family members and friends which is also a risk and family and friends should be encouraged to stop smoking.  - Therapy: No therapy needs at this  time.   - Follow-up: RTC as needed. Recommend close follow-up with their PCP for monitoring and management of the above vascular risk factors as needed to meet goals.       Problem List Items Addressed This Visit    None  Visit Diagnoses       Word finding difficulty                  Leonela Caldwell MD, PhD  [facility]  Department of Vascular Neurology

## 2023-06-14 ENCOUNTER — OFFICE VISIT (OUTPATIENT)
Dept: SLEEP MEDICINE | Facility: CLINIC | Age: 73
End: 2023-06-14
Payer: MEDICARE

## 2023-06-14 VITALS
SYSTOLIC BLOOD PRESSURE: 100 MMHG | HEIGHT: 65 IN | HEART RATE: 64 BPM | DIASTOLIC BLOOD PRESSURE: 60 MMHG | WEIGHT: 118.63 LBS | BODY MASS INDEX: 19.76 KG/M2

## 2023-06-14 DIAGNOSIS — G47.33 OSA (OBSTRUCTIVE SLEEP APNEA): Primary | ICD-10-CM

## 2023-06-14 PROCEDURE — 99999 PR PBB SHADOW E&M-EST. PATIENT-LVL III: ICD-10-PCS | Mod: PBBFAC,,, | Performed by: PHYSICIAN ASSISTANT

## 2023-06-14 PROCEDURE — 99214 PR OFFICE/OUTPT VISIT, EST, LEVL IV, 30-39 MIN: ICD-10-PCS | Mod: S$PBB,,, | Performed by: PHYSICIAN ASSISTANT

## 2023-06-14 PROCEDURE — 99213 OFFICE O/P EST LOW 20 MIN: CPT | Mod: PBBFAC | Performed by: PHYSICIAN ASSISTANT

## 2023-06-14 PROCEDURE — 99999 PR PBB SHADOW E&M-EST. PATIENT-LVL III: CPT | Mod: PBBFAC,,, | Performed by: PHYSICIAN ASSISTANT

## 2023-06-14 PROCEDURE — 99214 OFFICE O/P EST MOD 30 MIN: CPT | Mod: S$PBB,,, | Performed by: PHYSICIAN ASSISTANT

## 2023-06-14 NOTE — PROGRESS NOTES
Referred by No ref. provider found     ESTABLISHED PATIENT VISIT  New to me. Previously evaluated by Dr Gerson Harper Richard  is a pleasant 73 y.o. female  with PMH significant for Afib, SVT, MVP, UC, NOEMI.    Here today for : compliance    PLAN last visit 4/5/23:   -recommend sleep testing   -discussed trial therapy if NOEMI present and the patient is open to a trial of CPAP therapy      Since last visit:   States she is still trying to get used of wearing CPAP. States she is waking in the middle for the night with dry mouth. Has upped humidity once, with minimal improvement. Not using chin strap due to discomfort on first night trial.       PAP history   Problems Mouth dryness   Mask Nasal hybrid   Pressure 5-12cwp   DME Access   Machine age AirSense 11 5/25/23   Download 6.14.23: 18/20 x 7hrs 3mins, 5-12cwp (6.5/8.8/10.1), leak (7.7/35.3/47.8), AHI 0.7     SLEEP SCHEDULE   Environment     Bed Time 9:30P-10:30P    Sleep Latency Not long   Arousals 1-2   Nocturia 1-2   Back to sleep variable   Wake time 6A -7A   Naps 15-25 minutes   Work           Past Medical History:   Diagnosis Date    Diverticulitis     Kidney stone     Osteoporosis     ostenopenia      Patient Active Problem List   Diagnosis    Abnormal Pap smear    Atypical glandular cells on Pap smear    Palpitations    Left ureteral stone    Osteopenia    History of kidney stones    History of ulcerative colitis    Supraventricular tachycardia    PAF (paroxysmal atrial fibrillation)    MVP (mitral valve prolapse)    Nonrheumatic mitral valve regurgitation    Right medial knee pain    Age-related osteoporosis with current pathological fracture with routine healing    Sleep disorder       Current Outpatient Medications:     calcium-vitamin D3 (OS- + D3) 500 mg(1,250mg) -200 unit per tablet, Calcium 500 + D, Disp: , Rfl:     cetirizine (ZYRTEC) 10 MG tablet, Take 10 mg by mouth., Disp: , Rfl:     cholecalciferol, vitamin D3, (VITAMIN D3) 25 mcg (1,000  "unit) capsule, , Disp: , Rfl:     MAGNESIUM CITRATE ORAL, Take 250 mg by mouth once daily., Disp: , Rfl:     metoprolol succinate (TOPROL-XL) 25 MG 24 hr tablet, Take 1 tablet (25 mg total) by mouth once daily. .5-1 once or twice daily or as directed, Disp: 90 tablet, Rfl: 3    rivaroxaban (XARELTO) 20 mg Tab, Take 1 tablet (20 mg total) by mouth daily with dinner or evening meal., Disp: 30 tablet, Rfl: 11    vitamin E acetate (VITAMIN E ORAL), Take 400 mg by mouth once daily., Disp: , Rfl:        Vitals:    06/14/23 0930   BP: 100/60   BP Location: Left arm   Patient Position: Sitting   BP Method: Small (Automatic)   Pulse: 64   Weight: 53.8 kg (118 lb 9.7 oz)   Height: 5' 5" (1.651 m)     Physical Exam:    GEN:   Well-appearing  Psych:  Appropriate affect, demonstrates insight  SKIN:  No rash on the face or bridge of the nose      LABS:   Lab Results   Component Value Date    HGB 14.7 02/17/2023    CO2 30 (H) 03/28/2023       RECORDS REVIEWED PREVIOUSLY:    PSG 4.22.23: AHI 1.9, RDI 7.5, RRDII 19  did not meet CMS criteria, but met criteria for mild NOEMI on AASM 1A scoring    ASSESSMENT:    ESS today: 10/24    PROBLEM DESCRIPTION/ Sx on Presentation Interval Hx STATUS    NOEMI   + snoring, + snoring arousals, no witnessed apneas      (no recent bed partner)  Never wakes up rested  Mother has NOEMI did not meet CMS criteria, but met criteria for mild NOEMI on AASM 1A scoring; paid for CPAP OOP; good usage; AHI <1 controlled   Daytime Sx   + sleepiness when inactive   ESS 10/24 on intake (reviewed from 4/5/23) Reports still very sleepy during the day persists   Insomnia   Trouble falling asleep: no  Maintenance:         wakes frequently, sometimes hard to return to sleep   Prior hypnotics:        Current hypnotics:    Reports still waking frequently due to dry mouth, which she believes is why she still feels tired persists   Nocturia   x 1-2 per sleep period  drinks a gallon of water a day (now stopping 1 hour before " bedtime) 1-2 x nightly Persists    Other issues:     PLAN     -using and benefiting from CPAP therapy  -continue CPAP 5-12cwp  -recommended to up humidity once more (lower again if rainout)  -recommended to try FFM or give the chin strap another try  -CPAP supplies ordered  -discussed NOEMI and CPAP with patient in detail, including possible complications of untreated NOEMI like heart attack/stroke  -advised on strict driving precautions; advised never to drive drowsy    Advised on plan of care. Answered all patient questions. Patient verbalized understanding and voiced agreement with plan of care.       RTC 3 months or as needed     The patient was given open opportunity to ask questions and/or express concerns about treatment plan.   All questions/concerns were discussed.     Two patient identifiers used prior to evaluation.

## 2023-07-12 ENCOUNTER — OFFICE VISIT (OUTPATIENT)
Dept: CARDIOLOGY | Facility: CLINIC | Age: 73
End: 2023-07-12
Payer: MEDICARE

## 2023-07-12 ENCOUNTER — LAB VISIT (OUTPATIENT)
Dept: LAB | Facility: HOSPITAL | Age: 73
End: 2023-07-12
Payer: MEDICARE

## 2023-07-12 ENCOUNTER — TELEPHONE (OUTPATIENT)
Dept: CARDIOLOGY | Facility: CLINIC | Age: 73
End: 2023-07-12
Payer: MEDICARE

## 2023-07-12 VITALS
OXYGEN SATURATION: 95 % | HEIGHT: 65 IN | WEIGHT: 118.81 LBS | SYSTOLIC BLOOD PRESSURE: 110 MMHG | DIASTOLIC BLOOD PRESSURE: 60 MMHG | HEART RATE: 95 BPM | BODY MASS INDEX: 19.79 KG/M2

## 2023-07-12 DIAGNOSIS — I34.1 MVP (MITRAL VALVE PROLAPSE): ICD-10-CM

## 2023-07-12 DIAGNOSIS — Z79.01 ANTICOAGULATED: ICD-10-CM

## 2023-07-12 DIAGNOSIS — Z01.810 PREOP CARDIOVASCULAR EXAM: ICD-10-CM

## 2023-07-12 DIAGNOSIS — I48.0 PAF (PAROXYSMAL ATRIAL FIBRILLATION): Primary | ICD-10-CM

## 2023-07-12 DIAGNOSIS — I47.10 SUPRAVENTRICULAR TACHYCARDIA: ICD-10-CM

## 2023-07-12 DIAGNOSIS — I34.0 NONRHEUMATIC MITRAL VALVE REGURGITATION: ICD-10-CM

## 2023-07-12 DIAGNOSIS — I48.0 PAF (PAROXYSMAL ATRIAL FIBRILLATION): ICD-10-CM

## 2023-07-12 LAB
ANION GAP SERPL CALC-SCNC: 7 MMOL/L (ref 8–16)
BUN SERPL-MCNC: 21 MG/DL (ref 8–23)
CALCIUM SERPL-MCNC: 10 MG/DL (ref 8.7–10.5)
CHLORIDE SERPL-SCNC: 104 MMOL/L (ref 95–110)
CO2 SERPL-SCNC: 30 MMOL/L (ref 23–29)
CREAT SERPL-MCNC: 0.7 MG/DL (ref 0.5–1.4)
ERYTHROCYTE [DISTWIDTH] IN BLOOD BY AUTOMATED COUNT: 12.5 % (ref 11.5–14.5)
EST. GFR  (NO RACE VARIABLE): >60 ML/MIN/1.73 M^2
GLUCOSE SERPL-MCNC: 83 MG/DL (ref 70–110)
HCT VFR BLD AUTO: 42.3 % (ref 37–48.5)
HGB BLD-MCNC: 13.4 G/DL (ref 12–16)
MCH RBC QN AUTO: 30.1 PG (ref 27–31)
MCHC RBC AUTO-ENTMCNC: 31.7 G/DL (ref 32–36)
MCV RBC AUTO: 95 FL (ref 82–98)
PLATELET # BLD AUTO: 171 K/UL (ref 150–450)
PMV BLD AUTO: 13.2 FL (ref 9.2–12.9)
POTASSIUM SERPL-SCNC: 4.7 MMOL/L (ref 3.5–5.1)
RBC # BLD AUTO: 4.45 M/UL (ref 4–5.4)
SODIUM SERPL-SCNC: 141 MMOL/L (ref 136–145)
WBC # BLD AUTO: 5.57 K/UL (ref 3.9–12.7)

## 2023-07-12 PROCEDURE — 99214 PR OFFICE/OUTPT VISIT, EST, LEVL IV, 30-39 MIN: ICD-10-PCS | Mod: S$PBB,,, | Performed by: PHYSICIAN ASSISTANT

## 2023-07-12 PROCEDURE — 80048 BASIC METABOLIC PNL TOTAL CA: CPT | Performed by: PHYSICIAN ASSISTANT

## 2023-07-12 PROCEDURE — 99999 PR PBB SHADOW E&M-EST. PATIENT-LVL IV: CPT | Mod: PBBFAC,,, | Performed by: PHYSICIAN ASSISTANT

## 2023-07-12 PROCEDURE — 99214 OFFICE O/P EST MOD 30 MIN: CPT | Mod: PBBFAC | Performed by: PHYSICIAN ASSISTANT

## 2023-07-12 PROCEDURE — 99214 OFFICE O/P EST MOD 30 MIN: CPT | Mod: S$PBB,,, | Performed by: PHYSICIAN ASSISTANT

## 2023-07-12 PROCEDURE — 85027 COMPLETE CBC AUTOMATED: CPT | Performed by: PHYSICIAN ASSISTANT

## 2023-07-12 PROCEDURE — 99999 PR PBB SHADOW E&M-EST. PATIENT-LVL IV: ICD-10-PCS | Mod: PBBFAC,,, | Performed by: PHYSICIAN ASSISTANT

## 2023-07-12 PROCEDURE — 36415 COLL VENOUS BLD VENIPUNCTURE: CPT | Performed by: PHYSICIAN ASSISTANT

## 2023-07-12 NOTE — TELEPHONE ENCOUNTER
Pt is currently at oral surgeon's office, dr Rebollar, [address], to be evaluated for several teeth extraction. Staff called and stated that he is concerned that pt's xarelto dose might be too high in view of extensive bleeding, apparently bruising, on her arms. He wants her to be evaluated of anticoagulation issue/ Xarelto dosing before he can schedule her for her procedure, even knowing that med would be held prior. Pt was scheduled today w. Ms Beyer (verbally updated briefly) and staff agreed to date/time of appointment(s).

## 2023-07-12 NOTE — TELEPHONE ENCOUNTER
----- Message from Meredith Recio sent at 7/12/2023  9:42 AM CDT -----  Regarding: Medication  Dr. Rebollar Oral surgeon's office called states pt in chair at their office and they need to speak with Dr. Ferguson or the nurse about the pt Eliquis.    Thanks

## 2023-07-12 NOTE — PROGRESS NOTES
Cardiology Clinic Note  Reason for Visit: Bruising     HPI:     Meredith Ramos is a 73 y.o. female with pmhx of pAF, SVT (AT) seen on holter monitor, mitral valve prolapse, episode of reported SVT on 2/8 given adenosine by EMS at a rate of 150bpm which then was repeated and AF. She is preparing to undergo extraction of 4 teeth and was seeing her oral surgeon this AM who noted bruising on her arms and recommended she be evaluated. She is anticoagulated on xarelto 20 mg qD and has historically had normal renal function. She reports hemorrhoids that bleed minimally, not recently worsened. She is very active, cycles for exercise regularly. She denies recent SOB, dizziness, near syncope or fatigue. She is following with Dr. Ortiz in EP and is considering EPS and possible RFA for SVT or atrial flutter in the fall.       ROS:    Pertinent ROS included in HPI and below.  PMH:     Past Medical History:   Diagnosis Date    Diverticulitis     Kidney stone     Osteoporosis     ostenopenia      Past Surgical History:   Procedure Laterality Date    BREAST BIOPSY      x1    BUNIONECTOMY      Hammer toe repair    BUNIONECTOMY      COLECTOMY  2004    partial for diverticulitis    COLECTOMY      partial for diverticulitis    HYSTERECTOMY  07/22/2013    complete laparoscopic hysterectomy at [facility] 7/22/13.  No cancer.  Found 3cm fibroid on right ovary.    kidney stones      removal of breast implants       Allergies:     Review of patient's allergies indicates:   Allergen Reactions    Cortisone (hydrocortisone) [hydrocortisone]      Medications:     Current Outpatient Medications on File Prior to Visit   Medication Sig Dispense Refill    calcium-vitamin D3 (OS- + D3) 500 mg(1,250mg) -200 unit per tablet Calcium 500 + D      cholecalciferol, vitamin D3, (VITAMIN D3) 25 mcg (1,000 unit) capsule       MAGNESIUM CITRATE ORAL Take 250 mg by mouth once daily.      metoprolol succinate (TOPROL-XL) 25 MG 24 hr tablet Take  "1 tablet (25 mg total) by mouth once daily. .5-1 once or twice daily or as directed 90 tablet 3    rivaroxaban (XARELTO) 20 mg Tab Take 1 tablet (20 mg total) by mouth daily with dinner or evening meal. 30 tablet 11    vitamin E acetate (VITAMIN E ORAL) Take 400 mg by mouth once daily.      cetirizine (ZYRTEC) 10 MG tablet Take 10 mg by mouth.       No current facility-administered medications on file prior to visit.     Social History:     Social History     Tobacco Use    Smoking status: Former     Types: Cigarettes     Quit date: 2003     Years since quittin.0    Smokeless tobacco: Never   Substance Use Topics    Alcohol use: No     Family History:     Family History   Problem Relation Age of Onset    Hypertension Father     Cancer Father         bladder cancer    Cancer Sister         breast    Breast cancer Sister 54    Uterine cancer Maternal Grandmother     Colon cancer Maternal Grandfather     Ovarian cancer Neg Hx      Physical Exam:   /60   Pulse 95   Ht 5' 5" (1.651 m)   Wt 53.9 kg (118 lb 13.3 oz)   SpO2 95%   BMI 19.77 kg/m²      Physical Exam  Constitutional:       Appearance: She is well-developed.   HENT:      Head: Normocephalic.   Eyes:      Pupils: Pupils are equal, round, and reactive to light.   Neck:      Thyroid: No thyromegaly.      Vascular: Normal carotid pulses. No carotid bruit, hepatojugular reflux or JVD.   Cardiovascular:      Rate and Rhythm: Normal rate and regular rhythm.      Pulses: Intact distal pulses.      Heart sounds: Murmur heard.   High-pitched blowing holosystolic murmur is present with a grade of 1/6 at the apex.     No friction rub. No gallop.   Pulmonary:      Effort: Pulmonary effort is normal. No tachypnea or respiratory distress.      Breath sounds: Normal breath sounds. No wheezing or rales.   Chest:      Chest wall: No tenderness.   Abdominal:      General: Bowel sounds are normal. There is no distension.      Palpations: Abdomen is soft. There " is no mass.      Tenderness: There is no abdominal tenderness. There is no guarding or rebound.   Musculoskeletal:         General: No tenderness. Normal range of motion.      Cervical back: Normal range of motion.   Lymphadenopathy:      Cervical: No cervical adenopathy.   Skin:     General: Skin is warm.      Findings: Ecchymosis present. No erythema or rash.      Comments: Mild ecchymosis bilat forearms    Neurological:      Mental Status: She is alert and oriented to person, place, and time.      Cranial Nerves: No cranial nerve deficit.      Coordination: Coordination normal.   Psychiatric:         Behavior: Behavior normal.         Thought Content: Thought content normal.         Judgment: Judgment normal.        Labs:     Blood Tests:  Lab Results   Component Value Date    BNP 24 02/07/2023     07/12/2023    K 4.7 07/12/2023     07/12/2023    CO2 30 (H) 07/12/2023    BUN 21 07/12/2023    CREATININE 0.7 07/12/2023    GLU 83 07/12/2023    HGBA1C 5.0 02/08/2023    MG 2.1 02/07/2023    AST 25 02/17/2023    ALT 21 02/17/2023    ALBUMIN 4.1 03/28/2023    PROT 6.5 02/17/2023    BILITOT 0.8 02/17/2023    WBC 5.57 07/12/2023    HGB 13.4 07/12/2023    HCT 42.3 07/12/2023    MCV 95 07/12/2023     07/12/2023    INR 1.0 02/05/2015    TSH 1.240 02/07/2023       Lab Results   Component Value Date    CHOL 157 01/07/2023    HDL 58 01/07/2023    TRIG 51 01/07/2023       Lab Results   Component Value Date    LDLCALC 88.8 01/07/2023     CBC drawn 7/12/2023  Hemoglobin 13.4  Platelets 171     Assessment:     1. PAF (paroxysmal atrial fibrillation)    2. Supraventricular tachycardia    3. MVP (mitral valve prolapse)    4. Nonrheumatic mitral valve regurgitation    5. Anticoagulated    6. Preop cardiovascular exam        Plan:     PAF (paroxysmal atrial fibrillation)  -     CBC Without Differential; Future; Expected date: 07/12/2023  -     Basic metabolic panel; Future; Expected date: 07/12/2023  Dose reduction  in xarelto would not be appropriate given normal renal clearance  Xarelto should be continued at 20 mg qD, and I recommend the patient continue to monitor herself for signs of inappropriate bleeding   CBC normal at time of visit today     Supraventricular tachycardia  Plans to follow up with Dr. Ortiz in the fall to discuss definitive treatment   MVP (mitral valve prolapse)    Nonrheumatic mitral valve regurgitation    Anticoagulated    Preop cardiovascular exam  RCRI score of 0 is consistent with 3.9 % tomas-operative risk of major adverse cardiac event (cardiac death, nonfatal MI, nonfatal cardiac arrest).  Functional capacity >7 METs.  No further cardiac testing will reduce surgical risk prior to proceeding with planned surgery.   I recommend that xarelto be held 48 hours prior to undergoing surgical procedure, and should be restarted as soon as the surgeon feels it's safe to do so.       Signed:  Yuko Beyer PA-C  Cardiology     7/12/2023 10:29 AM    Follow-up:     Future Appointments   Date Time Provider Department Center   10/11/2023  2:30 PM Bhargav Nieto [facility]

## 2023-07-18 ENCOUNTER — TELEPHONE (OUTPATIENT)
Dept: ELECTROPHYSIOLOGY | Facility: CLINIC | Age: 73
End: 2023-07-18
Payer: MEDICARE

## 2023-07-18 NOTE — TELEPHONE ENCOUNTER
Left message for patient to return call and also asked her to upload EKG strips to patient portal for Dr Ortiz to review. Call back # left.

## 2023-07-19 ENCOUNTER — PATIENT MESSAGE (OUTPATIENT)
Dept: CARDIOLOGY | Facility: CLINIC | Age: 73
End: 2023-07-19
Payer: MEDICARE

## 2023-07-21 ENCOUNTER — PATIENT MESSAGE (OUTPATIENT)
Dept: CARDIOLOGY | Facility: CLINIC | Age: 73
End: 2023-07-21
Payer: MEDICARE

## 2023-07-25 ENCOUNTER — PATIENT MESSAGE (OUTPATIENT)
Dept: ELECTROPHYSIOLOGY | Facility: CLINIC | Age: 73
End: 2023-07-25
Payer: MEDICARE

## 2023-07-26 ENCOUNTER — PATIENT MESSAGE (OUTPATIENT)
Dept: ELECTROPHYSIOLOGY | Facility: CLINIC | Age: 73
End: 2023-07-26
Payer: MEDICARE

## 2023-07-26 NOTE — TELEPHONE ENCOUNTER
Reviewed all Kardia tracings with Dr Ortiz. He did not see any AF but did note SVT and states that he stands by his recommendations from last clinic visit:    Discussed consideration of EPS and possible RFA for SVT or atrial flutter as next step, given the history of  treated with Adenosine (it is feasible that the AF was triggered by Adenosine), as well as consideration of ILR.    States we can schedule ablation without office visit. Spoke with patient and gave her a few dates for Sept/Oct. She is going to look at her calendar and get back to me.

## 2023-07-27 DIAGNOSIS — I47.10 SUPRAVENTRICULAR TACHYCARDIA: Primary | ICD-10-CM

## 2023-07-27 DIAGNOSIS — I49.9 CARDIAC ARRHYTHMIA, UNSPECIFIED CARDIAC ARRHYTHMIA TYPE: ICD-10-CM

## 2023-08-18 ENCOUNTER — PATIENT MESSAGE (OUTPATIENT)
Dept: ADMINISTRATIVE | Facility: HOSPITAL | Age: 73
End: 2023-08-18
Payer: MEDICARE

## 2023-08-18 ENCOUNTER — PATIENT OUTREACH (OUTPATIENT)
Dept: ADMINISTRATIVE | Facility: HOSPITAL | Age: 73
End: 2023-08-18
Payer: MEDICARE

## 2023-08-18 DIAGNOSIS — Z12.31 SCREENING MAMMOGRAM FOR HIGH-RISK PATIENT: Primary | ICD-10-CM

## 2023-08-18 NOTE — PROGRESS NOTES
Health Maintenance Due   Topic Date Due    Colonoscopy  05/14/2021    Mammogram  10/03/2023      Chart reviewed. Triggered LINKS. Updated Care Everywhere. Sent patient a portal message about self scheduling mammogram.     Julia Fleming CMA  Population Health Care Coordinator  Primary Care Team

## 2023-09-06 ENCOUNTER — PATIENT MESSAGE (OUTPATIENT)
Dept: ELECTROPHYSIOLOGY | Facility: CLINIC | Age: 73
End: 2023-09-06
Payer: MEDICARE

## 2023-09-06 NOTE — PROGRESS NOTES
History of Present Illness (HPI)  09/13/23  Meredith Ramos, a 73 y.o. female, presents today for follow up evaluation of her right KNEE. At last appointment, 5/22/2023, patient completed Euflexxa series & pain/symptoms did improve. Pain returned 3 weeks ago. Pain is 0/10 at present & up to 2/10 with provacative activity including descending stairs. She reports wanting to get ahead of the pain.     05/08/23  Location: anterior knee   Onset: chronic, insidious  Palliative:    relative rest   oral analgesics, OTC   Physical Therapy for 6 weeks    CSI shanda r 2/7/23- good relief, ongoing but not tolerated due to side effects  Provocative: prolonged ambulation  Prior: none  Progression: plateau discomfort  Quality: sharp  Radiation: none  Severity: per nursing documentation  Timing: intermittent w/ use  Trauma: none    Review of Systems (ROS)  A 10+ review of systems was performed with pertinent positives and negatives noted above in the history of present illness. Other systems were negative unless otherwise specified.    Physical Examination (PE)  General:  The patient is alert and oriented x 3. Mood is pleasant. Observation of ears, eyes and nose reveal no gross abnormalities. HEENT: NCAT, sclera anicteric.   Lungs: Respirations are equal and unlabored.  Gait is coordinated. Patient can toe walk and heel walk without difficulty.    KNEE EXAMINATION    Observation/Inspection  Gait:   Antalgic   Alignment:  Neutral   Scars:   None   Muscle atrophy: Mild  Effusion:  None   Warmth:  None   Discoloration:   none     Tenderness / Crepitus (T / C):         T / C      T / C  Patella   - / -   Lateral joint line   - / -     Peripatellar medial  -  Medial joint line    + / -  Peripatellar lateral -  Medial plica   - / -  Patellar tendon -   Popliteal fossa   - / -  Quad tendon   -   Gastrocnemius   -  Prepatellar Bursa - / -   Quadricep   -  Tibial tubercle  -  Thigh/hamstring  -  Pes anserine/HS -  Fibula    -  ITB   - /  -  Tibia     -  Tib/fib joint  - / -  LCL    -    MFC   - / -   MCL: Proximal  -    LFC   - / -   Distal    -          ROM: (* = pain)  PASSIVE   ACTIVE    Left :   5 / 0 / 145   5 / 0 / 145     Right :    5 / 0 / 145   5 / 0 / 145    Patellofemoral examination:  See above noted areas of tenderness.   Patella position    Subluxation / dislocation: Centered        Sup. / Inf;   Normal   Crepitus (PF):    Absent   Patellar Mobility:       Medial-lateral:   Normal    Superior-inferior:  Normal    Inferior tilt   Normal    Patellar tendon:  Normal   Lateral tilt:    Normal   J-sign:     None   Patellofemoral grind:   No pain     Meniscal Signs:     Pain on terminal extension:  +*  Pain on terminal flexion:  +*  Katies maneuver:  +*  Squat     NT  Thesaly    NT    Ligament Examination:  ACL / Lachman:  WNL  PCL-Post.  drawer: normal 0 to 2mm  MCL- Valgus:  normal 0 to 2mm  LCL- Varus:    normal 0 to 2mm  Pivot shift:  guarding   Dial Test:   difference c/w other side   At 30° flexion: normal (< 5°)    At 90° flexion: normal (< 5°)   Reverse Pivot Shift:   normal (Equal)     Strength: (* = with pain) Painful Side  Quadriceps   5/5  Hamstrin/5    Extremity Neuro-vascular Examination:   Sensation:  Grossly intact to light touch all dermatomal regions.   Motor Function:  Fully intact motor function at hip, knee, foot and ankle    DTRs;  quadriceps and  achilles 2+.  No clonus and downgoing Babinski.    Vascular status:  DP and PT pulses 2+, brisk capillary refill, symmetric.     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, rose medial compartment, right  w/ prior knee joint effusion    No evidence of neurologic pathology  No evidence of vascular pathology    Imaging studies reviewed:   X-ray knee, bilat 23.05    Plan  We discussed the importance of appropriate diet, weight, and regular exercise    We discussed options including    Watchful waiting /  relative rest    Physical therapy x   Injection therapy Does not do well w/ iaCSI  Repeat Euflexxa series after 11/22/23   Consultation    The patient chooses As above   x = prescribed  CSI = corticosteroid injection  VSI = viscosupplement injection  PRPI = platelet rich plasma injection  ia = intra articular  R = right  L = left  B = bilateral   nfSx = surgical consultation was recommended, but patient is not interested in consultation at this time    Physical Therapy        Formal (fPT), @ [facility] facility T, [facility]   Formal (fPT), @ [facility] facility        Homegoing (hgPT), per concurrent fPT recommendations t   Homegoing (hgPT), per prior fPT recommendations    Homegoing (hgPT), handout provided        w/  (atPT)    [blank] = not prescribed  x = prescribed  b = prescribed, and begin as indicated  t = continue as indicated  r = prescribed, and restart as indicated  p = completed prior as indicated  hs = prescribed, and with high school   col = prescribed, and with college or university   nfPT = physical therapy was recommended, but patient is not interested in PT at this time    Activity (e.g. sports, work) restrictions    [blank] = as tolerated  pt = per physical therapist  at = per   NWB = non weight bearing on affected lower extremity, with crutches assistance for ambulation    Bracing    [blank] = not prescribed  r = recommended, but not fit with at todays visit  f = prescribed and fit with at todays visit  t = continue as indicated  d = d/c  p = as needed  rare = use on rare, as-needed basis; advised against chronic use    Pain management    [blank] = No prescription necessary. A handout detailing dosing of appropriate   over-the-counter musculoskeletal analgesics was made available to the patient.   m = meloxicam x 14 days  mp = 14 day course of meloxicam prescribed prior    Follow up As scheduled for Euflexxa   [blank] = as  needed  [number] = in [number] weeks  CSI = for corticosteroid injection  VSI = for viscosupplement injection or injection series  PRP = for platelet rich plasma injection or injection series  MRI = after MRI imaging  ns = should surgical options be deferred (no surgery)  o = appointment offered, deferred by patient    Should symptoms worsen or fail to resolve, consider    Revisiting the above options and / or MRI knee then likely  -->TKA     Vocation:

## 2023-09-07 ENCOUNTER — PATIENT MESSAGE (OUTPATIENT)
Dept: MEDSURG UNIT | Facility: HOSPITAL | Age: 73
End: 2023-09-07
Payer: MEDICARE

## 2023-09-13 ENCOUNTER — OFFICE VISIT (OUTPATIENT)
Dept: SPORTS MEDICINE | Facility: CLINIC | Age: 73
End: 2023-09-13
Payer: MEDICARE

## 2023-09-13 VITALS
HEIGHT: 65 IN | HEART RATE: 62 BPM | BODY MASS INDEX: 19.6 KG/M2 | SYSTOLIC BLOOD PRESSURE: 111 MMHG | DIASTOLIC BLOOD PRESSURE: 70 MMHG | WEIGHT: 117.63 LBS

## 2023-09-13 DIAGNOSIS — M17.11 PRIMARY OSTEOARTHRITIS OF RIGHT KNEE: Primary | ICD-10-CM

## 2023-09-13 DIAGNOSIS — R26.89 ANTALGIC GAIT: ICD-10-CM

## 2023-09-13 DIAGNOSIS — M25.561 RIGHT MEDIAL KNEE PAIN: ICD-10-CM

## 2023-09-13 PROCEDURE — 99214 OFFICE O/P EST MOD 30 MIN: CPT | Mod: S$PBB,,, | Performed by: FAMILY MEDICINE

## 2023-09-13 PROCEDURE — 99214 PR OFFICE/OUTPT VISIT, EST, LEVL IV, 30-39 MIN: ICD-10-PCS | Mod: S$PBB,,, | Performed by: FAMILY MEDICINE

## 2023-09-13 PROCEDURE — 99999 PR PBB SHADOW E&M-EST. PATIENT-LVL III: CPT | Mod: PBBFAC,,, | Performed by: FAMILY MEDICINE

## 2023-09-13 PROCEDURE — 99999 PR PBB SHADOW E&M-EST. PATIENT-LVL III: ICD-10-PCS | Mod: PBBFAC,,, | Performed by: FAMILY MEDICINE

## 2023-09-13 PROCEDURE — 99213 OFFICE O/P EST LOW 20 MIN: CPT | Mod: PBBFAC | Performed by: FAMILY MEDICINE

## 2023-09-20 ENCOUNTER — PATIENT MESSAGE (OUTPATIENT)
Dept: MEDSURG UNIT | Facility: HOSPITAL | Age: 73
End: 2023-09-20
Payer: MEDICARE

## 2023-09-22 ENCOUNTER — PATIENT MESSAGE (OUTPATIENT)
Dept: MEDSURG UNIT | Facility: HOSPITAL | Age: 73
End: 2023-09-22
Payer: MEDICARE

## 2023-09-25 ENCOUNTER — PATIENT MESSAGE (OUTPATIENT)
Dept: INTERNAL MEDICINE | Facility: CLINIC | Age: 73
End: 2023-09-25
Payer: MEDICARE

## 2023-09-25 ENCOUNTER — LAB VISIT (OUTPATIENT)
Dept: LAB | Facility: HOSPITAL | Age: 73
End: 2023-09-25
Attending: INTERNAL MEDICINE
Payer: MEDICARE

## 2023-09-25 DIAGNOSIS — I47.10 SUPRAVENTRICULAR TACHYCARDIA: ICD-10-CM

## 2023-09-25 DIAGNOSIS — I49.9 CARDIAC ARRHYTHMIA, UNSPECIFIED CARDIAC ARRHYTHMIA TYPE: ICD-10-CM

## 2023-09-25 LAB
ANION GAP SERPL CALC-SCNC: 8 MMOL/L (ref 8–16)
APTT PPP: 27.6 SEC (ref 21–32)
BASOPHILS # BLD AUTO: 0.03 K/UL (ref 0–0.2)
BASOPHILS NFR BLD: 0.8 % (ref 0–1.9)
BUN SERPL-MCNC: 22 MG/DL (ref 8–23)
CALCIUM SERPL-MCNC: 9.8 MG/DL (ref 8.7–10.5)
CHLORIDE SERPL-SCNC: 109 MMOL/L (ref 95–110)
CO2 SERPL-SCNC: 28 MMOL/L (ref 23–29)
CREAT SERPL-MCNC: 0.8 MG/DL (ref 0.5–1.4)
DIFFERENTIAL METHOD: ABNORMAL
EOSINOPHIL # BLD AUTO: 0.1 K/UL (ref 0–0.5)
EOSINOPHIL NFR BLD: 3.8 % (ref 0–8)
ERYTHROCYTE [DISTWIDTH] IN BLOOD BY AUTOMATED COUNT: 12.7 % (ref 11.5–14.5)
EST. GFR  (NO RACE VARIABLE): >60 ML/MIN/1.73 M^2
GLUCOSE SERPL-MCNC: 87 MG/DL (ref 70–110)
HCT VFR BLD AUTO: 41.6 % (ref 37–48.5)
HGB BLD-MCNC: 13.8 G/DL (ref 12–16)
IMM GRANULOCYTES # BLD AUTO: 0.02 K/UL (ref 0–0.04)
IMM GRANULOCYTES NFR BLD AUTO: 0.5 % (ref 0–0.5)
INR PPP: 1 (ref 0.8–1.2)
LYMPHOCYTES # BLD AUTO: 1.1 K/UL (ref 1–4.8)
LYMPHOCYTES NFR BLD: 28.2 % (ref 18–48)
MCH RBC QN AUTO: 30.3 PG (ref 27–31)
MCHC RBC AUTO-ENTMCNC: 33.2 G/DL (ref 32–36)
MCV RBC AUTO: 91 FL (ref 82–98)
MONOCYTES # BLD AUTO: 0.3 K/UL (ref 0.3–1)
MONOCYTES NFR BLD: 7.5 % (ref 4–15)
NEUTROPHILS # BLD AUTO: 2.2 K/UL (ref 1.8–7.7)
NEUTROPHILS NFR BLD: 59.2 % (ref 38–73)
NRBC BLD-RTO: 0 /100 WBC
PLATELET # BLD AUTO: 163 K/UL (ref 150–450)
PMV BLD AUTO: 13.2 FL (ref 9.2–12.9)
POTASSIUM SERPL-SCNC: 4.2 MMOL/L (ref 3.5–5.1)
PROTHROMBIN TIME: 11 SEC (ref 9–12.5)
RBC # BLD AUTO: 4.56 M/UL (ref 4–5.4)
SODIUM SERPL-SCNC: 145 MMOL/L (ref 136–145)
WBC # BLD AUTO: 3.72 K/UL (ref 3.9–12.7)

## 2023-09-25 PROCEDURE — 36415 COLL VENOUS BLD VENIPUNCTURE: CPT | Performed by: INTERNAL MEDICINE

## 2023-09-25 PROCEDURE — 85610 PROTHROMBIN TIME: CPT | Performed by: INTERNAL MEDICINE

## 2023-09-25 PROCEDURE — 85730 THROMBOPLASTIN TIME PARTIAL: CPT | Performed by: INTERNAL MEDICINE

## 2023-09-25 PROCEDURE — 85025 COMPLETE CBC W/AUTO DIFF WBC: CPT | Performed by: INTERNAL MEDICINE

## 2023-09-25 PROCEDURE — 80048 BASIC METABOLIC PNL TOTAL CA: CPT | Performed by: INTERNAL MEDICINE

## 2023-09-28 ENCOUNTER — PATIENT MESSAGE (OUTPATIENT)
Dept: MEDSURG UNIT | Facility: HOSPITAL | Age: 73
End: 2023-09-28
Payer: MEDICARE

## 2023-09-29 ENCOUNTER — TELEPHONE (OUTPATIENT)
Dept: ELECTROPHYSIOLOGY | Facility: CLINIC | Age: 73
End: 2023-09-29
Payer: MEDICARE

## 2023-09-29 NOTE — TELEPHONE ENCOUNTER
"Spoke to patient    CONFIRMED procedure arrival time of 5:15am for Ablation with Dr Ortiz on 10/2/2023    Reiterated instructions including:  -Directions to check in desk  -NPO after midnight night prior to procedure  -High importance of HOLDING Metoprolol for 3 days prior to procedure. Confirms that last dose was 9/28/2023  -Confirmed compliance of Xarelto; she takes it with her evening meal and will take it Sunday evening  -Pre-procedure LABS reviewed, no alerts noted  -Confirmed no fever, cough, or shortness of breath in the past 30 days  -Confirmed no redness, rash, irritation, or yeast infection to groin area.   -Do not wear mascara day of procedure  -Reviewed current visitor policy  -Patient requests "no fellow" for procedure  Patient verbalized understanding of above and appreciated the call.    "

## 2023-10-01 ENCOUNTER — ANESTHESIA EVENT (OUTPATIENT)
Dept: MEDSURG UNIT | Facility: HOSPITAL | Age: 73
End: 2023-10-01
Payer: MEDICARE

## 2023-10-01 NOTE — HPI
72 yo F pmhx of pAF, SVT (AT) seen on holter monitor, mitral valve prolapse, episode of reported SVT on 2/8 given adenosine by EMS at a rate of 150bpm which then was repeated and AF. No scanned in strip in media. Of note her apple watch just noted high heart rates. It did not note AF per patient. Patient states frequent palpitations. Usually short bursts.  Sometimes nightly sometimes every few days.       2017 Holter with AT.     2/2023 ECGs with coarse AF with follow up ekg shortly afterwards in normal sinus rhythm    Corin strips from July 2023 with short RP tachycardia.     Echo EF 60% MVP     PET/STRESS negative     ECG today normal sinus rhythm rate 68bpm

## 2023-10-01 NOTE — ANESTHESIA PREPROCEDURE EVALUATION
10/01/2023  Meredith Ramos is a 73 y.o., female.  Patient Active Problem List   Diagnosis    Abnormal Pap smear    Atypical glandular cells on Pap smear    Palpitations    Left ureteral stone    Osteopenia    History of kidney stones    History of ulcerative colitis    Supraventricular tachycardia    PAF (paroxysmal atrial fibrillation)    MVP (mitral valve prolapse)    Nonrheumatic mitral valve regurgitation    Right medial knee pain    Age-related osteoporosis with current pathological fracture with routine healing    Sleep disorder           Pre-op Assessment    I have reviewed the Patient Summary Reports.       I have reviewed the Medications.     Review of Systems  Anesthesia Hx:  No problems with previous Anesthesia   Denies Personal Hx of Anesthesia complications.       Physical Exam    Airway:  No airway management difficulties anticipated  Dental:No active dental issues noted  Chest/Lungs:  Clear to auscultation    Heart:  Rate: Normal  Rhythm: Regular Rhythm  Sounds: Normal        Anesthesia Plan  Type of Anesthesia, risks & benefits discussed:    Anesthesia Type: Gen Natural Airway, Gen Supraglottic Airway  Informed Consent: Informed consent signed with the Patient and all parties understand the risks and agree with anesthesia plan.  All questions answered.   ASA Score: 3  Anesthesia Plan Notes: Chart reviewed. Patient seen and examined. Anesthesia plan discussed and questions answered. E-consent signed. Samir Le MD    Ready For Surgery From Anesthesia Perspective.     .

## 2023-10-01 NOTE — ASSESSMENT & PLAN NOTE
Patient here for SVT EPS & RFA    Anticoagulation: rivaroxaban  Antiplatelets: none  EF (most recent): 60%  AAD/AVN agents: Toprol    The risks, benefits and alternatives of the procedure were explained to the patient, patient's family and/or surrogate decision maker. Risks include (but not limited to) bleeding, hematoma, infection, pain, vascular damage, damage to structures surrounding the vasculature, myocardial damage [perforation, valvular damage], cardiac tamponade, phrenic nerve damage, injury to conduction system which may require permanent pacemaker implantation, CVA, MI, and death. All questions were answered. Patient is understanding of these risks, and would like to proceed. Consents signed.

## 2023-10-01 NOTE — SUBJECTIVE & OBJECTIVE
Past Medical History:   Diagnosis Date    Diverticulitis     Kidney stone     Osteoporosis     ostenopenia        Past Surgical History:   Procedure Laterality Date    BREAST BIOPSY      x1    BUNIONECTOMY      Hammer toe repair    BUNIONECTOMY      COLECTOMY  2004    partial for diverticulitis    COLECTOMY      partial for diverticulitis    HYSTERECTOMY  2013    complete laparoscopic hysterectomy at [facility].  No cancer.  Found 3cm fibroid on right ovary.    kidney stones      removal of breast implants         Review of patient's allergies indicates:   Allergen Reactions    Cortisone (hydrocortisone) [hydrocortisone]        No current facility-administered medications on file prior to encounter.     Current Outpatient Medications on File Prior to Encounter   Medication Sig    calcium-vitamin D3 (OS- + D3) 500 mg(1,250mg) -200 unit per tablet Calcium 500 + D    cetirizine (ZYRTEC) 10 MG tablet Take 10 mg by mouth.    cholecalciferol, vitamin D3, (VITAMIN D3) 25 mcg (1,000 unit) capsule     MAGNESIUM CITRATE ORAL Take 250 mg by mouth once daily.    metoprolol succinate (TOPROL-XL) 25 MG 24 hr tablet Take 1 tablet (25 mg total) by mouth once daily. .5-1 once or twice daily or as directed    rivaroxaban (XARELTO) 20 mg Tab Take 1 tablet (20 mg total) by mouth daily with dinner or evening meal.    vitamin E acetate (VITAMIN E ORAL) Take 400 mg by mouth once daily.     Family History       Problem Relation (Age of Onset)    Breast cancer Sister (54)    Cancer Father, Sister    Colon cancer Maternal Grandfather    Hypertension Father    Uterine cancer Maternal Grandmother          Tobacco Use    Smoking status: Former     Current packs/day: 0.00     Types: Cigarettes     Quit date: 2003     Years since quittin.2    Smokeless tobacco: Never   Substance and Sexual Activity    Alcohol use: No    Drug use: No    Sexual activity: Never     Review of Systems   All other systems reviewed and  are negative.    Objective:     Vital Signs (Most Recent):    Vital Signs (24h Range):  BP: ()/()   Arterial Line BP: ()/()           There is no height or weight on file to calculate BMI.             Physical Exam  Vitals and nursing note reviewed.   Constitutional:       Appearance: Normal appearance.   Cardiovascular:      Rate and Rhythm: Normal rate and regular rhythm.      Pulses: Normal pulses.      Heart sounds: Normal heart sounds.   Pulmonary:      Effort: Pulmonary effort is normal.   Musculoskeletal:      Right lower leg: No edema.      Left lower leg: No edema.   Skin:     General: Skin is warm.   Neurological:      General: No focal deficit present.      Mental Status: She is alert.            Significant Labs: All pertinent lab results from the last 24 hours have been reviewed.

## 2023-10-02 ENCOUNTER — HOSPITAL ENCOUNTER (OUTPATIENT)
Facility: HOSPITAL | Age: 73
Discharge: HOME | End: 2023-10-02
Attending: INTERNAL MEDICINE | Admitting: INTERNAL MEDICINE
Payer: MEDICARE

## 2023-10-02 ENCOUNTER — TELEPHONE (OUTPATIENT)
Dept: ELECTROPHYSIOLOGY | Facility: CLINIC | Age: 73
End: 2023-10-02
Payer: MEDICARE

## 2023-10-02 ENCOUNTER — ANESTHESIA (OUTPATIENT)
Dept: MEDSURG UNIT | Facility: HOSPITAL | Age: 73
End: 2023-10-02
Payer: MEDICARE

## 2023-10-02 VITALS
WEIGHT: 118 LBS | BODY MASS INDEX: 19.66 KG/M2 | HEIGHT: 65 IN | DIASTOLIC BLOOD PRESSURE: 60 MMHG | RESPIRATION RATE: 16 BRPM | SYSTOLIC BLOOD PRESSURE: 108 MMHG | HEART RATE: 61 BPM | TEMPERATURE: 98 F | OXYGEN SATURATION: 98 %

## 2023-10-02 DIAGNOSIS — I48.91 A-FIB: ICD-10-CM

## 2023-10-02 DIAGNOSIS — I47.10 SUPRAVENTRICULAR TACHYCARDIA: Primary | ICD-10-CM

## 2023-10-02 DIAGNOSIS — I49.9 ARRHYTHMIA: ICD-10-CM

## 2023-10-02 LAB
POC ACTIVATED CLOTTING TIME K: 155 SEC (ref 74–137)
POC ACTIVATED CLOTTING TIME K: 317 SEC (ref 74–137)
POC ACTIVATED CLOTTING TIME K: 323 SEC (ref 74–137)
SAMPLE: ABNORMAL

## 2023-10-02 PROCEDURE — C1887 CATHETER, GUIDING: HCPCS | Performed by: INTERNAL MEDICINE

## 2023-10-02 PROCEDURE — 93620 COMP EP EVL R AT VEN PAC&REC: CPT | Mod: 26,,, | Performed by: INTERNAL MEDICINE

## 2023-10-02 PROCEDURE — 93613 INTRACARDIAC EPHYS 3D MAPG: CPT | Performed by: INTERNAL MEDICINE

## 2023-10-02 PROCEDURE — C1731 CATH, EP, 20 OR MORE ELEC: HCPCS | Performed by: INTERNAL MEDICINE

## 2023-10-02 PROCEDURE — D9220A PRA ANESTHESIA: Mod: CRNA,,, | Performed by: NURSE ANESTHETIST, CERTIFIED REGISTERED

## 2023-10-02 PROCEDURE — 27201423 OPTIME MED/SURG SUP & DEVICES STERILE SUPPLY: Performed by: INTERNAL MEDICINE

## 2023-10-02 PROCEDURE — 37000009 HC ANESTHESIA EA ADD 15 MINS: Performed by: INTERNAL MEDICINE

## 2023-10-02 PROCEDURE — 93613 INTRACARDIAC EPHYS 3D MAPG: CPT | Mod: ,,, | Performed by: INTERNAL MEDICINE

## 2023-10-02 PROCEDURE — 93010 EKG 12-LEAD: ICD-10-PCS | Mod: ,,, | Performed by: INTERNAL MEDICINE

## 2023-10-02 PROCEDURE — D9220A PRA ANESTHESIA: ICD-10-PCS | Mod: CRNA,,, | Performed by: NURSE ANESTHETIST, CERTIFIED REGISTERED

## 2023-10-02 PROCEDURE — 25000003 PHARM REV CODE 250: Performed by: NURSE ANESTHETIST, CERTIFIED REGISTERED

## 2023-10-02 PROCEDURE — 93620 COMP EP EVL R AT VEN PAC&REC: CPT | Performed by: INTERNAL MEDICINE

## 2023-10-02 PROCEDURE — 93621 COMP EP EVL L PAC&REC C SINS: CPT | Mod: 26,,, | Performed by: INTERNAL MEDICINE

## 2023-10-02 PROCEDURE — 93010 ELECTROCARDIOGRAM REPORT: CPT | Mod: ,,, | Performed by: INTERNAL MEDICINE

## 2023-10-02 PROCEDURE — C1730 CATH, EP, 19 OR FEW ELECT: HCPCS | Performed by: INTERNAL MEDICINE

## 2023-10-02 PROCEDURE — 93621 COMP EP EVL L PAC&REC C SINS: CPT | Performed by: INTERNAL MEDICINE

## 2023-10-02 PROCEDURE — 93005 ELECTROCARDIOGRAM TRACING: CPT

## 2023-10-02 PROCEDURE — 93620: ICD-10-PCS | Mod: 26,,, | Performed by: INTERNAL MEDICINE

## 2023-10-02 PROCEDURE — D9220A PRA ANESTHESIA: Mod: ANES,,, | Performed by: ANESTHESIOLOGY

## 2023-10-02 PROCEDURE — 93621: ICD-10-PCS | Mod: 26,,, | Performed by: INTERNAL MEDICINE

## 2023-10-02 PROCEDURE — D9220A PRA ANESTHESIA: ICD-10-PCS | Mod: ANES,,, | Performed by: ANESTHESIOLOGY

## 2023-10-02 PROCEDURE — 63600175 PHARM REV CODE 636 W HCPCS: Performed by: NURSE ANESTHETIST, CERTIFIED REGISTERED

## 2023-10-02 PROCEDURE — C1766 INTRO/SHEATH,STRBLE,NON-PEEL: HCPCS | Performed by: INTERNAL MEDICINE

## 2023-10-02 PROCEDURE — 37000008 HC ANESTHESIA 1ST 15 MINUTES: Performed by: INTERNAL MEDICINE

## 2023-10-02 PROCEDURE — 93613 PR INTRACARD ELECTROPHYS 3-DIMENS MAPPING: ICD-10-PCS | Mod: ,,, | Performed by: INTERNAL MEDICINE

## 2023-10-02 PROCEDURE — 63600175 PHARM REV CODE 636 W HCPCS: Performed by: ANESTHESIOLOGY

## 2023-10-02 PROCEDURE — 25000003 PHARM REV CODE 250: Performed by: INTERNAL MEDICINE

## 2023-10-02 PROCEDURE — 93005 ELECTROCARDIOGRAM TRACING: CPT | Mod: 59

## 2023-10-02 PROCEDURE — C1894 INTRO/SHEATH, NON-LASER: HCPCS | Performed by: INTERNAL MEDICINE

## 2023-10-02 RX ORDER — HEPARIN SOD,PORCINE/0.9 % NACL 1000/500ML
INTRAVENOUS SOLUTION INTRAVENOUS
Status: DISCONTINUED | OUTPATIENT
Start: 2023-10-02 | End: 2023-10-02 | Stop reason: HOSPADM

## 2023-10-02 RX ORDER — DIPHENHYDRAMINE HYDROCHLORIDE 50 MG/ML
25 INJECTION INTRAMUSCULAR; INTRAVENOUS EVERY 6 HOURS PRN
Status: DISCONTINUED | OUTPATIENT
Start: 2023-10-02 | End: 2023-10-02 | Stop reason: HOSPADM

## 2023-10-02 RX ORDER — ACETAMINOPHEN 325 MG/1
650 TABLET ORAL EVERY 6 HOURS PRN
Status: DISCONTINUED | OUTPATIENT
Start: 2023-10-02 | End: 2023-10-02 | Stop reason: HOSPADM

## 2023-10-02 RX ORDER — PROTAMINE SULFATE 10 MG/ML
INJECTION, SOLUTION INTRAVENOUS
Status: DISCONTINUED | OUTPATIENT
Start: 2023-10-02 | End: 2023-10-02

## 2023-10-02 RX ORDER — PROPOFOL 10 MG/ML
VIAL (ML) INTRAVENOUS
Status: DISCONTINUED | OUTPATIENT
Start: 2023-10-02 | End: 2023-10-02

## 2023-10-02 RX ORDER — FENTANYL CITRATE 50 UG/ML
25 INJECTION, SOLUTION INTRAMUSCULAR; INTRAVENOUS EVERY 5 MIN PRN
Status: DISCONTINUED | OUTPATIENT
Start: 2023-10-02 | End: 2023-10-02 | Stop reason: HOSPADM

## 2023-10-02 RX ORDER — LIDOCAINE HYDROCHLORIDE 20 MG/ML
INJECTION, SOLUTION INFILTRATION; PERINEURAL
Status: DISCONTINUED | OUTPATIENT
Start: 2023-10-02 | End: 2023-10-02 | Stop reason: HOSPADM

## 2023-10-02 RX ORDER — ONDANSETRON 2 MG/ML
4 INJECTION INTRAMUSCULAR; INTRAVENOUS ONCE AS NEEDED
Status: DISCONTINUED | OUTPATIENT
Start: 2023-10-02 | End: 2023-10-02 | Stop reason: HOSPADM

## 2023-10-02 RX ORDER — HEPARIN SODIUM 1000 [USP'U]/ML
INJECTION, SOLUTION INTRAVENOUS; SUBCUTANEOUS
Status: DISCONTINUED | OUTPATIENT
Start: 2023-10-02 | End: 2023-10-02

## 2023-10-02 RX ORDER — HYDROMORPHONE HYDROCHLORIDE 1 MG/ML
0.2 INJECTION, SOLUTION INTRAMUSCULAR; INTRAVENOUS; SUBCUTANEOUS EVERY 5 MIN PRN
Status: DISCONTINUED | OUTPATIENT
Start: 2023-10-02 | End: 2023-10-02 | Stop reason: HOSPADM

## 2023-10-02 RX ORDER — HEPARIN SODIUM 10000 [USP'U]/100ML
INJECTION, SOLUTION INTRAVENOUS CONTINUOUS PRN
Status: DISCONTINUED | OUTPATIENT
Start: 2023-10-02 | End: 2023-10-02

## 2023-10-02 RX ADMIN — PROTAMINE SULFATE 40 MG: 10 INJECTION, SOLUTION INTRAVENOUS at 09:10

## 2023-10-02 RX ADMIN — HYDROMORPHONE HYDROCHLORIDE 0.2 MG: 1 INJECTION, SOLUTION INTRAMUSCULAR; INTRAVENOUS; SUBCUTANEOUS at 12:10

## 2023-10-02 RX ADMIN — ACETAMINOPHEN 650 MG: 325 TABLET ORAL at 10:10

## 2023-10-02 RX ADMIN — HEPARIN SODIUM 8000 UNITS: 1000 INJECTION, SOLUTION INTRAVENOUS; SUBCUTANEOUS at 08:10

## 2023-10-02 RX ADMIN — SODIUM CHLORIDE: 0.9 INJECTION, SOLUTION INTRAVENOUS at 07:10

## 2023-10-02 RX ADMIN — PROPOFOL 30 MG: 10 INJECTION, EMULSION INTRAVENOUS at 07:10

## 2023-10-02 RX ADMIN — HEPARIN SODIUM AND DEXTROSE 12 UNITS/KG/HR: 10000; 5 INJECTION INTRAVENOUS at 08:10

## 2023-10-02 NOTE — NURSING
Ambulated to restroom without difficulty. Voiding without difficulty.  Right and left groin dressings remain clean dry and intact.  Right and left groins soft.  No bleeding or hematoma noted.  VSS. IV's d/c'd x 2 with cath tips intact.  Pt verbalized an understanding of d/c instructions.  Pt daughter updated via phone.  Awaiting arrival of pt daughter for pickup.

## 2023-10-02 NOTE — NURSING
Off unit via wheelchair for discharge home.  Pt received by daughter.  Bilateral groin dressings remain clean dry and intact.

## 2023-10-02 NOTE — TRANSFER OF CARE
"Anesthesia Transfer of Care Note    Patient: Meredith Ramos    Procedure(s) Performed: Procedure(s) (LRB):  EP - diagnostic (N/A)    Patient location: PACU    Anesthesia Type: general    Transport from OR: Transported from OR on 6-10 L/min O2 by face mask with adequate spontaneous ventilation    Post pain: adequate analgesia    Post assessment: no apparent anesthetic complications    Post vital signs: stable    Level of consciousness: awake    Nausea/Vomiting: no nausea/vomiting    Complications: none    Transfer of care protocol was followed      Last vitals:   Visit Vitals  BP (!) 149/74   Pulse 67   Temp 37.2 °C (99 °F) (Temporal)   Resp 16   Ht 5' 5" (1.651 m)   Wt 53.5 kg (118 lb)   SpO2 98%   Breastfeeding No   BMI 19.64 kg/m²     "

## 2023-10-02 NOTE — ANESTHESIA POSTPROCEDURE EVALUATION
Anesthesia Post Evaluation    Patient: Meredith Ramos    Procedure(s) Performed: Procedure(s) (LRB):  EP - diagnostic (N/A)    Final Anesthesia Type: general      Level of consciousness: awake and alert  Post-procedure vital signs: reviewed and stable  Pain control: Pain has been treated.  Airway patency: patent    PONV status: Absent or treated.  Anesthetic complications: no      Cardiovascular status: hemodynamically stable  Respiratory status: unassisted  Hydration status: euvolemic            Vitals Value Taken Time   /59 10/02/23 1330   Temp 36.6 °C (97.9 °F) 10/02/23 1330   Pulse 59 10/02/23 1330   Resp 16 10/02/23 1330   SpO2 98 % 10/02/23 1330         No case tracking events are documented in the log.      Pain/Jesus Alberto Score: Pain Rating Prior to Med Admin: 7 (10/2/2023 12:05 PM)  Pain Rating Post Med Admin: 5 (10/2/2023 12:50 PM)  Jesus Alberto Score: 9 (10/2/2023  1:30 PM)

## 2023-10-02 NOTE — BRIEF OP NOTE
Attending: Shin Ortiz MD  Date of Procedure: 10/2/23    Post-operative Diagnosis: SVT    Procedure Performed: EPS with high density mapping.    Description of Procedure: The patient was brought to the EP lab in the fasting state. Prepped and draped in sterile fashion. Safety timeout was performed. Sedation administered by anesthesia staff. Ultrasound guided venous access of the bilateral femoral veins was performed. HRA, HIS, and RV catheters placed via left femoral vein access. CS and Ablation catheters via right femoral vein access. We observed frequent nonsustained atrial tachycardia.The tachycardia focus was in the same region as the sinus node.  We therefore deferred attempts at RFA.    EBL: <10 mL    Specimens: none  Complications: no immediate    Plan:  Bedrest x 6 hours  ECG on upon arrival to PACU  Medication changes: none  Plan for discharge following bedrest if patient tolerating PO intake, voiding, and ambulatory without evidence of complications   Contine Xarelto     The attending physician was present for entire duration of the procedure    Dhara Mata MD, PGY7  Electrophysiology

## 2023-10-02 NOTE — DISCHARGE INSTRUCTIONS
Discharge Instructions and Care of Your Groin      Follow up:  -Follow up with Dr. Ortiz for scheduled loop recorder implant. DIANDRA Wynn will call to set you up with this appointment.    Medications:  -Continue all home medications especially your blood thinner Xarelto.    Catheter Insertion Site  -The morning after your procedure, you may take the dressing off. The easiest way to do this is when you are showering, get the tape and dressing wet and remove it.  -After the bandage is removed, cover the area with a small adhesive bandage. It is normal for the catheter insertion site to be black and blue for a couple of days. The site may also be slightly swollen and pink, and there may be a small lump (about the size of a quarter) at the site.  -Wash the catheter insertion site at least once daily with soap and water. Place soapy water on your hand or washcloth and gently wash the insertion site; do not rub.  -Keep the area clean and dry when you are not showering.  -Do not use creams, lotions or ointment on the wound site.  -Wear loose clothes and loose underwear.  -Do not take a bath, tub soak, go in a Jacuzzi, or swim in a pool or lake for one week after the procedure.    Activity Instructions  -Do not drive or operate any dangerous machinery for 24 hours, but optimally 48-72 hours since you were given general anesthesia.   -Do not strain during bowel movements for the first 3 to 4 days after the procedure to prevent bleeding from the catheter insertion site.  -Avoid heavy lifting (more than 10 pounds) and pushing or pulling heavy objects for the first 5 to 7 days after the procedure.  -Do not participate in strenuous activities for 5 days after the procedure. This includes most sports - jogging, golfing, play tennis, and bowling.  -You may climb stairs if needed, but walk up and down the stairs more slowly than usual.  -Gradually increase your activities until you reach your normal activity level within one week  after the procedure.    If bleeding should occur following discharge:  -Sit down and apply firm pressure to the puncture site with your fingers for 10 minutes   -If the bleeding stops, continue to sit quietly, keeping your leg straight for 2 hours. Notify your physician as soon as possible   -If bleeding does not stop after 10 minutes or if there is a large amount of bleeding or spurting, call 911 immediately.  Do not drive yourself to the hospital.     Notify your physician if these symptoms persist or if you experience:  -Change in color or temperature of the leg  -Redness, heat, or pus at the puncture site  -Chills or fever greater than 101.0 F     Go to the Emergency Department if you develop:   -Severe bleeding   -Acute Weakness or numbness   -Visual, gait or speech disturbances   -New chest pain, palpitations, shortness of breath, fainting

## 2023-10-02 NOTE — PLAN OF CARE
Patient arrived to room. PIV placed x2. Admit assessment completed. Plan of care discussed with patient. Will monitor. Call light within reach, instructed in use.

## 2023-10-02 NOTE — TELEPHONE ENCOUNTER
----- Message from Kiersten Merino NP sent at 10/2/2023  3:35 PM CDT -----  Regarding: Schedule for ILR implant  Dr. Diana Marrufo was unable to ablate anything on Ms. Ramos due to the location of her arrhythmia. He does want to set her up in the near future for ILR implant. Would you be able to reach out to her to facilitate this?    Thank you!  Albert

## 2023-10-02 NOTE — NURSING TRANSFER
Nursing Transfer Note      10/2/2023   1:28 PM    Nurse giving handoff:angélica,rn   Nurse receiving handoff:dominga olsen sscu rn    Reason patient is being transferred: ep pacu 3 to sscu 11/home    Transfer To: ep pacu 3 to sscu 11/home    Transfer via stretcher    Transfer with cardiac monitoring, tele box on confirmed by tele tech    Transported by angélica,rn    Transfer Vital Signs:  Blood Pressure:92/56  Heart Rate:55  O2:96% room air  Temperature:98.6  Respirations:15    Telemetry: Box Number [id number], Rate 53, Rhythm sb, and Telemetry  brent  Order for Tele Monitor? Yes    Additional Lines: purewick connected to wall sxn -60mm hg sxn    4eyes on Skin: yes    Medicines sent: none    Any special needs or follow-up needed: bedrest x6hrs. Sutures removal 1330. Bedrest done at 1530    Patient belongings transferred with patient:sscu locker    Chart send with patient: Yes    Notified: daughter    Patient reassessed at: 10/2/23 1300. Next due 1330  1  Upon arrival to floor: cardiac monitor applied, patient oriented to room, call bell in reach, and bed in lowest position, purewick connected to wall sxn.

## 2023-10-02 NOTE — H&P
[facility] - Short Stay Cardiac Unit  Cardiac Electrophysiology  History and Physical     Admission Date: 10/2/2023  Code Status: Prior   Attending Provider: Shin Ortiz MD   Principal Problem:Supraventricular tachycardia    Subjective:     Chief Complaint:  SVT     HPI:  72 yo F pmhx of pAF, SVT (AT) seen on holter monitor, mitral valve prolapse, episode of reported SVT on 2/8 given adenosine by EMS at a rate of 150bpm which then was repeated and AF. No scanned in strip in media. Of note her apple watch just noted high heart rates. It did not note AF per patient. Patient states frequent palpitations. Usually short bursts.  Sometimes nightly sometimes every few days.       2017 Holter with AT.     2/2023 ECGs with coarse AF with follow up ekg shortly afterwards in normal sinus rhythm    Kardia strips from July 2023 with short RP tachycardia.     Echo EF 60% MVP     PET/STRESS negative     ECG today normal sinus rhythm rate 68bpm       Past Medical History:   Diagnosis Date    Diverticulitis     Kidney stone     Osteoporosis     ostenopenia        Past Surgical History:   Procedure Laterality Date    BREAST BIOPSY      x1    BUNIONECTOMY      Hammer toe repair    BUNIONECTOMY      COLECTOMY  2004    partial for diverticulitis    COLECTOMY      partial for diverticulitis    HYSTERECTOMY  07/22/2013    complete laparoscopic hysterectomy at [facility] 7/22/13.  No cancer.  Found 3cm fibroid on right ovary.    kidney stones      removal of breast implants         Review of patient's allergies indicates:   Allergen Reactions    Cortisone (hydrocortisone) [hydrocortisone]        No current facility-administered medications on file prior to encounter.     Current Outpatient Medications on File Prior to Encounter   Medication Sig    calcium-vitamin D3 (OS- + D3) 500 mg(1,250mg) -200 unit per tablet Calcium 500 + D    cetirizine (ZYRTEC) 10 MG tablet Take 10 mg by mouth.    cholecalciferol, vitamin D3,  (VITAMIN D3) 25 mcg (1,000 unit) capsule     MAGNESIUM CITRATE ORAL Take 250 mg by mouth once daily.    metoprolol succinate (TOPROL-XL) 25 MG 24 hr tablet Take 1 tablet (25 mg total) by mouth once daily. .5-1 once or twice daily or as directed    rivaroxaban (XARELTO) 20 mg Tab Take 1 tablet (20 mg total) by mouth daily with dinner or evening meal.    vitamin E acetate (VITAMIN E ORAL) Take 400 mg by mouth once daily.     Family History       Problem Relation (Age of Onset)    Breast cancer Sister (54)    Cancer Father, Sister    Colon cancer Maternal Grandfather    Hypertension Father    Uterine cancer Maternal Grandmother          Tobacco Use    Smoking status: Former     Current packs/day: 0.00     Types: Cigarettes     Quit date: 2003     Years since quittin.2    Smokeless tobacco: Never   Substance and Sexual Activity    Alcohol use: No    Drug use: No    Sexual activity: Never     Review of Systems   All other systems reviewed and are negative.    Objective:     Vital Signs (Most Recent):    Vital Signs (24h Range):  BP: ()/()   Arterial Line BP: ()/()           There is no height or weight on file to calculate BMI.             Physical Exam  Vitals and nursing note reviewed.   Constitutional:       Appearance: Normal appearance.   Cardiovascular:      Rate and Rhythm: Normal rate and regular rhythm.      Pulses: Normal pulses.      Heart sounds: Normal heart sounds.   Pulmonary:      Effort: Pulmonary effort is normal.   Musculoskeletal:      Right lower leg: No edema.      Left lower leg: No edema.   Skin:     General: Skin is warm.   Neurological:      General: No focal deficit present.      Mental Status: She is alert.            Significant Labs: All pertinent lab results from the last 24 hours have been reviewed.      Assessment and Plan:     * Supraventricular tachycardia  Patient here for SVT EPS & RFA    Anticoagulation: rivaroxaban  Antiplatelets: none  EF (most recent):  60%  AAD/AVN agents: Toprol    The risks, benefits and alternatives of the procedure were explained to the patient, patient's family and/or surrogate decision maker. Risks include (but not limited to) bleeding, hematoma, infection, pain, vascular damage, damage to structures surrounding the vasculature, myocardial damage [perforation, valvular damage], cardiac tamponade, phrenic nerve damage, injury to conduction system which may require permanent pacemaker implantation, CVA, MI, and death. All questions were answered. Patient is understanding of these risks, and would like to proceed. Consents signed.        Dhara Mata MD  Cardiac Electrophysiology  [facility] - Short Stay Cardiac Unit

## 2023-10-02 NOTE — DISCHARGE SUMMARY
[facility] - Short Stay Cardiac Unit  Cardiac Electrophysiology  Discharge Summary      Patient Name: Meredith Ramos  MRN: [id number]  Admission Date: 10/2/2023  Hospital Length of Stay: 0 days  Discharge Date and Time: 10/2/2023  4:00 PM  Attending Physician: Shin Ortiz MD    Discharging Provider: Kiersten Merino NP  Primary Care Physician: Lisa Childress MD    HPI: Patient is a 72 yo F pmhx of pAF, SVT (AT) seen on holter monitor, mitral valve prolapse, episode of reported SVT on 2/8 given adenosine by EMS at a rate of 150bpm which then was repeated and AF.    History obtained through clinic notes and patient report:    No scanned in strip in media. Of note her apple watch just noted high heart rates. It did not note AF per patient. Patient states frequent palpitations. Usually short bursts.  Sometimes nightly sometimes every few days.       2017 Holter with AT.     2/2023 ECGs with coarse AF with follow up ekg shortly afterwards in normal sinus rhythm     Echo EF 60% MVP     PET/STRESS negative     3/8/23 During last office visit with Dr. Ortiz, ECG today normal sinus rhythm rate 68 bpm. Discussed consideration of EPS and possible RFA for SVT or atrial flutter as next step, given the history of  treated with Adenosine (it is feasible that the AF was triggered by Adenosine), as well as consideration of ILR.  She is leaning towards proceeding, but would like to reflect. Will contact us if she would like to proceed.     She presents today for EPS and possible RFA for SVT or atrial flutter.    Procedure(s) (LRB):  EP - diagnostic (N/A)     Indwelling Lines/Drains at time of discharge:  Lines/Drains/Airways    None     Hospital Course:Patient underwent EPS unable to ablate due to location of arrhythmia (see procedure note for details), tolerated procedure well with no acute complications. Admitted to PACU, post-procedure ECG normal sinus rhythm at sinus rhythm 60 bpm  ms QRS 80 ms QT/QTc 464/464  ms. Bilateral groin sites with sutures removed, dressings C/D/I. No bleeding, hematoma, or bruit noted. Bedrest completed x 6 hours. Ms Ramos was monitored on telemetry, no acute arrhythmias. Patient ambulating, tolerating PO intake, and voiding with no issues. Denies any chest pain or SOB. Discharge plans discussed with Dr. Ortiz who also spoke with the patient. Continue all home medications. Follow up with Dr. Ortiz for ILR implant. Ms. Ramos was assessed at bedside prior to discharge. She reported feeling well and denied chest discomfort, shortness of breath, palpitations, lightheadedness, or any other acute symptoms. Discharge plans/instructions discussed with patient who verbalized understanding and agreement of plans of care. No further questions or concerns voiced at this time. She was discharged home in stable condition.     Goals of Care Treatment Preferences:  Code Status: Full Code  Consults: None.    Significant Diagnostic Studies: N/A    Final Active Diagnoses:    Diagnosis Date Noted POA    PRINCIPAL PROBLEM:  Supraventricular tachycardia [I47.10] 02/07/2023 Yes      Problems Resolved During this Admission:     Discharged Condition: stable    Disposition: Home or Self Care    Follow Up:   Follow-up Information       Shin Ortiz MD Follow up.    Specialties: Electrophysiology, Cardiology  Why: For scheduled procedure-loop recorder implant  Contact information:  [address] [place] [address] [id number] [phone]                           Patient Instructions:      No driving until:   Order Comments: No driving or operating heavy machinery for 24-48 hours after your procedure because you received sedation.     Other restrictions (specify):   Order Comments: Discharge Instructions and Care of Your Groin      Follow up:  -Follow up with Dr. Ortiz for scheduled loop recorder implant. DIANDRA Wnyn will call to set you up with this appointment.    Medications:  -Continue all home medications  especially your blood thinner Xarelto.    Catheter Insertion Site  -The morning after your procedure, you may take the dressing off. The easiest way to do this is when you are showering, get the tape and dressing wet and remove it.  -After the bandage is removed, cover the area with a small adhesive bandage. It is normal for the catheter insertion site to be black and blue for a couple of days. The site may also be slightly swollen and pink, and there may be a small lump (about the size of a quarter) at the site.  -Wash the catheter insertion site at least once daily with soap and water. Place soapy water on your hand or washcloth and gently wash the insertion site; do not rub.  -Keep the area clean and dry when you are not showering.  -Do not use creams, lotions or ointment on the wound site.  -Wear loose clothes and loose underwear.  -Do not take a bath, tub soak, go in a Jacuzzi, or swim in a pool or lake for one week after the procedure.    Activity Instructions  -Do not drive or operate any dangerous machinery for 24 hours, but optimally 48-72 hours since you were given general anesthesia.   -Do not strain during bowel movements for the first 3 to 4 days after the procedure to prevent bleeding from the catheter insertion site.  -Avoid heavy lifting (more than 10 pounds) and pushing or pulling heavy objects for the first 5 to 7 days after the procedure.  -Do not participate in strenuous activities for 5 days after the procedure. This includes most sports - jogging, golfing, play tennis, and bowling.  -You may climb stairs if needed, but walk up and down the stairs more slowly than usual.  -Gradually increase your activities until you reach your normal activity level within one week after the procedure.    If bleeding should occur following discharge:  -Sit down and apply firm pressure to the puncture site with your fingers for 10 minutes   -If the bleeding stops, continue to sit quietly, keeping your leg  straight for 2 hours. Notify your physician as soon as possible   -If bleeding does not stop after 10 minutes or if there is a large amount of bleeding or spurting, call 911 immediately.  Do not drive yourself to the hospital.     Notify your physician if these symptoms persist or if you experience:  -Change in color or temperature of the leg  -Redness, heat, or pus at the puncture site  -Chills or fever greater than 101.0 F     Go to the Emergency Department if you develop:   -Severe bleeding   -Acute Weakness or numbness   -Visual, gait or speech disturbances   -New chest pain, palpitations, shortness of breath, fainting     Lifting restrictions     Notify your health care provider if you experience any of the following:  increased confusion or weakness     Notify your health care provider if you experience any of the following:  persistent dizziness, light-headedness, or visual disturbances     Notify your health care provider if you experience any of the following:  worsening rash     Notify your health care provider if you experience any of the following:  severe persistent headache     Notify your health care provider if you experience any of the following:  difficulty breathing or increased cough     Notify your health care provider if you experience any of the following:  redness, tenderness, or signs of infection (pain, swelling, redness, odor or green/yellow discharge around incision site)     Notify your health care provider if you experience any of the following:  severe uncontrolled pain     Notify your health care provider if you experience any of the following:  persistent nausea and vomiting or diarrhea     Notify your health care provider if you experience any of the following:  temperature >100.4     Remove dressing in 24 hours     Shower on day dressing removed (No bath)     Weight bearing restrictions (specify):     Medications:  Reconciled Home Medications:      Medication List        CONTINUE  taking these medications      calcium-vitamin D3 500 mg-5 mcg (200 unit) per tablet  Commonly known as: OS- + D3  Calcium 500 + D     cetirizine 10 MG tablet  Commonly known as: ZYRTEC  Take 10 mg by mouth.     cholecalciferol (vitamin D3) 25 mcg (1,000 unit) capsule  Commonly known as: VITAMIN D3     MAGNESIUM CITRATE ORAL  Take 250 mg by mouth once daily.     metoprolol succinate 25 MG 24 hr tablet  Commonly known as: TOPROL-XL  Take 1 tablet (25 mg total) by mouth once daily. .5-1 once or twice daily or as directed     rivaroxaban 20 mg Tab  Commonly known as: XARELTO  Take 1 tablet (20 mg total) by mouth daily with dinner or evening meal.     VITAMIN E ORAL  Take 400 mg by mouth once daily.            Plan:  -Continue all home medications  -Follow up with Dr. Ortiz for scheduled outpatient ILR implant. DIANDRA Wynn will reach out to schedule    Time spent on the discharge of patient: 15 minutes    Kiersten Merino NP  Cardiac Electrophysiology  [facility] - Arrhythmia    Attending: Shin Ortiz MD

## 2023-10-02 NOTE — PLAN OF CARE
"Vss. Sats 96% on room air. S/p ep study. Unable to do svt ablation.  Pt arrived to ep pacu 3 with scout groins sutures x1 each groin, placed at 0930. Sutures removal time 1330. Bedest done at 1530. No hematoma or drainage noted. Sutures are tight to skin. Palpable pulses noted ble. Pt's daughter updated over phone. Verbalizes understanding. Pt's sister is going to pick her up when ready for discharge.  Both daughter and sister updated by text messaging system. 12 lead ekg done and in chart.  Purewick placed to wall sxn -60mm hg sxn. Pt due to void later. Denies bladder pressure or pain. Pt tolerating sips of water in ep pacu 3. Pt complaints of "groin discomfort". Scout blanket rolls placed under scout knees due to pt sleeping with pillows under knees at home. Prn po and iv pain meds given per md order. At this time "tolerable". Tele box on confirmed by tele tech. See flowsheet for full assessment. Pt aaox4 follows commands.   "

## 2023-10-02 NOTE — PLAN OF CARE
Received via stretcher from  PACU.  Report from DIANDRA Warren.  Awake alert and oriented.  No c/o pain on arrival.  Denies SOB.  VSS.  Bilateral groin sutures in place.  No bleeding or hematoma noted to bilateral groins.  Sutures removed upon arrival.  No bleeding or hematoma noted.  Gauze/tegaderm applied to bilateral groin puncture sites.  Oriented to room and call bell provided. Pt instructed on bedrest.  Family to be called when pt is ready for discharge. Purewick in place with no drainage noted to tubing or canister.  Will continue to monitor.

## 2023-10-03 ENCOUNTER — TELEPHONE (OUTPATIENT)
Dept: ELECTROPHYSIOLOGY | Facility: CLINIC | Age: 73
End: 2023-10-03
Payer: MEDICARE

## 2023-10-03 DIAGNOSIS — M79.641 BILATERAL HAND PAIN: Primary | ICD-10-CM

## 2023-10-03 DIAGNOSIS — M79.642 BILATERAL HAND PAIN: Primary | ICD-10-CM

## 2023-10-03 DIAGNOSIS — I49.9 CARDIAC ARRHYTHMIA, UNSPECIFIED CARDIAC ARRHYTHMIA TYPE: ICD-10-CM

## 2023-10-03 DIAGNOSIS — I47.10 SUPRAVENTRICULAR TACHYCARDIA: Primary | ICD-10-CM

## 2023-10-03 DIAGNOSIS — R00.2 PALPITATIONS: ICD-10-CM

## 2023-10-03 NOTE — TELEPHONE ENCOUNTER
I spoke with patient and scheduled her procedure (ILR with Dr Ortiz) for 11/14/2023. Procedure details reviewed and instructions will be sent via patient portal as requested.

## 2023-10-04 ENCOUNTER — PATIENT MESSAGE (OUTPATIENT)
Dept: INTERNAL MEDICINE | Facility: CLINIC | Age: 73
End: 2023-10-04
Payer: MEDICARE

## 2023-10-05 ENCOUNTER — PATIENT MESSAGE (OUTPATIENT)
Dept: MEDSURG UNIT | Facility: HOSPITAL | Age: 73
End: 2023-10-05
Payer: MEDICARE

## 2023-10-05 ENCOUNTER — PATIENT MESSAGE (OUTPATIENT)
Dept: ORTHOPEDICS | Facility: CLINIC | Age: 73
End: 2023-10-05
Payer: MEDICARE

## 2023-10-05 DIAGNOSIS — I47.10 SUPRAVENTRICULAR TACHYCARDIA: Primary | ICD-10-CM

## 2023-10-09 ENCOUNTER — OFFICE VISIT (OUTPATIENT)
Dept: ORTHOPEDICS | Facility: CLINIC | Age: 73
End: 2023-10-09
Payer: MEDICARE

## 2023-10-09 ENCOUNTER — HOSPITAL ENCOUNTER (OUTPATIENT)
Dept: RADIOLOGY | Facility: HOSPITAL | Age: 73
Discharge: HOME | End: 2023-10-09
Attending: OBSTETRICS & GYNECOLOGY
Payer: MEDICARE

## 2023-10-09 ENCOUNTER — HOSPITAL ENCOUNTER (OUTPATIENT)
Dept: RADIOLOGY | Facility: OTHER | Age: 73
Discharge: HOME | End: 2023-10-09
Attending: ORTHOPAEDIC SURGERY
Payer: MEDICARE

## 2023-10-09 VITALS — WEIGHT: 118 LBS | BODY MASS INDEX: 19.64 KG/M2

## 2023-10-09 VITALS — HEIGHT: 65 IN | WEIGHT: 118 LBS | BODY MASS INDEX: 19.66 KG/M2

## 2023-10-09 DIAGNOSIS — R29.898 DECREASED GRIP STRENGTH: ICD-10-CM

## 2023-10-09 DIAGNOSIS — Z12.31 SCREENING MAMMOGRAM FOR HIGH-RISK PATIENT: ICD-10-CM

## 2023-10-09 DIAGNOSIS — S63.659A SAGITTAL BAND RUPTURE AT METACARPOPHALANGEAL JOINT, INITIAL ENCOUNTER: ICD-10-CM

## 2023-10-09 DIAGNOSIS — M79.642 BILATERAL HAND PAIN: ICD-10-CM

## 2023-10-09 DIAGNOSIS — M06.9 RHEUMATOID ARTHRITIS INVOLVING BOTH HANDS, UNSPECIFIED WHETHER RHEUMATOID FACTOR PRESENT: Primary | ICD-10-CM

## 2023-10-09 DIAGNOSIS — M79.641 BILATERAL HAND PAIN: ICD-10-CM

## 2023-10-09 DIAGNOSIS — M19.042 DEGENERATIVE ARTHRITIS OF METACARPOPHALANGEAL JOINT OF LEFT THUMB: ICD-10-CM

## 2023-10-09 DIAGNOSIS — M19.041 DEGENERATIVE ARTHRITIS OF METACARPOPHALANGEAL JOINT OF RIGHT THUMB: ICD-10-CM

## 2023-10-09 PROCEDURE — 73130 X-RAY EXAM OF HAND: CPT | Mod: TC,50,FY

## 2023-10-09 PROCEDURE — 77067 SCR MAMMO BI INCL CAD: CPT | Mod: TC

## 2023-10-09 PROCEDURE — 77063 BREAST TOMOSYNTHESIS BI: CPT | Mod: 26,,, | Performed by: RADIOLOGY

## 2023-10-09 PROCEDURE — 77067 SCR MAMMO BI INCL CAD: CPT | Mod: 26,,, | Performed by: RADIOLOGY

## 2023-10-09 PROCEDURE — 73130 X-RAY EXAM OF HAND: CPT | Mod: 26,50,, | Performed by: INTERNAL MEDICINE

## 2023-10-09 PROCEDURE — 99214 PR OFFICE/OUTPT VISIT, EST, LEVL IV, 30-39 MIN: ICD-10-PCS | Mod: S$PBB,,, | Performed by: ORTHOPAEDIC SURGERY

## 2023-10-09 PROCEDURE — 99214 OFFICE O/P EST MOD 30 MIN: CPT | Mod: S$PBB,,, | Performed by: ORTHOPAEDIC SURGERY

## 2023-10-09 PROCEDURE — 99999 PR PBB SHADOW E&M-EST. PATIENT-LVL III: ICD-10-PCS | Mod: PBBFAC,,, | Performed by: ORTHOPAEDIC SURGERY

## 2023-10-09 PROCEDURE — 99999 PR PBB SHADOW E&M-EST. PATIENT-LVL III: CPT | Mod: PBBFAC,,, | Performed by: ORTHOPAEDIC SURGERY

## 2023-10-09 PROCEDURE — 73130 XR HAND COMPLETE 3 VIEWS BILATERAL: ICD-10-PCS | Mod: 26,50,, | Performed by: INTERNAL MEDICINE

## 2023-10-09 PROCEDURE — 77063 MAMMO DIGITAL SCREENING BILAT WITH TOMO: ICD-10-PCS | Mod: 26,,, | Performed by: RADIOLOGY

## 2023-10-09 PROCEDURE — 99213 OFFICE O/P EST LOW 20 MIN: CPT | Mod: PBBFAC | Performed by: ORTHOPAEDIC SURGERY

## 2023-10-09 PROCEDURE — 77067 MAMMO DIGITAL SCREENING BILAT WITH TOMO: ICD-10-PCS | Mod: 26,,, | Performed by: RADIOLOGY
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
## 2023-10-10 ENCOUNTER — PATIENT OUTREACH (OUTPATIENT)
Dept: ADMINISTRATIVE | Facility: HOSPITAL | Age: 73
End: 2023-10-10
Payer: MEDICARE

## 2023-10-11 ENCOUNTER — OFFICE VISIT (OUTPATIENT)
Dept: SLEEP MEDICINE | Facility: CLINIC | Age: 73
End: 2023-10-11
Payer: MEDICARE

## 2023-10-11 VITALS
BODY MASS INDEX: 19.47 KG/M2 | WEIGHT: 116.88 LBS | DIASTOLIC BLOOD PRESSURE: 70 MMHG | HEIGHT: 65 IN | SYSTOLIC BLOOD PRESSURE: 109 MMHG | HEART RATE: 78 BPM

## 2023-10-11 DIAGNOSIS — G47.33 OSA (OBSTRUCTIVE SLEEP APNEA): ICD-10-CM

## 2023-10-11 DIAGNOSIS — F51.09 OTHER INSOMNIA NOT DUE TO A SUBSTANCE OR KNOWN PHYSIOLOGICAL CONDITION: Primary | ICD-10-CM

## 2023-10-11 PROCEDURE — 99999 PR PBB SHADOW E&M-EST. PATIENT-LVL II: ICD-10-PCS | Mod: PBBFAC,,, | Performed by: INTERNAL MEDICINE

## 2023-10-11 PROCEDURE — 99212 OFFICE O/P EST SF 10 MIN: CPT | Mod: PBBFAC | Performed by: INTERNAL MEDICINE

## 2023-10-11 PROCEDURE — 99999 PR PBB SHADOW E&M-EST. PATIENT-LVL II: CPT | Mod: PBBFAC,,, | Performed by: INTERNAL MEDICINE

## 2023-10-11 PROCEDURE — 99214 PR OFFICE/OUTPT VISIT, EST, LEVL IV, 30-39 MIN: ICD-10-PCS | Mod: S$PBB,,, | Performed by: INTERNAL MEDICINE

## 2023-10-11 PROCEDURE — 99214 OFFICE O/P EST MOD 30 MIN: CPT | Mod: S$PBB,,, | Performed by: INTERNAL MEDICINE

## 2023-10-11 NOTE — PROGRESS NOTES
ESTABLISHED PATIENT VISIT    Meredith Ramos  is a pleasant 73 y.o. female  with PMH significant for Afib, SVT, MVP, UC, NOEMI.    Here today for : CPAP compliance    PLAN last visit 4/5/23:   -using and benefiting from CPAP therapy  -continue CPAP 5-12cwp  -recommended to up humidity once more (lower again if rainout)  -recommended to try FFM or give the chin strap another try  -CPAP supplies ordered  -discussed NOEMI and CPAP with patient in detail, including possible complications of untreated NOEMI like heart attack/stroke  -advised on strict driving precautions; advised never to drive drowsy      Since last visit:     Still has dry mouth  Wearing CPAP nightly      PAP history   Problems Mouth dryness   Mask Nasal hybrid with chinstrap (still dry mouth)   Pressure 5-12cwp   DME Access   Machine age Air[employer] 11 5/25/23   Download 10.9.23: 27/30 x 4h 54min, 5-12 (6.2/8.0/8.9), Leak 8/36/46, AHI 0.8     SLEEP SCHEDULE   Environment     Bed Time 8:30P   Sleep Latency Minutes to hours   Arousals 3-4   Nocturia 2-3   Back to sleep 30minutes, 2 hours   Wake time 6:30-6:45AM   Naps 20-30min    Work           Past Medical History:   Diagnosis Date    Diverticulitis     Kidney stone     Osteoporosis     ostenopenia      Patient Active Problem List   Diagnosis    Abnormal Pap smear    Atypical glandular cells on Pap smear    Palpitations    Left ureteral stone    Osteopenia    History of kidney stones    History of ulcerative colitis    Supraventricular tachycardia    PAF (paroxysmal atrial fibrillation)    MVP (mitral valve prolapse)    Nonrheumatic mitral valve regurgitation    Right medial knee pain    Age-related osteoporosis with current pathological fracture with routine healing    Sleep disorder       Current Outpatient Medications:     calcium-vitamin D3 (OS- + D3) 500 mg(1,250mg) -200 unit per tablet, Calcium 500 + D, Disp: , Rfl:     cetirizine (ZYRTEC) 10 MG tablet, Take 10 mg by mouth., Disp: , Rfl:      cholecalciferol, vitamin D3, (VITAMIN D3) 25 mcg (1,000 unit) capsule, , Disp: , Rfl:     MAGNESIUM CITRATE ORAL, Take 250 mg by mouth once daily., Disp: , Rfl:     metoprolol succinate (TOPROL-XL) 25 MG 24 hr tablet, Take 1 tablet (25 mg total) by mouth once daily. .5-1 once or twice daily or as directed, Disp: 90 tablet, Rfl: 3    rivaroxaban (XARELTO) 20 mg Tab, Take 1 tablet (20 mg total) by mouth daily with dinner or evening meal., Disp: 30 tablet, Rfl: 11    vitamin E acetate (VITAMIN E ORAL), Take 400 mg by mouth once daily., Disp: , Rfl:        There were no vitals filed for this visit.    Physical Exam:    GEN:   Well-appearing  Psych:  Appropriate affect, demonstrates insight  SKIN:  No rash on the face or bridge of the nose      LABS:   Lab Results   Component Value Date    HGB 13.8 09/25/2023    CO2 28 09/25/2023       RECORDS REVIEWED PREVIOUSLY:    PSG 4.22.23: AHI 1.9, RDI 7.5, RRDII 19  did not meet CMS criteria, but met criteria for mild NOEMI on AASM 1A scoring    6.14.23: 18/20 x 7hrs 3mins, 5-12cwp (6.5/8.8/10.1), leak (7.7/35.3/47.8), AHI 0.7      ASSESSMENT:      PROBLEM DESCRIPTION/ Sx on Presentation Interval Hx STATUS    NOEMI, mild per AASM   + snoring, + snoring arousals, no witnessed apneas      (no recent bed partner)  Never wakes up rested  Mother has NOEMI    Bought CPAP out of pocket Good usage  Limited by dry mouth controlled   Daytime Sx   + sleepiness when inactive   ESS 10/24 on intake (reviewed from 4/5/23)    ESS today 12/24 Still sleepy persists   Dry mouth   Tried xylimelts, but had GI issues   persists persists   Insomnia   Trouble falling asleep: no  Maintenance:         wakes frequently, sometimes hard to return to sleep   Prior hypnotics:        Current hypnotics:    Reports still waking frequently due to dry mouth, which she believes is why she still feels tired persists   Nocturia   x 1-2 per sleep period  drinks a gallon of water a day (now stopping 1 hour before bedtime) 1-2  x nightly Persists    Other issues:     PLAN     -consider biotene  -consider trial of mouthtaping  -trial of full-face mask  -still quite tired during the day  -increase CPAP min to 8  -FFM  -discussed CBT for insomnia and the [facility] program.  The patient is  interested, will refer   -consider MAD if not making progress    RTC      The patient was given open opportunity to ask questions and/or express concerns about treatment plan.   All questions/concerns were discussed.     Two patient identifiers used prior to evaluation.

## 2023-10-12 ENCOUNTER — TELEPHONE (OUTPATIENT)
Dept: ELECTROPHYSIOLOGY | Facility: CLINIC | Age: 73
End: 2023-10-12
Payer: MEDICARE

## 2023-10-12 ENCOUNTER — PATIENT MESSAGE (OUTPATIENT)
Dept: PSYCHIATRY | Facility: CLINIC | Age: 73
End: 2023-10-12
Payer: MEDICARE

## 2023-10-12 NOTE — TELEPHONE ENCOUNTER
----- Message from Hieu Emmanuel MA sent at 10/12/2023 11:00 AM CDT -----  Regarding: FW: Tachycardia    ----- Message -----  From: Sabrina Marti  Sent: 10/12/2023  10:55 AM CDT  To: Diana Melissa Staff  Subject: Tachycardia                                      Pt would like a call back, she's in tachycardia.    Contact @ [phone]    Thanks

## 2023-10-12 NOTE — TELEPHONE ENCOUNTER
Spoke with patient. She was back in regular rhythm by the time I called her back. States she was working out earlier. We reviewed the possible interventions: taking supplemental metoprolol, performing vagal maneuvers. States she forgot about the metoprolol but did try the vagal maneuvers. She wanted to know at what HR should she go to ER and after what duration. Parameters discussed and she verbalized understanding. She will continue to keep us posted for any change in pattern or intensity

## 2023-10-13 ENCOUNTER — PATIENT MESSAGE (OUTPATIENT)
Dept: PSYCHIATRY | Facility: CLINIC | Age: 73
End: 2023-10-13
Payer: MEDICARE

## 2023-10-13 ENCOUNTER — PATIENT OUTREACH (OUTPATIENT)
Dept: ADMINISTRATIVE | Facility: HOSPITAL | Age: 73
End: 2023-10-13
Payer: MEDICARE

## 2023-10-17 ENCOUNTER — PATIENT MESSAGE (OUTPATIENT)
Dept: ELECTROPHYSIOLOGY | Facility: CLINIC | Age: 73
End: 2023-10-17
Payer: MEDICARE

## 2023-10-18 ENCOUNTER — CLINICAL SUPPORT (OUTPATIENT)
Dept: REHABILITATION | Facility: OTHER | Age: 73
End: 2023-10-18
Attending: ORTHOPAEDIC SURGERY
Payer: MEDICARE

## 2023-10-18 DIAGNOSIS — M19.041 DEGENERATIVE ARTHRITIS OF METACARPOPHALANGEAL JOINT OF RIGHT THUMB: ICD-10-CM

## 2023-10-18 DIAGNOSIS — M19.042 DEGENERATIVE ARTHRITIS OF METACARPOPHALANGEAL JOINT OF LEFT THUMB: ICD-10-CM

## 2023-10-18 DIAGNOSIS — R29.898 DECREASED GRIP STRENGTH: ICD-10-CM

## 2023-10-18 DIAGNOSIS — M62.549 ATROPHY OF INTEROSSEOUS MUSCLE OF HAND: ICD-10-CM

## 2023-10-18 PROCEDURE — 97760 ORTHOTIC MGMT&TRAING 1ST ENC: CPT | Mod: PN

## 2023-10-18 PROCEDURE — 97165 OT EVAL LOW COMPLEX 30 MIN: CPT | Mod: PN

## 2023-10-18 NOTE — PATIENT INSTRUCTIONS
[facility], OCCUPATIONAL THERAPY  HOME EXERCISE PROGRAM     Complete the following stretches holding for 30 seconds per stretch. Complete 4 of each stretch, 4-6x/day.         Hook Stretch  Use other hand to bend middle and tip joints of your finger.         PIP Extension (Passive)        Use thumb of other hand on top of joint and two fingers under- neath on either side to straighten middle joint of  finger. Hold 5 seconds.  Repeat 10 times. Do 3 sessions per day.             Complete 10 repetitions of each exercise 4-6 times a day:                               Therapist: MARTY Minaya/EPIFANIO, CHT

## 2023-10-18 NOTE — PROGRESS NOTES
[facility]  Occupational Therapy Initial Evaluation    Date: 10/18/2023  Name: Meredith Ramos  Clinic Number: [id number]    Therapy Diagnosis:   Encounter Diagnoses   Name Primary?    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     Degenerative arthritis of metacarpophalangeal joint of right thumb      Physician: Harsha Lucero MD    Physician Orders: OT eval and Treat (Splinting for Extensor Instability of fingers, trigger finger, OA)  Medical Diagnosis:   R29.898 (ICD-10-CM) - Other symptoms and signs involving the musculoskeletal system   M19.042 (ICD-10-CM) - Primary osteoarthritis, left hand   M19.041 (ICD-10-CM) - Primary osteoarthritis, right hand         (Conservative Treatment) Date of Injury/Onset: years    Evaluation Date: 10/18/23  Insurance Authorization Period Expiration: 10/18/24  Plan of Care Expiration: 1/18/24  Date of Return to MD: TBD  Visit # / Visits authorized: 1 / 1  FOTO Completion: on eval    Precautions:  Standard    Time In: 2:00pm  Time Out: 2:45pm  Total Appointment Time (timed & untimed codes): 45 minutes      SUBJECTIVE     Date of Onset/ History of Current Condition/Mechanism of Injury: Meredith reports: Patient is a 73 y.o. right hand dominant female who presents today with complaints of B hand weakness.  Upon evaluation, it was noted that patient has 3 trigger fingers.  Discussed throughly on gross  is wfl for age/gender, especially state of OA in hands.  It was decided that due to the inflammation in the hand, we would treat TFs first x 6 weeks, in conjunction treating OA, and increasing interosseus muscle, avoiding finger flexors until Tfs get better.  OT to address joint protection for OA as well as splinting needs     Falls: See PMH    Dominant Side: Right  Involved Side: B  Imaging:  Reviewed  Prior Therapy: NA  Occupation and work duties:  Retired    Functional Limitations/Social  History:    Previous functional status includes: Independent with all ADLs.           Limitation of Functional Status as follows:   ADLs/IADLs:     - Feeding: MOD I    - Bathing: MOD I    - Dressing/Grooming: MOD I    - Driving: MOD I     Leisure: [employer] Rules of Lifting for Women  started 2-3 months ago with 10-15lb dumbbells in functional planes    Pain:  Functional Pain Scale Rating 0-10 and Location: NA  Current 0/10,   worst 0/10  best 0/10     Description: no pain  Aggravating and Easing Factors: NA      Patient's Goals for Therapy: Restore previous hand strength as she had prior to the beginning of triggering years ago, opening tight jars, round door handles    Medical History:   Past Medical History:   Diagnosis Date    Diverticulitis     Kidney stone     Osteoporosis     ostenopenia        Surgical History:    has a past surgical history that includes Bunionectomy; removal of breast implants; Colectomy (2004); Bunionectomy; Colectomy; kidney stones; Breast biopsy; Hysterectomy (07/22/2013); and Diagnostic cardiac electrophysiology study (N/A, 10/2/2023).    Medications:   has a current medication list which includes the following prescription(s): calcium-vitamin d3, cholecalciferol (vitamin d3), magnesium citrate, metoprolol succinate, rivaroxaban, and vitamin e acetate.    Allergies:   Review of patient's allergies indicates:   Allergen Reactions    Cortisone (hydrocortisone) [hydrocortisone]           OBJECTIVE     Observation/Appearance: Significant OA noted throughout hands, 3 trigger fingers noted bilaterally, and extensor instability also noted.    Edema. Mild effusion noted over all joints in hands    Hand ROM: all wfl and not painful, but triggering occurring    NT: POP for TF Hand ROM. Measured in degrees.   10/18/2023 10/18/2023             Index: MP                 PIP                    DIP                ZHAO          Long:  MP                PIP                DIP                ZHAO          Ring:    MP                PIP                DIP                ZHAO          Small:  MP                 PIP                 DIP                ZHAO          Thumb: MP                  IP         Rad ADD/ABD         Pal ADD/ABD            Opposition        Strength (Dynamometer) and Pinch Strength (Pinch Gauge)  Measured in pounds.   10/18/2023 10/18/2023    L R   Rung II 37 no pain 46 no pain   Key Pinch     3pt Pinch     2pt Pinch       Sensation   10/18/2023 10/18/2023        [place] Josselyn     Normal 1.65-2.83 X X   Diminished Light Touch 3.22-3.61     Diminished Protective 3.84-4.31     Loss of Protective 4.56-6.65     Untestable >6.65     2 Point Discrimination     Static     Dynamic       Manual Muscle Test: NT   10/18/2023 10/18/2023        Wrist Extension      Wrist Flexion     Radial Deviation     Ulnar Deviation     Supination     Pronation     Elbow Extension     Elbow Flexion           Limitation/Restriction for FOTO initial Survey    Therapist reviewed FOTO scores for Meredith Ramos on 10/18/2023.   FOTO documents entered into [email or web address] - see Media section.    Limitation Score: 15.8%         Treatment   Total Treatment time (time-based codes) separate from Evaluation: 30 minutes    Meredith received the treatments listed below:     Supervised modalities after being cleared for contradictions: Paraffin bath - x 10 min with MHP      Therapeutic exercises  for 10 minutes, including:  Exercise Reps/Time   Gentle PROM: passive extension, 50% Hook 30 sec x 4       TGE: (50%) Wave and Hook, Lifts, Spreads/Squeezes X 10           Educated in at length in order to increase  strength, the inflammation in hand needs to decrease, and treating TF conservatively should be prior to adding more resistance/strain to finger flexors                    Orthotic Fit and Train:  Spent 10 min fitting and eduating patient on Oval 8s, proper wearing position to prevent IP flexion and triggering.    Patient Education and Home  Exercises      Education provided:   - Role of OT, HEP, and POC    Written Home Exercises Provided: yes.  Exercises were reviewed and Meredith was able to demonstrate them prior to the end of the session.  Meredith demonstrated good  understanding of the education provided. See EMR under Patient Instructions for exercises provided during therapy sessions.     Pt was advised to perform these exercises free of pain, and to stop performing them if pain occurs.    Patient/Family Education: role of OT, goals for OT, scheduling/cancellations - pt verbalized understanding. Discussed insurance limitations with patient.    ASSESSMENT     Meredith Ramos is a 73 y.o. female referred to outpatient occupational therapy and presents with a medical diagnosis of   R29.898 (ICD-10-CM) - Other symptoms and signs involving the musculoskeletal system   M19.042 (ICD-10-CM) - Primary osteoarthritis, left hand   M19.041 (ICD-10-CM) - Primary osteoarthritis, right hand   .      Assessment of Occupational Performance   Performance Deficits    Physical:  Joint Stability  Muscle Power/Strength  Muscle Endurance  Edema  Pinch Strength    Cognitive:  No Deficits    Psychosocial:    No Deficits     Following medical record review it is determined that pt will benefit from occupational therapy services in order to maximize pain free and/or functional use of bilateral hands. The following goals were discussed with the patient and patient is in agreement with them as to be addressed in the treatment plan. The patient's rehab potential is Fair.     Anticipated barriers to occupational therapy: chronic TFs    Plan of care discussed with patient: Yes  Patient's spiritual, cultural and educational needs considered and patient is agreeable to the plan of care and goals as stated below:     Medical Necessity is demonstrated by the following  Occupational Profile/History  Co-morbidities and personal factors that may impact the plan of care [x] LOW: Brief  chart review  [] MODERATE: Expanded chart review   [] HIGH: Extensive chart review    Moderate / High Support Documentation:      Examination  Performance deficits relating to physical, cognitive or psychosocial skills that result in activity limitations and/or participation restrictions  [x] LOW: addressing 1-3 Performance deficits  [] MODERATE: 3-5 Performance deficits  [] HIGH: 5+ Performance deficits (please support below)    Moderate / High Support Documentation:      Treatment Options [x] LOW: Limited options  [] MODERATE: Several options  [] HIGH: Multiple options      Decision Making/ Complexity Score: low       The following goals were discussed with the patient and patient is in agreement with them as to be addressed in the treatment plan.       Goals:   The following goals were discussed with the patient and patient is in agreement with them as to be addressed in the treatment plan.     Long Term Goals (LTGs); to be met by discharge.  LTG #1: Pt will report an improved ability To open tight lids, jars, and door handles  LTG #2: Pt will demo improved FOTO score of 10 or less  LTG #3: Pt will increase  and pinch strength by 5lbs    Short Term Goals (STGs); to be met within 6 weeks .  STG #1: Pt will improve point of pop AROM over a 6 week period  STG #2: Pt will report a Mod [place] with opening tight jars, lids, and door handles  STG #3: Pt. Will complete a FOTO score of 12 or less  STG #4: Pt will demonstrate independence with issued HEP.           PLAN   Plan of Care Certification: 10/18/2023 to 1/18/24.     Outpatient Occupational Therapy 1 times weekly for 12 weeks to include the following interventions: Paraffin, Fluidotherapy, Manual therapy/joint mobilizations, Modalities for pain management, US 3 mhz, Therapeutic exercises/activities., Iontophoresis with 2.0 cc Dexamethasone, Strengthening, Orthotic Fabrication/Fit/Training, Edema Control, and Joint Protection.      Daniela Guidry  OTR/L, CHT      I CERTIFY THE NEED FOR THESE SERVICES FURNISHED UNDER THIS PLAN OF TREATMENT AND WHILE UNDER MY CARE  Physician's comments:      Physician's Signature: ___________________________________________________

## 2023-10-19 PROBLEM — M62.549: Status: ACTIVE | Noted: 2023-10-19

## 2023-10-27 ENCOUNTER — CLINICAL SUPPORT (OUTPATIENT)
Dept: REHABILITATION | Facility: OTHER | Age: 73
End: 2023-10-27
Payer: MEDICARE

## 2023-10-27 DIAGNOSIS — M62.549 ATROPHY OF INTEROSSEOUS MUSCLE OF HAND: Primary | ICD-10-CM

## 2023-10-27 PROCEDURE — 97110 THERAPEUTIC EXERCISES: CPT | Mod: PN

## 2023-10-27 PROCEDURE — 97140 MANUAL THERAPY 1/> REGIONS: CPT | Mod: PN

## 2023-10-27 PROCEDURE — 97018 PARAFFIN BATH THERAPY: CPT | Mod: PN

## 2023-10-27 NOTE — PROGRESS NOTES
[facility] OUTPATIENT THERAPY AND WELLNESS  Occupational Therapy Treatment Note    Date: 10/27/2023  Name: Meredith Ramos  Clinic Number: [id number]    Therapy Diagnosis:   Encounter Diagnosis   Name Primary?    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      Degenerative arthritis of metacarpophalangeal joint of right thumb        Physician: Harsha Lucero MD     Physician Orders: OT eval and Treat (Splinting for Extensor Instability of fingers, trigger finger, OA)  Medical Diagnosis:   R29.898 (ICD-10-CM) - Other symptoms and signs involving the musculoskeletal system   M19.042 (ICD-10-CM) - Primary osteoarthritis, left hand   M19.041 (ICD-10-CM) - Primary osteoarthritis, right hand            (Conservative Treatment) Date of Injury/Onset: years     Evaluation Date: 10/18/23  Insurance Authorization Period Expiration: 10/18/24  Plan of Care Expiration: 1/18/24  Date of Return to MD: TBD  Visit # / Visits authorized: 2/ 20  FOTO Completion: on eval     Precautions:  Standard       Time In: 12:30pm  Time Out: 1:15pm  Total Billable Time: 45 minutes      SUBJECTIVE     Pt reports: compliance in HEP  She was compliant with home exercise program given last session.   Response to previous treatment and functional change:TBD      Pain: 0/10  Location: A1 pulley    OBJECTIVE   Objective Measures updated at progress report unless specified.  Observation/Appearance: Significant OA noted throughout hands, 3 trigger fingers noted bilaterally, and extensor instability also noted.     Edema. Mild effusion noted over all joints in hands     Hand ROM: all wfl and not painful, but triggering occurring     NT: POP for TF Hand ROM. Measured in degrees.    10/18/2023 10/18/2023                   Index: MP                   PIP                      DIP                  ZHAO               Long:  MP                   PIP                  DIP                  ZHAO               Ring:   MP                  PIP                  DIP                  ZHAO               Small:  MP                   PIP                   DIP                  ZHAO               Thumb: MP                    IP           Rad ADD/ABD           Pal ADD/ABD              Opposition           Strength (Dynamometer) and Pinch Strength (Pinch Gauge)  Measured in pounds.    10/18/2023 10/18/2023     L R   Rung II 37 no pain 46 no pain   Key Pinch       3pt Pinch       2pt Pinch          Sensation    10/18/2023 10/18/2023           [place] Josselyn       Normal 1.65-2.83 X X   Diminished Light Touch 3.22-3.61       Diminished Protective 3.84-4.31       Loss of Protective 4.56-6.65       Untestable >6.65       2 Point Discrimination       Static       Dynamic          Manual Muscle Test: NT    10/18/2023 10/18/2023           Wrist Extension        Wrist Flexion       Radial Deviation       Ulnar Deviation       Supination       Pronation       Elbow Extension       Elbow Flexion                Limitation/Restriction for FOTO initial Survey     Therapist reviewed FOTO scores for Meredith Ramos on 10/18/2023.   FOTO documents entered into [employer]. - see Media section.     Limitation Score: 15.8%            Treatment   Total Treatment time (time-based codes) separate from Evaluation: 30 minutes     Meredith received the treatments listed below:      Supervised modalities after being cleared for contradictions: Paraffin bath - x 10 min with MHP       Manual Therapy: IASTYM and Manual Massage to A1 Pulleys and flexor tendons on B UEs x 10 min     Therapeutic exercises  for  25 minutes, including:  Exercise Reps/Time   Gentle PROM: passive extension, 100% Hook 30 sec x 4 each finger         TGE: (100%) Wave and Hook, Lifts, Spreads/Squeezes, Lifts 3 X 10          Isometrics: Hand, thenars, intrinsics, EDC, gentle finger flexors  3 x 10                                Patient Education and Home Exercises      Education provided:   - modified HEP  - Progress towards goals     Written Home Exercises Provided: yes.  Exercises were reviewed and Meredith was able to demonstrate them prior to the end of the session.  Meredith demonstrated good  understanding of the HEP provided. See EMR under Patient Instructions for exercises provided during therapy sessions.      ASSESSMENT      Pt. Complied to HEP instructions.  Pt. Able to tolerated 100 percent of joint mobility today, which is indicative of less inflammation in hand from IE, which patient only tolerated 50% of each joint.     Meredith is progressing well towards her goals and there are no updates to goals at this time. Pt prognosis is Good.     Pt will continue to benefit from skilled outpatient occupational therapy to address the deficits listed in the problem list on initial evaluation, provide pt/family education and to maximize pt's level of independence in the home and community environment.     Pt's spiritual, cultural and educational needs considered and pt agreeable to plan of care and goals.    Anticipated barriers to occupational therapy: chronic late stage tenosynovitis    Goals:  Long Term Goals (LTGs); to be met by discharge.  LTG #1: Pt will report an improved ability To open tight lids, jars, and door handles  LTG #2: Pt will demo improved FOTO score of 10 or less  LTG #3: Pt will increase  and pinch strength by 5lbs     Short Term Goals (STGs); to be met within 6 weeks .  STG #1: Pt will improve point of pop AROM over a 6 week period  STG #2: Pt will report a Mod [place] with opening tight jars, lids, and door handles  STG #3: Pt. Will complete a FOTO score of 12 or less  STG #4: Pt will demonstrate independence with issued HEP.        PLAN     Continue skilled occupational therapy with individualized plan of care focusing on treating trigger fingers conservatively while increasing hand  strength    Updates/Grading for next session: assess at 3 weeks    MARTY Minaya/EPIFANIO, CHT

## 2023-11-01 ENCOUNTER — CLINICAL SUPPORT (OUTPATIENT)
Dept: REHABILITATION | Facility: OTHER | Age: 73
End: 2023-11-01
Payer: MEDICARE

## 2023-11-01 DIAGNOSIS — M62.549 ATROPHY OF INTEROSSEOUS MUSCLE OF HAND: Primary | ICD-10-CM

## 2023-11-01 PROCEDURE — 97110 THERAPEUTIC EXERCISES: CPT | Mod: PN

## 2023-11-01 PROCEDURE — 97018 PARAFFIN BATH THERAPY: CPT | Mod: PN

## 2023-11-01 NOTE — PROGRESS NOTES
"  [facility]  Occupational Therapy Treatment Note    Date: 11/1/2023  Name: Meredith Ramos  Clinic Number: [id number]    Therapy Diagnosis:   Encounter Diagnosis   Name Primary?    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      Degenerative arthritis of metacarpophalangeal joint of right thumb        Physician: Harsha Lucero MD     Physician Orders: OT eval and Treat (Splinting for Extensor Instability of fingers, trigger finger, OA)  Medical Diagnosis:   R29.898 (ICD-10-CM) - Other symptoms and signs involving the musculoskeletal system   M19.042 (ICD-10-CM) - Primary osteoarthritis, left hand   M19.041 (ICD-10-CM) - Primary osteoarthritis, right hand            (Conservative Treatment) Date of Injury/Onset: years     Evaluation Date: 10/18/23  Insurance Authorization Period Expiration: 10/18/24  Plan of Care Expiration: 1/18/24  Date of Return to MD: TBD  Visit # / Visits authorized: 4/ 20  FOTO Completion: on eval     Precautions:  Standard       Time In: 12:30pm  Time Out: 1:15pm  Total Billable Time: 45 minutes      SUBJECTIVE     Pt reports I think I just made up that I have trigger finger.  That's the sensation I feel"  She was compliant with home exercise program given last session.   Response to previous treatment and functional change:TBD      Pain: 0/10  Location:     OBJECTIVE   Objective Measures updated at progress report unless specified.  Observation/Appearance: Upon reevaluation, it was found that the "triggering" was due to sagittal band ruptures and not TFs  POC adjusted     Edema. Mild effusion noted over all joints in hands     Hand ROM: all wfl and not painful, but triggering occurring     NT: POP for TF Hand ROM. Measured in degrees.    10/18/2023 10/18/2023 11/1/23                     Index: MP                    PIP                "        DIP                   ZHAO                 Long:  MP     R91              PIP     54              DIP     wnl              ZHAO                 Ring:   MP     R84              PIP     wnl              DIP     wnl              ZHAO                 Small:  MP                    PIP                    DIP                   ZHAO                 Thumb: MP                     IP            Rad ADD/ABD            Pal ADD/ABD               Opposition            Strength (Dynamometer) and Pinch Strength (Pinch Gauge)  Measured in pounds.    10/18/2023 10/18/2023 11/1/23 11/1/23     L R L R   Rung II 37 no pain 46 no pain 34  40   Key Pinch         3pt Pinch         2pt Pinch            Sensation    10/18/2023 10/18/2023           [place] Josselyn       Normal 1.65-2.83 X X   Diminished Light Touch 3.22-3.61       Diminished Protective 3.84-4.31       Loss of Protective 4.56-6.65       Untestable >6.65       2 Point Discrimination       Static       Dynamic          Manual Muscle Test: NT    10/18/2023 10/18/2023           Wrist Extension        Wrist Flexion       Radial Deviation       Ulnar Deviation       Supination       Pronation       Elbow Extension       Elbow Flexion                Limitation/Restriction for FOTO initial Survey     Therapist reviewed FOTO scores for Meredith Ramos on 10/18/2023.   FOTO documents entered into [employer] - see Media section.     Limitation Score: 15.8%            Treatment        Meredith received the treatments listed below:      Supervised modalities after being cleared for contradictions: Paraffin bath - x 10 min with MHP       NT: Manual Therapy: IASTYM and Manual Massage to A1 Pulleys and flexor tendons on B UEs x 10 min     Therapeutic exercises  for  25 minutes, including:  Exercise Reps/Time   Isometrics        Yellow Theraputty Squeezing, pinching 3 x 10              Time reassessing                             Patient Education and Home Exercises      Education provided:    TF vs MCP instability  - modified HEP  - Progress towards goals     Written Home Exercises Provided: yes.  Exercises were reviewed and Meredith was able to demonstrate them prior to the end of the session.  Meredith demonstrated good  understanding of the HEP provided. See EMR under Patient Instructions for exercises provided during therapy sessions.      ASSESSMENT     Altough less MCP disloation noted in the last session, the POC was changed today due to findings.   Plan to assess how hand strengthening is tolerated  Meredith is progressing well towards her goals and there are no updates to goals at this time. Pt prognosis is Good.     Pt will continue to benefit from skilled outpatient occupational therapy to address the deficits listed in the problem list on initial evaluation, provide pt/family education and to maximize pt's level of independence in the home and community environment.     Pt's spiritual, cultural and educational needs considered and pt agreeable to plan of care and goals.    Anticipated barriers to occupational therapy: chronic late stage tenosynovitis    Goals:  Long Term Goals (LTGs); to be met by discharge.  LTG #1: Pt will report an improved ability To open tight lids, jars, and door handles  LTG #2: Pt will demo improved FOTO score of 10 or less  LTG #3: Pt will increase  and pinch strength by 5lbs     Short Term Goals (STGs); to be met within 6 weeks .  STG #1: Pt will improve point of pop AROM over a 6 week period  STG #2: Pt will report a Mod [place] with opening tight jars, lids, and door handles  STG #3: Pt. Will complete a FOTO score of 12 or less  STG #4: Pt will demonstrate independence with issued HEP.        PLAN     Continue skilled occupational therapy with individualized plan of care focusing on treating trigger fingers conservatively while increasing hand strength    Updates/Grading for next session: assess after 2 weeks    MARTY Minaya/EPIFANIO, CHT

## 2023-11-06 ENCOUNTER — PATIENT MESSAGE (OUTPATIENT)
Dept: ELECTROPHYSIOLOGY | Facility: CLINIC | Age: 73
End: 2023-11-06
Payer: MEDICARE

## 2023-11-08 ENCOUNTER — CLINICAL SUPPORT (OUTPATIENT)
Dept: REHABILITATION | Facility: OTHER | Age: 73
End: 2023-11-08
Payer: MEDICARE

## 2023-11-08 DIAGNOSIS — M62.549 ATROPHY OF INTEROSSEOUS MUSCLE OF HAND: Primary | ICD-10-CM

## 2023-11-08 PROCEDURE — 97140 MANUAL THERAPY 1/> REGIONS: CPT | Mod: PN

## 2023-11-08 PROCEDURE — 97110 THERAPEUTIC EXERCISES: CPT | Mod: PN

## 2023-11-08 PROCEDURE — 97018 PARAFFIN BATH THERAPY: CPT | Mod: PN

## 2023-11-08 NOTE — PROGRESS NOTES
"  [facility]  Occupational Therapy Treatment Note    Date: 11/8/2023  Name: Meredith Ramos  Clinic Number: [id number]    Therapy Diagnosis:   Encounter Diagnosis   Name Primary?    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      Degenerative arthritis of metacarpophalangeal joint of right thumb        Physician: Harsha Lucero MD     Physician Orders: OT eval and Treat (Splinting for Extensor Instability of fingers, trigger finger, OA)  Medical Diagnosis:   R29.898 (ICD-10-CM) - Other symptoms and signs involving the musculoskeletal system   M19.042 (ICD-10-CM) - Primary osteoarthritis, left hand   M19.041 (ICD-10-CM) - Primary osteoarthritis, right hand            (Conservative Treatment) Date of Injury/Onset: years     Evaluation Date: 10/18/23  Insurance Authorization Period Expiration: 10/18/24  Plan of Care Expiration: 1/18/24  Date of Return to MD: TBD  Visit # / Visits authorized: 4/ 20  FOTO Completion: on eval     Precautions:  Standard       Time In: 12:30pm  Time Out: 1:15pm  Total Billable Time: 45 minutes      SUBJECTIVE     Pt reports I felt fine after the hand strengthening"  She was compliant with home exercise program given last session.   Response to previous treatment and functional change:TBD      Pain: 0/10  Location:     OBJECTIVE   Objective Measures updated at progress report unless specified.  Observation/Appearance: Upon reevaluation, it was found that the "triggering" was due to sagittal band ruptures and not TFs  POC adjusted     Edema. Mild effusion noted over all joints in hands     Hand ROM: all wfl and not painful, but triggering occurring     NT: POP for TF Hand ROM. Measured in degrees.    10/18/2023 10/18/2023 11/1/23                     Index: MP                    PIP                       DIP                   ZHAO      "            Long:  MP     R91              PIP     54              DIP     wnl              ZHAO                 Ring:   MP     R84              PIP     wnl              DIP     wnl              ZHAO                 Small:  MP                    PIP                    DIP                   ZHAO                 Thumb: MP                     IP            Rad ADD/ABD            Pal ADD/ABD               Opposition            Strength (Dynamometer) and Pinch Strength (Pinch Gauge)  Measured in pounds.    10/18/2023 10/18/2023 11/1/23 11/1/23     L R L R   Rung II 37 no pain 46 no pain 34  40   Key Pinch         3pt Pinch         2pt Pinch            Sensation    10/18/2023 10/18/2023           [place] Josselyn       Normal 1.65-2.83 X X   Diminished Light Touch 3.22-3.61       Diminished Protective 3.84-4.31       Loss of Protective 4.56-6.65       Untestable >6.65       2 Point Discrimination       Static       Dynamic          Manual Muscle Test: NT    10/18/2023 10/18/2023           Wrist Extension        Wrist Flexion       Radial Deviation       Ulnar Deviation       Supination       Pronation       Elbow Extension       Elbow Flexion                Limitation/Restriction for FOTO initial Survey     Therapist reviewed FOTO scores for Meredith Ramos on 10/18/2023.   FOTO documents entered into [employer] - see Media section.     Limitation Score: 15.8%            Treatment        Meredith received the treatments listed below:      Supervised modalities after being cleared for contradictions: Paraffin bath - x 10 min with MHP       NT: Manual Therapy: IASTYM and Manual Massage to A1 Pulleys and flexor tendons on B UEs x 10 min     Therapeutic exercises  for  25 minutes, including:  Exercise Reps/Time   Isometrics        Yellow Theraputty Squeezing but avoid full fist to avoid sagittal Band fibers of other digits, pinching 3 x 10    Yellow Putty scraps  3 x 10    Yellow Putty Spreads/squeezes 3 x 10   Wide  Yellow  Flex Bar  3 x 10                          Patient Education and Home Exercises      Education provided:   TF vs MCP instability  - modified HEP  - Progress towards goals     Written Home Exercises Provided: yes.  Exercises were reviewed and Meredith was able to demonstrate them prior to the end of the session.  Meredith demonstrated good  understanding of the HEP provided. See EMR under Patient Instructions for exercises provided during therapy sessions.      ASSESSMENT     Pt. Tolerated modified strengthening exercises well. Plan to assess how hand strengthening is tolerated  Meredith is progressing well towards her goals and there are no updates to goals at this time. Pt prognosis is Good.     Pt will continue to benefit from skilled outpatient occupational therapy to address the deficits listed in the problem list on initial evaluation, provide pt/family education and to maximize pt's level of independence in the home and community environment.     Pt's spiritual, cultural and educational needs considered and pt agreeable to plan of care and goals.    Anticipated barriers to occupational therapy: chronic late stage tenosynovitis    Goals:  Long Term Goals (LTGs); to be met by discharge.  LTG #1: Pt will report an improved ability To open tight lids, jars, and door handles  LTG #2: Pt will demo improved FOTO score of 10 or less  LTG #3: Pt will increase  and pinch strength by 5lbs     Short Term Goals (STGs); to be met within 6 weeks .  STG #1: Pt will improve point of pop AROM over a 6 week period  STG #2: Pt will report a Mod [place] with opening tight jars, lids, and door handles  STG #3: Pt. Will complete a FOTO score of 12 or less  STG #4: Pt will demonstrate independence with issued HEP.        PLAN     Continue skilled occupational therapy with individualized plan of care focusing on treating trigger fingers conservatively while increasing hand strength    Updates/Grading for next session: assess  after 2 weeks    Daniela Guidry, OTR/L, CHT

## 2023-11-09 NOTE — PROGRESS NOTES
Euflexxa knee joint right 1/3  Lot number: [id number]  Expiration date: 10/06/2024    MEDICAL NECESSITY FOR VISCOSUPPLEMENTATION USE: After thorough evaluation of the patient, I have determined that viscosupplementation treatment is medically necessary. 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 Oral analgesics including NSAIDs have not adequately controlled the patient's symptoms. There is radiographic evidence of Kellgren-Guillermo grade II (or greater) osteoarthritic (OA) changes, or if lack of radiographic evidence, there is arthroscopic or other evidence of chondrosis of the knee(s).

## 2023-11-14 ENCOUNTER — PATIENT MESSAGE (OUTPATIENT)
Dept: SLEEP MEDICINE | Facility: CLINIC | Age: 73
End: 2023-11-14
Payer: MEDICARE

## 2023-11-14 DIAGNOSIS — G47.33 OSA (OBSTRUCTIVE SLEEP APNEA): Primary | ICD-10-CM

## 2023-11-15 ENCOUNTER — CLINICAL SUPPORT (OUTPATIENT)
Dept: REHABILITATION | Facility: OTHER | Age: 73
End: 2023-11-15
Payer: MEDICARE

## 2023-11-15 DIAGNOSIS — M62.549 ATROPHY OF INTEROSSEOUS MUSCLE OF HAND: Primary | ICD-10-CM

## 2023-11-15 PROCEDURE — 97110 THERAPEUTIC EXERCISES: CPT | Mod: PN

## 2023-11-15 PROCEDURE — 97018 PARAFFIN BATH THERAPY: CPT | Mod: PN

## 2023-11-15 NOTE — PROGRESS NOTES
"  [facility]  Occupational Therapy Treatment Note    Date: 11/15/2023  Name: Meredith Ramos  Clinic Number: [id number]    Therapy Diagnosis:   Encounter Diagnosis   Name Primary?    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      Degenerative arthritis of metacarpophalangeal joint of right thumb        Physician: Harsha Lucero MD     Physician Orders: OT eval and Treat (Splinting for Extensor Instability of fingers, trigger finger, OA)  Medical Diagnosis:   R29.898 (ICD-10-CM) - Other symptoms and signs involving the musculoskeletal system   M19.042 (ICD-10-CM) - Primary osteoarthritis, left hand   M19.041 (ICD-10-CM) - Primary osteoarthritis, right hand            (Conservative Treatment) Date of Injury/Onset: years     Evaluation Date: 10/18/23  Insurance Authorization Period Expiration: 10/18/24  Plan of Care Expiration: 1/18/24  Date of Return to MD: TBD  Visit # / Visits authorized: 5/ 20  FOTO Completion: on eval     Precautions:  Standard       Time In: 12:30pm  Time Out: 1:15pm  Total Billable Time: 45 minutes      SUBJECTIVE     Pt reports I felt fine after the hand strengthening"  She was compliant with home exercise program given last session.   Response to previous treatment and functional change:TBD      Pain: 0/10  Location:     OBJECTIVE   Objective Measures updated at progress report unless specified.  Observation/Appearance: Upon reevaluation, it was found that the "triggering" was due to sagittal band ruptures and not TFs  POC adjusted     Edema. Mild effusion noted over all joints in hands     Hand ROM: all wfl and not painful, but triggering occurring     NT: POP for TF Hand ROM. Measured in degrees.    10/18/2023 10/18/2023 11/1/23                     Index: MP                    PIP                       DIP                   ZHAO      "            Long:  MP     R91              PIP     54              DIP     wnl              ZHAO                 Ring:   MP     R84              PIP     wnl              DIP     wnl              ZHAO                 Small:  MP                    PIP                    DIP                   ZHAO                 Thumb: MP                     IP            Rad ADD/ABD            Pal ADD/ABD               Opposition            Strength (Dynamometer) and Pinch Strength (Pinch Gauge)  Measured in pounds.    10/18/2023 10/18/2023 11/1/23 11/1/23 11/15/23 11/15/23     L R L R R L   Rung II 37 no pain 46 no pain 34  40 52 42   Key Pinch           3pt Pinch           2pt Pinch              Sensation    10/18/2023 10/18/2023           [place] Josselyn       Normal 1.65-2.83 X X   Diminished Light Touch 3.22-3.61       Diminished Protective 3.84-4.31       Loss of Protective 4.56-6.65       Untestable >6.65       2 Point Discrimination       Static       Dynamic          Manual Muscle Test: NT    10/18/2023 10/18/2023           Wrist Extension        Wrist Flexion       Radial Deviation       Ulnar Deviation       Supination       Pronation       Elbow Extension       Elbow Flexion                Limitation/Restriction for FOTO initial Survey     Therapist reviewed FOTO scores for Meredith Ramos on 10/18/2023.   FOTO documents entered into [employer] - see Media section.     Limitation Score: 15.8%            Treatment        Meredith received the treatments listed below:      Supervised modalities after being cleared for contradictions: Paraffin bath - x 10 min with MHP       NT: Manual Therapy: IASTYM and Manual Massage to A1 Pulleys and flexor tendons on B UEs x 10 min     Therapeutic exercises  for  25 minutes, including:  Exercise Reps/Time   Isometrics 3 x 10       All Sports Ball Squeezing but avoid full fist to avoid sagittal Band fibers of other digits, pinching 3 x 10    Yellow Putty scraps  3 x 10    Yellow Putty  Spreads/squeezes 3 x 10   Demo. Pool Noodle to Putty Ciser Tools/ education  x 5 min                          Patient Education and Home Exercises      Education provided:   Purchasing Pool Noodle as a foam gripper for strengthening tools  Bring splints at net session  - modified HEP  - Progress towards goals     Written Home Exercises Provided: yes.  Exercises were reviewed and Meredith was able to demonstrate them prior to the end of the session.  Meredith demonstrated good  understanding of the HEP provided. See EMR under Patient Instructions for exercises provided during therapy sessions.      ASSESSMENT     Hand strength has increased significantly since stating therapy without aggravation of MCP instability. Meredith is progressing well towards her goals and there are no updates to goals at this time. Pt prognosis is Good.     Pt will continue to benefit from skilled outpatient occupational therapy to address the deficits listed in the problem list on initial evaluation, provide pt/family education and to maximize pt's level of independence in the home and community environment.     Pt's spiritual, cultural and educational needs considered and pt agreeable to plan of care and goals.    Anticipated barriers to occupational therapy: chronic late stage tenosynovitis    Goals:  Long Term Goals (LTGs); to be met by discharge.  LTG #1: Pt will report an improved ability To open tight lids, jars, and door handles  LTG #2: Pt will demo improved FOTO score of 10 or less  LTG #3: Pt will increase  and pinch strength by 5lbs     Short Term Goals (STGs); to be met within 6 weeks .  STG #1: Pt will improve point of pop AROM over a 6 week period  STG #2: Pt will report a Mod [place] with opening tight jars, lids, and door handles  STG #3: Pt. Will complete a FOTO score of 12 or less  STG #4: Pt will demonstrate independence with issued HEP.        PLAN     Continue skilled occupational therapy with individualized plan  of care focusing on treating trigger fingers conservatively while increasing hand strength    Updates/Grading for next session: plan to dc next session    Daniela Guidry, ASCENCIONR/L, CHT

## 2023-11-16 ENCOUNTER — TELEPHONE (OUTPATIENT)
Dept: SPORTS MEDICINE | Facility: CLINIC | Age: 73
End: 2023-11-16
Payer: MEDICARE

## 2023-11-16 NOTE — TELEPHONE ENCOUNTER
Attempted to contact pt. Left voicemail. Informed pt that Dr. Lu will not longer be seeing pts at scheduled time on 12/11/23 & appt must be r/s. Asked pt to return call to clinic at [phone] . MyChart sent.

## 2023-11-17 ENCOUNTER — TELEPHONE (OUTPATIENT)
Dept: SPORTS MEDICINE | Facility: CLINIC | Age: 73
End: 2023-11-17
Payer: MEDICARE

## 2023-11-17 ENCOUNTER — PATIENT MESSAGE (OUTPATIENT)
Dept: MEDSURG UNIT | Facility: HOSPITAL | Age: 73
End: 2023-11-17
Payer: MEDICARE

## 2023-11-17 ENCOUNTER — PATIENT MESSAGE (OUTPATIENT)
Dept: SPORTS MEDICINE | Facility: CLINIC | Age: 73
End: 2023-11-17
Payer: MEDICARE

## 2023-11-17 NOTE — TELEPHONE ENCOUNTER
2nd attempt.     Attempted to contact pt. Left voicemail. Informed pt that Dr. Lu will not longer be seeing pts at scheduled time on 12/11/23 & appt must be r/s. Asked pt to return call to clinic at [phone]. MyChart sent.

## 2023-11-21 ENCOUNTER — PATIENT MESSAGE (OUTPATIENT)
Dept: MEDSURG UNIT | Facility: HOSPITAL | Age: 73
End: 2023-11-21
Payer: MEDICARE

## 2023-11-22 ENCOUNTER — CLINICAL SUPPORT (OUTPATIENT)
Dept: REHABILITATION | Facility: OTHER | Age: 73
End: 2023-11-22
Payer: MEDICARE

## 2023-11-22 DIAGNOSIS — M62.549 ATROPHY OF INTEROSSEOUS MUSCLE OF HAND: Primary | ICD-10-CM

## 2023-11-22 PROCEDURE — 97110 THERAPEUTIC EXERCISES: CPT | Mod: PN

## 2023-11-22 PROCEDURE — 97018 PARAFFIN BATH THERAPY: CPT | Mod: PN

## 2023-11-22 NOTE — PROGRESS NOTES
"  [facility]  Occupational Therapy Treatment Note    Date: 11/22/2023  Name: Meredith Ramos  Clinic Number: [id number]    Therapy Diagnosis:   Encounter Diagnosis   Name Primary?    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      Degenerative arthritis of metacarpophalangeal joint of right thumb        Physician: Harsha Lucero MD     Physician Orders: OT eval and Treat (Splinting for Extensor Instability of fingers, trigger finger, OA)  Medical Diagnosis:   R29.898 (ICD-10-CM) - Other symptoms and signs involving the musculoskeletal system   M19.042 (ICD-10-CM) - Primary osteoarthritis, left hand   M19.041 (ICD-10-CM) - Primary osteoarthritis, right hand            (Conservative Treatment) Date of Injury/Onset: years     Evaluation Date: 10/18/23  Insurance Authorization Period Expiration: 10/18/24  Plan of Care Expiration: 1/18/24  Date of Return to MD: TBD  Visit # / Visits authorized: 5/ 20  FOTO Completion: on eval     Precautions:  Standard       Time In: 12:30pm  Time Out: 1:15pm  Total Billable Time: 45 minutes      SUBJECTIVE     Pt. States she is happy with education and progress made in therapy  She was compliant with home exercise program given last session.   Response to previous treatment and functional change:TBD      Pain: 0/10  Location:     OBJECTIVE   Objective Measures updated at progress report unless specified.  Observation/Appearance: Upon reevaluation, it was found that the "triggering" was due to sagittal band ruptures and not TFs  POC adjusted     Edema. Mild effusion noted over all joints in hands     Hand ROM: all wfl and not painful, but triggering occurring     NT: POP for TF Hand ROM. Measured in degrees.    10/18/2023 10/18/2023 11/1/23                     Index: MP                    PIP                       DIP             "       ZHAO                 Long:  MP     R91              PIP     54              DIP     wnl              ZHAO                 Ring:   MP     R84              PIP     wnl              DIP     wnl              ZHAO                 Small:  MP                    PIP                    DIP                   ZHAO                 Thumb: MP                     IP            Rad ADD/ABD            Pal ADD/ABD               Opposition            Strength (Dynamometer) and Pinch Strength (Pinch Gauge)  Measured in pounds.    10/18/2023 10/18/2023 11/1/23 11/1/23 11/15/23 11/15/23     L R L R R L   Rung II 37 no pain 46 no pain 34  40 52 42   Key Pinch           3pt Pinch           2pt Pinch              Sensation    10/18/2023 10/18/2023           [place] Josselyn       Normal 1.65-2.83 X X   Diminished Light Touch 3.22-3.61       Diminished Protective 3.84-4.31       Loss of Protective 4.56-6.65       Untestable >6.65       2 Point Discrimination       Static       Dynamic          Manual Muscle Test: NT    10/18/2023 10/18/2023           Wrist Extension        Wrist Flexion       Radial Deviation       Ulnar Deviation       Supination       Pronation       Elbow Extension       Elbow Flexion                Limitation/Restriction for FOTO initial Survey     Therapist reviewed FOTO scores for Meredith Ramos on 10/18/2023.   FOTO documents entered into [employer] - see Media section.     Limitation Score: 15.8%            Treatment        Meredith received the treatments listed below:      Supervised modalities after being cleared for contradictions: Paraffin bath - x 10 min with MHP       NT: Manual Therapy: IASTYM and Manual Massage to A1 Pulleys and flexor tendons on B UEs x 10 min     Therapeutic exercises  for  25 minutes, including:  Exercise Reps/Time   Isometrics 3 x 10       All Sports Ball Squeezing but avoid full fist to avoid sagittal Band fibers of other digits, pinching 3 x 10    Yellow Putty scraps  3 x 10    Yellow  Putty Spreads/squeezes 3 x 10   Demo. Pool Noodle to Putty Ciser Tools/ education  x 5 min                          Patient Education and Home Exercises      Education provided:   Purchasing Pool Noodle as a foam gripper for strengthening tools  Bring splints at net session  - modified HEP  - Progress towards goals     Written Home Exercises Provided: yes.  Exercises were reviewed and Meredith was able to demonstrate them prior to the end of the session.  Meredith demonstrated good  understanding of the HEP provided. See EMR under Patient Instructions for exercises provided during therapy sessions.      ASSESSMENT     Hand strength has increased significantly since stating therapy without aggravation of MCP instability. Meredith is progressing well towards her goals and there are no updates to goals at this time. Pt prognosis is Good.     Pt will continue to benefit from skilled outpatient occupational therapy to address the deficits listed in the problem list on initial evaluation, provide pt/family education and to maximize pt's level of independence in the home and community environment.     Pt's spiritual, cultural and educational needs considered and pt agreeable to plan of care and goals.    Anticipated barriers to occupational therapy: chronic late stage tenosynovitis    Goals:  Long Term Goals (LTGs); to be met by discharge.  LTG #1: Pt will report an improved ability To open tight lids, jars, and door handles  LTG #2: Pt will demo improved FOTO score of 10 or less  LTG #3: Pt will increase  and pinch strength by 5lbs     Short Term Goals (STGs); to be met within 6 weeks .  STG #1: Pt will improve point of pop AROM over a 6 week period  STG #2: Pt will report a Mod [place] with opening tight jars, lids, and door handles  STG #3: Pt. Will complete a FOTO score of 12 or less  STG #4: Pt will demonstrate independence with issued HEP.        PLAN     Continue skilled occupational therapy with  individualized plan of care focusing on treating trigger fingers conservatively while increasing hand strength    Updates/Grading for next session: patient formally discharged from OT     Daniela Guidry, ASCENCIONR/L, CHT

## 2023-11-27 ENCOUNTER — OFFICE VISIT (OUTPATIENT)
Dept: SPORTS MEDICINE | Facility: CLINIC | Age: 73
End: 2023-11-27
Payer: MEDICARE

## 2023-11-27 VITALS
BODY MASS INDEX: 19.83 KG/M2 | HEIGHT: 65 IN | DIASTOLIC BLOOD PRESSURE: 69 MMHG | WEIGHT: 119.06 LBS | HEART RATE: 63 BPM | SYSTOLIC BLOOD PRESSURE: 115 MMHG

## 2023-11-27 DIAGNOSIS — M17.11 PRIMARY OSTEOARTHRITIS OF RIGHT KNEE: Primary | ICD-10-CM

## 2023-11-27 PROCEDURE — 99999PBSHW PR PBB SHADOW TECHNICAL ONLY FILED TO HB: Mod: JZ,PBBFAC,,

## 2023-11-27 PROCEDURE — 99499 NO LOS: ICD-10-PCS | Mod: S$PBB,,, | Performed by: FAMILY MEDICINE

## 2023-11-27 PROCEDURE — 20611 LARGE JOINT ASPIRATION/INJECTION: R KNEE: ICD-10-PCS | Mod: S$PBB,RT,, | Performed by: FAMILY MEDICINE

## 2023-11-27 PROCEDURE — 99999 PR PBB SHADOW E&M-EST. PATIENT-LVL III: ICD-10-PCS | Mod: PBBFAC,,, | Performed by: FAMILY MEDICINE

## 2023-11-27 PROCEDURE — 99499 UNLISTED E&M SERVICE: CPT | Mod: S$PBB,,, | Performed by: FAMILY MEDICINE

## 2023-11-27 PROCEDURE — 99999 PR PBB SHADOW E&M-EST. PATIENT-LVL III: CPT | Mod: PBBFAC,,, | Performed by: FAMILY MEDICINE

## 2023-11-27 PROCEDURE — 20611 DRAIN/INJ JOINT/BURSA W/US: CPT | Mod: PBBFAC,RT | Performed by: FAMILY MEDICINE

## 2023-11-27 PROCEDURE — 99213 OFFICE O/P EST LOW 20 MIN: CPT | Mod: PBBFAC,25 | Performed by: FAMILY MEDICINE

## 2023-11-27 PROCEDURE — 99999PBSHW PR PBB SHADOW TECHNICAL ONLY FILED TO HB: ICD-10-PCS | Mod: JZ,PBBFAC,,

## 2023-11-27 RX ADMIN — Medication 10 MG: at 10:11

## 2023-11-27 NOTE — PROCEDURES
"Large Joint Aspiration/Injection: R knee    Date/Time: 11/27/2023 10:45 AM    Performed by: Sarwat Lu MD  Authorized by: Sarwat Lu MD    Consent Done?:  Yes (Verbal)  Indications:  Pain  Site marked: the procedure site was marked    Timeout: prior to procedure the correct patient, procedure, and site was verified    Prep: patient was prepped and draped in usual sterile fashion      Details:  Needle Size:  25 G  Ultrasonic Guidance for needle placement?: Yes    Images are saved and documented.  Approach:  Lateral  Location:  Knee  Site:  R knee  Medications:  10 mg sodium hyaluronate (EUFLEXXA) 10 mg/mL(mw 2.4 -3.6 million)  Patient tolerance:  Patient tolerated the procedure well with no immediate complications     Description of ultrasound utilization for needle guidance:   Ultrasound guidance used for needle localization. Images saved and stored for documentation. The SUPRAPATELLAR BURSA / KNEE JOINT was visualized. Dynamic visualization of the 25g x 1.5" needle was continuous throughout the procedure.     "

## 2023-11-30 ENCOUNTER — OFFICE VISIT (OUTPATIENT)
Dept: PSYCHIATRY | Facility: CLINIC | Age: 73
End: 2023-11-30
Payer: MEDICARE

## 2023-11-30 ENCOUNTER — TELEPHONE (OUTPATIENT)
Dept: SPORTS MEDICINE | Facility: CLINIC | Age: 73
End: 2023-11-30
Payer: MEDICARE

## 2023-11-30 DIAGNOSIS — F51.01 PRIMARY INSOMNIA: Primary | ICD-10-CM

## 2023-11-30 PROCEDURE — 90791 PSYCH DIAGNOSTIC EVALUATION: CPT | Mod: ,,, | Performed by: PSYCHOLOGIST

## 2023-11-30 PROCEDURE — 90791 PR PSYCHIATRIC DIAGNOSTIC EVALUATION: ICD-10-PCS | Mod: ,,, | Performed by: PSYCHOLOGIST

## 2023-11-30 NOTE — TELEPHONE ENCOUNTER
Spoke c pt. Informed pt that Dr. Lu will no longer be seeing pts at scheduled appt time on 12/04/23. R/s appt to Dr. REBECA Espinal at 0900. Confirmed appt date, time, location. Pt will call c additional questions/concerns in interim. Pt expressed understanding & was thankful.

## 2023-11-30 NOTE — PROGRESS NOTES
"[facility] Psychiatry Initial Visit (PhD/LCSW)      Patient Name:  Meredith Ramos  Date:  11/30/2023  Site:  [facility]   Referral source:  Bhargav Nieto MD    Chief complaint/reason for encounter:  Psychological Evaluation to assess suitability for admission to the [facility]   Clinical status of patient:  Outpatient   Met with:  Patient   CPT Code: 06371      Before this evaluation was initiated, the purposes and process of the assessment and the limits of confidentiality were discussed with the patient who expressed understanding of these issues and verbally consented to proceed with the evaluation.      History of present illness:  Ms. Meredith Ramos is a 73-year-old female who is pursuing psychotherapy to improve insomnia.  Patient states, "I've been having sleep difficulties for years. 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 In February 2023, I had a SVT attack and had to be hospitalized. I had drank a lot of coffee, had a lot of anxiety about family stuff. From there, my sleep got worse so I went to sleep medicine. I was diagnosed with very mild NOEMI and it's not helping. It's causing a lot of dry mouth, I can't wear it longer than 1-2 hours. I saw a different doctor and he said I never should've been placed on the CPAP. "    Dinner between 6-7, sometimes earlier. I get in bed around 8:30-9:30. I watch TV or read for about 30 minutes. At 10, I turn everything off and then try to fall asleep.  Around 12-1, I wake up and struggle to return to sleep for 1-2 hours. Sometimes I can get back to sleep with a meditation natalie. Out of bed at 6:45. Takes naps regularly, has been trying to reduce them more recently. Short duration, about 15-20 minutes.        Medical history:    Patient Active Problem List   Diagnosis    Abnormal Pap smear    Atypical glandular cells on Pap smear    Palpitations    Left ureteral stone    " Osteopenia    History of kidney stones    History of ulcerative colitis    Supraventricular tachycardia    PAF (paroxysmal atrial fibrillation)    MVP (mitral valve prolapse)    Nonrheumatic mitral valve regurgitation    Right medial knee pain    Age-related osteoporosis with current pathological fracture with routine healing    Sleep disorder    Atrophy of interosseous muscle of hand          Psychiatric symptoms:   Depression - Denied depressed mood, loss of interest in pleasurable activities, anhedonia, sleep changes, decreased motivation, decreased concentration, feelings of excessive or irrational guilt, helplessness, hopelessness, increased or decreased appetite, weight changes, increased or decreased motor activity, decreased energy, suicidal ideations/thoughts of death.  Maya/Hypomania - Denied increased goal directed activity, decreased need for sleep, pressured speech or increased talkative, racing thoughts, increased risk-taking behavior, episodic elevated or irritable mood, flight of ideas, distractibility, inflated self-esteem, grandiosity  Anxiety - Endorsed excessive worry, difficulty controlling worrying, sleep disturbance, racing thoughts  Panic Attacks- Denied palpitations, sweating, trembling, dyspnea, choking sensation, chest pain/discomfort, nausea, dizziness, chills or hot flashes, tingling, derealization, fear of losing control, fear of dying.  Thoughts - Denied any AVH, paranoia, delusions, ideas of reference, thought insertion or thought broadcasting  Suicidal thoughts/behaviors - denied passive or active SI, denied suicidal plans or intent  Self-injury - denied.  Substance abuse - denied abuse or dependence.   Sleep - Endorsed, see above     Current psychosocial stressors:  elderly mother  Report of coping skills:  meditation, exercise, reading   Support system:  AA sponsor, daughter, son in law    Current and past substance use:   Alcohol:  Denied current use.  Reports previous alcohol  abuse after [facility]. Has been sober/attending AA for 21 years.  Drugs:  Denied current use.  Denied history of abuse or dependency.   Tobacco:  Denied current use.   Caffeine:  Denied current use.      Current Psychiatric Treatment:   Medications:  none  Psychotherapy:  none     Psychiatric history:   Previous diagnosis:  anxiety, unspecified  Previous hospitalizations:  denied  History of outpatient treatment:  AA, individual therapy approx 20 years ago  Previous suicide attempt:  Denied.   Family history of psychiatric illness:  denied  Access to guns:  denied     Trauma history:  Denied.      Social history:  Ms. Meredith Ramos was born and raised in [place], [place], by her biological parents.  She described her childhood as average.  She denied childhood trauma, abuse, and neglect.  She has a bachelor's degree in chemistry and biology.  She is currently retired. She previously worked as a .  She denied  service.  She is not on disability.  She is .  She has 2 children. She currently lives alone.        Mental Status Exam:   General appearance:  Appears stated age, neatly dressed, well groomed   Speech:  Normal rate, normal tone, normal pitch, normal volume   Level of cooperation:  Cooperative   Thought processes:  Logical, goal-directed   Mood:  Euthymic   Thought content:  No illusions, no visual hallucinations, no auditory hallucinations, no delusions, no active or passive homicidal thoughts, no active or passive suicidal ideation, no obsessions, no compulsions, no violence   Affect:  Appropriate   Orientation:  Oriented to person, place, and date   Memory:   Recent memory:  Intact   Remote memory: Intact   Attention span and concentration:  Appropriate   Fund of general knowledge: Appropriate  Abstract reasoning:   Not Assessed  Judgment and insight: Good   Language:  Intact      Diagnostic impression:     ICD-10-CM ICD-9-CM   1. Primary insomnia  F51.01 307.42            Plan:  Ms. Meredith Ramos will be [facility] to the [facility].  She understood [facility] guidelines and signed the [facility] Informed Consent and [facility] Partnership Agreement.  She was provided with information about [facility] treatments and will proceed with CBT-I.

## 2023-12-01 NOTE — PROGRESS NOTES
"Subjective:     CC: right knee pain/osteoarthritis    Meredith is a 73 y.o. female coming in today for their 2nd Euflexxa injection to the right knee. The patient reports their pain is 0/10 today.     Objective:     VITAL SIGNS: /77   Pulse (!) 58   Ht 5' 5" (1.651 m)   Wt 54 kg (119 lb)   BMI 19.80 kg/m²      Euflexxa Injection Procedure #2     A time out was performed, including verification of patient ID, procedure, site and side, availability of information and equipment, review of safety issues, and agreement with consent, the procedure site was marked.    Location: Knee joint, right     Procedure: The patient was prepped aseptically with alcohol and chlorhexidine. Ethyl Chloride spray was used prior to skin puncture to help numb the superficial skin. After cold spray was applied, 2-4 cc's of 0.2% ropivacaine was injected into the skin and superficial tissue at the injection site using a 22G, 1.5" needle to form an anesthetic tunnel and ensure proper needle placement into the knee joint space. Using a hemostat, the syringe was exchanged with the Euflexxa syringe, and 2 cc of Euflexxa was injected into the knee joint. The patient was in the supine position during the duration of this procedure and the injection approach was from the superolateral aspect.     Ultrasound guidance was used for needle localization with SonCommProvete Edge 2, 15-6 MHz (L)probe(s). Images were saved and stored for documentation. The knee joint was well visualized. Dynamic visualization of the 22G x 1.5 needles was continuous throughout the procedure and maintained good position and correct needle placement.        Patient tolerance: The patient tolerated the procedure well with no immediate complications. There were no adverse reactions to the medication. Patient was instructed to apply ice to the joint for up to 20 minutes at a time and avoid strenuous activities for 24-36 hours following the injection. Following that time, the " patient can resume activities as prior to the injection.     The patient was reminded to call the clinic immediately for any adverse side effects as explained in clinic today.     Euflexxa:  NDC: [address]-21  Product Code: [id number]  Lot: [id number]  Exp: 2024-10-06      Assessment:      Encounter Diagnosis   Name Primary?    Primary osteoarthritis of right knee Yes        Plan:     1. Euflexxa injection of right knee received today (see procedure note above)  2. Follow-up in 1 week for 3rd injection of 3 injection series  3. Patient agreeable to today's plan and all questions were answered      This note is dictated using the M*Modal Fluency Direct word recognition program. There are word recognition mistakes that are occasionally missed on review.

## 2023-12-04 ENCOUNTER — OFFICE VISIT (OUTPATIENT)
Dept: SPORTS MEDICINE | Facility: CLINIC | Age: 73
End: 2023-12-04
Payer: MEDICARE

## 2023-12-04 VITALS
HEIGHT: 65 IN | SYSTOLIC BLOOD PRESSURE: 112 MMHG | WEIGHT: 119 LBS | DIASTOLIC BLOOD PRESSURE: 77 MMHG | BODY MASS INDEX: 19.83 KG/M2 | HEART RATE: 58 BPM

## 2023-12-04 DIAGNOSIS — M17.11 PRIMARY OSTEOARTHRITIS OF RIGHT KNEE: Primary | ICD-10-CM

## 2023-12-04 PROCEDURE — 99499 NO LOS: ICD-10-PCS | Mod: S$PBB,,, | Performed by: ORTHOPAEDIC SURGERY

## 2023-12-04 PROCEDURE — 99999PBSHW PR PBB SHADOW TECHNICAL ONLY FILED TO HB: Mod: JZ,PBBFAC,,

## 2023-12-04 PROCEDURE — 99213 OFFICE O/P EST LOW 20 MIN: CPT | Mod: PBBFAC | Performed by: ORTHOPAEDIC SURGERY

## 2023-12-04 PROCEDURE — 99999 PR PBB SHADOW E&M-EST. PATIENT-LVL III: ICD-10-PCS | Mod: PBBFAC,,, | Performed by: ORTHOPAEDIC SURGERY

## 2023-12-04 PROCEDURE — 99999 PR PBB SHADOW E&M-EST. PATIENT-LVL III: CPT | Mod: PBBFAC,,, | Performed by: ORTHOPAEDIC SURGERY

## 2023-12-04 PROCEDURE — 20611 DRAIN/INJ JOINT/BURSA W/US: CPT | Mod: S$PBB,RT,, | Performed by: ORTHOPAEDIC SURGERY

## 2023-12-04 PROCEDURE — 99499 UNLISTED E&M SERVICE: CPT | Mod: S$PBB,,, | Performed by: ORTHOPAEDIC SURGERY

## 2023-12-04 PROCEDURE — 20611 PR DRAIN/ASP/INJECT MAJOR JOINT/BURSA W/US GUIDANCE: ICD-10-PCS | Mod: S$PBB,RT,, | Performed by: ORTHOPAEDIC SURGERY

## 2023-12-04 PROCEDURE — 99999PBSHW PR PBB SHADOW TECHNICAL ONLY FILED TO HB: ICD-10-PCS | Mod: JZ,PBBFAC,,

## 2023-12-04 PROCEDURE — 20611 DRAIN/INJ JOINT/BURSA W/US: CPT | Mod: PBBFAC | Performed by: ORTHOPAEDIC SURGERY

## 2023-12-04 RX ADMIN — Medication 20 MG: at 09:12

## 2023-12-05 ENCOUNTER — TELEPHONE (OUTPATIENT)
Dept: ELECTROPHYSIOLOGY | Facility: CLINIC | Age: 73
End: 2023-12-05
Payer: MEDICARE

## 2023-12-05 NOTE — TELEPHONE ENCOUNTER
Left message for patient advising that we need to reschedule ILR that was scheduled for tomorrow. Call back # left.

## 2023-12-05 NOTE — TELEPHONE ENCOUNTER
Spoke to patient    CONFIRMED procedure arrival time of 10:30am tomorrow for ILR implant with Dr Ortiz    Reiterated instructions including:  -Directions to check in desk  -NPO after midnight night prior to procedure  -High importance of HOLDING Xarelto for 2 days prior. Patient sent message earlier stating that she accidentally took it last night. Discussed with Dr Ortiz. Will still proceed as long as she does not take it this evening and she verbalized understanding.   -Bathe night prior and morning prior to procedure with Hibiclens solution or an antibacterial soap  -Reviewed current visitor policy    Patient verbalized understanding of above and appreciated the call.

## 2023-12-06 ENCOUNTER — PATIENT MESSAGE (OUTPATIENT)
Dept: ELECTROPHYSIOLOGY | Facility: CLINIC | Age: 73
End: 2023-12-06
Payer: MEDICARE

## 2023-12-06 NOTE — PROGRESS NOTES
Individual Psychotherapy (PhD/LCSW)    12/7/2023    Site:  Telemed     The patient location is: home, [place]  The chief complaint leading to consultation is: insomnia    Visit type: audiovisual    Face to Face time with patient: 41  50 minutes of total time spent on the encounter, which includes face to face time and non-face to face time preparing to see the patient (eg, review of tests), Obtaining and/or reviewing separately obtained history, Documenting clinical information in the electronic or other health record, Independently interpreting results (not separately reported) and communicating results to the patient/family/caregiver, or Care coordination (not separately reported).         Each patient to whom he or she provides medical services by telemedicine is:  (1) informed of the relationship between the physician and patient and the respective role of any other health care provider with respect to management of the patient; and (2) notified that he or she may decline to receive medical services by telemedicine and may withdraw from such care at any time.    Notes:     Therapeutic Intervention: Met with patient.  Outpatient - Insight oriented psychotherapy 45 min - CPT code 29459 and Outpatient - Behavior modifying psychotherapy 45 min - CPT code 95188    Chief complaint/reason for encounter: sleep     Interval history and content of current session:   Cognitive Behavioral Therapy for Insomnia (CBT-i), Session #1  Session Focus:  Introduction to CBT-i  Sleep and Insomnia Basic Concepts  Sleep medications  Sleep Diary & CBT-i     LOBO:  18     Practice Assignments:  1) Review treatment materials as needed.  2) Complete the Sleep Diary every day.  Fill it out within two hours of getting up.    Treatment plan:  Target symptoms:  insomnia  Why chosen therapy is appropriate versus another modality: relevant to diagnosis, evidence based practice  Outcome monitoring methods: self-report, checklist/rating  scale  Therapeutic intervention type: insight oriented psychotherapy, behavior modifying psychotherapy    Risk parameters:  Patient reports no suicidal ideation  Patient reports no homicidal ideation  Patient reports no self-injurious behavior  Patient reports no violent behavior    Verbal deficits: None    Patient's response to intervention:  The patient's response to intervention is accepting.    Progress toward goals and other mental status changes:  The patient's progress toward goals is fair .    Diagnosis:     ICD-10-CM ICD-9-CM   1. Primary insomnia  F51.01 307.42       Plan:  individual psychotherapy    Return to clinic: 1 week    Length of Service (minutes): 45

## 2023-12-07 ENCOUNTER — OFFICE VISIT (OUTPATIENT)
Dept: PSYCHIATRY | Facility: CLINIC | Age: 73
End: 2023-12-07
Payer: MEDICARE

## 2023-12-07 ENCOUNTER — PATIENT MESSAGE (OUTPATIENT)
Dept: PSYCHIATRY | Facility: CLINIC | Age: 73
End: 2023-12-07

## 2023-12-07 DIAGNOSIS — F51.01 PRIMARY INSOMNIA: Primary | ICD-10-CM

## 2023-12-07 PROCEDURE — 90834 PR PSYCHOTHERAPY W/PATIENT, 45 MIN: ICD-10-PCS | Mod: 95,,, | Performed by: PSYCHOLOGIST

## 2023-12-07 PROCEDURE — 90834 PSYTX W PT 45 MINUTES: CPT | Mod: 95,,, | Performed by: PSYCHOLOGIST

## 2023-12-11 ENCOUNTER — OFFICE VISIT (OUTPATIENT)
Dept: SPORTS MEDICINE | Facility: CLINIC | Age: 73
End: 2023-12-11
Payer: MEDICARE

## 2023-12-11 VITALS
BODY MASS INDEX: 19.74 KG/M2 | WEIGHT: 118.5 LBS | SYSTOLIC BLOOD PRESSURE: 118 MMHG | HEART RATE: 56 BPM | HEIGHT: 65 IN | DIASTOLIC BLOOD PRESSURE: 68 MMHG

## 2023-12-11 DIAGNOSIS — M17.11 PRIMARY OSTEOARTHRITIS OF RIGHT KNEE: Primary | ICD-10-CM

## 2023-12-11 DIAGNOSIS — M25.561 RIGHT MEDIAL KNEE PAIN: ICD-10-CM

## 2023-12-11 PROCEDURE — 20611 PR DRAIN/ASP/INJECT MAJOR JOINT/BURSA W/US GUIDANCE: ICD-10-PCS | Mod: S$PBB,RT,, | Performed by: ORTHOPAEDIC SURGERY

## 2023-12-11 PROCEDURE — 99999 PR PBB SHADOW E&M-EST. PATIENT-LVL III: ICD-10-PCS | Mod: PBBFAC,,, | Performed by: ORTHOPAEDIC SURGERY

## 2023-12-11 PROCEDURE — 20611 DRAIN/INJ JOINT/BURSA W/US: CPT | Mod: PBBFAC | Performed by: ORTHOPAEDIC SURGERY

## 2023-12-11 PROCEDURE — 99213 OFFICE O/P EST LOW 20 MIN: CPT | Mod: PBBFAC,25 | Performed by: ORTHOPAEDIC SURGERY

## 2023-12-11 PROCEDURE — 99499 UNLISTED E&M SERVICE: CPT | Mod: S$PBB,,, | Performed by: ORTHOPAEDIC SURGERY

## 2023-12-11 PROCEDURE — 20611 DRAIN/INJ JOINT/BURSA W/US: CPT | Mod: S$PBB,RT,, | Performed by: ORTHOPAEDIC SURGERY

## 2023-12-11 PROCEDURE — 99999PBSHW PR PBB SHADOW TECHNICAL ONLY FILED TO HB: ICD-10-PCS | Mod: JZ,PBBFAC,,

## 2023-12-11 PROCEDURE — 99999 PR PBB SHADOW E&M-EST. PATIENT-LVL III: CPT | Mod: PBBFAC,,, | Performed by: ORTHOPAEDIC SURGERY

## 2023-12-11 PROCEDURE — 99999PBSHW PR PBB SHADOW TECHNICAL ONLY FILED TO HB: Mod: JZ,PBBFAC,,

## 2023-12-11 PROCEDURE — 99499 NO LOS: ICD-10-PCS | Mod: S$PBB,,, | Performed by: ORTHOPAEDIC SURGERY

## 2023-12-11 RX ADMIN — Medication 20 MG: at 11:12

## 2023-12-11 NOTE — PROGRESS NOTES
"Subjective:     CC: right knee pain/osteoarthritis    Meredith is a 73 y.o. female coming in today for their 3rd Euflexxa injection to the right knee. The patient reports their pain is 0/10 today.     Objective:     VITAL SIGNS: /68   Pulse (!) 56   Ht 5' 5" (1.651 m)   Wt 53.8 kg (118 lb 8 oz)   BMI 19.72 kg/m²      Euflexxa Injection Procedure #3     A time out was performed, including verification of patient ID, procedure, site and side, availability of information and equipment, review of safety issues, and agreement with consent, the procedure site was marked.    Location: Knee joint, right     Procedure: The patient was prepped aseptically with alcohol and chlorhexidine. Ethyl Chloride spray was used prior to skin puncture to help numb the superficial skin. After cold spray was applied, 2-4 cc's of 0.2% ropivacaine was injected into the skin and superficial tissue at the injection site using a 22G, 1.5" needle to form an anesthetic tunnel and ensure proper needle placement into the knee joint space. Using a hemostat, the syringe was exchanged with the Euflexxa syringe, and 2 cc of Euflexxa was injected into the knee joint. The patient was in the supine position during the duration of this procedure and the injection approach was from the superolateral aspect.     Ultrasound guidance was used for needle localization with SonWyliote Edge 2, 15-6 MHz (L)probe(s). Images were saved and stored for documentation. The knee joint was well visualized. Dynamic visualization of the 22G x 1.5 needles was continuous throughout the procedure and maintained good position and correct needle placement.        Patient tolerance: The patient tolerated the procedure well with no immediate complications. There were no adverse reactions to the medication. Patient was instructed to apply ice to the joint for up to 20 minutes at a time and avoid strenuous activities for 24-36 hours following the injection. Following that " time, the patient can resume activities as prior to the injection.     The patient was reminded to call the clinic immediately for any adverse side effects as explained in clinic today.     Euflexxa:  NDC: [address]-57  Product Code: [id number]  Lot: [id number]  Exp: 2024-10-06      Assessment:      Encounter Diagnoses   Name Primary?    Primary osteoarthritis of right knee Yes    Right medial knee pain           Plan:     1. Euflexxa injection of right knee received today (see procedure note above)  2. Patient issued core/pelvis HEP today. Handouts provided and explained.   3. Follow-up with Dr. Lu as needed  4. Patient agreeable to today's plan and all questions were answered      This note is dictated using the M*Modal Fluency Direct word recognition program. There are word recognition mistakes that are occasionally missed on review.

## 2023-12-13 ENCOUNTER — OFFICE VISIT (OUTPATIENT)
Dept: PSYCHIATRY | Facility: CLINIC | Age: 73
End: 2023-12-13
Payer: MEDICARE

## 2023-12-13 ENCOUNTER — PATIENT MESSAGE (OUTPATIENT)
Dept: PSYCHIATRY | Facility: CLINIC | Age: 73
End: 2023-12-13

## 2023-12-13 DIAGNOSIS — F51.01 PRIMARY INSOMNIA: Primary | ICD-10-CM

## 2023-12-13 PROCEDURE — 90834 PSYTX W PT 45 MINUTES: CPT | Mod: 95,,, | Performed by: PSYCHOLOGIST

## 2023-12-13 PROCEDURE — 90834 PR PSYCHOTHERAPY W/PATIENT, 45 MIN: ICD-10-PCS | Mod: 95,,, | Performed by: PSYCHOLOGIST

## 2023-12-13 NOTE — PROGRESS NOTES
Individual Psychotherapy (PhD/LCSW)    12/13/2023    Site:  Telemed     The patient location is: home, [place]  The chief complaint leading to consultation is: insomnia    Visit type: audiovisual    Face to Face time with patient: 41  50 minutes of total time spent on the encounter, which includes face to face time and non-face to face time preparing to see the patient (eg, review of tests), Obtaining and/or reviewing separately obtained history, Documenting clinical information in the electronic or other health record, Independently interpreting results (not separately reported) and communicating results to the patient/family/caregiver, or Care coordination (not separately reported).         Each patient to whom he or she provides medical services by telemedicine is:  (1) informed of the relationship between the physician and patient and the respective role of any other health care provider with respect to management of the patient; and (2) notified that he or she may decline to receive medical services by telemedicine and may withdraw from such care at any time.    Notes:     Therapeutic Intervention: Met with patient.  Outpatient - Insight oriented psychotherapy 45 min - CPT code 86916 and Outpatient - Behavior modifying psychotherapy 45 min - CPT code 47404    Chief complaint/reason for encounter: sleep     Interval history and content of current session:   Cognitive Behavioral Therapy for Insomnia (CBT-i), Session #2  Sleep Diary completed?  Yes  # of days completed:      Session Focus:  Summarize and graph Sleep Diary  SE:  89%  Introduce Stimulus Control and Sleep Hygiene  Introduce Sleep Restriction  Prescribed TTB:  10:30 pm  Prescribed TOB:  6:30 am    Practice Assignments:  1) Review treatment materials as needed.  2) Complete the Sleep Diary every day.  Fill it out within two hours of getting up.  3) Implement Stimulus Control and Sleep Hygiene strategies  4) Implement Sleep  Restriction/Compression    Treatment plan:  Target symptoms:  insomnia  Why chosen therapy is appropriate versus another modality: relevant to diagnosis, evidence based practice  Outcome monitoring methods: self-report, checklist/rating scale  Therapeutic intervention type: insight oriented psychotherapy, behavior modifying psychotherapy    Risk parameters:  Patient reports no suicidal ideation  Patient reports no homicidal ideation  Patient reports no self-injurious behavior  Patient reports no violent behavior    Verbal deficits: None    Patient's response to intervention:  The patient's response to intervention is accepting.    Progress toward goals and other mental status changes:  The patient's progress toward goals is fair .    Diagnosis:     ICD-10-CM ICD-9-CM   1. Primary insomnia  F51.01 307.42         Plan:  individual psychotherapy    Return to clinic: 1 week    Length of Service (minutes): 45

## 2023-12-18 ENCOUNTER — OFFICE VISIT (OUTPATIENT)
Dept: PSYCHIATRY | Facility: CLINIC | Age: 73
End: 2023-12-18
Payer: MEDICARE

## 2023-12-18 ENCOUNTER — PATIENT MESSAGE (OUTPATIENT)
Dept: PSYCHIATRY | Facility: CLINIC | Age: 73
End: 2023-12-18

## 2023-12-18 DIAGNOSIS — F51.01 PRIMARY INSOMNIA: Primary | ICD-10-CM

## 2023-12-18 PROBLEM — Z79.01 ON ANTICOAGULANT THERAPY: Status: ACTIVE | Noted: 2023-12-18

## 2023-12-18 PROCEDURE — 90834 PR PSYCHOTHERAPY W/PATIENT, 45 MIN: ICD-10-PCS | Mod: 95,,, | Performed by: PSYCHOLOGIST

## 2023-12-18 PROCEDURE — 90834 PSYTX W PT 45 MINUTES: CPT | Mod: 95,,, | Performed by: PSYCHOLOGIST

## 2023-12-18 NOTE — PROGRESS NOTES
Individual Psychotherapy (PhD/LCSW)    12/18/2023    Site:  Telemed     The patient location is: home, [place]  The chief complaint leading to consultation is: insomnia    Visit type: audiovisual    Face to Face time with patient: 38  46 minutes of total time spent on the encounter, which includes face to face time and non-face to face time preparing to see the patient (eg, review of tests), Obtaining and/or reviewing separately obtained history, Documenting clinical information in the electronic or other health record, Independently interpreting results (not separately reported) and communicating results to the patient/family/caregiver, or Care coordination (not separately reported).         Each patient to whom he or she provides medical services by telemedicine is:  (1) informed of the relationship between the physician and patient and the respective role of any other health care provider with respect to management of the patient; and (2) notified that he or she may decline to receive medical services by telemedicine and may withdraw from such care at any time.    Notes:     Therapeutic Intervention: Met with patient.  Outpatient - Insight oriented psychotherapy 45 min - CPT code 23196 and Outpatient - Behavior modifying psychotherapy 45 min - CPT code 56134    Chief complaint/reason for encounter: sleep     Interval history and content of current session:   Cognitive Behavioral Therapy for Insomnia (CBT-i), Session #3   Sleep Diary completed?  Yes   # of days completed:  7  SE:  89%     Session Focus:   Summarize and graph Sleep Diary   Discuss Sleep Restriction/Compression   Prescribed TTB:  10:30  Prescribed TOB:  6:30  Review progress with Stimulus Control and Sleep Hygiene   Introduce Relaxation Training   ?   Practice Assignments:   1) Review treatment materials as needed.   2) Complete the Sleep Diary every day.  Fill it out within two hours of getting up.   3) Implement Stimulus Control and Sleep Hygiene  strategies   4) Implement Sleep Restriction/Compression   5) Practice Relaxation Training at least 20 minutes per day     Treatment plan:  Target symptoms:  insomnia  Why chosen therapy is appropriate versus another modality: relevant to diagnosis, evidence based practice  Outcome monitoring methods: self-report, checklist/rating scale  Therapeutic intervention type: insight oriented psychotherapy, behavior modifying psychotherapy    Risk parameters:  Patient reports no suicidal ideation  Patient reports no homicidal ideation  Patient reports no self-injurious behavior  Patient reports no violent behavior    Verbal deficits: None    Patient's response to intervention:  The patient's response to intervention is accepting.    Progress toward goals and other mental status changes:  The patient's progress toward goals is fair .    Diagnosis:     ICD-10-CM ICD-9-CM   1. Primary insomnia  F51.01 307.42       Plan:  individual psychotherapy    Return to clinic: 1 week    Length of Service (minutes): 45

## 2023-12-27 ENCOUNTER — TELEPHONE (OUTPATIENT)
Dept: ELECTROPHYSIOLOGY | Facility: CLINIC | Age: 73
End: 2023-12-27
Payer: MEDICARE

## 2024-01-02 ENCOUNTER — PATIENT MESSAGE (OUTPATIENT)
Dept: PSYCHIATRY | Facility: CLINIC | Age: 74
End: 2024-01-02
Payer: MEDICARE

## 2024-01-03 ENCOUNTER — OFFICE VISIT (OUTPATIENT)
Dept: PSYCHIATRY | Facility: CLINIC | Age: 74
End: 2024-01-03
Payer: MEDICARE

## 2024-01-03 ENCOUNTER — PATIENT MESSAGE (OUTPATIENT)
Dept: PSYCHIATRY | Facility: CLINIC | Age: 74
End: 2024-01-03

## 2024-01-03 ENCOUNTER — PATIENT MESSAGE (OUTPATIENT)
Dept: ENDOCRINOLOGY | Facility: CLINIC | Age: 74
End: 2024-01-03
Payer: MEDICARE

## 2024-01-03 DIAGNOSIS — F51.01 PRIMARY INSOMNIA: Primary | ICD-10-CM

## 2024-01-03 PROCEDURE — 90834 PSYTX W PT 45 MINUTES: CPT | Mod: 95,,, | Performed by: PSYCHOLOGIST

## 2024-01-03 NOTE — PROGRESS NOTES
Individual Psychotherapy (PhD/LCSW)    1/3/2024    Site:  Telemed     The patient location is: home, [place]  The chief complaint leading to consultation is: insomnia    Visit type: audiovisual    Face to Face time with patient: 38 (switched to telephone after 5 min due to audio problems)  46 minutes of total time spent on the encounter, which includes face to face time and non-face to face time preparing to see the patient (eg, review of tests), Obtaining and/or reviewing separately obtained history, Documenting clinical information in the electronic or other health record, Independently interpreting results (not separately reported) and communicating results to the patient/family/caregiver, or Care coordination (not separately reported).     Each patient to whom he or she provides medical services by telemedicine is:  (1) informed of the relationship between the physician and patient and the respective role of any other health care provider with respect to management of the patient; and (2) notified that he or she may decline to receive medical services by telemedicine and may withdraw from such care at any time.    Notes:     Therapeutic Intervention: Met with patient.  Outpatient - Insight oriented psychotherapy 45 min - CPT code 90774 and Outpatient - Behavior modifying psychotherapy 45 min - CPT code 76897    Chief complaint/reason for encounter: sleep     Interval history and content of current session:   Cognitive Behavioral Therapy for Insomnia (CBT-i), Session #4  Sleep Diary completed?  Yes  # of days completed:    SE:  90%    Session Focus:  Summarize and graph Sleep Diary  Discuss Sleep Restriction/Compression  Prescribed TTB:  10:30  Prescribed TOB 6:30  Review progress with Stimulus Control and Sleep Hygiene  Review progress with Relaxation Training  Review Cognitive Restructuring     Practice Assignment:  1) Review treatment materials as needed.  2) Complete the Sleep Diary every day.  Fill it out within  two hours of getting up.  3) Implement Stimulus Control and Sleep Hygiene strategies  4) Implement Sleep Restriction/Compression  5) Practice Relaxation Training at least 20 minutes per day  6) Practice Cognitive Restructuring    Treatment plan:  Target symptoms:  insomnia  Why chosen therapy is appropriate versus another modality: relevant to diagnosis, evidence based practice  Outcome monitoring methods: self-report, checklist/rating scale  Therapeutic intervention type: insight oriented psychotherapy, behavior modifying psychotherapy    Risk parameters:  Patient reports no suicidal ideation  Patient reports no homicidal ideation  Patient reports no self-injurious behavior  Patient reports no violent behavior    Verbal deficits: None    Patient's response to intervention:  The patient's response to intervention is accepting.    Progress toward goals and other mental status changes:  The patient's progress toward goals is fair .    Diagnosis:     ICD-10-CM ICD-9-CM   1. Primary insomnia  F51.01 307.42         Plan:  individual psychotherapy    Return to clinic: 1 week    Length of Service (minutes): 45

## 2024-01-04 ENCOUNTER — HOSPITAL ENCOUNTER (OUTPATIENT)
Dept: RADIOLOGY | Facility: HOSPITAL | Age: 74
Discharge: HOME | End: 2024-01-04
Attending: INTERNAL MEDICINE
Payer: MEDICARE

## 2024-01-04 ENCOUNTER — OFFICE VISIT (OUTPATIENT)
Dept: RHEUMATOLOGY | Facility: CLINIC | Age: 74
End: 2024-01-04
Payer: MEDICARE

## 2024-01-04 VITALS
SYSTOLIC BLOOD PRESSURE: 112 MMHG | HEIGHT: 65 IN | BODY MASS INDEX: 19.98 KG/M2 | WEIGHT: 119.94 LBS | HEART RATE: 62 BPM | DIASTOLIC BLOOD PRESSURE: 71 MMHG

## 2024-01-04 DIAGNOSIS — M06.9 RHEUMATOID ARTHRITIS INVOLVING BOTH HANDS, UNSPECIFIED WHETHER RHEUMATOID FACTOR PRESENT: ICD-10-CM

## 2024-01-04 PROCEDURE — 99204 OFFICE O/P NEW MOD 45 MIN: CPT | Mod: S$PBB,,, | Performed by: INTERNAL MEDICINE

## 2024-01-04 PROCEDURE — 99213 OFFICE O/P EST LOW 20 MIN: CPT | Mod: PBBFAC | Performed by: INTERNAL MEDICINE

## 2024-01-04 PROCEDURE — 99999 PR PBB SHADOW E&M-EST. PATIENT-LVL III: CPT | Mod: PBBFAC,,, | Performed by: INTERNAL MEDICINE

## 2024-01-04 PROCEDURE — 77077 JOINT SURVEY SINGLE VIEW: CPT | Mod: 26,,, | Performed by: RADIOLOGY

## 2024-01-04 PROCEDURE — 77077 JOINT SURVEY SINGLE VIEW: CPT | Mod: TC

## 2024-01-04 NOTE — PROGRESS NOTES
Subjective:      Patient ID: Meredith Ramos is a 73 y.o. female.    Chief Complaint: Disease Management    HPI 73 year old with PMH of diverticulitis, SVT,  PAF on Xeralto, MVP,osteoporosis, proctitis, mitral valve regurgitation,  MTFHR mutation here for evaluation.     She was diagnosed with extensor tendon instability bilateral hands, changes consistent with possible rheumatoid arthritis, sagittal band ruptures, and thumb MCP arthritis and instability recently.  On occasion, she has pain in left lateral hip and right knee. Pain is mild. She will get her right knee drained and it helps her.       Denies rashes or bloody diarrhea. She was a smoker and quit when she was 53. She smoked from age 16 off and on until age  53.  Denies oral ulcers, fevers, raynauds, photosensitivity, or pleurisy.    Family hx: AUNT: RA    Patient Active Problem List   Diagnosis    Abnormal Pap smear    Atypical glandular cells on Pap smear    Palpitations    Left ureteral stone    Osteopenia    History of kidney stones    History of ulcerative colitis    Supraventricular tachycardia    PAF (paroxysmal atrial fibrillation)    MVP (mitral valve prolapse)    Nonrheumatic mitral valve regurgitation    Right medial knee pain    Age-related osteoporosis with current pathological fracture with routine healing    Sleep disorder    Atrophy of interosseous muscle of hand    On anticoagulant therapy         Past Medical History:   Diagnosis Date    Diverticulitis     Kidney stone     Osteoporosis     ostenopenia        Review of Systems   Constitutional:  Negative for fever and unexpected weight change.   HENT:  Negative for mouth sores and trouble swallowing.    Eyes:  Negative for redness.   Respiratory:  Negative for cough and shortness of breath.    Cardiovascular:  Negative for chest pain.   Gastrointestinal:  Negative for constipation and diarrhea.   Genitourinary:  Negative for dysuria and genital sores.   Skin:  Negative for rash.  "  Neurological:  Negative for headaches.   Hematological:  Bruises/bleeds easily.    see HPI        Objective:   /71   Pulse 62   Ht 5' 5" (1.651 m)   Wt 54.4 kg (119 lb 14.9 oz)   BMI 19.96 kg/m²   Physical Exam   Constitutional: She is oriented to person, place, and time.   HENT:   Head: Normocephalic and atraumatic.   Right Ear: External ear normal.   Left Ear: External ear normal.   Nose: Nose normal.   Mouth/Throat: Oropharynx is clear and moist. No oropharyngeal exudate.   Eyes: Pupils are equal, round, and reactive to light. Conjunctivae are normal. Right eye exhibits no discharge. Left eye exhibits no discharge. No scleral icterus.   Neck: No JVD present. No thyromegaly present.   Cardiovascular: Normal rate, regular rhythm and normal heart sounds. Exam reveals no gallop and no friction rub.   No murmur heard.  Pulmonary/Chest: Effort normal and breath sounds normal. No respiratory distress. She has no wheezes. She has no rales. She exhibits no tenderness.   Abdominal: Soft. Bowel sounds are normal. She exhibits no distension and no mass. There is no abdominal tenderness. There is no rebound and no guarding.   Musculoskeletal:         General: Swelling and deformity present. No tenderness.      Cervical back: Neck supple.   Lymphadenopathy:     She has no cervical adenopathy.   Neurological: She is alert and oriented to person, place, and time. No cranial nerve deficit. Gait normal. Coordination normal.   Skin: Skin is dry. No rash noted. No erythema. No pallor.   Advanced deformities  of both hands with ulnar deviation  Enlarged mcps bilaterally    Psychiatric: Affect and judgment normal.       No data to display     Assessment:   73 year old with PMH of diverticulitis, SVT,  PAF on Xeralto, MVP,osteoporosis, proctitis, mitral valve regurgitation,  MTFHR mutation here for evaluation of joint pain.  She does report much pain but on exam, she has changes consistent with deformities from RA.  1. " Rheumatoid arthritis involving both hands, unspecified whether rheumatoid factor present          Plan:     Problem List Items Addressed This Visit    None  Visit Diagnoses       Rheumatoid arthritis involving both hands, unspecified whether rheumatoid factor present              Labs  Xrays  Consider MRI of hands  No MTX given MTHFR gene  mutation   45 minutes of total time spent on the encounter, which includes face to face time and non-face to face time preparing to see the patient (eg, review of tests), Obtaining and/or reviewing separately obtained history, Documenting clinical information in the electronic or other health record, Independently interpreting results (not separately reported) and communicating results to the patient/family/caregiver, or Care coordination (not separately reported).

## 2024-01-04 NOTE — PROGRESS NOTES
1/3/2024    10:20 AM   Rapid3 Question Responses and Scores   MDHAQ Score 0   Psychologic Score 1.1   Pain Score 0   When you awakened in the morning OVER THE LAST WEEK, did you feel stiff? No   Fatigue Score 4   Global Health Score 0   RAPID3 Score 0     Answers submitted by the patient for this visit:  Rheumatology Questionnaire (Submitted on 1/3/2024)  fever: No  eye redness: No  mouth sores: No  headaches: No  shortness of breath: No  chest pain: No  trouble swallowing: No  diarrhea: No  constipation: No  unexpected weight change: No  genital sore: No  dysuria: No  During the last 3 days, have you had a skin rash?: No  Bruises or bleeds easily: Yes  cough: No

## 2024-01-07 ENCOUNTER — PATIENT MESSAGE (OUTPATIENT)
Dept: RHEUMATOLOGY | Facility: CLINIC | Age: 74
End: 2024-01-07
Payer: MEDICARE

## 2024-01-08 ENCOUNTER — TELEPHONE (OUTPATIENT)
Dept: ELECTROPHYSIOLOGY | Facility: CLINIC | Age: 74
End: 2024-01-08
Payer: MEDICARE

## 2024-01-08 ENCOUNTER — OFFICE VISIT (OUTPATIENT)
Dept: PSYCHIATRY | Facility: CLINIC | Age: 74
End: 2024-01-08
Payer: MEDICARE

## 2024-01-08 ENCOUNTER — PATIENT MESSAGE (OUTPATIENT)
Dept: PSYCHIATRY | Facility: CLINIC | Age: 74
End: 2024-01-08

## 2024-01-08 DIAGNOSIS — F51.01 PRIMARY INSOMNIA: Primary | ICD-10-CM

## 2024-01-08 PROCEDURE — 90834 PSYTX W PT 45 MINUTES: CPT | Mod: 95,,, | Performed by: PSYCHOLOGIST

## 2024-01-08 NOTE — TELEPHONE ENCOUNTER
Spoke to patient    CONFIRMED procedure arrival time of 6:15am (new time) for ILR implant with Dr Ortiz    Reiterated instructions including:  -Directions to check in desk  -High importance of HOLDING Xarelto. Confirmed that her last dose was 1-6-2024, as instructed  -Confirmed no fever, cough, or shortness of breath in the past 30 days  -Bathe night prior and morning prior to procedure with Hibiclens solution or an antibacterial soap  -Reviewed current visitor policy    Patient verbalized understanding of above and appreciated the call.

## 2024-01-08 NOTE — PROGRESS NOTES
Individual Psychotherapy (PhD/LCSW)    1/8/2024    Site:  Telemed     The patient location is: home, [place]  The chief complaint leading to consultation is: insomnia    Visit type: audiovisual    Face to Face time with patient: 38   46 minutes of total time spent on the encounter, which includes face to face time and non-face to face time preparing to see the patient (eg, review of tests), Obtaining and/or reviewing separately obtained history, Documenting clinical information in the electronic or other health record, Independently interpreting results (not separately reported) and communicating results to the patient/family/caregiver, or Care coordination (not separately reported).     Each patient to whom he or she provides medical services by telemedicine is:  (1) informed of the relationship between the physician and patient and the respective role of any other health care provider with respect to management of the patient; and (2) notified that he or she may decline to receive medical services by telemedicine and may withdraw from such care at any time.    Notes:     Therapeutic Intervention: Met with patient.  Outpatient - Insight oriented psychotherapy 45 min - CPT code 05284 and Outpatient - Behavior modifying psychotherapy 45 min - CPT code 92039    Chief complaint/reason for encounter: sleep     Interval history and content of current session:   Cognitive Behavioral Therapy for Insomnia (CBT-i), Session #5   Sleep Diary completed?  Yes   # of days completed:  7  SE:  89%     Session Focus:   Summarize and graph Sleep Diary   Discuss Sleep Restriction/Compression   Review progress with Stimulus Control and Sleep Hygiene   Review progress with Relaxation Training   Review Cognitive Restructuring   Discuss Relapse Prevention   Re-administered LOBO   LOBO:   2       Treatment plan:  Target symptoms:  insomnia  Why chosen therapy is appropriate versus another modality: relevant to diagnosis, evidence based  practice  Outcome monitoring methods: self-report, checklist/rating scale  Therapeutic intervention type: insight oriented psychotherapy, behavior modifying psychotherapy    Risk parameters:  Patient reports no suicidal ideation  Patient reports no homicidal ideation  Patient reports no self-injurious behavior  Patient reports no violent behavior    Verbal deficits: None    Patient's response to intervention:  The patient's response to intervention is accepting.    Progress toward goals and other mental status changes:  The patient's progress toward goals is fair .    Diagnosis:     ICD-10-CM ICD-9-CM   1. Primary insomnia  F51.01 307.42     Plan:  1) Review and complete treatment materials as needed.   2) Contact Dr. Bingham or other provider for booster sessions if needed.   3) Treatment termination.  Follow-up with referring provider.     Return to clinic: as needed    Length of Service (minutes): 45

## 2024-01-09 ENCOUNTER — HOSPITAL ENCOUNTER (OUTPATIENT)
Facility: HOSPITAL | Age: 74
Discharge: HOME | End: 2024-01-09
Attending: INTERNAL MEDICINE | Admitting: INTERNAL MEDICINE
Payer: MEDICARE

## 2024-01-09 VITALS
HEIGHT: 65 IN | HEART RATE: 88 BPM | DIASTOLIC BLOOD PRESSURE: 69 MMHG | TEMPERATURE: 98 F | OXYGEN SATURATION: 94 % | WEIGHT: 118 LBS | BODY MASS INDEX: 19.66 KG/M2 | RESPIRATION RATE: 18 BRPM | SYSTOLIC BLOOD PRESSURE: 128 MMHG

## 2024-01-09 DIAGNOSIS — R00.2 PALPITATIONS: ICD-10-CM

## 2024-01-09 DIAGNOSIS — Z95.9 CARDIAC DEVICE IN SITU: ICD-10-CM

## 2024-01-09 DIAGNOSIS — I47.10 SUPRAVENTRICULAR TACHYCARDIA: ICD-10-CM

## 2024-01-09 DIAGNOSIS — I49.9 CARDIAC ARRHYTHMIA, UNSPECIFIED CARDIAC ARRHYTHMIA TYPE: ICD-10-CM

## 2024-01-09 PROCEDURE — 33285 INSJ SUBQ CAR RHYTHM MNTR: CPT | Performed by: INTERNAL MEDICINE

## 2024-01-09 PROCEDURE — 63600175 PHARM REV CODE 636 W HCPCS: Performed by: INTERNAL MEDICINE

## 2024-01-09 PROCEDURE — 33285 INSJ SUBQ CAR RHYTHM MNTR: CPT | Mod: ,,, | Performed by: INTERNAL MEDICINE

## 2024-01-09 PROCEDURE — C1764 EVENT RECORDER, CARDIAC: HCPCS | Performed by: INTERNAL MEDICINE

## 2024-01-09 PROCEDURE — 25000003 PHARM REV CODE 250: Performed by: INTERNAL MEDICINE

## 2024-01-09 DEVICE — LUX-DX™ INSERTABLE CARDIAC MONITOR
Type: IMPLANTABLE DEVICE | Site: CHEST | Status: FUNCTIONAL
Brand: LUX-DX™ INSERTABLE CARDIAC MONITOR

## 2024-01-09 RX ORDER — LIDOCAINE HYDROCHLORIDE AND EPINEPHRINE 10; 10 MG/ML; UG/ML
INJECTION, SOLUTION INFILTRATION; PERINEURAL
Status: DISCONTINUED | OUTPATIENT
Start: 2024-01-09 | End: 2024-01-09 | Stop reason: HOSPADM

## 2024-01-09 RX ORDER — CEFAZOLIN SODIUM 1 G/3ML
INJECTION, POWDER, FOR SOLUTION INTRAMUSCULAR; INTRAVENOUS
Status: DISCONTINUED | OUTPATIENT
Start: 2024-01-09 | End: 2024-01-09 | Stop reason: HOSPADM

## 2024-01-09 RX ORDER — ASCORBIC ACID 125 MG
1 TABLET,CHEWABLE ORAL DAILY
COMMUNITY

## 2024-01-09 NOTE — H&P
[facility] Short Stay Cardiac Unit  Cardiac Electrophysiology  History and Physical     Admission Date: 1/9/2024  Code Status: Prior   Attending Provider: Shin Ortiz MD   Principal Problem:<principal problem not specified>    Subjective:       HPI:  Patient is a 74 yo F pmhx of pAF, SVT (AT) seen on holter monitor, mitral valve prolapse, episode of reported SVT on 2/8 given adenosine by EMS at a rate of 150bpm which then was repeated and AF. No scanned in strip in media. Of note her apple watch just noted high heart rates. It did not note AF per patient. Patient states frequent palpitations. Usually short bursts.  Sometimes nightly sometimes every few days.       Presents for ILR implantation. She has no complaints at this time. Last xarelto dose was 01/06. ECG shows NSR.     Past Medical History:   Diagnosis Date    Diverticulitis     Kidney stone     Osteoporosis     ostenopenia        Past Surgical History:   Procedure Laterality Date    BREAST BIOPSY      x1    BUNIONECTOMY      Hammer toe repair    BUNIONECTOMY      COLECTOMY  2004    partial for diverticulitis    DIAGNOSTIC CARDIAC ELECTROPHYSIOLOGY STUDY N/A 10/02/2023    Procedure: EP - diagnostic;  Surgeon: Shin Ortiz MD;  Location: [facility] EP LAB;  Service: Cardiology;  Laterality: N/A;    HYSTERECTOMY  07/22/2013    complete laparoscopic hysterectomy at [facility] 7/22/13.  No cancer.  Found 3cm fibroid on right ovary.    kidney stones      removal of breast implants         Review of patient's allergies indicates:   Allergen Reactions    Cortisone (hydrocortisone) [hydrocortisone] Other (See Comments)     Triggers svt       No current facility-administered medications on file prior to encounter.     Current Outpatient Medications on File Prior to Encounter   Medication Sig    calcium-vitamin D3 (OS- + D3) 500 mg(1,250mg) -200 unit per tablet Calcium 500 + D    cholecalciferol, vitamin D3, (VITAMIN D3) 25 mcg (1,000 unit) capsule      MAGNESIUM CITRATE ORAL Take 250 mg by mouth once daily.    metoprolol succinate (TOPROL-XL) 25 MG 24 hr tablet Take 1 tablet (25 mg total) by mouth once daily. .5-1 once or twice daily or as directed    vitamin K2 100 mcg Cap Take 1 capsule by mouth once daily.    rivaroxaban (XARELTO) 20 mg Tab Take 1 tablet (20 mg total) by mouth daily with dinner or evening meal.     Family History       Problem Relation (Age of Onset)    Breast cancer Sister (54)    Cancer Father, Sister    Colon cancer Maternal Grandfather    Hypertension Father    Uterine cancer Maternal Grandmother          Tobacco Use    Smoking status: Former     Current packs/day: 0.00     Types: Cigarettes     Quit date: 2003     Years since quittin.5    Smokeless tobacco: Never   Substance and Sexual Activity    Alcohol use: No    Drug use: No    Sexual activity: Not Currently     Partners: Male     Review of Systems   Constitutional: Negative for chills and fever.   HENT: Negative.     Eyes: Negative.    Cardiovascular:  Negative for dyspnea on exertion.   Respiratory:  Negative for shortness of breath.    Endocrine: Negative.    Skin: Negative.    Musculoskeletal: Negative.    Gastrointestinal: Negative.    Genitourinary: Negative.    Neurological: Negative.    Psychiatric/Behavioral: Negative.       Objective:     Vital Signs (Most Recent):    Vital Signs (24h Range):             There is no height or weight on file to calculate BMI.             Physical Exam  Constitutional:       General: She is not in acute distress.     Appearance: Normal appearance. She is not ill-appearing.   HENT:      Nose: Nose normal.      Mouth/Throat:      Mouth: Mucous membranes are moist.   Eyes:      Extraocular Movements: Extraocular movements intact.   Cardiovascular:      Rate and Rhythm: Normal rate.      Pulses: Normal pulses.   Pulmonary:      Effort: Pulmonary effort is normal. No respiratory distress.   Abdominal:      General: Abdomen is flat.    Musculoskeletal:         General: No swelling or tenderness. Normal range of motion.   Skin:     General: Skin is warm.   Neurological:      Mental Status: She is alert and oriented to person, place, and time. Mental status is at baseline.   Psychiatric:         Mood and Affect: Mood normal.         Behavior: Behavior normal.            Si  Assessment and Plan:     Supraventricular tachycardia  Patient has palpitations and has ECG with AF. Patient has an elevated [id number] (age and sex) and is on Xarelto. Patient is on toprol 12.5mg daily as well. Patient would like to be off as many medications as possible.     - Proceed with ESTRELLA Rodriguez Obi, MD  Cardiac Electrophysiology  [facility] - [facility] Stay Cardiac [facility]

## 2024-01-09 NOTE — ASSESSMENT & PLAN NOTE
Patient has palpitations and has ECG with AF. Patient has an elevated [id number] (age and sex) and is on Xarelto. Patient is on toprol 12.5mg daily as well. Patient would like to be off as many medications as possible.     - Proceed with ILR

## 2024-01-09 NOTE — DISCHARGE SUMMARY
[facility] Short Stay Cardiac Unit  Cardiac Electrophysiology  Discharge Summary      Patient Name: Meredith Ramos  MRN: [id number]  Admission Date: 1/9/2024  Hospital Length of Stay: 0 days  Discharge Date and Time:  01/09/2024 12:42 PM  Attending Physician: No att. providers found    Discharging Provider: Dhara Mata MD  Primary Care Physician: Lisa Childress MD    HPI:   Patient is a 74 yo F pmhx of pAF, SVT (AT) seen on holter monitor, mitral valve prolapse, episode of reported SVT on 2/8 given adenosine by EMS at a rate of 150bpm which then was repeated and AF. No scanned in strip in media. Of note her apple watch just noted high heart rates. It did not note AF per patient. Patient states frequent palpitations. Usually short bursts.  Sometimes nightly sometimes every few days.       Presents for ILR implantation. She has no complaints. ECG NSR.     Procedure(s) (LRB):  Insertion, Implantable Loop Recorder (N/A)     Indwelling Lines/Drains at time of discharge:  Lines/Drains/Airways       None                   Hospital Course:  S/p implantation of ILR for surveillance.    Goals of Care Treatment Preferences:  Code Status: Full Code      Consults:     Significant Diagnostic Studies: N/A    Pending Diagnostic Studies:       None            There are no hospital problems to display for this patient.    No new Assessment & Plan notes have been filed under this hospital service since the last note was generated.  Service: Arrhythmia      Discharged Condition: stable    Disposition: Home or Self Care    Follow Up:   Follow-up Information       DEVICE CHECK CLINIC Follow up in 1 week(s).                           Patient Instructions:   No discharge procedures on file.  Medications:  Reconciled Home Medications:      Medication List        CONTINUE taking these medications      calcium-vitamin D3 500 mg-5 mcg (200 unit) per tablet  Commonly known as: OS- + D3  Calcium 500 + D     cholecalciferol  (vitamin D3) 25 mcg (1,000 unit) capsule  Commonly known as: VITAMIN D3     MAGNESIUM CITRATE ORAL  Take 250 mg by mouth once daily.     metoprolol succinate 25 MG 24 hr tablet  Commonly known as: TOPROL-XL  Take 1 tablet (25 mg total) by mouth once daily. .5-1 once or twice daily or as directed     rivaroxaban 20 mg Tab  Commonly known as: XARELTO  Take 1 tablet (20 mg total) by mouth daily with dinner or evening meal.     vitamin K2 100 mcg Cap  Take 1 capsule by mouth once daily.              Time spent on the discharge of patient: 45 minutes    Dhara Mata MD  Cardiac Electrophysiology  [facility] - Short Stay Cardiac Unit

## 2024-01-09 NOTE — PLAN OF CARE
Received report from lab. Patient s/p loop implantation, AAOx3. VSS, no c/o pain or discomfort at this time, resp even and unlabored. Dermabond to chest wall is CDI. Dressing applied. No active bleeding. No hematoma noted. Post procedure protocol reviewed with patient and patient's family. Understanding verbalized. Family members at bedside. Nurse call bell within reach. Will continue to monitor per post procedure protocol.

## 2024-01-09 NOTE — HPI
Patient is a 72 yo F pmhx of pAF, SVT (AT) seen on holter monitor, mitral valve prolapse, episode of reported SVT on 2/8 given adenosine by EMS at a rate of 150bpm which then was repeated and AF. No scanned in strip in media. Of note her apple watch just noted high heart rates. It did not note AF per patient. Patient states frequent palpitations. Usually short bursts.  Sometimes nightly sometimes every few days.       Presents for ILR implantation. She has no complaints. ECG NSR.

## 2024-01-09 NOTE — PLAN OF CARE
Patient discharged per MD orders. Instructions given on medications, wound care, activity, signs of infection, when to call [facility], and follow up appointments. Pt verbalized understanding. PIV removed. Patient and family refused transport, ambulated off unit.

## 2024-01-09 NOTE — DISCHARGE INSTRUCTIONS
Post-Procedure Patient Discharge Instructions  Loop Recorders     Wound Care  You are discharged with a standard dressing over the incision, you may remove the dressing after 24 hours.   There is Dermabond (clear glue) over your incision, do not scrub it off. It acts as a barrier and will eventually disappear.  Do not get the incision wet for 48 hours following the procedure. You may sponge bath during this period, working around the incision. After 48 hours, you may shower, but you should still try to keep this area as dry as possible, and avoid direct water contact to the incision (allow the water to hit back of your shoulder rather than directly on the incision). Gently pat the incision dry if it does get wet.  You may take regular showers after 2 weeks, unless otherwise indicated at your follow-up visit.  Do not submerge the incision in a tub, pool, or body of water for 6 weeks.  If you notice unusual swelling, redness, drainage, have more device site pain, chest pain, shortness of breath, or have a fever greater than 100 degrees, call our device clinic immediately: [phone] or [phone] during normal office hours. You may call [phone] after-hours or on weekends and ask for the electrophysiologist on call.     Any need to reschedule or cancel procedures, or any questions regarding your procedures should be addressed directly with the Arrhythmia Department Nurses at the following phone number: [phone].

## 2024-01-09 NOTE — SUBJECTIVE & OBJECTIVE
Past Medical History:   Diagnosis Date    Diverticulitis     Kidney stone     Osteoporosis     ostenopenia        Past Surgical History:   Procedure Laterality Date    BREAST BIOPSY      x1    BUNIONECTOMY      Hammer toe repair    BUNIONECTOMY      COLECTOMY  2004    partial for diverticulitis    DIAGNOSTIC CARDIAC ELECTROPHYSIOLOGY STUDY N/A 10/02/2023    Procedure: EP - diagnostic;  Surgeon: Shin Ortiz MD;  Location: [facility] EP LAB;  Service: Cardiology;  Laterality: N/A;    HYSTERECTOMY  2013    complete laparoscopic hysterectomy at [facility].  No cancer.  Found 3cm fibroid on right ovary.    kidney stones      removal of breast implants         Review of patient's allergies indicates:   Allergen Reactions    Cortisone (hydrocortisone) [hydrocortisone] Other (See Comments)     Triggers svt       No current facility-administered medications on file prior to encounter.     Current Outpatient Medications on File Prior to Encounter   Medication Sig    calcium-vitamin D3 (OS- + D3) 500 mg(1,250mg) -200 unit per tablet Calcium 500 + D    cholecalciferol, vitamin D3, (VITAMIN D3) 25 mcg (1,000 unit) capsule     MAGNESIUM CITRATE ORAL Take 250 mg by mouth once daily.    metoprolol succinate (TOPROL-XL) 25 MG 24 hr tablet Take 1 tablet (25 mg total) by mouth once daily. .5-1 once or twice daily or as directed    vitamin K2 100 mcg Cap Take 1 capsule by mouth once daily.    rivaroxaban (XARELTO) 20 mg Tab Take 1 tablet (20 mg total) by mouth daily with dinner or evening meal.     Family History       Problem Relation (Age of Onset)    Breast cancer Sister (54)    Cancer Father, Sister    Colon cancer Maternal Grandfather    Hypertension Father    Uterine cancer Maternal Grandmother          Tobacco Use    Smoking status: Former     Current packs/day: 0.00     Types: Cigarettes     Quit date: 2003     Years since quittin.5    Smokeless tobacco: Never   Substance and Sexual Activity     Alcohol use: No    Drug use: No    Sexual activity: Not Currently     Partners: Male     Review of Systems   Constitutional: Negative for chills and fever.   HENT: Negative.     Eyes: Negative.    Cardiovascular:  Negative for dyspnea on exertion.   Respiratory:  Negative for shortness of breath.    Endocrine: Negative.    Skin: Negative.    Musculoskeletal: Negative.    Gastrointestinal: Negative.    Genitourinary: Negative.    Neurological: Negative.    Psychiatric/Behavioral: Negative.       Objective:     Vital Signs (Most Recent):    Vital Signs (24h Range):             There is no height or weight on file to calculate BMI.             Physical Exam  Constitutional:       General: She is not in acute distress.     Appearance: Normal appearance. She is not ill-appearing.   HENT:      Nose: Nose normal.      Mouth/Throat:      Mouth: Mucous membranes are moist.   Eyes:      Extraocular Movements: Extraocular movements intact.   Cardiovascular:      Rate and Rhythm: Normal rate.      Pulses: Normal pulses.   Pulmonary:      Effort: Pulmonary effort is normal. No respiratory distress.   Abdominal:      General: Abdomen is flat.   Musculoskeletal:         General: No swelling or tenderness. Normal range of motion.   Skin:     General: Skin is warm.   Neurological:      Mental Status: She is alert and oriented to person, place, and time. Mental status is at baseline.   Psychiatric:         Mood and Affect: Mood normal.         Behavior: Behavior normal.            Si

## 2024-01-12 ENCOUNTER — PATIENT MESSAGE (OUTPATIENT)
Dept: ELECTROPHYSIOLOGY | Facility: CLINIC | Age: 74
End: 2024-01-12
Payer: MEDICARE

## 2024-01-16 ENCOUNTER — OFFICE VISIT (OUTPATIENT)
Dept: SPORTS MEDICINE | Facility: CLINIC | Age: 74
End: 2024-01-16
Payer: MEDICARE

## 2024-01-16 VITALS
HEIGHT: 65 IN | BODY MASS INDEX: 19.66 KG/M2 | HEART RATE: 63 BPM | SYSTOLIC BLOOD PRESSURE: 126 MMHG | DIASTOLIC BLOOD PRESSURE: 78 MMHG | WEIGHT: 118 LBS

## 2024-01-16 DIAGNOSIS — M25.661 DECREASED RANGE OF MOTION OF RIGHT KNEE: ICD-10-CM

## 2024-01-16 DIAGNOSIS — M25.561 RIGHT MEDIAL KNEE PAIN: ICD-10-CM

## 2024-01-16 DIAGNOSIS — M17.11 PRIMARY OSTEOARTHRITIS OF RIGHT KNEE: Primary | ICD-10-CM

## 2024-01-16 PROCEDURE — 99213 OFFICE O/P EST LOW 20 MIN: CPT | Mod: PBBFAC | Performed by: FAMILY MEDICINE

## 2024-01-16 PROCEDURE — 99214 OFFICE O/P EST MOD 30 MIN: CPT | Mod: S$PBB,,, | Performed by: FAMILY MEDICINE

## 2024-01-16 PROCEDURE — 99999 PR PBB SHADOW E&M-EST. PATIENT-LVL III: CPT | Mod: PBBFAC,,, | Performed by: FAMILY MEDICINE

## 2024-01-16 NOTE — PROGRESS NOTES
History of Present Illness (HPI)  1/16/24  Meredith Ramos, a 74 y.o. female, presents today for follow up evaluation of her right KNEE. At last appointment, 12/11/23, patient completed Euflexxa series with Dr. Oniel Espinal & pain/symptoms did improve. Pt is not currently having pain but concerned that there is fluid in her knee. Notes some stiffness with knee flexion. Pain is 0/10 at present & up to 2/10 with provacative activity including descending stairs. She reports wanting to get ahead of the pain.     09/13/23  Meredith Ramos, a 74 y.o. female, presents today for follow up evaluation of her right KNEE. At last appointment, 5/22/2023, patient completed Euflexxa series & pain/symptoms did improve. Pain returned 3 weeks ago. Pain is 0/10 at present & up to 2/10 with provacative activity including descending stairs. She reports wanting to get ahead of the pain.     05/08/23  Location: anterior knee   Onset: chronic, insidious  Palliative:    relative rest   oral analgesics, OTC   Physical Therapy for 6 weeks    [facility] iaknee r 2/7/23- good relief, ongoing but not tolerated due to side effects  Provocative: prolonged ambulation  Prior: none  Progression: plateau discomfort  Quality: sharp  Radiation: none  Severity: per nursing documentation  Timing: intermittent w/ use  Trauma: none    Review of Systems (ROS)  A 10+ review of systems was performed with pertinent positives and negatives noted above in the history of present illness. Other systems were negative unless otherwise specified.    Physical Examination (PE)  General:  The patient is alert and oriented x 3. Mood is pleasant. Observation of ears, eyes and nose reveal no gross abnormalities. HEENT: NCAT, sclera anicteric.   Lungs: Respirations are equal and unlabored.  Gait is coordinated. Patient can toe walk and heel walk without difficulty.    KNEE EXAMINATION    Observation/Inspection  Gait:   Antalgic   Alignment:  Neutral   Scars:   None   Muscle  atrophy: Mild  Effusion:  None   Warmth:  None   Discoloration:   none     Tenderness / Crepitus (T / C):         T / C      T / C  Patella   - / -   Lateral joint line   - / -     Peripatellar medial  -  Medial joint line    + / -  Peripatellar lateral -  Medial plica   - / -  Patellar tendon -   Popliteal fossa   - / -  Quad tendon   -   Gastrocnemius   -  Prepatellar Bursa - / -   Quadricep   -  Tibial tubercle  -  Thigh/hamstring  -  Pes anserine/HS -  Fibula    -  ITB   - / -  Tibia     -  Tib/fib joint  - / -  LCL    -    MFC   - / -   MCL: Proximal  -    LFC   - / -   Distal    -          ROM: (* = pain)  PASSIVE   ACTIVE    Left :   5 / 0 / 145   5 / 0 / 145     Right :    5 / 0 / 145   5 / 0 / 145    Patellofemoral examination:  See above noted areas of tenderness.   Patella position    Subluxation / dislocation: Centered        Sup. / Inf;   Normal   Crepitus (PF):    Absent   Patellar Mobility:       Medial-lateral:   Normal    Superior-inferior:  Normal    Inferior tilt   Normal    Patellar tendon:  Normal   Lateral tilt:    Normal   J-sign:     None   Patellofemoral grind:   No pain     Meniscal Signs:     Pain on terminal extension:  +*  Pain on terminal flexion:  +*  Katies maneuver:  +*  Squat     NT  Thesaly    NT    Ligament Examination:  ACL / Lachman:  WNL  PCL-Post.  drawer: normal 0 to 2mm  MCL- Valgus:  normal 0 to 2mm  LCL- Varus:    normal 0 to 2mm  Pivot shift:  guarding   Dial Test:   difference c/w other side   At 30° flexion: normal (< 5°)    At 90° flexion: normal (< 5°)   Reverse Pivot Shift:   normal (Equal)     Strength: (* = with pain) Painful Side  Quadriceps   5/5  Hamstrin/5    Extremity Neuro-vascular Examination:   Sensation:  Grossly intact to light touch all dermatomal regions.   Motor Function:  Fully intact motor function at hip, knee, foot and ankle    DTRs;  quadriceps and  achilles 2+.  No clonus and downgoing Babinski.    Vascular status:  DP and PT  pulses 2+, brisk capillary refill, symmetric.     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, rose medial compartment, right  w/ prior knee joint effusion    No evidence of neurologic pathology  No evidence of vascular pathology    Imaging studies reviewed:   X-ray knee, bilat 23.05    Plan  We discussed the importance of appropriate diet, weight, and regular exercise    We discussed options including    Watchful waiting / relative rest    Physical therapy x   Injection therapy Does not do well w/ iaCSI  Repeat Euflexxa in future, if she would like   Consultation    The patient chooses As above   x = prescribed  CSI = corticosteroid injection  VSI = viscosupplement injection  PRPI = platelet rich plasma injection  ia = intra articular  R = right  L = left  B = bilateral   nfSx = surgical consultation was recommended, but patient is not interested in consultation at this time    Physical Therapy        [facility]   Formal (fPT), @ [facility]        Homegoing (hgPT), per concurrent fPT recommendations    Homegoing (hgPT), per prior fPT recommendations t   Homegoing (hgPT), handout provided        w/  (atPT)    [blank] = not prescribed  x = prescribed  b = prescribed, and begin as indicated  t = continue as indicated  r = prescribed, and restart as indicated  p = completed prior as indicated  hs = prescribed, and with high school   col = prescribed, and with college or university   nfPT = physical therapy was recommended, but patient is not interested in PT at this time    Activity (e.g. sports, work) restrictions    [blank] = as tolerated  pt = per physical therapist  at = per   NWB = non weight bearing on affected lower extremity, with crutches assistance for ambulation    Bracing    [blank] = not prescribed  r = recommended, but not fit with at  todays visit  f = prescribed and fit with at todays visit  t = continue as indicated  d = d/c  p = as needed  rare = use on rare, as-needed basis; advised against chronic use    Pain management    [blank] = No prescription necessary. A handout detailing dosing of appropriate   over-the-counter musculoskeletal analgesics was made available to the patient.   m = meloxicam x 14 days  mp = 14 day course of meloxicam prescribed prior    Follow up    [blank] = as needed  [number] = in [number] weeks  CSI = for corticosteroid injection  VSI = for viscosupplement injection or injection series  PRP = for platelet rich plasma injection or injection series  MRI = after MRI imaging  ns = should surgical options be deferred (no surgery)  o = appointment offered, deferred by patient    Should symptoms worsen or fail to resolve, consider    Revisiting the above options and / or Update x-ray knee standing  MRI  -->likely TKA     Vocation:

## 2024-01-19 ENCOUNTER — CLINICAL SUPPORT (OUTPATIENT)
Dept: CARDIOLOGY | Facility: HOSPITAL | Age: 74
End: 2024-01-19
Attending: INTERNAL MEDICINE
Payer: MEDICARE

## 2024-01-19 DIAGNOSIS — I47.10 SUPRAVENTRICULAR TACHYCARDIA: ICD-10-CM

## 2024-01-19 DIAGNOSIS — R00.2 PALPITATIONS: ICD-10-CM

## 2024-01-19 DIAGNOSIS — Z95.818 PRESENCE OF OTHER CARDIAC IMPLANTS AND GRAFTS: ICD-10-CM

## 2024-01-19 DIAGNOSIS — I49.9 CARDIAC ARRHYTHMIA, UNSPECIFIED CARDIAC ARRHYTHMIA TYPE: ICD-10-CM

## 2024-01-31 ENCOUNTER — OFFICE VISIT (OUTPATIENT)
Dept: INTERNAL MEDICINE | Facility: CLINIC | Age: 74
End: 2024-01-31
Payer: MEDICARE

## 2024-01-31 DIAGNOSIS — Z86.39 PERSONAL HISTORY OF OTHER ENDOCRINE, NUTRITIONAL AND METABOLIC DISEASE: ICD-10-CM

## 2024-01-31 DIAGNOSIS — Z13.6 ENCOUNTER FOR SCREENING FOR CARDIOVASCULAR DISORDERS: ICD-10-CM

## 2024-01-31 DIAGNOSIS — Z87.891 PERSONAL HISTORY OF NICOTINE DEPENDENCE: ICD-10-CM

## 2024-01-31 DIAGNOSIS — I48.91 ATRIAL FIBRILLATION, UNSPECIFIED TYPE: ICD-10-CM

## 2024-01-31 DIAGNOSIS — Z00.00 PREVENTATIVE HEALTH CARE: ICD-10-CM

## 2024-01-31 DIAGNOSIS — Z72.0 TOBACCO USE: Primary | ICD-10-CM

## 2024-01-31 PROCEDURE — 99214 OFFICE O/P EST MOD 30 MIN: CPT | Mod: S$PBB,,, | Performed by: INTERNAL MEDICINE

## 2024-01-31 PROCEDURE — 99999 PR PBB SHADOW E&M-EST. PATIENT-LVL IV: CPT | Mod: PBBFAC,,, | Performed by: INTERNAL MEDICINE

## 2024-01-31 PROCEDURE — 99214 OFFICE O/P EST MOD 30 MIN: CPT | Mod: PBBFAC | Performed by: INTERNAL MEDICINE

## 2024-02-01 ENCOUNTER — PATIENT MESSAGE (OUTPATIENT)
Dept: ENDOCRINOLOGY | Facility: CLINIC | Age: 74
End: 2024-02-01
Payer: MEDICARE

## 2024-02-01 DIAGNOSIS — M80.00XD AGE-RELATED OSTEOPOROSIS WITH CURRENT PATHOLOGICAL FRACTURE WITH ROUTINE HEALING: Primary | ICD-10-CM

## 2024-02-04 VITALS
OXYGEN SATURATION: 97 % | SYSTOLIC BLOOD PRESSURE: 122 MMHG | TEMPERATURE: 99 F | HEART RATE: 65 BPM | BODY MASS INDEX: 19.54 KG/M2 | WEIGHT: 117.31 LBS | DIASTOLIC BLOOD PRESSURE: 64 MMHG | HEIGHT: 65 IN

## 2024-02-04 NOTE — PROGRESS NOTES
Subjective:       Patient ID: Meredith Ramos is a 74 y.o. female.    Chief Complaint: Atrial Fibrillation    HPI  She returns for management of atrial fibrillation.  She has had atrial fibrillation for over a year.  Current treatment has included medications outlined in medication list.  She denies palpitations.  Denies pain located in her chest.  She has a history of tobacco use as well as osteoporosis     Past medical history: Partial colectomy secondary to diverticular disease, atrial fibrillation, status post loop recorder placement, rheumatoid arthritis, osteoporosis,  nephrolithiasis, status post hysterectomy.  She had a colonoscopy August 2021,  repeat in 10 years     Medications:  Toprol, Xarelto     NO KNOWN DRUG ALLERGIES       Review of Systems   Constitutional:  Negative for chills, fatigue, fever and unexpected weight change.   Respiratory:  Negative for chest tightness and shortness of breath.    Cardiovascular:  Negative for chest pain and palpitations.   Gastrointestinal:  Negative for abdominal pain and blood in stool.   Neurological:  Negative for dizziness, syncope, numbness and headaches.       Objective:      Physical Exam  HENT:      Right Ear: External ear normal.      Left Ear: External ear normal.      Nose: Nose normal.      Mouth/Throat:      Mouth: Mucous membranes are moist.      Pharynx: Oropharynx is clear.   Eyes:      Pupils: Pupils are equal, round, and reactive to light.   Cardiovascular:      Rate and Rhythm: Normal rate and regular rhythm.      Heart sounds: No murmur heard.  Pulmonary:      Breath sounds: Normal breath sounds.   Abdominal:      General: There is no distension.      Palpations: There is no hepatomegaly or splenomegaly.      Tenderness: There is no abdominal tenderness.   Musculoskeletal:      Cervical back: Normal range of motion.   Lymphadenopathy:      Cervical: No cervical adenopathy.      Upper Body:      Right upper body: No axillary adenopathy.      Left  upper body: No axillary adenopathy.   Neurological:      Cranial Nerves: No cranial nerve deficit.      Sensory: No sensory deficit.      Motor: Motor function is intact.      Deep Tendon Reflexes: Reflexes are normal and symmetric.         Assessment/Plan       Assessment and plan:  1.  Atrial fibrillation:  Controlled.  Continue Toprol.  Check CMP, lipid panel, TSH  2.  Osteoporosis:  She declines medication  3.  History of tobacco use: schedule low-dose CT

## 2024-02-08 ENCOUNTER — LAB VISIT (OUTPATIENT)
Dept: LAB | Facility: HOSPITAL | Age: 74
End: 2024-02-08
Attending: INTERNAL MEDICINE
Payer: MEDICARE

## 2024-02-08 DIAGNOSIS — Z86.39 PERSONAL HISTORY OF OTHER ENDOCRINE, NUTRITIONAL AND METABOLIC DISEASE: ICD-10-CM

## 2024-02-08 DIAGNOSIS — I48.91 ATRIAL FIBRILLATION, UNSPECIFIED TYPE: ICD-10-CM

## 2024-02-08 DIAGNOSIS — Z13.6 ENCOUNTER FOR SCREENING FOR CARDIOVASCULAR DISORDERS: ICD-10-CM

## 2024-02-08 DIAGNOSIS — M80.00XD AGE-RELATED OSTEOPOROSIS WITH CURRENT PATHOLOGICAL FRACTURE WITH ROUTINE HEALING: ICD-10-CM

## 2024-02-08 LAB
25(OH)D3+25(OH)D2 SERPL-MCNC: 50 NG/ML (ref 30–96)
ALBUMIN SERPL BCP-MCNC: 3.6 G/DL (ref 3.5–5.2)
ALP SERPL-CCNC: 86 U/L (ref 55–135)
ALT SERPL W/O P-5'-P-CCNC: 43 U/L (ref 10–44)
ANION GAP SERPL CALC-SCNC: 9 MMOL/L (ref 8–16)
AST SERPL-CCNC: 42 U/L (ref 10–40)
BILIRUB SERPL-MCNC: 0.6 MG/DL (ref 0.1–1)
BUN SERPL-MCNC: 18 MG/DL (ref 8–23)
CALCIUM SERPL-MCNC: 9.5 MG/DL (ref 8.7–10.5)
CHLORIDE SERPL-SCNC: 106 MMOL/L (ref 95–110)
CHOLEST SERPL-MCNC: 181 MG/DL (ref 120–199)
CHOLEST/HDLC SERPL: 2.8 {RATIO} (ref 2–5)
CO2 SERPL-SCNC: 26 MMOL/L (ref 23–29)
CREAT SERPL-MCNC: 0.7 MG/DL (ref 0.5–1.4)
EST. GFR  (NO RACE VARIABLE): >60 ML/MIN/1.73 M^2
GLUCOSE SERPL-MCNC: 85 MG/DL (ref 70–110)
HDLC SERPL-MCNC: 65 MG/DL (ref 40–75)
HDLC SERPL: 35.9 % (ref 20–50)
LDLC SERPL CALC-MCNC: 107 MG/DL (ref 63–159)
NONHDLC SERPL-MCNC: 116 MG/DL
POTASSIUM SERPL-SCNC: 4.1 MMOL/L (ref 3.5–5.1)
PROT SERPL-MCNC: 6 G/DL (ref 6–8.4)
SODIUM SERPL-SCNC: 141 MMOL/L (ref 136–145)
T4 FREE SERPL-MCNC: 0.88 NG/DL (ref 0.71–1.51)
TRIGL SERPL-MCNC: 45 MG/DL (ref 30–150)
TSH SERPL DL<=0.005 MIU/L-ACNC: 4.07 UIU/ML (ref 0.4–4)
VIT B12 SERPL-MCNC: 897 PG/ML (ref 210–950)

## 2024-02-08 PROCEDURE — 84443 ASSAY THYROID STIM HORMONE: CPT | Performed by: INTERNAL MEDICINE

## 2024-02-08 PROCEDURE — 82607 VITAMIN B-12: CPT | Performed by: INTERNAL MEDICINE

## 2024-02-08 PROCEDURE — 84439 ASSAY OF FREE THYROXINE: CPT | Performed by: INTERNAL MEDICINE

## 2024-02-08 PROCEDURE — 80053 COMPREHEN METABOLIC PANEL: CPT | Performed by: INTERNAL MEDICINE

## 2024-02-08 PROCEDURE — 80061 LIPID PANEL: CPT | Performed by: INTERNAL MEDICINE

## 2024-02-08 PROCEDURE — 82306 VITAMIN D 25 HYDROXY: CPT | Performed by: INTERNAL MEDICINE

## 2024-02-08 PROCEDURE — 36415 COLL VENOUS BLD VENIPUNCTURE: CPT | Performed by: INTERNAL MEDICINE

## 2024-02-09 ENCOUNTER — CLINICAL SUPPORT (OUTPATIENT)
Dept: CARDIOLOGY | Facility: HOSPITAL | Age: 74
End: 2024-02-09
Attending: INTERNAL MEDICINE
Payer: MEDICARE

## 2024-02-09 DIAGNOSIS — Z95.818 PRESENCE OF OTHER CARDIAC IMPLANTS AND GRAFTS: ICD-10-CM

## 2024-02-09 DIAGNOSIS — R00.2 PALPITATIONS: ICD-10-CM

## 2024-02-09 PROCEDURE — 93298 REM INTERROG DEV EVAL SCRMS: CPT | Mod: ,,, | Performed by: INTERNAL MEDICINE

## 2024-02-14 ENCOUNTER — HOSPITAL ENCOUNTER (OUTPATIENT)
Dept: RADIOLOGY | Facility: HOSPITAL | Age: 74
Discharge: HOME | End: 2024-02-14
Attending: INTERNAL MEDICINE
Payer: MEDICARE

## 2024-02-14 DIAGNOSIS — Z87.891 PERSONAL HISTORY OF NICOTINE DEPENDENCE: ICD-10-CM

## 2024-02-14 DIAGNOSIS — Z72.0 TOBACCO USE: ICD-10-CM

## 2024-02-14 PROCEDURE — 71271 CT THORAX LUNG CANCER SCR C-: CPT | Mod: 26,,, | Performed by: RADIOLOGY

## 2024-02-14 PROCEDURE — 71271 CT THORAX LUNG CANCER SCR C-: CPT | Mod: TC

## 2024-02-17 PROBLEM — I70.0 AORTIC ATHEROSCLEROSIS: Status: ACTIVE | Noted: 2024-02-17

## 2024-02-18 NOTE — PROGRESS NOTES
Subjective:   Patient ID:  Meredith Ramos is a 74 y.o. female who presents for follow-up of \[facility]\"" Care      HPI:  The patient is here for valvular heart disease and EP issues.  The patient has no chest pain, SOB, TIA, palpitations, syncope or pre-syncope.Patient does  exercise a lot.        Review of Systems   Constitutional: Negative for chills, decreased appetite, diaphoresis, fever, malaise/fatigue, night sweats, weight gain and weight loss.   HENT:  Negative for congestion, hoarse voice, nosebleeds, sore throat and tinnitus.    Eyes:  Negative for blurred vision, double vision, vision loss in left eye, vision loss in right eye, visual disturbance and visual halos.   Cardiovascular:  Negative for chest pain, claudication, cyanosis, dyspnea on exertion, irregular heartbeat, leg swelling, near-syncope, orthopnea, palpitations, paroxysmal nocturnal dyspnea and syncope.   Respiratory:  Negative for cough, hemoptysis, shortness of breath, sleep disturbances due to breathing, snoring, sputum production and wheezing.    Endocrine: Negative for cold intolerance, heat intolerance, polydipsia, polyphagia and polyuria.   Hematologic/Lymphatic: Negative for adenopathy and bleeding problem. Does not bruise/bleed easily.   Skin:  Negative for color change, dry skin, flushing, itching, nail changes, poor wound healing, rash, skin cancer, suspicious lesions and unusual hair distribution.   Musculoskeletal:  Negative for arthritis, back pain, falls, gout, joint pain, joint swelling, muscle cramps, muscle weakness, myalgias and stiffness.   Gastrointestinal:  Negative for abdominal pain, anorexia, change in bowel habit, constipation, diarrhea, dysphagia, heartburn, hematemesis, hematochezia, melena and vomiting.   Genitourinary:  Negative for decreased libido, dysuria, hematuria, hesitancy and urgency.   Neurological:  Negative for excessive daytime sleepiness, dizziness, focal weakness, headaches, light-headedness, loss of  "balance, numbness, paresthesias, seizures, sensory change, tremors, vertigo and weakness.   Psychiatric/Behavioral:  Negative for altered mental status, depression, hallucinations, memory loss, substance abuse and suicidal ideas. The patient does not have insomnia and is not nervous/anxious.    Allergic/Immunologic: Negative for environmental allergies and hives.       Objective: /69   Pulse (!) 59   Ht 5' 5" (1.651 m)   Wt 52.6 kg (115 lb 15.4 oz)   SpO2 99%   BMI 19.30 kg/m²      Physical Exam  Constitutional:       Appearance: She is well-developed.   HENT:      Head: Normocephalic.   Eyes:      Pupils: Pupils are equal, round, and reactive to light.   Neck:      Thyroid: No thyromegaly.      Vascular: Normal carotid pulses. No carotid bruit, hepatojugular reflux or JVD.   Cardiovascular:      Rate and Rhythm: Normal rate and regular rhythm.      Pulses: Intact distal pulses.      Heart sounds: Murmur heard.      Mid to late systolic murmur is present with a grade of 1/6. Mid systolic click      No friction rub. No gallop.   Pulmonary:      Effort: Pulmonary effort is normal. No tachypnea or respiratory distress.      Breath sounds: Normal breath sounds. No wheezing or rales.   Chest:      Chest wall: No tenderness.   Abdominal:      General: Bowel sounds are normal. There is no distension.      Palpations: Abdomen is soft. There is no mass.      Tenderness: There is no abdominal tenderness. There is no guarding or rebound.   Musculoskeletal:         General: No tenderness. Normal range of motion.      Cervical back: Normal range of motion.   Lymphadenopathy:      Cervical: No cervical adenopathy.   Skin:     General: Skin is warm.      Findings: No erythema or rash.   Neurological:      Mental Status: She is alert and oriented to person, place, and time.      Cranial Nerves: No cranial nerve deficit.      Coordination: Coordination normal.   Psychiatric:         Behavior: Behavior normal.         " Thought Content: Thought content normal.         Judgment: Judgment normal.       Assessment:     1. Aortic atherosclerosis    2. PAF (paroxysmal atrial fibrillation)    3. On anticoagulant therapy    4. Supraventricular tachycardia    5. Sleep disorder    6. Nonrheumatic mitral valve regurgitation    7. MVP (mitral valve prolapse)        Plan:   Discussed diet , achieving and maintaining ideal body weight, and exercise.   We reviewed meds in detail.  Reassured-Discussed goals, options, plan.  Needs serial echoes  Seeing EP  Reduce exercise intensity  Meredith was seen today for establish care.    Diagnoses and all orders for this visit:    Aortic atherosclerosis    PAF (paroxysmal atrial fibrillation)  -     Echo; Future; Expected date: 02/21/2024  -     EKG 12-lead; Future; Expected date: 04/20/2025    On anticoagulant therapy    Supraventricular tachycardia  -     Echo; Future; Expected date: 02/21/2024  -     EKG 12-lead; Future; Expected date: 04/20/2025    Sleep disorder    Nonrheumatic mitral valve regurgitation  -     Echo; Future; Expected date: 02/21/2024  -     EKG 12-lead; Future; Expected date: 04/20/2025    MVP (mitral valve prolapse)  -     Echo; Future; Expected date: 02/21/2024  -     EKG 12-lead; Future; Expected date: 04/20/2025            Follow up in about 15 months (around 5/20/2025) for with ECG; JAMIL Ferguson to read soon.

## 2024-02-20 ENCOUNTER — OFFICE VISIT (OUTPATIENT)
Dept: CARDIOLOGY | Facility: CLINIC | Age: 74
End: 2024-02-20
Payer: MEDICARE

## 2024-02-20 VITALS
WEIGHT: 115.94 LBS | HEIGHT: 65 IN | OXYGEN SATURATION: 99 % | HEART RATE: 59 BPM | BODY MASS INDEX: 19.32 KG/M2 | SYSTOLIC BLOOD PRESSURE: 111 MMHG | DIASTOLIC BLOOD PRESSURE: 69 MMHG

## 2024-02-20 DIAGNOSIS — G47.9 SLEEP DISORDER: ICD-10-CM

## 2024-02-20 DIAGNOSIS — Z79.01 ON ANTICOAGULANT THERAPY: ICD-10-CM

## 2024-02-20 DIAGNOSIS — I47.10 SUPRAVENTRICULAR TACHYCARDIA: ICD-10-CM

## 2024-02-20 DIAGNOSIS — I48.0 PAF (PAROXYSMAL ATRIAL FIBRILLATION): ICD-10-CM

## 2024-02-20 DIAGNOSIS — I34.0 NONRHEUMATIC MITRAL VALVE REGURGITATION: ICD-10-CM

## 2024-02-20 DIAGNOSIS — I34.1 MVP (MITRAL VALVE PROLAPSE): ICD-10-CM

## 2024-02-20 DIAGNOSIS — I70.0 AORTIC ATHEROSCLEROSIS: Primary | ICD-10-CM

## 2024-02-20 PROCEDURE — 99999 PR PBB SHADOW E&M-EST. PATIENT-LVL IV: CPT | Mod: PBBFAC,,, | Performed by: INTERNAL MEDICINE

## 2024-02-20 PROCEDURE — 99214 OFFICE O/P EST MOD 30 MIN: CPT | Mod: PBBFAC | Performed by: INTERNAL MEDICINE

## 2024-02-20 PROCEDURE — 99215 OFFICE O/P EST HI 40 MIN: CPT | Mod: S$PBB,,, | Performed by: INTERNAL MEDICINE

## 2024-02-20 NOTE — PATIENT INSTRUCTIONS
Discussed diet , achieving and maintaining ideal body weight, and exercise.   We reviewed meds in detail.  Reassured-Discussed goals, options, plan.  Needs serial echoes  Seeing EP  Reduce exercise intensity

## 2024-02-23 LAB
OHS CV AF BURDEN PERCENT: < 1
OHS CV DC REMOTE DEVICE TYPE: NORMAL

## 2024-02-25 DIAGNOSIS — I47.10 SUPRAVENTRICULAR TACHYCARDIA: ICD-10-CM

## 2024-02-25 DIAGNOSIS — I48.0 PAF (PAROXYSMAL ATRIAL FIBRILLATION): ICD-10-CM

## 2024-02-26 RX ORDER — RIVAROXABAN 20 MG/1
TABLET, FILM COATED ORAL
Qty: 30 TABLET | Refills: 11 | Status: SHIPPED | OUTPATIENT
Start: 2024-02-26

## 2024-02-26 RX ORDER — METOPROLOL SUCCINATE 25 MG/1
25 TABLET, EXTENDED RELEASE ORAL
Qty: 90 TABLET | Refills: 3 | OUTPATIENT
Start: 2024-02-26

## 2024-02-26 RX ORDER — METOPROLOL SUCCINATE 25 MG/1
25 TABLET, EXTENDED RELEASE ORAL DAILY
Qty: 90 TABLET | Refills: 3 | Status: SHIPPED | OUTPATIENT
Start: 2024-02-26 | End: 2024-02-29 | Stop reason: SDUPTHER

## 2024-02-27 ENCOUNTER — PATIENT MESSAGE (OUTPATIENT)
Dept: CARDIOLOGY | Facility: CLINIC | Age: 74
End: 2024-02-27

## 2024-02-27 ENCOUNTER — HOSPITAL ENCOUNTER (OUTPATIENT)
Dept: CARDIOLOGY | Facility: HOSPITAL | Age: 74
Discharge: HOME | End: 2024-02-27
Attending: INTERNAL MEDICINE
Payer: MEDICARE

## 2024-02-27 VITALS
DIASTOLIC BLOOD PRESSURE: 70 MMHG | WEIGHT: 115 LBS | HEART RATE: 70 BPM | HEIGHT: 65 IN | BODY MASS INDEX: 19.16 KG/M2 | SYSTOLIC BLOOD PRESSURE: 110 MMHG

## 2024-02-27 DIAGNOSIS — I47.10 SUPRAVENTRICULAR TACHYCARDIA: ICD-10-CM

## 2024-02-27 DIAGNOSIS — I48.0 PAF (PAROXYSMAL ATRIAL FIBRILLATION): ICD-10-CM

## 2024-02-27 DIAGNOSIS — I34.1 MVP (MITRAL VALVE PROLAPSE): ICD-10-CM

## 2024-02-27 DIAGNOSIS — I34.0 NONRHEUMATIC MITRAL VALVE REGURGITATION: ICD-10-CM

## 2024-02-27 DIAGNOSIS — Z00.00 ENCOUNTER FOR MEDICARE ANNUAL WELLNESS EXAM: ICD-10-CM

## 2024-02-27 PROBLEM — Z95.818 STATUS POST PLACEMENT OF IMPLANTABLE LOOP RECORDER: Status: ACTIVE | Noted: 2024-02-27

## 2024-02-27 LAB
ASCENDING AORTA: 3.09 CM
BSA FOR ECHO PROCEDURE: 1.55 M2
CV ECHO LV RWT: 0.46 CM
DOP CALC LVOT AREA: 3.4 CM2
DOP CALC LVOT DIAMETER: 2.09 CM
DOP CALC LVOT PEAK VEL: 1.23 M/S
DOP CALC LVOT STROKE VOLUME: 87.78 CM3
DOP CALCLVOT PEAK VEL VTI: 25.6 CM
E WAVE DECELERATION TIME: 142.5 MSEC
E/A RATIO: 1.25
E/E' RATIO: 8.63 M/S
ECHO LV POSTERIOR WALL: 0.89 CM (ref 0.6–1.1)
EJECTION FRACTION: 63 %
FRACTIONAL SHORTENING: 34 % (ref 28–44)
INTERVENTRICULAR SEPTUM: 0.62 CM (ref 0.6–1.1)
IVRT: 76.12 MSEC
LA MAJOR: 4.83 CM
LA MINOR: 4.89 CM
LA WIDTH: 4.52 CM
LEFT ATRIUM SIZE: 2.97 CM
LEFT ATRIUM VOLUME INDEX MOD: 42.7 ML/M2
LEFT ATRIUM VOLUME INDEX: 35.5 ML/M2
LEFT ATRIUM VOLUME MOD: 66.62 CM3
LEFT ATRIUM VOLUME: 55.45 CM3
LEFT INTERNAL DIMENSION IN SYSTOLE: 2.57 CM (ref 2.1–4)
LEFT VENTRICLE DIASTOLIC VOLUME INDEX: 41.76 ML/M2
LEFT VENTRICLE DIASTOLIC VOLUME: 65.15 ML
LEFT VENTRICLE MASS INDEX: 53 G/M2
LEFT VENTRICLE SYSTOLIC VOLUME INDEX: 15.3 ML/M2
LEFT VENTRICLE SYSTOLIC VOLUME: 23.82 ML
LEFT VENTRICULAR INTERNAL DIMENSION IN DIASTOLE: 3.88 CM (ref 3.5–6)
LEFT VENTRICULAR MASS: 82.29 G
LV LATERAL E/E' RATIO: 8.63 M/S
LV SEPTAL E/E' RATIO: 8.63 M/S
MV PEAK A VEL: 0.55 M/S
MV PEAK E VEL: 0.69 M/S
MV STENOSIS PRESSURE HALF TIME: 41.33 MS
MV VALVE AREA P 1/2 METHOD: 5.32 CM2
PISA TR MAX VEL: 2.41 M/S
RA MAJOR: 4.62 CM
RA PRESSURE ESTIMATED: 8 MMHG
RA WIDTH: 3.89 CM
RIGHT VENTRICULAR END-DIASTOLIC DIMENSION: 3.49 CM
RV TB RVSP: 10 MMHG
SINUS: 3.21 CM
STJ: 2.69 CM
TDI LATERAL: 0.08 M/S
TDI SEPTAL: 0.08 M/S
TDI: 0.08 M/S
TR MAX PG: 23 MMHG
TRICUSPID ANNULAR PLANE SYSTOLIC EXCURSION: 2.68 CM
TV REST PULMONARY ARTERY PRESSURE: 31 MMHG
Z-SCORE OF LEFT VENTRICULAR DIMENSION IN END DIASTOLE: -1.48
Z-SCORE OF LEFT VENTRICULAR DIMENSION IN END SYSTOLE: -0.65

## 2024-02-27 PROCEDURE — 93306 TTE W/DOPPLER COMPLETE: CPT

## 2024-02-27 PROCEDURE — 93306 TTE W/DOPPLER COMPLETE: CPT | Mod: 26,,, | Performed by: INTERNAL MEDICINE

## 2024-02-27 NOTE — PROGRESS NOTES
The mitral valve leaking is a bit more -the heart is still strong and the leaking not enough to where we have to do mitral surgery but having Atrial Fib makes this a potential - would she like to see the cardiac surgeon like Eddie Rubio to discuss mitral valve repair?

## 2024-02-27 NOTE — PROGRESS NOTES
Ms. Ramos is a patient of Dr. Ortiz and was last seen in clinic 3/8/2023.      Subjective:   Patient ID:  Meredith Ramos is a 74 y.o. female who presents for follow up of Palpitations  .     HPI:    Ms. Ramos is a 74 y.o. female with pAF, SVT, MVP here for follow up after loop recorder implantation.    Background:    Patient is a 74 yo F pmhx of pAF, SVT (AT) seen on holter monitor, mitral valve prolapse, episode of reported SVT on 2/8 given adenosine by EMS at a rate of 150bpm which then was repeated and AF. No scanned in strip in media. Of note her apple watch just noted high heart rates. It did not note AF per patient. Patient states frequent palpitations. Usually short bursts.  Sometimes nightly sometimes every few days.      2017 Holter with AT.    2/2023 ECGs with coarse AF with follow up ekg shortly afterwards in normal sinus rhythm    Echo EF 60% MVP    PET/STRESS negative    3/8/2023: ECG today normal sinus rhythm rate 68bpm   Patient has palpitations and has ECG with AF. Patient has an elevated [id number] (age and sex) and is on Xarelto. Patient is on toprol 12.5mg daily as well. Patient would like to be off as many medications as possible. Interestingly from her history her Apple watch did not state AF and per ER note EMS was initially concerned for SVT and didn't see AF until after adenosine which can certainly cause someone to degenerate into AF.   -Discussed options of EPS+ILR vs ILR, patient will consider and call back    Update (02/28/2024):    1/9/2024: Successful implantation of Loop Recorder.     Today she says she feels her heart race when exercising. She works hard to increase her HR during exercise. Minimal palps at rest. No MUSE, CP, LH, syncope reported.    She is currently taking xarelto 20mg daily for stroke prophylaxis and denies significant bleeding episodes. She is currently being treated with toprol 12.5mg daily for HR control.  Kidney function is stable, with a creatinine of  0.7 on 2/8/2024.    ILR shows false AF events. Visible p waves noted. I reviewed events with patient and they were confirmed to be primarily during exercise.    I have personally reviewed the patient's EKG today, which shows sinus paddy at 54bpm. OR interval is 152. QRS is 74. QT is 450.    Relevant Cardiac Test Results:    2D Echo (2/27/2024):    Left Ventricle: The left ventricle is normal in size. Normal wall thickness. There is concentric remodeling. There is normal systolic function with a visually estimated ejection fraction of 60 - 65%. Ejection fraction by visual approximation is 63%. There is normal diastolic function.    Right Ventricle: Normal right ventricular cavity size. Wall thickness is normal. Right ventricle wall motion  is normal. Systolic function is normal.    Left Atrium: Left atrium is mildly dilated.    Aortic Valve: There is moderate aortic valve sclerosis.    Mitral Valve: There is mild bileaflet sclerosis. There is bileaflet prolapse. There is at least moderate and possibly moderate-severe ( 2-3+) regurgitation.    Tricuspid Valve: There is mild regurgitation.    Pulmonary Artery: The estimated pulmonary artery systolic pressure is 31 mmHg.    IVC/SVC: Intermediate venous pressure at 8 mmHg.    Current Outpatient Medications   Medication Sig    calcium-vitamin D3 (OS- + D3) 500 mg(1,250mg) -200 unit per tablet Calcium 500 + D    cholecalciferol, vitamin D3, (VITAMIN D3) 25 mcg (1,000 unit) capsule     MAGNESIUM CITRATE ORAL Take 250 mg by mouth once daily.    metoprolol succinate (TOPROL-XL) 25 MG 24 hr tablet Take 1 tablet (25 mg total) by mouth once daily. .5-1 once or twice daily or as directed    vitamin K2 100 mcg Cap Take 1 capsule by mouth once daily.    XARELTO 20 mg Tab TAKE 1 TABLET(20 MG) BY MOUTH DAILY EVENING MEAL WITH DINNER    ZINC PICOLINATE ORAL      No current facility-administered medications for this visit.     Facility-Administered Medications Ordered in Other  "Visits   Medication    ceFAZolin 2 g in dextrose 5 % in water (D5W) 50 mL IVPB (MB+)       Review of Systems   Constitutional: Negative for malaise/fatigue.   Cardiovascular:  Positive for palpitations. Negative for chest pain, dyspnea on exertion, irregular heartbeat and leg swelling.   Respiratory:  Negative for shortness of breath.    Hematologic/Lymphatic: Negative for bleeding problem.   Skin:  Negative for rash.   Musculoskeletal:  Negative for myalgias.   Gastrointestinal:  Negative for hematemesis, hematochezia and nausea.   Genitourinary:  Negative for hematuria.   Neurological:  Negative for light-headedness.   Psychiatric/Behavioral:  Negative for altered mental status.    Allergic/Immunologic: Negative for persistent infections.       Objective:          BP (!) 97/59   Pulse (!) 54   Ht 5' 5" (1.651 m)   Wt 52.3 kg (115 lb 4.8 oz)   BMI 19.19 kg/m²     Physical Exam  Vitals and nursing note reviewed.   Constitutional:       Appearance: Normal appearance. She is well-developed.   HENT:      Head: Normocephalic.      Nose: Nose normal.   Eyes:      Pupils: Pupils are equal, round, and reactive to light.   Cardiovascular:      Rate and Rhythm: Regular rhythm. Bradycardia present.   Pulmonary:      Effort: No respiratory distress.      Breath sounds: Normal breath sounds.   Musculoskeletal:         General: Normal range of motion.   Skin:     General: Skin is warm and dry.      Findings: No erythema.   Neurological:      Mental Status: She is alert and oriented to person, place, and time.   Psychiatric:         Speech: Speech normal.         Behavior: Behavior normal.           Lab Results   Component Value Date     02/08/2024    K 4.1 02/08/2024    MG 2.1 02/07/2023    BUN 18 02/08/2024    CREATININE 0.7 02/08/2024    ALT 43 02/08/2024    AST 42 (H) 02/08/2024    HGB 13.8 09/25/2023    HCT 41.6 09/25/2023    TSH 4.069 (H) 02/08/2024    LDLCALC 107.0 02/08/2024       Recent Labs   Lab " 09/25/23  0901   INR 1.0       Assessment:     1. Status post placement of implantable loop recorder    2. PAF (paroxysmal atrial fibrillation)    3. Supraventricular tachycardia    4. On anticoagulant therapy      Plan:     In summary, Ms. Ramos is a 74 y.o. female with pAF, SVT, MVP here for follow up after loop recorder implantation.  She is 6 weeks s/p ILR implantation to monitor for AF. Loop reports reviewed show false AF episodes with visible p waves.  Episodes reviewed with patient and are during exercise. She is on low dose toprol 12.5mg daily. Recommend she continue this given hx of SVT.  She is on xarelto with only one confirmed AF event. Will discuss possibly discontinuing OAC with Dr. Ortiz if no AF is seen.    Continue current meds  Continue monthly loop reports  RTC 1 yr, sooner if needed      *A copy of this note has been sent to Dr. Ortiz*    Follow up in about 1 year (around 2/28/2025).    ------------------------------------------------------------------    GRACIE Dumont, NP-C  Cardiac Electrophysiology

## 2024-02-28 ENCOUNTER — HOSPITAL ENCOUNTER (OUTPATIENT)
Dept: CARDIOLOGY | Facility: CLINIC | Age: 74
Discharge: HOME | End: 2024-02-28
Payer: MEDICARE

## 2024-02-28 ENCOUNTER — TELEPHONE (OUTPATIENT)
Dept: CARDIOLOGY | Facility: HOSPITAL | Age: 74
End: 2024-02-28
Payer: MEDICARE

## 2024-02-28 ENCOUNTER — OFFICE VISIT (OUTPATIENT)
Dept: ELECTROPHYSIOLOGY | Facility: CLINIC | Age: 74
End: 2024-02-28
Payer: MEDICARE

## 2024-02-28 VITALS
HEART RATE: 54 BPM | DIASTOLIC BLOOD PRESSURE: 59 MMHG | SYSTOLIC BLOOD PRESSURE: 97 MMHG | WEIGHT: 115.31 LBS | HEIGHT: 65 IN | BODY MASS INDEX: 19.21 KG/M2

## 2024-02-28 DIAGNOSIS — I47.10 SUPRAVENTRICULAR TACHYCARDIA: ICD-10-CM

## 2024-02-28 DIAGNOSIS — I48.0 PAF (PAROXYSMAL ATRIAL FIBRILLATION): ICD-10-CM

## 2024-02-28 DIAGNOSIS — Z95.818 STATUS POST PLACEMENT OF IMPLANTABLE LOOP RECORDER: Primary | ICD-10-CM

## 2024-02-28 DIAGNOSIS — Z79.01 ON ANTICOAGULANT THERAPY: ICD-10-CM

## 2024-02-28 LAB
OHS QRS DURATION: 74 MS
OHS QTC CALCULATION: 426 MS

## 2024-02-28 PROCEDURE — 93005 ELECTROCARDIOGRAM TRACING: CPT

## 2024-02-28 PROCEDURE — 99213 OFFICE O/P EST LOW 20 MIN: CPT | Mod: PBBFAC,25 | Performed by: NURSE PRACTITIONER

## 2024-02-28 PROCEDURE — 93010 ELECTROCARDIOGRAM REPORT: CPT | Mod: ,,, | Performed by: INTERNAL MEDICINE

## 2024-02-28 PROCEDURE — 99214 OFFICE O/P EST MOD 30 MIN: CPT | Mod: S$PBB,,, | Performed by: NURSE PRACTITIONER

## 2024-02-28 PROCEDURE — 99999 PR PBB SHADOW E&M-EST. PATIENT-LVL III: CPT | Mod: PBBFAC,,, | Performed by: NURSE PRACTITIONER

## 2024-02-28 NOTE — TELEPHONE ENCOUNTER
Spoke with patient and informed her that she should send a remote transmission from her [place] Loop recorder prior to her MRI and after her MRI.  Nothing else needs to be done for the MRI with her loop recorder.  Patient stated understanding.

## 2024-02-28 NOTE — Clinical Note
Pt with hx of SVT and one AF episode after adenosine in 2023 now 6 weeks s/p ILR placement. To date only false AF observed. Are you ok discontinuing her xarelto after 6 mo if true no AF or would you prefer she continue longer? thx

## 2024-02-29 DIAGNOSIS — I47.10 SUPRAVENTRICULAR TACHYCARDIA: ICD-10-CM

## 2024-02-29 DIAGNOSIS — I48.0 PAF (PAROXYSMAL ATRIAL FIBRILLATION): ICD-10-CM

## 2024-02-29 RX ORDER — METOPROLOL SUCCINATE 25 MG/1
TABLET, EXTENDED RELEASE ORAL
Qty: 90 TABLET | Refills: 3 | Status: SHIPPED | OUTPATIENT
Start: 2024-02-29

## 2024-03-04 ENCOUNTER — TELEPHONE (OUTPATIENT)
Dept: CARDIOTHORACIC SURGERY | Facility: CLINIC | Age: 74
End: 2024-03-04
Payer: MEDICARE

## 2024-03-04 NOTE — TELEPHONE ENCOUNTER
Received return call from pt. Scheduled her for next available consultation appointment. Pt verbalized understanding of appointment date, time, and location.

## 2024-03-11 ENCOUNTER — CLINICAL SUPPORT (OUTPATIENT)
Dept: CARDIOLOGY | Facility: HOSPITAL | Age: 74
End: 2024-03-11
Attending: INTERNAL MEDICINE
Payer: MEDICARE

## 2024-03-11 DIAGNOSIS — R00.2 PALPITATIONS: ICD-10-CM

## 2024-03-11 DIAGNOSIS — Z95.818 PRESENCE OF OTHER CARDIAC IMPLANTS AND GRAFTS: ICD-10-CM

## 2024-03-11 PROCEDURE — 93298 REM INTERROG DEV EVAL SCRMS: CPT | Mod: ,,, | Performed by: INTERNAL MEDICINE

## 2024-03-12 LAB
OHS CV AF BURDEN PERCENT: < 1
OHS CV DC REMOTE DEVICE TYPE: NORMAL
OHS CV ICD SHOCK: NO

## 2024-03-13 NOTE — PROGRESS NOTES
"Subjective:      Patient ID: Meredith Ramos is a 74 y.o. female.    Chief Complaint: No chief complaint on file.      HPI:  Meredith Ramos is a 74 y.o. female who presents for surgical evaluation of mitral valve prolapse. Medical conditions include atrial fibrillation?, s/p loop recorder 10/3/23, osteopenia, SVT, NOEMI.     Per EP Notes. "Patient had an episode of reported SVT on 2/8 given adenosine by EMS at a rate of 150bpm which then was repeated and AF. Loop reports reviewed show false AF episodes with visible p waves.  Episodes reviewed with patient and are during exercise. She is on low dose toprol 12.5mg daily. Recommend she continue this given hx of SVT. She is on xarelto with only one confirmed AF event. Will discuss possibly discontinuing OAC with Dr. Ortiz if no AF is seen."    Patient states that last year she had an SVT attack when exercising which prompted a cardiac workup. Her mitral valve prolapse was noted at that time. She exercises regularly and is very active. Denies any overt shortness of breath, dizziness, chest pain, lower extremity swelling. Does have fatigue and palpitations. Reports her father also had mitral valve prolapse. No prior strokes, seizures, blood clots, stents, or sternotomies. Smoked on and off from age 16 to age 52. No significant drinking history.     Family and social history reviewed    Current Outpatient Medications   Medication Instructions    calcium-vitamin D3 (OS- + D3) 500 mg(1,250mg) -200 unit per tablet Calcium 500 + D    cholecalciferol, vitamin D3, (VITAMIN D3) 25 mcg (1,000 unit) capsule No dose, route, or frequency recorded.    cyanocobalamin (vitamin B-12) (VITAMIN B-12) 50 mcg, Oral, Daily    MAGNESIUM CITRATE ORAL 250 mg, Oral, Daily    metoprolol succinate (TOPROL-XL) 25 MG 24 hr tablet One half to one a day or as directed.    vitamin K2 100 mcg Cap 1 capsule, Oral, Daily    XARELTO 20 mg Tab TAKE 1 TABLET(20 MG) BY MOUTH DAILY EVENING MEAL WITH " DINNER    ZINC PICOLINATE ORAL          Review of patient's allergies indicates:   Allergen Reactions    Cortisone (hydrocortisone) [hydrocortisone] Other (See Comments)     Triggers svt     Past Medical History:   Diagnosis Date    Diverticulitis     Kidney stone     Osteoporosis     ostenopenia      Past Surgical History:   Procedure Laterality Date    BREAST BIOPSY      x1    BUNIONECTOMY      Hammer toe repair    BUNIONECTOMY      COLECTOMY  2004    partial for diverticulitis    DIAGNOSTIC CARDIAC ELECTROPHYSIOLOGY STUDY N/A 10/02/2023    Procedure: EP - diagnostic;  Surgeon: Shin Ortiz MD;  Location: [facility] EP [facility];  Service: Cardiology;  Laterality: N/A;    HYSTERECTOMY  2013    complete laparoscopic hysterectomy at [facility].  No cancer.  Found 3cm fibroid on right ovary.    INSERTION OF IMPLANTABLE LOOP RECORDER N/A 2024    Procedure: Insertion, Implantable Loop Recorder;  Surgeon: Shin Ortiz MD;  Location: [facility] EP [facility];  Service: Cardiology;  Laterality: N/A;  SVT/AT, ILR, BSci, Local, SK, 3 Prep    kidney stones      removal of breast implants       Family History       Problem Relation (Age of Onset)    Breast cancer Sister (54)    Cancer Father, Sister    Colon cancer Maternal Grandfather    Hypertension Father    Uterine cancer Maternal Grandmother          Social History     Socioeconomic History    Marital status:    Tobacco Use    Smoking status: Former     Current packs/day: 0.00     Types: Cigarettes     Quit date: 2003     Years since quittin.7    Smokeless tobacco: Never   Substance and Sexual Activity    Alcohol use: No    Drug use: No    Sexual activity: Not Currently     Partners: Male     Social Determinants of Health     Financial Resource Strain: Low Risk  (2024)    Overall Financial Resource Strain (CARDIA)     Difficulty of Paying Living Expenses: Not hard at all   Food Insecurity: No Food Insecurity (2024)    Hunger Vital Sign      Worried About Running Out of Food in the Last Year: Never true     Ran Out of Food in the Last Year: Never true   Transportation Needs: No Transportation Needs (1/30/2024)    PRAPARE - Transportation     Lack of Transportation (Medical): No     Lack of Transportation (Non-Medical): No   Physical Activity: Sufficiently Active (1/30/2024)    Exercise Vital Sign     Days of Exercise per Week: 6 days     Minutes of Exercise per Session: 60 min   Stress: Stress Concern Present (1/30/2024)    [employer] - Occupational Stress Questionnaire     Feeling of Stress : To some extent   Social Connections: Unknown (1/30/2024)    Social Connection and Isolation Panel [NHANES]     Frequency of Communication with Friends and Family: More than three times a week     Frequency of Social Gatherings with Friends and Family: Twice a week     Active Member of Clubs or Organizations: Yes     Attends Club or Organization Meetings: More than 4 times per year     Marital Status:    Housing Stability: Low Risk  (1/30/2024)    Housing Stability Vital Sign     Unable to Pay for Housing in the Last Year: No     Number of Places Lived in the Last Year: 1     Unstable Housing in the Last Year: No       Current medications Reviewed    Review of Systems   Constitutional:  Positive for fatigue.   HENT:  Negative for nosebleeds.    Eyes:  Negative for visual disturbance.   Respiratory:  Negative for shortness of breath.    Cardiovascular:  Positive for palpitations. Negative for chest pain and leg swelling.   Gastrointestinal:  Negative for nausea.   Musculoskeletal:  Negative for gait problem.   Skin:  Negative for color change.   Neurological:  Negative for dizziness and seizures.   Hematological:  Does not bruise/bleed easily.   Psychiatric/Behavioral:  Negative for sleep disturbance.      Objective:   Physical Exam  Constitutional:       General: She is not in acute distress.  HENT:      Head: Normocephalic and  atraumatic.   Eyes:      Pupils: Pupils are equal, round, and reactive to light.   Cardiovascular:      Rate and Rhythm: Normal rate.   Pulmonary:      Effort: Pulmonary effort is normal. No respiratory distress.   Musculoskeletal:         General: No swelling. Normal range of motion.      Cervical back: Normal range of motion.   Skin:     Coloration: Skin is not pale.   Neurological:      General: No focal deficit present.      Mental Status: She is alert.   Psychiatric:         Mood and Affect: Mood normal.         Behavior: Behavior normal.         Diagnostic Results: reviewed   TTE 2/27/24    Left Ventricle: The left ventricle is normal in size. Normal wall thickness. There is concentric remodeling. There is normal systolic function with a visually estimated ejection fraction of 60 - 65%. Ejection fraction by visual approximation is 63%. There is normal diastolic function.    Right Ventricle: Normal right ventricular cavity size. Wall thickness is normal. Right ventricle wall motion  is normal. Systolic function is normal.    Left Atrium: Left atrium is mildly dilated.    Aortic Valve: There is moderate aortic valve sclerosis.    Mitral Valve: There is mild bileaflet sclerosis. There is bileaflet prolapse. There is at least moderate and possibly moderate-severe ( 2-3+) regurgitation.    Tricuspid Valve: There is mild regurgitation.    Pulmonary Artery: The estimated pulmonary artery systolic pressure is 31 mmHg.    IVC/SVC: Intermediate venous pressure at 8 mmHg.  LV 3.88    TTE 2/8/23  Mild left atrial enlargement.  The left ventricle is normal in size with normal systolic function.  The estimated ejection fraction is 60%.  Indeterminate left ventricular diastolic function.  Normal right ventricular size with normal right ventricular systolic function.  There is bileaflet mitral prolapse.  At least moderate, eccentric, posteriorly directed mitral regurgitation.  Mild pulmonic regurgitation.  Intermediate  central venous pressure (8 mmHg).  The estimated PA systolic pressure is 32 mmHg.  LV 4.29    Assessment:   MVP with MR   Atrial Fibrillation/SVT   Plan:     CTS Attending Note:    I have personally taken the history and examined this patient and agree with the MALATHI's note as stated above.  74-year-old woman who has been followed for mitral regurgitation.  Her jet is eccentric and difficult to quantify.  She is reported to have at least moderate mitral regurgitation.  She is extremely active, and denies dyspnea on exertion.  She does get tachycardic with exercise, and although the loop recorder reported atrial fibrillation apparently this was erroneous when reviewed by the electrophysiology nurse practitioner.  I reviewed her echo in detail.  She does not have worrisome findings.  Interestingly, her left atrium was only mildly dilated which would argue against chronic severe mitral regurgitation, in particular in light of her activity level.  I think it would be reasonable for now just to follow her.  We did discuss acute cord rupture in the likelihood that that would instigate symptoms.  I will plan to see her back in 1 year with a repeat echo.

## 2024-03-14 ENCOUNTER — OFFICE VISIT (OUTPATIENT)
Dept: CARDIOTHORACIC SURGERY | Facility: CLINIC | Age: 74
End: 2024-03-14
Payer: MEDICARE

## 2024-03-14 VITALS
OXYGEN SATURATION: 99 % | WEIGHT: 115 LBS | SYSTOLIC BLOOD PRESSURE: 106 MMHG | BODY MASS INDEX: 19.16 KG/M2 | DIASTOLIC BLOOD PRESSURE: 62 MMHG | HEIGHT: 65 IN | HEART RATE: 62 BPM

## 2024-03-14 DIAGNOSIS — I48.0 PAF (PAROXYSMAL ATRIAL FIBRILLATION): ICD-10-CM

## 2024-03-14 DIAGNOSIS — I34.0 NONRHEUMATIC MITRAL VALVE REGURGITATION: Primary | ICD-10-CM

## 2024-03-14 DIAGNOSIS — I34.1 MVP (MITRAL VALVE PROLAPSE): ICD-10-CM

## 2024-03-14 PROCEDURE — 99999 PR PBB SHADOW E&M-EST. PATIENT-LVL III: CPT | Mod: PBBFAC,,, | Performed by: THORACIC SURGERY (CARDIOTHORACIC VASCULAR SURGERY)

## 2024-03-14 PROCEDURE — 99213 OFFICE O/P EST LOW 20 MIN: CPT | Mod: PBBFAC | Performed by: THORACIC SURGERY (CARDIOTHORACIC VASCULAR SURGERY)

## 2024-03-14 PROCEDURE — 99204 OFFICE O/P NEW MOD 45 MIN: CPT | Mod: S$PBB,,, | Performed by: THORACIC SURGERY (CARDIOTHORACIC VASCULAR SURGERY)

## 2024-03-25 ENCOUNTER — PATIENT MESSAGE (OUTPATIENT)
Dept: SLEEP MEDICINE | Facility: CLINIC | Age: 74
End: 2024-03-25
Payer: MEDICARE

## 2024-03-27 ENCOUNTER — PATIENT MESSAGE (OUTPATIENT)
Dept: CARDIOLOGY | Facility: CLINIC | Age: 74
End: 2024-03-27
Payer: MEDICARE

## 2024-04-04 ENCOUNTER — PATIENT MESSAGE (OUTPATIENT)
Dept: INTERNAL MEDICINE | Facility: CLINIC | Age: 74
End: 2024-04-04
Payer: MEDICARE

## 2024-04-11 ENCOUNTER — CLINICAL SUPPORT (OUTPATIENT)
Dept: CARDIOLOGY | Facility: HOSPITAL | Age: 74
End: 2024-04-11
Attending: INTERNAL MEDICINE
Payer: MEDICARE

## 2024-04-11 DIAGNOSIS — Z95.818 PRESENCE OF OTHER CARDIAC IMPLANTS AND GRAFTS: ICD-10-CM

## 2024-04-11 DIAGNOSIS — R00.2 PALPITATIONS: ICD-10-CM

## 2024-04-11 PROCEDURE — 93298 REM INTERROG DEV EVAL SCRMS: CPT | Mod: 26,,, | Performed by: INTERNAL MEDICINE

## 2024-04-15 ENCOUNTER — PATIENT MESSAGE (OUTPATIENT)
Dept: RHEUMATOLOGY | Facility: CLINIC | Age: 74
End: 2024-04-15
Payer: MEDICARE

## 2024-04-15 ENCOUNTER — PATIENT MESSAGE (OUTPATIENT)
Dept: INTERNAL MEDICINE | Facility: CLINIC | Age: 74
End: 2024-04-15
Payer: MEDICARE

## 2024-04-15 NOTE — TELEPHONE ENCOUNTER
Pt inquiring about Rx for CGM    Pt reports early morning hypoglycemia  Pt reports time corresponds with early morning sleep disturbances   Pt has 2 sisters with Hashimoto's       Pt last saw PCP 1/31/24  Pt last saw ADRIAN Lozada 3/11/22

## 2024-04-16 NOTE — TELEPHONE ENCOUNTER
Called pt and informed her that Dr Espinoza is out of office on today but will return on Wednesday 2/27/19. Pt messages were fwd to Dr Espinoza.   It is possible that the lip swelling was a side effect of gabapentin but it seems unusual that it would be improving even while you continue with the 300 mg dose. Since you would prefer to stop gabapentin anyway, we can plan to have you taper off of medication and monitor for worsening arm pain/symptoms.  You can take 200 mg nightly for 1 week, then 100 mg nightly for 1 week, then stop  You should be seen urgently if you have worsening lip swelling, call 911 or go to the ER if you have throat swelling, difficulty breathing, etc.

## 2024-04-18 LAB
OHS CV AF BURDEN PERCENT: < 1
OHS CV DC REMOTE DEVICE TYPE: NORMAL
OHS CV ICD SHOCK: NO

## 2024-04-30 ENCOUNTER — PATIENT MESSAGE (OUTPATIENT)
Dept: ADMINISTRATIVE | Facility: CLINIC | Age: 74
End: 2024-04-30
Payer: MEDICARE

## 2024-04-30 ENCOUNTER — OFFICE VISIT (OUTPATIENT)
Dept: RHEUMATOLOGY | Facility: CLINIC | Age: 74
End: 2024-04-30
Payer: MEDICARE

## 2024-04-30 VITALS
HEART RATE: 69 BPM | DIASTOLIC BLOOD PRESSURE: 63 MMHG | WEIGHT: 112.88 LBS | BODY MASS INDEX: 18.81 KG/M2 | SYSTOLIC BLOOD PRESSURE: 100 MMHG | HEIGHT: 65 IN

## 2024-04-30 DIAGNOSIS — M06.9 RHEUMATOID ARTHRITIS INVOLVING BOTH HANDS, UNSPECIFIED WHETHER RHEUMATOID FACTOR PRESENT: Primary | ICD-10-CM

## 2024-04-30 PROCEDURE — 99214 OFFICE O/P EST MOD 30 MIN: CPT | Mod: S$PBB,,, | Performed by: INTERNAL MEDICINE

## 2024-04-30 PROCEDURE — 99999 PR PBB SHADOW E&M-EST. PATIENT-LVL III: CPT | Mod: PBBFAC,,, | Performed by: INTERNAL MEDICINE

## 2024-04-30 PROCEDURE — 99213 OFFICE O/P EST LOW 20 MIN: CPT | Mod: PBBFAC | Performed by: INTERNAL MEDICINE

## 2024-04-30 NOTE — PROGRESS NOTES
Subjective:      Patient ID: Meredith Ramos is a 73 y.o. female.    Chief Complaint: Disease Management    HPI 73 year old with PMH of diverticulitis, proctitis, SVT,  PAF on Xeralto, MVP,osteoporosis, proctitis, mitral valve regurgitation,  MTFHR mutation here for evaluation.     She was diagnosed with extensor tendon instability bilateral hands, changes consistent with possible rheumatoid arthritis, sagittal band ruptures, and thumb MCP arthritis and instability recently.  On occasion, she has pain in left lateral hip and right knee. Pain is mild. She will get her right knee drained and it helps her.       Denies rashes or bloody diarrhea. She was a smoker and quit when she was 53. She smoked from age 16 off and on until age  53.  Denies oral ulcers, fevers, raynauds, photosensitivity, or pleurisy.    Family hx: AUNT: RA    Interval history:She has been noticing blood in her stool  and off since December.    She has upcoming colonoscopy.  Denies any joint pain,swelling, or stiffness.      Patient Active Problem List   Diagnosis    Abnormal Pap smear    Atypical glandular cells on Pap smear    Palpitations    Left ureteral stone    Osteopenia    History of kidney stones    History of ulcerative colitis    Supraventricular tachycardia    PAF (paroxysmal atrial fibrillation)    MVP (mitral valve prolapse)    Nonrheumatic mitral valve regurgitation    Right medial knee pain    Age-related osteoporosis with current pathological fracture with routine healing    Sleep disorder    Atrophy of interosseous muscle of hand    On anticoagulant therapy         Past Medical History:   Diagnosis Date    Diverticulitis     Kidney stone     Osteoporosis     ostenopenia        Review of Systems   Constitutional:  Negative for fever and unexpected weight change.   HENT:  Negative for mouth sores and trouble swallowing.    Eyes:  Negative for redness.   Respiratory:  Negative for cough and shortness of breath.    Cardiovascular:   "Negative for chest pain.   Gastrointestinal:  Negative for constipation and diarrhea.   Genitourinary:  Negative for dysuria and genital sores.   Skin:  Negative for rash.   Neurological:  Negative for headaches.   Hematological:  Bruises/bleeds easily.    see HPI        Objective:   /71   Pulse 62   Ht 5' 5" (1.651 m)   Wt 54.4 kg (119 lb 14.9 oz)   BMI 19.96 kg/m²   Physical Exam   Constitutional: She is oriented to person, place, and time.   HENT:   Head: Normocephalic and atraumatic.   Right Ear: External ear normal.   Left Ear: External ear normal.   Nose: Nose normal.   Mouth/Throat: Oropharynx is clear and moist. No oropharyngeal exudate.   Eyes: Pupils are equal, round, and reactive to light. Conjunctivae are normal. Right eye exhibits no discharge. Left eye exhibits no discharge. No scleral icterus.   Neck: No JVD present. No thyromegaly present.   Cardiovascular: Normal rate, regular rhythm and normal heart sounds. Exam reveals no gallop and no friction rub.   No murmur heard.  Pulmonary/Chest: Effort normal and breath sounds normal. No respiratory distress. She has no wheezes. She has no rales. She exhibits no tenderness.   Abdominal: Soft. Bowel sounds are normal. She exhibits no distension and no mass. There is no abdominal tenderness. There is no rebound and no guarding.   Musculoskeletal:         General: Swelling and deformity present. No tenderness.      Cervical back: Neck supple.   Lymphadenopathy:     She has no cervical adenopathy.   Neurological: She is alert and oriented to person, place, and time. No cranial nerve deficit. Gait normal. Coordination normal.   Skin: Skin is dry. No rash noted. No erythema. No pallor.   Advanced deformities  of both hands with ulnar deviation  Enlarged mcps bilaterally    Psychiatric: Affect and judgment normal.       No data to display     Assessment:   74 year old with PMH of diverticulitis, SVT,  PAF on Xeralto, MVP,osteoporosis, proctitis, mitral " valve regurgitation,  MTFHR mutation here for evaluation of joint pain.  She does report much pain but on exam, she has changes consistent with deformities from RA.  Xrays also consistent with RA.  Patient denies any pain and would like to hold off on medicines.  Can consider MRI of hands but she declines right now.  1. Rheumatoid arthritis involving both hands, unspecified whether rheumatoid factor present          Plan:     Problem List Items Addressed This Visit    None  Visit Diagnoses       Rheumatoid arthritis involving both hands, unspecified whether rheumatoid factor present                Consider MRI of hands  No MTX given MTHFR gene  mutation     Rtc in 6 months  30  minutes of total time spent on the encounter, which includes face to face time and non-face to face time preparing to see the patient (eg, review of tests), Obtaining and/or reviewing separately obtained history, Documenting clinical information in the electronic or other health record, Independently interpreting results (not separately reported) and communicating results to the patient/family/caregiver, or Care coordination (not separately reported).

## 2024-05-01 ENCOUNTER — PATIENT MESSAGE (OUTPATIENT)
Dept: ORTHOPEDICS | Facility: CLINIC | Age: 74
End: 2024-05-01
Payer: MEDICARE

## 2024-05-02 ENCOUNTER — TELEPHONE (OUTPATIENT)
Dept: ADMINISTRATIVE | Facility: CLINIC | Age: 74
End: 2024-05-02
Payer: MEDICARE

## 2024-05-03 ENCOUNTER — PATIENT MESSAGE (OUTPATIENT)
Dept: CARDIOLOGY | Facility: CLINIC | Age: 74
End: 2024-05-03
Payer: MEDICARE

## 2024-05-04 ENCOUNTER — OFFICE VISIT (OUTPATIENT)
Dept: HOME HEALTH SERVICES | Facility: CLINIC | Age: 74
End: 2024-05-04
Payer: MEDICARE

## 2024-05-04 ENCOUNTER — TELEPHONE (OUTPATIENT)
Dept: ADMINISTRATIVE | Facility: CLINIC | Age: 74
End: 2024-05-04
Payer: MEDICARE

## 2024-05-04 VITALS — WEIGHT: 112 LBS | HEIGHT: 65 IN | BODY MASS INDEX: 18.66 KG/M2

## 2024-05-04 DIAGNOSIS — M81.0 AGE-RELATED OSTEOPOROSIS WITHOUT CURRENT PATHOLOGICAL FRACTURE: ICD-10-CM

## 2024-05-04 DIAGNOSIS — I70.0 AORTIC ATHEROSCLEROSIS: ICD-10-CM

## 2024-05-04 DIAGNOSIS — Z00.00 ENCOUNTER FOR MEDICARE ANNUAL WELLNESS EXAM: Chronic | ICD-10-CM

## 2024-05-04 DIAGNOSIS — I48.0 PAF (PAROXYSMAL ATRIAL FIBRILLATION): ICD-10-CM

## 2024-05-04 DIAGNOSIS — I47.10 SUPRAVENTRICULAR TACHYCARDIA: ICD-10-CM

## 2024-05-04 DIAGNOSIS — Z79.01 ON ANTICOAGULANT THERAPY: ICD-10-CM

## 2024-05-04 DIAGNOSIS — Z95.818 STATUS POST PLACEMENT OF IMPLANTABLE LOOP RECORDER: ICD-10-CM

## 2024-05-04 DIAGNOSIS — Z00.00 ENCOUNTER FOR PREVENTIVE HEALTH EXAMINATION: Primary | ICD-10-CM

## 2024-05-04 PROCEDURE — G0439 PPPS, SUBSEQ VISIT: HCPCS | Mod: 95,,, | Performed by: NURSE PRACTITIONER

## 2024-05-04 RX ORDER — ALCOHOL 2.38 KG/3.79L
GEL TOPICAL
COMMUNITY

## 2024-05-04 NOTE — PROGRESS NOTES
"The patient location is: [place]  The chief complaint leading to consultation is: AWV    Visit type: audiovisual    Face to Face time with patient: 40  60 minutes of total time spent on the encounter, which includes face to face time and non-face to face time preparing to see the patient (eg, review of tests), Obtaining and/or reviewing separately obtained history, Documenting clinical information in the electronic or other health record, Independently interpreting results (not separately reported) and communicating results to the patient/family/caregiver, or Care coordination (not separately reported).         Each patient to whom he or she provides medical services by telemedicine is:  (1) informed of the relationship between the physician and patient and the respective role of any other health care provider with respect to management of the patient; and (2) notified that he or she may decline to receive medical services by telemedicine and may withdraw from such care at any time.    Notes:       Meredith Ramos presented for a follow-up Medicare AWV today. The following components were reviewed and updated:    Medical history  Family History  Social history  Allergies and Current Medications  Health Risk Assessment  Health Maintenance  Care Team    **See Completed Assessments for Annual Wellness visit with in the encounter summary    The following assessments were completed:  Depression Screening  Cognitive function Screening  Timed Get Up Test  Whisper Test      Opioid documentation:      Patient does not have a current opioid prescription.          Vitals:    05/04/24 1209   Weight: 50.8 kg (112 lb)   Height: 5' 5" (1.651 m)     Body mass index is 18.64 kg/m².       Physical Exam  Constitutional:       Appearance: Normal appearance.   HENT:      Head: Normocephalic.   Eyes:      General: No scleral icterus.  Neurological:      Mental Status: She is alert.   Psychiatric:         Mood and Affect: Mood normal.  "        Behavior: Behavior normal.           Diagnoses and health risks identified today and associated recommendations/orders:  1. Encounter for preventive health examination  - Screenings performed, as noted above. Personal preventative testing needs reviewed.     2. PAF (paroxysmal atrial fibrillation)  - Stable, followed by cardiology    3. Aortic atherosclerosis  - Stable, mild, seen on CT    4. Supraventricular tachycardia  - Stable, controlled, followed by cardiology    5. Status post placement of implantable loop recorder  - Stable, followed by cardiology    6. On anticoagulant therapy  - Stable, followed by cardiology. OK to stop xarelto 3 days prior to upcoming colonoscopy, per Dr Ferguson.     7. Age-related osteoporosis without current pathological fracture  - Stable, followed by endocrinology and non-[facility] MD    8. Encounter for Medicare annual wellness exam  - Ambulatory Referral/Consult to Enhanced Annual Wellness Visit (eAWV)      Provided Meredith with a 5-10 year written screening schedule and personal prevention plan. Recommendations were developed using the USPSTF age appropriate recommendations. Education, counseling, and referrals were provided as needed.  After Visit Summary printed and given to patient which includes a list of additional screenings\tests needed.    Follow up in about 1 year (around 5/4/2025) for your next annual wellness visit.      Joan Billy, REFUGIO  I offered to discuss advanced care planning, including how to pick a person who would make decisions for you if you were unable to make them for yourself, called a health care power of , and what kind of decisions you might make such as use of life sustaining treatments such as ventilators and tube feeding when faced with a life limiting illness recorded on a living will that they will need to know. (How you want to be cared for as you near the end of your natural life)     X  Patient has advanced  directives written and agrees to provide copies to the institution.

## 2024-05-04 NOTE — PATIENT INSTRUCTIONS
Counseling and Referral of Other Preventative  (Italic type indicates deductible and co-insurance are waived)    Patient Name: Meredith Ramos  Today's Date: 5/4/2024    Health Maintenance       Date Due Completion Date    High Dose Statin Never done ---    Colonoscopy 05/14/2021 5/14/2018    Override on 5/14/2018: Done ([facility] -   Dr. Oniel Moran)    COVID-19 Vaccine (8 - 2023-24 season) 05/25/2024 3/30/2024    Mammogram 10/09/2024 10/9/2023    DEXA Scan 01/31/2025 1/31/2023    Override on 6/12/2017: Done (at outside facility)    TETANUS VACCINE 03/14/2028 3/14/2018    Lipid Panel 02/08/2029 2/8/2024        No orders of the defined types were placed in this encounter.    The following information is provided to all patients.  This information is to help you find resources for any of the problems found today that may be affecting your health:                  Living healthy guide: www.[facility].gov      Understanding Diabetes: www.diabetes.org      Eating healthy: www.cdc.gov/healthyweight      CDC home safety checklist: www.cdc.gov/steadi/patient.html      Agency on Aging: www.goea.[place].gov      Alcoholics anonymous (AA): www.aa.org      Physical Activity: www.chen.nih.gov/yr5bhva      Tobacco use: www.quitwithusla.org

## 2024-05-04 NOTE — TELEPHONE ENCOUNTER
Called pt; informed pt I was just making a reminder call for pt's virtual visit today at 12:00pm and to see if pt needed any help; pt stated didn't need any help; pt informed to login 10 minutes prior to appt time

## 2024-05-06 ENCOUNTER — TELEPHONE (OUTPATIENT)
Dept: ORTHOPEDICS | Facility: CLINIC | Age: 74
End: 2024-05-06
Payer: MEDICARE

## 2024-05-08 ENCOUNTER — PATIENT MESSAGE (OUTPATIENT)
Dept: CARDIOLOGY | Facility: CLINIC | Age: 74
End: 2024-05-08
Payer: MEDICARE

## 2024-05-09 ENCOUNTER — HOSPITAL ENCOUNTER (OUTPATIENT)
Dept: RADIOLOGY | Facility: HOSPITAL | Age: 74
Discharge: HOME | End: 2024-05-09
Attending: PHYSICIAN ASSISTANT
Payer: MEDICARE

## 2024-05-09 ENCOUNTER — OFFICE VISIT (OUTPATIENT)
Dept: ORTHOPEDICS | Facility: CLINIC | Age: 74
End: 2024-05-09
Payer: MEDICARE

## 2024-05-09 DIAGNOSIS — M25.532 LEFT WRIST PAIN: ICD-10-CM

## 2024-05-09 DIAGNOSIS — R22.32 MASS OF LEFT HAND: Primary | ICD-10-CM

## 2024-05-09 DIAGNOSIS — M25.532 LEFT WRIST PAIN: Primary | ICD-10-CM

## 2024-05-09 PROCEDURE — 73130 X-RAY EXAM OF HAND: CPT | Mod: 26,LT,, | Performed by: RADIOLOGY

## 2024-05-09 PROCEDURE — 99212 OFFICE O/P EST SF 10 MIN: CPT | Mod: PBBFAC,25 | Performed by: PHYSICIAN ASSISTANT

## 2024-05-09 PROCEDURE — 99213 OFFICE O/P EST LOW 20 MIN: CPT | Mod: S$PBB,,, | Performed by: PHYSICIAN ASSISTANT

## 2024-05-09 PROCEDURE — 99999 PR PBB SHADOW E&M-EST. PATIENT-LVL II: CPT | Mod: PBBFAC,,, | Performed by: PHYSICIAN ASSISTANT

## 2024-05-09 PROCEDURE — 73130 X-RAY EXAM OF HAND: CPT | Mod: TC,LT

## 2024-05-09 NOTE — PROGRESS NOTES
[facility]  History & Physical  Orthopedics    SUBJECTIVE:      Chief Complaint: Left hand injury    Referring Provider: No ref. provider found     Dr. Lucero is the supervising physician for this encounter/patient    History of Present Illness:  Patient is a 74 y.o. right hand dominant female who presents today with complaints of left hand injury occurred 3 weeks ago when she banged the ulnar side of her hand washing dishes. Initially this bruised and was very painful to touch. This has improved greatly with no pain except if she hits the side of the hand. She denies any numbness.     Onset of symptoms/DOI was 3 weeks.    Symptoms are aggravated by activity.    Symptoms are alleviated by rest.    Symptoms consist of pain.    The patient rates their pain as a 0/10.    Attempted treatment(s) and/or interventions include activity modifications, rest,  ice .     The patient denies any fevers, chills, N/V, D/C and presents for evaluation.       Past Medical History:   Diagnosis Date    Diverticulitis     Kidney stone     Osteoporosis     ostenopenia      Past Surgical History:   Procedure Laterality Date    BREAST BIOPSY      x1    BUNIONECTOMY      Hammer toe repair    BUNIONECTOMY      COLECTOMY  2004    partial for diverticulitis    DIAGNOSTIC CARDIAC ELECTROPHYSIOLOGY STUDY N/A 10/02/2023    Procedure: EP - diagnostic;  Surgeon: Shin Ortiz MD;  Location: [facility] EP LAB;  Service: Cardiology;  Laterality: N/A;    HYSTERECTOMY  07/22/2013    complete laparoscopic hysterectomy at [facility] 7/22/13.  No cancer.  Found 3cm fibroid on right ovary.    INSERTION OF IMPLANTABLE LOOP RECORDER N/A 1/9/2024    Procedure: Insertion, Implantable Loop Recorder;  Surgeon: Shin Ortiz MD;  Location: [facility] EP LAB;  Service: Cardiology;  Laterality: N/A;  SVT/AT, ILR, BSci, Local, SK, 3 Prep    kidney stones      removal of breast implants       Review of patient's allergies indicates:   Allergen Reactions     Cortisone (hydrocortisone) [hydrocortisone] Other (See Comments)     Triggers svt     Social History     Social History Narrative    Not on file     Family History   Problem Relation Name Age of Onset    Dementia Mother      Hypertension Father      Cancer Father          bladder cancer    Cancer Sister 4         breast x2 sis    Breast cancer Sister 4 54    Hashimoto's thyroiditis Sister 4         x2 sis    No Known Problems Brother 1     No Known Problems Daughter 1     Alcohol abuse Son 1     Uterine cancer Maternal Grandmother      Colon cancer Maternal Grandfather      Ovarian cancer Neg Hx           Current Outpatient Medications:     calcium-vitamin D3 (OS- + D3) 500 mg(1,250mg) -200 unit per tablet, Calcium 500 + D, Disp: , Rfl:     cholecalciferol, vitamin D3, (VITAMIN D3) 25 mcg (1,000 unit) capsule, , Disp: , Rfl:     cyanocobalamin, vitamin B-12, (VITAMIN B-12) 50 mcg tablet, Take 50 mcg by mouth once daily., Disp: , Rfl:     [id number]-algal oil (METANX/FOLTANX RF) 3 mg-35 mg-2 mg -90.314 mg Cap, Take by mouth., Disp: , Rfl:     magnesium glycinate (MAG GLYCINATE ORAL), Take by mouth., Disp: , Rfl:     metoprolol succinate (TOPROL-XL) 25 MG 24 hr tablet, One half to one a day or as directed., Disp: 90 tablet, Rfl: 3    vitamin K2 100 mcg Cap, Take 1 capsule by mouth once daily., Disp: , Rfl:     XARELTO 20 mg Tab, TAKE 1 TABLET(20 MG) BY MOUTH DAILY EVENING MEAL WITH DINNER, Disp: 30 tablet, Rfl: 11    ZINC PICOLINATE ORAL, , Disp: , Rfl:   No current facility-administered medications for this visit.    Facility-Administered Medications Ordered in Other Visits:     ceFAZolin 2 g in dextrose 5 % in water (D5W) 50 mL IVPB (MB+), 2 g, Intravenous, On Call Procedure, Brandie Adrian NP      Review of Systems:  Constitutional: no fever or chills  Eyes: no visual changes  ENT: no nasal congestion or sore throat  Respiratory: no cough or shortness of breath  Cardiovascular: no chest  pain  Gastrointestinal: no nausea or vomiting, tolerating diet  Musculoskeletal: pain and soreness    OBJECTIVE:      Vital Signs (Most Recent):  There were no vitals filed for this visit.  There is no height or weight on file to calculate BMI.      Physical Exam:  Constitutional: The patient appears well-developed and well-nourished. No distress.   Skin: No lesions appreciated  Head: Normocephalic and atraumatic.   Nose: Nose normal.   Ears: No deformities seen  Eyes: Conjunctivae and EOM are normal.   Neck: No tracheal deviation present.   Cardiovascular: Normal rate and intact distal pulses.    Pulmonary/Chest: Effort normal. No respiratory distress.   Abdominal: There is no guarding.   Neurological: The patient is alert.   Psychiatric: The patient has a normal mood and affect.     Left Hand/Wrist Examination:    Observation/Inspection:  Swelling  none    Deformity  none  Discoloration  Healing ecchymosis ulnar hand    Scars   none    Atrophy  none    HAND/WRIST EXAMINATION:  Finkelstein's Test   Neg  WHAT Test    Neg  Snuff box tenderness   Neg  Alexis's Test    Neg  Hook of Hamate Tenderness  Neg  CMC grind    Neg  Circumduction test   Neg  On exam, there is a small pea size mass present to the ulnar hand at the level of the carpals. This is NTTP and slightly mobile, does not appear related to a tendon    Neurovascular Exam:  Digits WWP, brisk CR < 3s throughout  NVI motor/LTS to M/R/U nerves, radial pulse 2+  Tinel's Test - Carpal Tunnel  Neg  Tinel's Test - Cubital Tunnel  Neg  Phalen's Test    Neg  Median Nerve Compression Test Neg    ROM hand full, painless    ROM wrist full, painless    ROM elbow full, painless    Abdomen not guarded  Respirations nonlabored  Perfusion intact    Diagnostic Results:     Imaging - I independently viewed the patient's imaging as well as the radiology report.  Xrays of the patient's left hand  demonstrate no evidence of any acute fractures or  dislocations.      ASSESSMENT/PLAN:      74 y.o. yo female with Left hand ulnar sided mass, pain improving    Plan: The patient and I had a thorough discussion today.  We discussed the working diagnosis as well as several other potential alternative diagnoses.  Treatment options were discussed, both conservative and surgical.  Conservative treatment options would include things such as activity modifications, workplace modifications, a period of rest, oral vs topical OTC and prescription anti-inflammatory medications, occupational therapy, splinting/bracing, immobilization, corticosteroid injections, and others.  Surgical options were discussed as well.     At this time, the patient would like to proceed with a trial of heat/warm compresses, progress activity as tolerated. If mass enlarges or becomes painful, she will return to clinic for further evaluation.    Should the patient's symptoms worsen, persist, or fail to improve they should return for reevaluation and I would be happy to see them back anytime.           Please do not hesitate to reach out to us via email, phone, or MyChart with any questions, concerns, or feedback.

## 2024-05-12 ENCOUNTER — CLINICAL SUPPORT (OUTPATIENT)
Dept: CARDIOLOGY | Facility: HOSPITAL | Age: 74
End: 2024-05-12
Attending: INTERNAL MEDICINE
Payer: MEDICARE

## 2024-05-12 DIAGNOSIS — R00.2 PALPITATIONS: ICD-10-CM

## 2024-05-12 DIAGNOSIS — Z95.818 PRESENCE OF OTHER CARDIAC IMPLANTS AND GRAFTS: ICD-10-CM

## 2024-05-12 PROCEDURE — 93298 REM INTERROG DEV EVAL SCRMS: CPT | Mod: 26,,, | Performed by: INTERNAL MEDICINE

## 2024-05-16 ENCOUNTER — PATIENT MESSAGE (OUTPATIENT)
Dept: CARDIOLOGY | Facility: CLINIC | Age: 74
End: 2024-05-16
Payer: MEDICARE

## 2024-05-22 LAB
OHS CV AF BURDEN PERCENT: < 1
OHS CV DC REMOTE DEVICE TYPE: NORMAL
OHS CV ICD SHOCK: NO

## 2024-06-10 ENCOUNTER — PATIENT MESSAGE (OUTPATIENT)
Dept: INTERNAL MEDICINE | Facility: CLINIC | Age: 74
End: 2024-06-10
Payer: MEDICARE

## 2024-06-12 ENCOUNTER — PATIENT MESSAGE (OUTPATIENT)
Dept: SPORTS MEDICINE | Facility: CLINIC | Age: 74
End: 2024-06-12
Payer: MEDICARE

## 2024-06-13 ENCOUNTER — CLINICAL SUPPORT (OUTPATIENT)
Dept: CARDIOLOGY | Facility: HOSPITAL | Age: 74
End: 2024-06-13
Attending: INTERNAL MEDICINE
Payer: MEDICARE

## 2024-06-13 ENCOUNTER — CLINICAL SUPPORT (OUTPATIENT)
Dept: CARDIOLOGY | Facility: HOSPITAL | Age: 74
End: 2024-06-13
Payer: MEDICARE

## 2024-06-13 DIAGNOSIS — R00.2 PALPITATIONS: ICD-10-CM

## 2024-06-13 DIAGNOSIS — R26.89 ANTALGIC GAIT: ICD-10-CM

## 2024-06-13 DIAGNOSIS — I49.9 CARDIAC ARRHYTHMIA, UNSPECIFIED: ICD-10-CM

## 2024-06-13 DIAGNOSIS — I47.10 SUPRAVENTRICULAR TACHYCARDIA, UNSPECIFIED: ICD-10-CM

## 2024-06-13 DIAGNOSIS — M17.11 PRIMARY OSTEOARTHRITIS OF RIGHT KNEE: Primary | ICD-10-CM

## 2024-06-13 DIAGNOSIS — G89.29 CHRONIC PAIN OF RIGHT KNEE: ICD-10-CM

## 2024-06-13 DIAGNOSIS — M25.561 CHRONIC PAIN OF RIGHT KNEE: ICD-10-CM

## 2024-06-13 LAB
OHS CV AF BURDEN PERCENT: < 1
OHS CV DC REMOTE DEVICE TYPE: NORMAL
OHS CV ICD SHOCK: NO

## 2024-06-13 PROCEDURE — 93298 REM INTERROG DEV EVAL SCRMS: CPT | Mod: 26,,, | Performed by: INTERNAL MEDICINE

## 2024-06-20 ENCOUNTER — PATIENT MESSAGE (OUTPATIENT)
Dept: SPORTS MEDICINE | Facility: CLINIC | Age: 74
End: 2024-06-20
Payer: MEDICARE

## 2024-07-01 ENCOUNTER — PATIENT MESSAGE (OUTPATIENT)
Dept: SPORTS MEDICINE | Facility: CLINIC | Age: 74
End: 2024-07-01
Payer: MEDICARE

## 2024-07-01 DIAGNOSIS — G89.29 CHRONIC PAIN OF RIGHT KNEE: ICD-10-CM

## 2024-07-01 DIAGNOSIS — M17.11 PRIMARY OSTEOARTHRITIS OF RIGHT KNEE: Primary | ICD-10-CM

## 2024-07-01 DIAGNOSIS — M25.561 CHRONIC PAIN OF RIGHT KNEE: ICD-10-CM

## 2024-07-02 ENCOUNTER — PATIENT MESSAGE (OUTPATIENT)
Dept: ORTHOPEDICS | Facility: CLINIC | Age: 74
End: 2024-07-02
Payer: MEDICARE

## 2024-07-08 ENCOUNTER — OFFICE VISIT (OUTPATIENT)
Dept: SPORTS MEDICINE | Facility: CLINIC | Age: 74
End: 2024-07-08
Payer: MEDICARE

## 2024-07-08 VITALS
HEIGHT: 65 IN | DIASTOLIC BLOOD PRESSURE: 64 MMHG | SYSTOLIC BLOOD PRESSURE: 98 MMHG | HEART RATE: 59 BPM | BODY MASS INDEX: 18.33 KG/M2 | WEIGHT: 110 LBS

## 2024-07-08 DIAGNOSIS — M17.11 PRIMARY OSTEOARTHRITIS OF RIGHT KNEE: Primary | ICD-10-CM

## 2024-07-08 DIAGNOSIS — M25.561 CHRONIC PAIN OF RIGHT KNEE: ICD-10-CM

## 2024-07-08 DIAGNOSIS — G89.29 CHRONIC PAIN OF RIGHT KNEE: ICD-10-CM

## 2024-07-08 PROCEDURE — 20611 DRAIN/INJ JOINT/BURSA W/US: CPT | Mod: PBBFAC | Performed by: FAMILY MEDICINE

## 2024-07-08 PROCEDURE — 99213 OFFICE O/P EST LOW 20 MIN: CPT | Mod: PBBFAC | Performed by: FAMILY MEDICINE

## 2024-07-08 PROCEDURE — 99999 PR PBB SHADOW E&M-EST. PATIENT-LVL III: CPT | Mod: PBBFAC,,, | Performed by: FAMILY MEDICINE

## 2024-07-08 PROCEDURE — 99999PBSHW PR PBB SHADOW TECHNICAL ONLY FILED TO HB: Mod: JZ,PBBFAC,,

## 2024-07-08 RX ORDER — PROGESTERONE 100 MG/1
100 CAPSULE ORAL DAILY
COMMUNITY

## 2024-07-08 RX ADMIN — Medication 20 MG: at 10:07

## 2024-07-08 NOTE — PROGRESS NOTES
Euflexxa knee joint right 1/3  Lot number: [id number]  Expiration date: 5/25/25    MEDICAL NECESSITY FOR VISCOSUPPLEMENTATION USE: After thorough evaluation of the patient, I have determined that viscosupplementation treatment is medically necessary. 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 Oral analgesics including NSAIDs have not adequately controlled the patient's symptoms. There is radiographic evidence of Kellgren-Guillermo grade II (or greater) osteoarthritic (OA) changes, or if lack of radiographic evidence, there is arthroscopic or other evidence of chondrosis of the knee(s).

## 2024-07-08 NOTE — PROCEDURES
"Large Joint Aspiration/Injection: R knee    Date/Time: 7/8/2024 10:45 AM    Performed by: Sarwat Lu MD  Authorized by: Sarwat Lu MD    Consent Done?:  Yes (Verbal)  Indications:  Pain  Site marked: the procedure site was marked    Timeout: prior to procedure the correct patient, procedure, and site was verified    Prep: patient was prepped and draped in usual sterile fashion      Details:  Needle Size:  25 G  Ultrasonic Guidance for needle placement?: Yes    Images are saved and documented.  Approach:  Lateral  Location:  Knee  Site:  R knee  Medications:  20 mg Euflexxa 3 Injections  Patient tolerance:  Patient tolerated the procedure well with no immediate complications     Description of ultrasound utilization for needle guidance:   Ultrasound guidance used for needle localization. Images saved and stored for documentation. The SUPRAPATELLAR BURSA / KNEE JOINT was visualized. Dynamic visualization of the 25g x 1.5" needle was continuous throughout the procedure.     "

## 2024-07-09 ENCOUNTER — TELEPHONE (OUTPATIENT)
Dept: SPINE | Facility: CLINIC | Age: 74
End: 2024-07-09
Payer: MEDICARE

## 2024-07-12 NOTE — PROGRESS NOTES
Subjective:     Patient ID: Meredith Ramos is a 74 y.o. female.    Chief Complaint: Leg Pain (nubmness)    Ms Ramos is a 75 yo female here for evaluation of low back pain.  She has sharp right posterior leg pain She has had the pain 4 weeks ago.  She noticed some back pain with moving something, then a few days later she felt a a burning pain in the back of the leg.  The pain is worse with bending, and sitting.  The pain is from buttock to the knee.  The pain is not constant.  It goes away with standing and walking.  She has some pain with lying down, but sitting and bending are worse.  She has been exercising and has worked to stay fit.  The pain is a burning pain.  The pain is 0/10 now, worst 8/10 sitting or bending, best 0/10 standing.  She has not taken anything.  She feels like she does not need to because it will go away.  No NSAIDs with xarelto    Arthritis survey 1/2024  Cervical spine: Pre-dens space is maintained.  Multilevel cervical spondylosis and reversal of the normal cervical lordosis.  Lower cervical facet arthritis.     Knees: Medial femorotibial joint space narrowing with early right-sided osteophytes.     Hands/wrists: No periarticular osteopenia or erosions.  D IP and MCP arthritis.     Feet: Symmetric advanced arthritis/joint space narrowing with erosion and remodeling of the 2nd MTP joint and asymmetric erosive arthropathy of the right 1st MTP joint.  Hallux valgus of the left MTP joint and bilateral crossed toe deformities.  Cartilage spaces are maintained.     Impression:     Arthritic changes involving the feet and MCP joint which may reflect inflammatory arthropathy given this patient's history of rheumatoid arthritis.     D IP osteoarthritis of the hands.     Cervical spondylosis and osteoarthritis of the right knee    Past Medical History:  No date: Diverticulitis  No date: Kidney stone  No date: Osteoporosis      Comment:  ostenopenia     Past Surgical History:  No date: BREAST  BIOPSY      Comment:  x1  No date: BUNIONECTOMY      Comment:  Hammer toe repair  No date: BUNIONECTOMY  2004: COLECTOMY      Comment:  partial for diverticulitis  10/02/2023: DIAGNOSTIC CARDIAC ELECTROPHYSIOLOGY STUDY; N/A      Comment:  Procedure: EP - diagnostic;  Surgeon: Shin Ortiz MD;               Location: [facility];  Service: Cardiology;  Laterality:               N/A;  07/22/2013: HYSTERECTOMY      Comment:  complete laparoscopic hysterectomy at [facility]                7/22/13.  No cancer.  Found 3cm fibroid on right ovary.  1/9/2024: INSERTION OF IMPLANTABLE LOOP RECORDER; N/A      Comment:  Procedure: Insertion, Implantable Loop Recorder;                 Surgeon: Shin Ortiz MD;  Location: [facility];                 Service: Cardiology;  Laterality: N/A;  SVT/AT, ILR,                BSci, Local, SK, 3 Prep  No date: kidney stones  No date: removal of breast implants    Review of patient's family history indicates:  Problem: Dementia      Relation: Mother          Name:               Age of Onset: (Not Specified)  Problem: Hypertension      Relation: Father          Name:               Age of Onset: (Not Specified)  Problem: Cancer      Relation: Father          Name:               Age of Onset: (Not Specified)              Comment: bladder cancer  Problem: Cancer      Relation: Sister          Name: 4              Age of Onset: (Not Specified)              Comment: breast x2 sis  Problem: Breast cancer      Relation: Sister          Name: 4              Age of Onset: 54  Problem: Hashimoto's thyroiditis      Relation: Sister          Name: 4              Age of Onset: (Not Specified)              Comment: x2 sis  Problem: No Known Problems      Relation: Brother          Name: 1              Age of Onset: (Not Specified)  Problem: No Known Problems      Relation: Daughter          Name: 1              Age of Onset: (Not Specified)  Problem: Alcohol abuse      Relation: Son          Name:  1              Age of Onset: (Not Specified)  Problem: Uterine cancer      Relation: Maternal Grandmother          Name:               Age of Onset: (Not Specified)  Problem: Colon cancer      Relation: Maternal Grandfather          Name:               Age of Onset: (Not Specified)  Problem: Ovarian cancer      Relation: Neg Hx          Name:               Age of Onset: (Not Specified)      Social History    Socioeconomic History      Marital status:     Tobacco Use      Smoking status: Former        Packs/day: 0.00        Types: Cigarettes        Quit date: 2003        Years since quittin.0      Smokeless tobacco: Never      Tobacco comments: Started at 16, smoking on and off, picked up smoking at 52 for 1 year after quitting drinking.     Substance and Sexual Activity      Alcohol use: No      Drug use: No      Sexual activity: Not Currently        Partners: Male    Social Determinants of Health  Financial Resource Strain: Low Risk  (2024)      Overall Financial Resource Strain (CARDIA)          Difficulty of Paying Living Expenses: Not hard at all  Food Insecurity: No Food Insecurity (2024)      Hunger Vital Sign          Worried About Running Out of Food in the Last Year: Never true          Ran Out of Food in the Last Year: Never true  Transportation Needs: No Transportation Needs (2024)      PRAPARE - Transportation          Lack of Transportation (Medical): No          Lack of Transportation (Non-Medical): No  Physical Activity: Sufficiently Active (2024)      Exercise Vital Sign          Days of Exercise per Week: 6 days          Minutes of Exercise per Session: 60 min  Stress: Stress Concern Present (2024)      [employer] - Occupational Stress Questionnaire          Feeling of Stress : To some extent  Housing Stability: Low Risk  (2024)      Housing Stability Vital Sign          Unable to Pay for Housing in the Last Year: No           Homeless in the Last Year: No    Current Outpatient Medications:  calcium-vitamin D3 (OS- + D3) 500 mg(1,250mg) -200 unit per tablet, Calcium 500 + D, Disp: , Rfl:   cholecalciferol, vitamin D3, (VITAMIN D3) 25 mcg (1,000 unit) capsule, , Disp: , Rfl:   cyanocobalamin, vitamin B-12, (VITAMIN B-12) 50 mcg tablet, Take 50 mcg by mouth once daily., Disp: , Rfl:   LACTOFERRIN ORAL, Take 350 mg by mouth., Disp: , Rfl:   [id number]-algal oil (METANX/FOLTANX RF) 3 mg-35 mg-2 mg -90.314 mg Cap, Take by mouth., Disp: , Rfl:   magnesium glycinate (MAG GLYCINATE ORAL), Take by mouth., Disp: , Rfl:   metoprolol succinate (TOPROL-XL) 25 MG 24 hr tablet, One half to one a day or as directed., Disp: 90 tablet, Rfl: 3  progesterone (PROMETRIUM) 100 MG capsule, Take 100 mg by mouth once daily., Disp: , Rfl:   vitamin K2 100 mcg Cap, Take 1 capsule by mouth once daily., Disp: , Rfl:   XARELTO 20 mg Tab, TAKE 1 TABLET(20 MG) BY MOUTH DAILY EVENING MEAL WITH DINNER, Disp: 30 tablet, Rfl: 11  ZINC PICOLINATE ORAL, , Disp: , Rfl:     Current Facility-Administered Medications:  sodium hyaluronate (EUFLEXXA) 10 mg/mL(mw 2.4 -3.6 million) injection 20 mg, 20 mg, Intra-articular, Weekly, Sarwat Lu MD, 20 mg at 07/08/24 1045    Facility-Administered Medications Ordered in Other Visits:  ceFAZolin 2 g in dextrose 5 % in water (D5W) 50 mL IVPB (MB+), 2 g, Intravenous, On Call Procedure, Brandie Adrian NP        Review of patient's allergies indicates:   -- Cortisone (hydrocortisone) [hydrocortisone] -- Other (See Comments)    --  Triggers svt          Review of Systems   Constitutional: Negative for weight gain and weight loss.   Cardiovascular:  Negative for chest pain.   Respiratory:  Negative for shortness of breath.    Musculoskeletal:  Positive for back pain (right posterior leg from hip to knee). Negative for joint pain and joint swelling.   Gastrointestinal:  Negative for abdominal pain, bowel  incontinence, nausea and vomiting.   Genitourinary:  Negative for bladder incontinence.   Neurological:  Negative for numbness and paresthesias.        Objective:     General: Meredith is well-developed, well-nourished, appears stated age, in no acute distress, alert and oriented to time, place and person.     General    Vitals reviewed.  Constitutional: She is oriented to person, place, and time. She appears well-developed and well-nourished.   HENT:   Head: Normocephalic and atraumatic.   Pulmonary/Chest: Effort normal.   Neurological: She is alert and oriented to person, place, and time.   Psychiatric: She has a normal mood and affect. Her behavior is normal. Judgment and thought content normal.     General Musculoskeletal Exam   Gait: normal     Right Ankle/Foot Exam     Tests   Heel Walk: able to perform  Tiptoe Walk: able to perform    Left Ankle/Foot Exam     Tests   Heel Walk: able to perform  Tiptoe Walk: able to perform  Back (L-Spine & T-Spine) / Neck (C-Spine) Exam     Tenderness Right paramedian tenderness of the Sacrum.     Back (L-Spine & T-Spine) Range of Motion   Extension:  30   Flexion:  90 (with pain)   Lateral bend right:  20   Lateral bend left:  20   Rotation right:  40   Rotation left:  40     Spinal Sensation   Right Side Sensation  C-Spine Level: normal   L-Spine Level: normal  S-Spine Level: normal  Left Side Sensation  C-Spine Level: normal  L-Spine Level: normal  S-Spine Level: normal    Back (L-Spine & T-Spine) Tests   Right Side Tests  Straight leg raise:        Sitting SLR: > 70 degrees    Left Side Tests  Straight leg raise:       Sitting SLR: > 70 degrees      Other   She has no scoliosis .  Spinal Kyphosis:  Absent    Comments:  Pain over the medial proximal hamstring ischial bursa and piriformis on right      Muscle Strength   Right Upper Extremity   Biceps: 5/5   Deltoid:  5/5  Triceps:  5/5  Wrist extension: 5/5   Finger Flexors:  5/5  Left Upper Extremity  Biceps: 5/5   Deltoid:   5/5  Triceps:  5/5  Wrist extension: 5/5   Finger Flexors:  5/5  Right Lower Extremity   Hip Flexion: 5/5   Quadriceps:  5/5   Anterior tibial:  5/5   EHL:  5/5  Left Lower Extremity   Hip Flexion: 5/5   Quadriceps:  5/5   Anterior tibial:  5/5   EHL:  5/5    Reflexes     Left Side  Biceps:  2+  Triceps:  2+  Brachioradialis:  2+  Achilles:  2+  Left Sam's Sign:  Absent  Babinski Sign:  absent  Quadriceps:  2+    Right Side   Biceps:  2+  Triceps:  2+  Brachioradialis:  2+  Achilles:  2+  Right Sam's Sign:  absent  Babinski Sign:  absent  Quadriceps:  2+    Vascular Exam     Right Pulses        Carotid:                  2+    Left Pulses        Carotid:                  2+          Assessment:     1. Hamstring strain, right, initial encounter    2. Piriformis syndrome of right side         Plan:     Orders Placed This Encounter    X-Ray Lumbar Spine Ap Lateral w/Flex Ext    Ambulatory referral/consult to Physical/Occupational Therapy    tiZANidine (ZANAFLEX) 2 MG tablet     We discussed right leg pain and the nature of right leg pain.  We discussed seems to be medial hamstring.  She does have some piriformis tenderness as well.  The leg pain is not constant.  It does go away  We discussed posture sitting and the importance of trying to sit better.  We discussed a tennis ball under hamstring  We discussed the benefits of therapy and exercise and continuing to move.  She is very active and continues to exercise.  We discussed continue activity as tolerated  PT for hamstring and piriformis stretches, glute strengthening and dry needling at [facility]  Tizanidine 1-2mg po TID as needed  Cannot have nsaids.  She would like to avoid steroids.  She cannot take tumeric.  She is already on antiinflammatory diet  X-ray lumbar  RTC 3 months    More than 50% of the total time  of 45 minutes was spent face to face in counseling on diagnosis and treatment options. I also counseled patient  on common and most usual side  effect of prescribed medications.  I reviewed Primary care , and other specialty's notes to better coordinate patient's care. All questions were answered, and patient voiced understanding.       Follow-up: No follow-ups on file. If there are any questions prior to this, the patient was instructed to contact the office.

## 2024-07-13 ENCOUNTER — CLINICAL SUPPORT (OUTPATIENT)
Dept: CARDIOLOGY | Facility: HOSPITAL | Age: 74
End: 2024-07-13
Payer: MEDICARE

## 2024-07-13 DIAGNOSIS — I47.10 SUPRAVENTRICULAR TACHYCARDIA, UNSPECIFIED: ICD-10-CM

## 2024-07-13 DIAGNOSIS — I49.9 CARDIAC ARRHYTHMIA, UNSPECIFIED: ICD-10-CM

## 2024-07-13 DIAGNOSIS — R00.2 PALPITATIONS: ICD-10-CM

## 2024-07-14 ENCOUNTER — CLINICAL SUPPORT (OUTPATIENT)
Dept: CARDIOLOGY | Facility: HOSPITAL | Age: 74
End: 2024-07-14
Attending: INTERNAL MEDICINE
Payer: MEDICARE

## 2024-07-14 DIAGNOSIS — I49.9 CARDIAC ARRHYTHMIA, UNSPECIFIED: ICD-10-CM

## 2024-07-14 DIAGNOSIS — I47.10 SUPRAVENTRICULAR TACHYCARDIA, UNSPECIFIED: ICD-10-CM

## 2024-07-14 DIAGNOSIS — R00.2 PALPITATIONS: ICD-10-CM

## 2024-07-14 PROCEDURE — 93298 REM INTERROG DEV EVAL SCRMS: CPT | Mod: 26,,, | Performed by: INTERNAL MEDICINE

## 2024-07-15 ENCOUNTER — HOSPITAL ENCOUNTER (OUTPATIENT)
Dept: RADIOLOGY | Facility: HOSPITAL | Age: 74
Discharge: HOME | End: 2024-07-15
Attending: PHYSICAL MEDICINE & REHABILITATION
Payer: MEDICARE

## 2024-07-15 ENCOUNTER — OFFICE VISIT (OUTPATIENT)
Dept: SPORTS MEDICINE | Facility: CLINIC | Age: 74
End: 2024-07-15
Payer: MEDICARE

## 2024-07-15 ENCOUNTER — HOSPITAL ENCOUNTER (OUTPATIENT)
Dept: RADIOLOGY | Facility: OTHER | Age: 74
Discharge: HOME | End: 2024-07-15
Attending: PHYSICAL MEDICINE & REHABILITATION
Payer: MEDICARE

## 2024-07-15 ENCOUNTER — OFFICE VISIT (OUTPATIENT)
Dept: SPINE | Facility: CLINIC | Age: 74
End: 2024-07-15
Attending: PHYSICAL MEDICINE & REHABILITATION
Payer: MEDICARE

## 2024-07-15 VITALS
HEART RATE: 65 BPM | HEIGHT: 65 IN | SYSTOLIC BLOOD PRESSURE: 111 MMHG | DIASTOLIC BLOOD PRESSURE: 77 MMHG | WEIGHT: 111.56 LBS | BODY MASS INDEX: 18.59 KG/M2

## 2024-07-15 VITALS
BODY MASS INDEX: 18.33 KG/M2 | HEIGHT: 65 IN | RESPIRATION RATE: 18 BRPM | WEIGHT: 110 LBS | SYSTOLIC BLOOD PRESSURE: 94 MMHG | DIASTOLIC BLOOD PRESSURE: 60 MMHG | OXYGEN SATURATION: 100 % | HEART RATE: 70 BPM

## 2024-07-15 DIAGNOSIS — S76.311A HAMSTRING STRAIN, RIGHT, INITIAL ENCOUNTER: ICD-10-CM

## 2024-07-15 DIAGNOSIS — G57.01 PIRIFORMIS SYNDROME OF RIGHT SIDE: ICD-10-CM

## 2024-07-15 DIAGNOSIS — S76.311A HAMSTRING STRAIN, RIGHT, INITIAL ENCOUNTER: Primary | ICD-10-CM

## 2024-07-15 DIAGNOSIS — M17.11 PRIMARY OSTEOARTHRITIS OF RIGHT KNEE: Primary | ICD-10-CM

## 2024-07-15 PROCEDURE — 99999 PR PBB SHADOW E&M-EST. PATIENT-LVL IV: CPT | Mod: PBBFAC,,, | Performed by: PHYSICAL MEDICINE & REHABILITATION

## 2024-07-15 PROCEDURE — 99214 OFFICE O/P EST MOD 30 MIN: CPT | Mod: PBBFAC,25,27,PO | Performed by: STUDENT IN AN ORGANIZED HEALTH CARE EDUCATION/TRAINING PROGRAM

## 2024-07-15 PROCEDURE — 99999 PR PBB SHADOW E&M-EST. PATIENT-LVL IV: CPT | Mod: PBBFAC,,, | Performed by: STUDENT IN AN ORGANIZED HEALTH CARE EDUCATION/TRAINING PROGRAM

## 2024-07-15 PROCEDURE — 72110 X-RAY EXAM L-2 SPINE 4/>VWS: CPT | Mod: TC,FY

## 2024-07-15 PROCEDURE — 99204 OFFICE O/P NEW MOD 45 MIN: CPT | Mod: S$PBB,,, | Performed by: PHYSICAL MEDICINE & REHABILITATION

## 2024-07-15 PROCEDURE — 99499 UNLISTED E&M SERVICE: CPT | Mod: S$PBB,,, | Performed by: STUDENT IN AN ORGANIZED HEALTH CARE EDUCATION/TRAINING PROGRAM

## 2024-07-15 PROCEDURE — 72110 X-RAY EXAM L-2 SPINE 4/>VWS: CPT | Mod: 26,,, | Performed by: RADIOLOGY

## 2024-07-15 PROCEDURE — 20611 DRAIN/INJ JOINT/BURSA W/US: CPT | Mod: PBBFAC,PO | Performed by: STUDENT IN AN ORGANIZED HEALTH CARE EDUCATION/TRAINING PROGRAM

## 2024-07-15 PROCEDURE — 99214 OFFICE O/P EST MOD 30 MIN: CPT | Mod: PBBFAC | Performed by: PHYSICAL MEDICINE & REHABILITATION

## 2024-07-15 PROCEDURE — 99999PBSHW PR PBB SHADOW TECHNICAL ONLY FILED TO HB: Mod: JZ,PBBFAC,,

## 2024-07-15 RX ORDER — TIZANIDINE 2 MG/1
1-2 TABLET ORAL EVERY 8 HOURS PRN
Qty: 90 TABLET | Refills: 2 | Status: SHIPPED | OUTPATIENT
Start: 2024-07-15

## 2024-07-15 RX ADMIN — Medication 20 MG: at 03:07

## 2024-07-15 NOTE — PROCEDURES
Large Joint Aspiration/Injection: R knee    Date/Time: 7/15/2024 3:30 PM    Performed by: Leonor Garcia MD  Authorized by: Leonor Garcia MD    Consent Done?:  Yes (Verbal)  Indications:  Arthritis and pain  Site marked: the procedure site was marked    Timeout: prior to procedure the correct patient, procedure, and site was verified    Prep: patient was prepped and draped in usual sterile fashion      Local anesthesia used?: Yes    Anesthesia:  Local infiltration  Local anesthetic:  Co-phenylcaine spray    Details:  Needle Size:  22 G  Ultrasonic Guidance for needle placement?: Yes (Ultrasound guidance used to avoid neurovascular injury and/or to improve accuracy given body habitus.)    Images are saved and documented.  Approach: Superolateral.  Location:  Knee  Site:  R knee  Medications:  20 mg sodium hyaluronate (EUFLEXXA) 10 mg/mL(mw 2.4 -3.6 million)  Medications comment:  Ropivacaine 0.2% 2mL  Patient tolerance:  Patient tolerated the procedure well with no immediate complications     TECHNIQUE: Real time ultrasound examination of the right knee was performed with NoteSick Edge 2, 9-L MHz linear probe(s). Ultrasound guidance was used for needle localization. Images were saved and stored for documentation. Dynamic visualization of the needle was continuous throughout the procedures and maintained in good position.

## 2024-07-15 NOTE — PROGRESS NOTES
CC: right knee pain    74 y.o. Female presents today for her 2nd Euflexxa injection of her right knee. Pt was referred by Dr. Lu.    Attempted treatments: none since last visit  Pain score: 0/10  History of trauma/injury: none since last visit  Affecting ADLs: no     REVIEW OF SYSTEMS:   Constitution: Patient denies fever or chills.  Eyes: Patient denies eye pain or vision changes.  HEENT: Patient denies ear pain, sore throat, or nasal discharge.  CVS: Patient denies chest pain.  Lungs: Patient denies shortness of breath or cough.  Skin: Patient denies skin rash or itching.    Musculoskeletal: Patient denies recent falls. See HPI.  Psych: Patient denies any current anxiety or nervousness.    PAST MEDICAL HISTORY:   Past Medical History:   Diagnosis Date    Diverticulitis     Kidney stone     Osteoporosis     ostenopenia        MEDICATIONS:     Current Outpatient Medications:     calcium-vitamin D3 (OS- + D3) 500 mg(1,250mg) -200 unit per tablet, Calcium 500 + D, Disp: , Rfl:     cholecalciferol, vitamin D3, (VITAMIN D3) 25 mcg (1,000 unit) capsule, , Disp: , Rfl:     cyanocobalamin, vitamin B-12, (VITAMIN B-12) 50 mcg tablet, Take 50 mcg by mouth once daily., Disp: , Rfl:     LACTOFERRIN ORAL, Take 350 mg by mouth., Disp: , Rfl:     [id number]-[id number]-algal oil (METANX/FOLTANX RF) 3 mg-35 mg-2 mg -90.314 mg Cap, Take by mouth., Disp: , Rfl:     magnesium glycinate (MAG GLYCINATE ORAL), Take by mouth., Disp: , Rfl:     metoprolol succinate (TOPROL-XL) 25 MG 24 hr tablet, One half to one a day or as directed., Disp: 90 tablet, Rfl: 3    progesterone (PROMETRIUM) 100 MG capsule, Take 100 mg by mouth once daily., Disp: , Rfl:     tiZANidine (ZANAFLEX) 2 MG tablet, Take 0.5-1 tablets (1-2 mg total) by mouth every 8 (eight) hours as needed (leg pain)., Disp: 90 tablet, Rfl: 2    vitamin K2 100 mcg Cap, Take 1 capsule by mouth once daily., Disp: , Rfl:     XARELTO 20 mg Tab, TAKE 1 TABLET(20 MG) BY MOUTH  "DAILY EVENING MEAL WITH DINNER, Disp: 30 tablet, Rfl: 11    ZINC PICOLINATE ORAL, , Disp: , Rfl:     Current Facility-Administered Medications:     sodium hyaluronate (EUFLEXXA) 10 mg/mL(mw 2.4 -3.6 million) injection 20 mg, 20 mg, Intra-articular, Weekly, Sarwat Lu MD, 20 mg at 07/08/24 1045    Facility-Administered Medications Ordered in Other Visits:     ceFAZolin 2 g in dextrose 5 % in water (D5W) 50 mL IVPB (MB+), 2 g, Intravenous, On Call Procedure, Brandie Adrian, NP    ALLERGIES:   Review of patient's allergies indicates:   Allergen Reactions    Cortisone (hydrocortisone) [hydrocortisone] Other (See Comments)     Triggers svt        PHYSICAL EXAMINATION:  /77   Pulse 65   Ht 5' 5" (1.651 m)   Wt 50.6 kg (111 lb 8.8 oz)   BMI 18.56 kg/m²   There are no signs of infection at the injection site, including no rubor, calor, or skin lesions.  Gen: NAD.  Psych: Affect & judgment nl.  Neuro: Grossly CNI. CONNOLLY.  HEENT: -Trach dev. -Eye d/c. -Rhinorrhea.  CV: Color nl. -E/C/C. WWPx4.  Pulm: -Dyspnea. -Cough.  Lymph: -Edema.  Int: -Rash/lesion noted. Skin is warm and dry    ASSESSMENT:      ICD-10-CM ICD-9-CM   1. Primary osteoarthritis of right knee  M17.11 715.16         PLAN:  Ultrasound-guided injection of the right knee with Euflexxa performed at visit today.    Future planning includes - Continue exercise program    Risks and benefits were discussed with patient prior to receiving injection.  Depending on injection type, risks include the possibility of infection, pain, disruptions in blood pressure and blood sugar, and cosmetic deformity at site of injection.    All questions were answered to the best of my ability and all concerns were addressed at this time.    Follow up in 1 week(s) for above, or sooner if needed.      This note is dictated using the M*Modal Fluency Direct word recognition program. There are word recognition mistakes that are occasionally missed on review.        "

## 2024-07-22 ENCOUNTER — TELEPHONE (OUTPATIENT)
Dept: CARDIOLOGY | Facility: HOSPITAL | Age: 74
End: 2024-07-22
Payer: MEDICARE

## 2024-07-22 NOTE — TELEPHONE ENCOUNTER
ILR alert remote transmission received for 1 Tachy episode detected on 07/20/2024, 12 seconds in duration with an avg rate of 189 bpm. EGM displays a wide complex tachycardia, c/w nsVT.     Pt symptoms: she remembers feeling lightheaded briefly last week, unsure if it was during this event.      Bp 111/77 on 7/15/2024    EF on 2/27/24 - 60 -65%    Pt taking Toprol XL 12.5 mg qd and Xarelto

## 2024-07-24 ENCOUNTER — OFFICE VISIT (OUTPATIENT)
Dept: SPORTS MEDICINE | Facility: CLINIC | Age: 74
End: 2024-07-24
Payer: MEDICARE

## 2024-07-24 VITALS — TEMPERATURE: 98 F

## 2024-07-24 DIAGNOSIS — G89.29 CHRONIC PAIN OF RIGHT KNEE: ICD-10-CM

## 2024-07-24 DIAGNOSIS — M25.561 CHRONIC PAIN OF RIGHT KNEE: ICD-10-CM

## 2024-07-24 DIAGNOSIS — M17.11 PRIMARY OSTEOARTHRITIS OF RIGHT KNEE: Primary | ICD-10-CM

## 2024-07-24 PROCEDURE — 99999 PR PBB SHADOW E&M-EST. PATIENT-LVL III: CPT | Mod: PBBFAC,,, | Performed by: FAMILY MEDICINE

## 2024-07-24 PROCEDURE — 99213 OFFICE O/P EST LOW 20 MIN: CPT | Mod: PBBFAC,25 | Performed by: FAMILY MEDICINE

## 2024-07-24 PROCEDURE — 20611 DRAIN/INJ JOINT/BURSA W/US: CPT | Mod: PBBFAC | Performed by: FAMILY MEDICINE

## 2024-07-24 PROCEDURE — 99999PBSHW PR PBB SHADOW TECHNICAL ONLY FILED TO HB: Mod: JZ,PBBFAC,,

## 2024-07-24 RX ORDER — TRIAMCINOLONE ACETONIDE 40 MG/ML
40 INJECTION, SUSPENSION INTRA-ARTICULAR; INTRAMUSCULAR
Status: DISCONTINUED | OUTPATIENT
Start: 2024-07-24 | End: 2024-07-24 | Stop reason: HOSPADM

## 2024-07-24 RX ADMIN — TRIAMCINOLONE ACETONIDE 40 MG: 40 INJECTION, SUSPENSION INTRA-ARTICULAR; INTRAMUSCULAR at 02:07

## 2024-07-24 NOTE — PROCEDURES
"Large Joint Aspiration/Injection: R knee    Date/Time: 7/24/2024 2:00 PM    Performed by: Sarwat Lu MD  Authorized by: Sarwat Lu MD    Consent Done?:  Yes (Verbal)  Indications:  Pain  Site marked: the procedure site was marked    Timeout: prior to procedure the correct patient, procedure, and site was verified    Prep: patient was prepped and draped in usual sterile fashion      Details:  Needle Size:  25 G  Ultrasonic Guidance for needle placement?: Yes    Images are saved and documented.  Approach:  Lateral  Location:  Knee  Site:  R knee  Medications:  40 mg triamcinolone acetonide 40 mg/mL; 20 mg sodium hyaluronate (EUFLEXXA) 10 mg/mL(mw 2.4 -3.6 million)  Patient tolerance:  Patient tolerated the procedure well with no immediate complications     Description of ultrasound utilization for needle guidance:   Ultrasound guidance used for needle localization. Images saved and stored for documentation. The SUPRAPATELLAR BURSA / KNEE JOINT was visualized. Dynamic visualization of the 25g x 1.5" needle was continuous throughout the procedure.     "

## 2024-07-24 NOTE — PROGRESS NOTES
Euflexxa knee joint right 3/3  Lot number: [id number]  Expiration date: 5/27/2025    MEDICAL NECESSITY FOR VISCOSUPPLEMENTATION USE: After thorough evaluation of the patient, I have determined that viscosupplementation treatment is medically necessary. 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 Oral analgesics including NSAIDs have not adequately controlled the patient's symptoms. There is radiographic evidence of Kellgren-Guillermo grade II (or greater) osteoarthritic (OA) changes, or if lack of radiographic evidence, there is arthroscopic or other evidence of chondrosis of the knee(s).    Patient: Christel Tilley    Procedure Summary     Date: 08/27/21 Room / Location:  [facility] /  [facility]    Anesthesia Start: 1356 Anesthesia Stop: 1457    Procedure: EXTRACORPOREAL SHOCKWAVE LITHOTRIPSY LEFT (Left ) Diagnosis:       Kidney stone      Left flank pain      (Kidney stone [N20.0])      (Left flank pain [R10.9])    Surgeons: Thien Dean MD Provider: Allan Carpenter CRNA    Anesthesia Type: general ASA Status: 2          Anesthesia Type: general    Vitals  Vitals Value Taken Time   /60 08/27/21 1635   Temp 98.3 °F (36.8 °C) 08/27/21 1626   Pulse 88 08/27/21 1635   Resp 16 08/27/21 1635   SpO2 94 % 08/27/21 1635           Post Anesthesia Care and Evaluation    PONV Status: none

## 2024-07-25 ENCOUNTER — CLINICAL SUPPORT (OUTPATIENT)
Dept: REHABILITATION | Facility: OTHER | Age: 74
End: 2024-07-25
Attending: PHYSICAL MEDICINE & REHABILITATION
Payer: MEDICARE

## 2024-07-25 DIAGNOSIS — M79.651 RIGHT THIGH PAIN: Primary | ICD-10-CM

## 2024-07-25 DIAGNOSIS — Z74.09 IMPAIRED FUNCTIONAL MOBILITY AND ACTIVITY TOLERANCE: ICD-10-CM

## 2024-07-25 DIAGNOSIS — G57.01 PIRIFORMIS SYNDROME OF RIGHT SIDE: ICD-10-CM

## 2024-07-25 DIAGNOSIS — S76.311A HAMSTRING STRAIN, RIGHT, INITIAL ENCOUNTER: ICD-10-CM

## 2024-07-25 PROBLEM — M25.561 RIGHT MEDIAL KNEE PAIN: Status: RESOLVED | Noted: 2023-03-14 | Resolved: 2024-07-25

## 2024-07-25 PROCEDURE — 97162 PT EVAL MOD COMPLEX 30 MIN: CPT | Mod: PN | Performed by: PHYSICAL THERAPIST

## 2024-07-25 PROCEDURE — 97530 THERAPEUTIC ACTIVITIES: CPT | Mod: PN | Performed by: PHYSICAL THERAPIST

## 2024-07-25 NOTE — PLAN OF CARE
"  [facility]   Physical Therapy Initial Evaluation      Name: Meredith Ramos  [facility] Number: [id number]    Therapy Diagnosis:   Encounter Diagnoses   Name Primary?    Hamstring strain, right, initial encounter     Piriformis syndrome of right side     Right thigh pain Yes    Impaired functional mobility and activity tolerance         Physician: Enriqueta Bains, *    Physician Orders: PT Eval and Treat "Piriformis stretching and glute strengthening, and hamstring stretches, nerve flossing, dry needling and hip ROM"  Medical Diagnosis from Referral: S76.311A (ICD-10-CM) - Hamstring strain, right, initial encounter G57.01 (ICD-10-CM) - Piriformis syndrome of right side  Evaluation Date: 7/25/2024  Authorization Period Expiration: 7/15/15  Plan of Care Expiration: 9/20/2024  Progress Note Due: 8/20/2024  Date of Surgery: n/a  Visit # / Visits authorized: 1/ 1   FOTO: 1/ 3    Precautions: Standard and blood thinners     Time In: 1020  Time Out: 1110  Total Billable Time: 10 minutes    Subjective     Date of onset: 1 month    History of current condition - Meredith reports: pain and numbness to inner R thigh that radiates down thigh to knee. Pain started when she was picking up a heavy iron gate with poor mechanics, that night she had a little soreness but a few days later when she lifted R leg up onto a step she felt heat shoot down her leg. She had been doing some higher impact exercises and deadlifts to help her osteoporosis, but has stepped away because of the pain. Pain is exacerbated with forward bending, and is very limited with sitting and driving. Denies genital/anal numbness/tingling and no change in bowel/bladder function    Falls: no     Imaging: bone scan films: FINDINGS:  X-rays of the lumbar spine demonstrate advanced dextroscoliosis the lumbar spine.  Centered at L3.  L3 vertebra is positioned to the right laterally compared to L2 and L4, slightly more than on the prior CT " scan.     There is degenerative disc disease with moderate disc space narrowing at L4-5, severe disc space narrowing at L3-4 and L5-S1.     Minimal anterolisthesis is present at L4-5.  Flexion and extension views show no instability.  No fracture or bone destruction appreciated.     Impression:     Lumbar dextroscoliosis and degenerative disc disease, as above.  No instability.    Prior Therapy: yes, none for c/c  Social History: Pt lives alone in [facility] with a few steps to enter  Occupation: retired  Prior Level of Function: I with ADL's and driving, exercise including weight lifting 5-6 days/week  Current Level of Function: I with ADL's but limited with forward bending. Difficulty driving, pain with transfers and has to sit in a particular way. Going to gym but modifying and limiting her exercise because of pain     Pain:  Current 0/10 (standing), worst 9/10, best 0/10   Location: R medial/posterior thigh to knee  Description: numbness (proximally), burning and sharp  Aggravating Factors: forward flexion, squatting (with too many reps), worst with sitting   Easing Factors: heat, ice    Patients goals: return to exercise routine      Medical History:   Past Medical History:   Diagnosis Date    Diverticulitis     Kidney stone     Osteoporosis     ostenopenia        Surgical History:   Meredith Ramos  has a past surgical history that includes Bunionectomy; removal of breast implants; Colectomy (2004); Bunionectomy; kidney stones; Breast biopsy; Hysterectomy (07/22/2013); Diagnostic cardiac electrophysiology study (N/A, 10/02/2023); and Insertion of implantable loop recorder (N/A, 1/9/2024).    Medications:   Meredith has a current medication list which includes the following prescription(s): calcium-vitamin d3, cholecalciferol (vitamin d3), cyanocobalamin (vitamin b-12), lactoferrin, krsqogahf-w2-emo10-algal oil, magnesium glycinate, metoprolol succinate, progesterone, tizanidine, vitamin k2, xarelto, and zinc, and  the following Facility-Administered Medications: ceFAZolin 2 g in dextrose 5 % in water (D5W) 50 mL IVPB (MB+).    Allergies:   Review of patient's allergies indicates:   Allergen Reactions    Cortisone (hydrocortisone) [hydrocortisone] Other (See Comments)     Triggers svt        Objective      Observation: Pt is alert and oriented, good historian.     Posture:  WFL, scoliotic curvature noted    Lumbar Range of Motion:    Percent WFL Pain   Flexion WFL   Very guarded movement, noted increased R medial thigh pain        Extension WFL   relieves        Left Side Bending WFL         Right Side Bending WFL         Left rotation   WFL         Right Rotation   WFL              Lower Extremity Strength  Right LE  Left LE    Ankle dorsiflexion: 5/5 Ankle dorsiflexion: 5/5   Knee extension: 5/5 Knee extension: 5/5   Knee flexion: 5/5 Knee flexion: 5/5   Hip flexion: 5/5 Hip flexion: 5/5   Hip external rotation: 5/5 Hip external rotation: 5/5   Hip internal rotation: 5/5 Hip internal rotation: 5/5   Hip extension:  4+/5 Hip extension: 4+/5   Hip abduction: 5/5 Hip abduction: 5/5   Hip adduction: 4+/5 Hip adduction 5/5         Special Tests:  -Repeated Flexion: pain in standing  -Repeated Ext: relief in standing  -Piriformis Test: -  -DAVID: -  -FADIR: -    Neuro Dynamic Testing:    Sciatic nerve:      SLR: R = -     L = -          Joint Mobility: no discomfort with CPA springing to lumbosacral spine, min hypomobility    Palpation: no significant tenderness to palpation of R sacrum, HS at ischial tuberosity, pubic symphysis, adductors; very slight tenderness to proximal and distal piriformis     Sensation: pt reports diminished sensation to light touch at proximal medial R thigh    Flexibility:    Hamstring: R = WFL; L = WFL   Quad: R = WFL; L = WFL   Piriformis: R: WFL; L WFL           Limitation/Restriction for FOTO Upper Leg Survey    Therapist reviewed FOTO scores for Meredith Ramos on 7/25/2024.   FOTO documents  "entered into [employer] - see Media section.    Intake Score: 65%    Goal: 77%         Treatment     Total Treatment time (time-based codes) separate from Evaluation: 10 minutes      Meredith received the treatments listed below:        therapeutic activities to improve functional performance for 10  minutes, including:  Pt education including diagnosis-specific anatomy. Development, demonstration, and review of home exercise program to include:   Prone propping x 2 min (2x/day)   Prone press-ups 5" x 10 (2x/day)   Standing lumbar extension 10x (hourly)        Patient Education and Home Exercises     Education provided:   - therapy rationale and plan of care    Written Home Exercises Provided: yes. Exercises were reviewed and Meredith was able to demonstrate them prior to the end of the session.  Meredith demonstrated good  understanding of the education provided. See EMR under Patient Instructions for exercises provided during therapy sessions.    Assessment     Meredith is a 74 y.o. female referred to outpatient Physical Therapy with a medical diagnosis of S76.311A (ICD-10-CM) - Hamstring strain, right, initial encounter G57.01 (ICD-10-CM) - Piriformis syndrome of right side. Patient presents with report of onset of pain to medial R thigh after improperly lifting a heavy gait. Symptoms are aggravated with direct pressure from sitting, and with forward flexion. Clinically pt presents lumbar AROM within functional limits in all planes, with increased pain to medial thigh with forward flexion and reduction of pain with extension. No neural tension noted with SLR or mobility of R LE. Negative DAVID/FADIR test. No tenderness to adductor complex, pubic symphysis , ischial tuberosity, hamstring. Min tenderness to piriformis. No tenderness with lumbar CPA springing. Pt instructed to limit exercise for the next few days and perform extension bias activities to assess response.     Patient prognosis is Good.   Patient will benefit " from skilled outpatient Physical Therapy to address the deficits stated above and in the chart below, provide patient /family education, and to maximize patientt's level of independence.     Plan of care discussed with patient: Yes  Patient's spiritual, cultural and educational needs considered and patient is agreeable to the plan of care and goals as stated below:     Anticipated Barriers for therapy: standard    Medical Necessity is demonstrated by the following  History  Co-morbidities and personal factors that may impact the plan of care [] LOW: no personal factors / co-morbidities  [x] MODERATE: 1-2 personal factors / co-morbidities  [] HIGH: 3+ personal factors / co-morbidities    Moderate / High Support Documentation:   Co-morbidities affecting plan of care: diverticulitis, osteoporosis, blood thinners    Personal Factors:   coping style     Examination  Body Structures and Functions, activity limitations and participation restrictions that may impact the plan of care [] LOW: addressing 1-2 elements  [x] MODERATE: 3+ elements  [] HIGH: 4+ elements (please support below)    Moderate / High Support Documentation: bending, lifting, recreation, sitting     Clinical Presentation [] LOW: stable  [x] MODERATE: Evolving  [] HIGH: Unstable     Decision Making/ Complexity Score: moderate       Goals:  Short Term Goals (4 Weeks):   1. Pt will report 20% reduction in pain of the R thigh for ease with ADL's.  2. PT will demonstrate improved upright posture with minimal cuing for ease with functional positioning in home and community.  3. Pt will demonstrate improved lumbar spine ROM in all directions by 10% for ease with bending activities.   4. Pt to demonstrate improved functional ability with FOTO score >=70% .    Long Term Goals (12 Weeks):   1. Pt will report being independent with HEP for maintenance of improvements gained during therapy sessions  2. PT will report 50% reduction of pain of the R thigh for ease with  driving tolerance.   3. Pt will demonstrate trunk and extremity strength to >=4+/5 without the provocation of pain for ease with return to gym exercise at [facility]  4.  Pt to demonstrate improved functional ability with FOTO score >=77% .    Plan     Plan of care Certification: 7/25/2024 to 9/20/2024.    Outpatient Physical Therapy 2 times weekly for 8 weeks to include the following interventions: Electrical Stimulation PRN, Manual Therapy, Moist Heat/ Ice, Neuromuscular Re-ed, Patient Education, Therapeutic Activities, Therapeutic Exercise, and Dry Needling .     Alice Caldwell, PT       Physician's Signature: _________________________________________ Date: ________________

## 2024-07-30 ENCOUNTER — CLINICAL SUPPORT (OUTPATIENT)
Dept: REHABILITATION | Facility: OTHER | Age: 74
End: 2024-07-30
Payer: MEDICARE

## 2024-07-30 DIAGNOSIS — Z74.09 IMPAIRED FUNCTIONAL MOBILITY AND ACTIVITY TOLERANCE: ICD-10-CM

## 2024-07-30 DIAGNOSIS — M79.651 RIGHT THIGH PAIN: Primary | ICD-10-CM

## 2024-07-30 PROCEDURE — 97530 THERAPEUTIC ACTIVITIES: CPT | Mod: PN

## 2024-07-30 PROCEDURE — 97112 NEUROMUSCULAR REEDUCATION: CPT | Mod: PN

## 2024-07-30 NOTE — PROGRESS NOTES
"[facility]   Physical Therapy Treatment Note      Name: Meredith Ramos  Clinic Number: [id number]    Therapy Diagnosis:   Encounter Diagnoses   Name Primary?    Right thigh pain Yes    Impaired functional mobility and activity tolerance      Physician: Enriqueta Bains, *    Visit Date: 7/30/2024    Physician Orders: PT Eval and Treat "Piriformis stretching and glute strengthening, and hamstring stretches, nerve flossing, dry needling and hip ROM"  Medical Diagnosis from Referral: S76.311A (ICD-10-CM) - Hamstring strain, right, initial encounter G57.01 (ICD-10-CM) - Piriformis syndrome of right side  Evaluation Date: 7/25/2024  Authorization Period Expiration: 7/15/15  Plan of Care Expiration: 9/20/2024  Progress Note Due: 8/20/2024  Date of Surgery: n/a  Visit # / Visits authorized: 2/ 1   FOTO: 1/ 3     Precautions: Standard and blood thinners      Time In: 4:00  Time Out: 4:55  Total Billable Time: 30 minutes 1:1 (55 min total)    PTA Visit #: 0/5       Subjective     Patient reports: no change in sx with HEP. Continued feelings of numbness along inner thigh and difficulty with sitting.  She was compliant with home exercise program.  Response to previous treatment: fair  Functional change: ongoing.   Aggravating Factors: forward flexion, squatting (with too many reps), worst with sitting    Pain: 1/10  Location: R medial/posterior thigh to knee    Objective      Objective Measures updated at progress report unless specified.     Treatment     Meredith received the treatments listed below:      therapeutic exercises to develop strength, endurance, ROM, flexibility, posture, and core stabilization for 00 minutes including:  None today    manual therapy techniques: Joint mobilizations, Manual traction, Myofacial release, and Soft tissue Mobilization were applied to the: R hip/back for 00 minutes, including:  None today.    neuromuscular re-education activities to improve: Balance, " "Coordination, Kinesthetic, Sense, Proprioception, and Posture for 45 minutes. The following activities were included:  Prone propping x 2 min  Prone press-ups 5" x 10     +figure 4 piriformis strech 2 x 20"  +SKTC 2x20"  +AHSS for nerve mobility x 20  +SLR with pilates ring x 3x10 B  +SL hip abd series <<>> 3 rounds  Leg lift  Circles x10 cw, x10 ccw  Hip flex with LAQ x10    +resisted clams x20 at 90 deg hip flex, x 20 at 45 deg hip flex -- Scout  +resisted reverse clams (pilates block between knees) x3x10 x YTB at ankles    therapeutic activities to improve functional performance for 10  minutes, including:  Reviewed HEP, importance of compliance and consistency for pain modulation and activity tolerance, but also to dx if sx originate from LSP vs hip. Advised pt to resume lateral hip stabilization, including SL hip abd/clams/reverse clams if sx do not change with TE aimed at radiculitis dx. Advised pt of possible need for PFPT if sx related to obturator mm/n. Persist - pt verbalized understanding and agreed to tx progression.    Patient Education and Home Exercises       Education provided:   - 7/30/2024 - advised pt to resume previous HEP (clams, reverse clams, SL hip abd) if prone extension series does not change/improve sx    Written Home Exercises Provided: Patient instructed to cont prior HEP. Exercises were reviewed and Meredith was able to demonstrate them prior to the end of the session.  Meredith demonstrated good  understanding of the education provided. See Electronic Medical Record under Patient Instructions for exercises provided during therapy sessions    Assessment     Pt presents with significant TTP and reproduction of sx with palpation to obturator internus and medial HS tendon attachment at medial anterior surface of ischial tuberosity, indicating possible PFM/deep hip rotator dysfunction or possible obturator nerve involvement. Attempted to progress hip, trunk, and core strength and motor control " "today. Fair tolerance overall without increased sx, but pt noted "pulling pain" down LE with SKTC and SL hip flex w/ LAQ.    Meredith Is progressing well towards her goals.   Patient prognosis is Fair.     Patient will continue to benefit from skilled outpatient physical therapy to address the deficits listed in the problem list box on initial evaluation, provide pt/family education and to maximize pt's level of independence in the home and community environment.     Patient's spiritual, cultural and educational needs considered and pt agreeable to plan of care and goals.     Anticipated barriers to physical therapy: standard    Goals: updated 7/31/2024   Short Term Goals (4 Weeks):   1. Pt will report 20% reduction in pain of the R thigh for ease with ADL's.  2. PT will demonstrate improved upright posture with minimal cuing for ease with functional positioning in home and community.  3. Pt will demonstrate improved lumbar spine ROM in all directions by 10% for ease with bending activities.   4. Pt to demonstrate improved functional ability with FOTO score >=70% .     Long Term Goals (12 Weeks):   1. Pt will report being independent with HEP for maintenance of improvements gained during therapy sessions  2. PT will report 50% reduction of pain of the R thigh for ease with driving tolerance.   3. Pt will demonstrate trunk and extremity strength to >=4+/5 without the provocation of pain for ease with return to gym exercise at [facility]  4.  Pt to demonstrate improved functional ability with FOTO score >=77% .    Plan     Cont with POC for mobility, strength.    Sofiya Lopez, PT     "

## 2024-08-01 ENCOUNTER — CLINICAL SUPPORT (OUTPATIENT)
Dept: REHABILITATION | Facility: OTHER | Age: 74
End: 2024-08-01
Payer: MEDICARE

## 2024-08-01 DIAGNOSIS — M79.651 RIGHT THIGH PAIN: Primary | ICD-10-CM

## 2024-08-01 DIAGNOSIS — Z74.09 IMPAIRED FUNCTIONAL MOBILITY AND ACTIVITY TOLERANCE: ICD-10-CM

## 2024-08-01 PROCEDURE — 97530 THERAPEUTIC ACTIVITIES: CPT | Mod: PN | Performed by: PHYSICAL THERAPIST

## 2024-08-01 PROCEDURE — 97112 NEUROMUSCULAR REEDUCATION: CPT | Mod: PN | Performed by: PHYSICAL THERAPIST

## 2024-08-01 NOTE — PROGRESS NOTES
"[facility]   Physical Therapy Treatment Note      Name: Meredith Ramos  Clinic Number: [id number]    Therapy Diagnosis:   Encounter Diagnoses   Name Primary?    Right thigh pain Yes    Impaired functional mobility and activity tolerance      Physician: Enriqueta Bains, *    Visit Date: 8/1/2024    Physician Orders: PT Eval and Treat "Piriformis stretching and glute strengthening, and hamstring stretches, nerve flossing, dry needling and hip ROM"  Medical Diagnosis from Referral: S76.311A (ICD-10-CM) - Hamstring strain, right, initial encounter G57.01 (ICD-10-CM) - Piriformis syndrome of right side  Evaluation Date: 7/25/2024  Authorization Period Expiration: 7/15/15  Plan of Care Expiration: 9/20/2024  Progress Note Due: 8/20/2024  Date of Surgery: n/a  Visit # / Visits authorized: 3/ 31   FOTO: 1/ 3     Precautions: Standard and blood thinners      Time In: 1115  Time Out: 1210  Total Billable Time: 55 minutes 1:1     PTA Visit #: 0/5       Subjective     Patient reports: no change in symptoms. She's already done her exercises this morning that she did at last visit.   She was compliant with home exercise program.  Response to previous treatment: fair  Functional change: ongoing.   Aggravating Factors: forward flexion, squatting (with too many reps), worst with sitting    Pain: 1/10  Location: R medial/posterior thigh to knee    Objective      Objective Measures updated at progress report unless specified.     Treatment     Meredith received the treatments listed below:      therapeutic exercises to develop strength, endurance, ROM, flexibility, posture, and core stabilization for 00 minutes including:  None today    manual therapy techniques: Joint mobilizations, Manual traction, Myofacial release, and Soft tissue Mobilization were applied to the: R hip/back for 00 minutes, including:  None today.    neuromuscular re-education activities to improve: Balance, Coordination, " "Kinesthetic, Sense, Proprioception, and Posture for 40 minutes. The following activities were included:  Prone propping x 2 min  Prone press-ups 5" x 10     figure 4 piriformis strech 2 x 20"  SKTC 5x10"  AHSS for nerve mobility x 20  SLR with pilates ring x 3x10 B  SL hip abd series <<>> 3 rounds  Leg lift  Circles x10 cw, x10 ccw  Hip flex with LAQ x10    resisted clams x20 at 90 deg hip flex, x 20 at 45 deg hip flex -- Scout  resisted reverse clams (pilates block between knees) x3x10 x YTB at ankles    therapeutic activities to improve functional performance for 15  minutes, including:  Pt education including brief consult with PFPT (visit scheduled for tomorrow)    +Monster walk (latera) pink loop at knees 4 x 40 ft    Patient Education and Home Exercises       Education provided:   - 7/30/2024 - advised pt to resume previous HEP (clams, reverse clams, SL hip abd) if prone extension series does not change/improve sx    Written Home Exercises Provided: Patient instructed to cont prior HEP. Exercises were reviewed and Meredith was able to demonstrate them prior to the end of the session.  Meredith demonstrated good  understanding of the education provided. See Electronic Medical Record under Patient Instructions for exercises provided during therapy sessions    Assessment     Overall good tolerance to treatment today. Initiated resisted sidestepping, min c/o "activation" to medial thigh but did not compress to further symptoms. Brief consult with pelvic floor therapist today, and plan for further assessment tomorrow.     Meredith Is progressing well towards her goals.   Patient prognosis is Fair.     Patient will continue to benefit from skilled outpatient physical therapy to address the deficits listed in the problem list box on initial evaluation, provide pt/family education and to maximize pt's level of independence in the home and community environment.     Patient's spiritual, cultural and educational needs " considered and pt agreeable to plan of care and goals.     Anticipated barriers to physical therapy: standard    Goals: updated 8/1/2024   Short Term Goals (4 Weeks):   1. Pt will report 20% reduction in pain of the R thigh for ease with ADL's. (Progressing, not met)   2. PT will demonstrate improved upright posture with minimal cuing for ease with functional positioning in home and community. (Progressing, not met)   3. Pt will demonstrate improved lumbar spine ROM in all directions by 10% for ease with bending activities.  (Progressing, not met)   4. Pt to demonstrate improved functional ability with FOTO score >=70% . (Progressing, not met)      Long Term Goals (12 Weeks):   1. Pt will report being independent with HEP for maintenance of improvements gained during therapy sessions (Progressing, not met)   2. PT will report 50% reduction of pain of the R thigh for ease with driving tolerance. (Progressing, not met)   3. Pt will demonstrate trunk and extremity strength to >=4+/5 without the provocation of pain for ease with return to gym exercise at [facility] (Progressing, not met)   4.  Pt to demonstrate improved functional ability with FOTO score >=77% . (Progressing, not met)     Plan     Cont with POC for mobility, strength.    Alice Caldwell, PT

## 2024-08-02 ENCOUNTER — CLINICAL SUPPORT (OUTPATIENT)
Dept: REHABILITATION | Facility: OTHER | Age: 74
End: 2024-08-02
Payer: MEDICARE

## 2024-08-02 DIAGNOSIS — Z74.09 IMPAIRED FUNCTIONAL MOBILITY AND ACTIVITY TOLERANCE: ICD-10-CM

## 2024-08-02 DIAGNOSIS — M79.651 RIGHT THIGH PAIN: Primary | ICD-10-CM

## 2024-08-02 PROCEDURE — 97530 THERAPEUTIC ACTIVITIES: CPT | Mod: PN | Performed by: PHYSICAL THERAPIST

## 2024-08-02 PROCEDURE — 97140 MANUAL THERAPY 1/> REGIONS: CPT | Mod: PN | Performed by: PHYSICAL THERAPIST

## 2024-08-02 NOTE — PROGRESS NOTES
"[facility]   Physical Therapy Treatment Note      Name: Meredith Ramos  Clinic Number: [id number]    Therapy Diagnosis:   Encounter Diagnoses   Name Primary?    Right thigh pain Yes    Impaired functional mobility and activity tolerance      Physician: Enriqueta Bains MD    Visit Date: 8/2/2024    Physician Orders: PT Eval and Treat "Piriformis stretching and glute strengthening, and hamstring stretches, nerve flossing, dry needling and hip ROM"  Medical Diagnosis from Referral: S76.311A (ICD-10-CM) - Hamstring strain, right, initial encounter G57.01 (ICD-10-CM) - Piriformis syndrome of right side  Evaluation Date: 7/25/2024  Authorization Period Expiration: 7/15/15  Plan of Care Expiration: 9/20/2024  Progress Note Due: 8/20/2024  Date of Surgery: n/a  Visit # / Visits authorized: 4/ 31   FOTO: 1/ 3     Precautions: Standard and blood thinners      Time In: 11:15  Time Out: 12:03  Total Billable Time: 44 minutes    PTA Visit #: 0/5       Subjective     Patient reports: no change in symptoms. She denies urinary/bowel/sexual symptoms. She reports numbness at rest and burning with sitting for more than a few minutes.    She was compliant with home exercise program.  Response to previous treatment: fair  Functional change: ongoing  Aggravating Factors: forward flexion, squatting (with too many reps), worst with sitting    Pain: 1/10  Location: R medial/posterior thigh to knee    Objective      Objective Measures updated at progress report unless specified.     Treatment     Meredith received the treatments listed below:      Therapeutic activities to improve functional performance for 23 minutes -  [x] Pelvic floor interview - see Subjective  [x] Assessment of hips and pelvic floor - no tenderness to palpation of pelvic floor muscles or obturator internus; tenderness to palpation of R adductor josselin and iliacus; no hip flexor flexibility deficits  [x] Seated R obturator nerve side, " x20 - no change  [x] HEP building/HEP review    Manual therapy techniques: to develop flexibility, desensitization, and extensibility for 15 minutes -   [x] Trigger point release to R adductor josselin  [x] Cupping on R posterior/medial thigh  [x] R iliacus release    Therapeutic exercises to develop strength, endurance and core stabilization for 6 minutes -  [x] Straight leg raise (R), 2x10  [x] Bridging, x12  [x] Bridging with ball between knees, x10  [x] Prone hamstring curl (3#, R), x15    Patient Education and Home Exercises       Education provided:   - 7/30/2024 - advised pt to resume previous HEP (clams, reverse clams, SL hip abd) if prone extension series does not change/improve sx  8/2/24 - add SLR and bridging to reload muscles released today    Written Home Exercises Provided: Patient instructed to cont prior HEP. Exercises were reviewed and Meredith was able to demonstrate them prior to the end of the session.  Meredith demonstrated good  understanding of the education provided. See Electronic Medical Record under Patient Instructions for exercises provided during therapy sessions    Assessment     Pt tolerated treatment session well, with good understanding of education provided and reduced symptoms following treatment. Pelvic floor assessment reveals no tension/tenderness, but deeper assessment of hips showed tenderness with palpation of R iliacus and adductor josselin. Pt's burning symptoms are in the pattern of the obturator nerve, which could be irritated around the hip flexors (which would align with mechanism of injury: step ups). Pt's functional mobility and ability to perform ADLs still limited by groin pain. She requires skilled therapy for continued patient education and coordination retraining.      Meredith is progressing well towards her goals.   Patient prognosis is Fair.     Patient will continue to benefit from skilled outpatient physical therapy to address the deficits listed in the problem  list box on initial evaluation, provide pt/family education and to maximize pt's level of independence in the home and community environment.     Patient's spiritual, cultural and educational needs considered and pt agreeable to plan of care and goals.     Anticipated barriers to physical therapy: standard    Goals: updated 8/2/2024   Short Term Goals (4 Weeks):   1. Pt will report 20% reduction in pain of the R thigh for ease with ADL's. (Progressing, not met)   2. PT will demonstrate improved upright posture with minimal cuing for ease with functional positioning in home and community. (Progressing, not met)   3. Pt will demonstrate improved lumbar spine ROM in all directions by 10% for ease with bending activities.  (Progressing, not met)   4. Pt to demonstrate improved functional ability with FOTO score >=70% . (Progressing, not met)      Long Term Goals (12 Weeks):   1. Pt will report being independent with HEP for maintenance of improvements gained during therapy sessions (Progressing, not met)   2. PT will report 50% reduction of pain of the R thigh for ease with driving tolerance. (Progressing, not met)   3. Pt will demonstrate trunk and extremity strength to >=4+/5 without the provocation of pain for ease with return to gym exercise at [facility] (Progressing, not met)   4.  Pt to demonstrate improved functional ability with FOTO score >=77% . (Progressing, not met)     Plan     Cont with POC for mobility, strength.  Assess response to release of R iliacus and adductor josselin on sitting tolerance    Venus Vargas, PT

## 2024-08-02 NOTE — PATIENT INSTRUCTIONS
Bridging, 2-3 sets of 10  - Gently engage the transverse abdominis by drawing the belly button up and in towards the spine  - Holding the belly button in, lift the hips (only lift as high as it is comfortable) and lower  *Don't hold your breath      Straight Leg Raise, 2-3 sets of 10  - Gently engage the transverse abdominis by drawing the belly button up and in to the spine  - Holding the hips level and the belly button down, gently lift one leg a few inches  *Don't hold your breath

## 2024-08-05 ENCOUNTER — DOCUMENTATION ONLY (OUTPATIENT)
Dept: REHABILITATION | Facility: OTHER | Age: 74
End: 2024-08-05

## 2024-08-05 ENCOUNTER — PATIENT MESSAGE (OUTPATIENT)
Dept: SPINE | Facility: CLINIC | Age: 74
End: 2024-08-05
Payer: MEDICARE

## 2024-08-05 ENCOUNTER — CLINICAL SUPPORT (OUTPATIENT)
Dept: REHABILITATION | Facility: OTHER | Age: 74
End: 2024-08-05
Payer: MEDICARE

## 2024-08-05 ENCOUNTER — DOCUMENTATION ONLY (OUTPATIENT)
Dept: REHABILITATION | Facility: OTHER | Age: 74
End: 2024-08-05
Payer: MEDICARE

## 2024-08-05 DIAGNOSIS — Z74.09 IMPAIRED FUNCTIONAL MOBILITY AND ACTIVITY TOLERANCE: ICD-10-CM

## 2024-08-05 DIAGNOSIS — M79.651 RIGHT THIGH PAIN: Primary | ICD-10-CM

## 2024-08-05 PROCEDURE — 97140 MANUAL THERAPY 1/> REGIONS: CPT | Mod: KX,PN,CQ

## 2024-08-05 PROCEDURE — 97530 THERAPEUTIC ACTIVITIES: CPT | Mod: KX,PN,CQ

## 2024-08-07 ENCOUNTER — CLINICAL SUPPORT (OUTPATIENT)
Dept: REHABILITATION | Facility: OTHER | Age: 74
End: 2024-08-07
Payer: MEDICARE

## 2024-08-07 DIAGNOSIS — Z74.09 IMPAIRED FUNCTIONAL MOBILITY AND ACTIVITY TOLERANCE: ICD-10-CM

## 2024-08-07 DIAGNOSIS — M79.651 RIGHT THIGH PAIN: Primary | ICD-10-CM

## 2024-08-07 PROCEDURE — 97530 THERAPEUTIC ACTIVITIES: CPT | Mod: PN | Performed by: PHYSICAL THERAPIST

## 2024-08-07 PROCEDURE — 97140 MANUAL THERAPY 1/> REGIONS: CPT | Mod: PN | Performed by: PHYSICAL THERAPIST

## 2024-08-09 LAB
OHS CV AF BURDEN PERCENT: < 1
OHS CV DC REMOTE DEVICE TYPE: NORMAL
OHS CV ICD SHOCK: NO

## 2024-08-10 ENCOUNTER — CLINICAL SUPPORT (OUTPATIENT)
Dept: CARDIOLOGY | Facility: HOSPITAL | Age: 74
End: 2024-08-10
Payer: MEDICARE

## 2024-08-10 DIAGNOSIS — I47.10 SUPRAVENTRICULAR TACHYCARDIA, UNSPECIFIED: ICD-10-CM

## 2024-08-10 DIAGNOSIS — I49.9 CARDIAC ARRHYTHMIA, UNSPECIFIED: ICD-10-CM

## 2024-08-10 DIAGNOSIS — R00.2 PALPITATIONS: ICD-10-CM

## 2024-08-12 ENCOUNTER — CLINICAL SUPPORT (OUTPATIENT)
Dept: REHABILITATION | Facility: OTHER | Age: 74
End: 2024-08-12
Payer: MEDICARE

## 2024-08-12 DIAGNOSIS — Z74.09 IMPAIRED FUNCTIONAL MOBILITY AND ACTIVITY TOLERANCE: ICD-10-CM

## 2024-08-12 DIAGNOSIS — M79.651 RIGHT THIGH PAIN: Primary | ICD-10-CM

## 2024-08-12 PROCEDURE — 97530 THERAPEUTIC ACTIVITIES: CPT | Mod: PN

## 2024-08-12 NOTE — PROGRESS NOTES
"[facility]   Physical Therapy Treatment Note      Name: Meredith Ramos  Clinic Number: [id number]    Therapy Diagnosis:   Encounter Diagnoses   Name Primary?    Right thigh pain Yes    Impaired functional mobility and activity tolerance      Physician: Enriqueta Bains MD    Visit Date: 8/12/2024    Physician Orders: PT Eval and Treat "Piriformis stretching and glute strengthening, and hamstring stretches, nerve flossing, dry needling and hip ROM"  Medical Diagnosis from Referral: S76.311A (ICD-10-CM) - Hamstring strain, right, initial encounter G57.01 (ICD-10-CM) - Piriformis syndrome of right side  Evaluation Date: 7/25/2024  Authorization Period Expiration: 7/15/15  Plan of Care Expiration: 9/20/2024  Progress Note Due: 8/20/2024  Date of Surgery: n/a  Visit # / Visits authorized: 7/ 31   FOTO: 1/ 3     Precautions: Standard and blood thinners      Time In: 9:15 am   Time Out: 10:15 am   Total Billable Time: 30 minutes 1:1 (60 min total)    PTA Visit #: 0/5       Subjective     Patient reports: the cupping has helped a lot. She was able to do a drinking bird with out pain, and has been able to drive some without sitting on yoga block. Symptoms are better than they were, but still present.    She was compliant with home exercise program.  Response to previous treatment: good response to manual therapy   Functional change: improvement with bending over to load , improvements with sleeping positions     Aggravating Factors: forward flexion, squatting (with too many reps), worst with sitting    Pain: 0/10, 9/10 sharp pain when sitting, but goes away when she stands up and starts moving   Location: R medial/posterior thigh to knee    Objective      Objective Measures updated at progress report unless specified.     Treatment     Meredith received the treatments listed below:      Therapeutic activities to improve functional performance for 45 minutes -  [] Straight leg raise " "(R), 2x10   [] Bridging, 2x10   [] Bridging with ball between knees, 2x10 (added to HEP)   [] Prone hamstring curl (3#, R), x15   [] hip flexor stretch off EOM x 1' ea  (added to HEP)   [x] +standing 4-way hip x2 x 10 ea <> Scout w/ yellow power band  [x] +standing lat lunge with ADD slider x3x8 B -- add KB next visit  [x] +[place] plank x5x15" holds Scout  [] +hip thrust - nv    [] Pelvic floor interview - see Subjective   [] Assessment of hips and pelvic floor - no tenderness to palpation of pelvic floor muscles or obturator internus; tenderness to palpation of R adductor josselin and iliacus; no hip flexor flexibility deficits  [x] Seated R obturator nerve side, x20 sliders, x20 tensioners  [x] HEP building/HEP review    Manual therapy techniques: to develop flexibility, desensitization, and extensibility for 15 minutes -   [x] Trigger point release to R adductor josselin   [x] Cupping on R posterior/medial thigh   [x] R iliacus release     Therapeutic exercises to develop strength, endurance and core stabilization for 00 minutes -    Patient Education and Home Exercises       Education provided:   - 7/30/2024 - advised pt to resume previous HEP (clams, reverse clams, SL hip abd) if prone extension series does not change/improve sx  8/2/24 - add SLR and bridging to reload muscles released today   8/5/24 - added bridge with ball squeeze and hip flexor stretch to HEP     Written Home Exercises Provided: Patient instructed to cont prior HEP. Exercises were reviewed and Meredith was able to demonstrate them prior to the end of the session.  Meredith demonstrated good  understanding of the education provided. See Electronic Medical Record under Patient Instructions for exercises provided during therapy sessions    Assessment     Good return technique with HEP, indicating good compliance at home. Pt with marked MF restrictions to iliacus/psoas > hip ADDuctors today. Progressed dynamic strength and lumbopelvic hip " stabilization today with good tolerance with fatigue noted but able to perform without increased sx in RLE.  Consider DN nv.  Meredith is progressing well towards her goals.   Patient prognosis is Fair.     Patient will continue to benefit from skilled outpatient physical therapy to address the deficits listed in the problem list box on initial evaluation, provide pt/family education and to maximize pt's level of independence in the home and community environment.     Patient's spiritual, cultural and educational needs considered and pt agreeable to plan of care and goals.     Anticipated barriers to physical therapy: standard    Goals: updated 8/12/2024   Short Term Goals (4 Weeks):   1. Pt will report 20% reduction in pain of the R thigh for ease with ADL's. (Progressing, not met)   2. PT will demonstrate improved upright posture with minimal cuing for ease with functional positioning in home and community. (Progressing, not met)   3. Pt will demonstrate improved lumbar spine ROM in all directions by 10% for ease with bending activities.  (Progressing, not met)   4. Pt to demonstrate improved functional ability with FOTO score >=70% . (Progressing, not met)      Long Term Goals (12 Weeks):   1. Pt will report being independent with HEP for maintenance of improvements gained during therapy sessions (Progressing, not met)   2. PT will report 50% reduction of pain of the R thigh for ease with driving tolerance. (Progressing, not met)   3. Pt will demonstrate trunk and extremity strength to >=4+/5 without the provocation of pain for ease with return to gym exercise at [facility] (Progressing, not met)   4.  Pt to demonstrate improved functional ability with FOTO score >=77% . (Progressing, not met)     Plan     Cont with POC for mobility, strength.  Assess response to release of R iliacus and adductor josselin on sitting tolerance    Sofiya Lopez, PT

## 2024-08-14 ENCOUNTER — CLINICAL SUPPORT (OUTPATIENT)
Dept: REHABILITATION | Facility: OTHER | Age: 74
End: 2024-08-14
Payer: MEDICARE

## 2024-08-14 ENCOUNTER — CLINICAL SUPPORT (OUTPATIENT)
Dept: CARDIOLOGY | Facility: HOSPITAL | Age: 74
End: 2024-08-14
Attending: INTERNAL MEDICINE
Payer: MEDICARE

## 2024-08-14 DIAGNOSIS — R00.2 PALPITATIONS: ICD-10-CM

## 2024-08-14 DIAGNOSIS — I47.10 SUPRAVENTRICULAR TACHYCARDIA, UNSPECIFIED: ICD-10-CM

## 2024-08-14 DIAGNOSIS — I49.9 CARDIAC ARRHYTHMIA, UNSPECIFIED: ICD-10-CM

## 2024-08-14 DIAGNOSIS — M79.651 RIGHT THIGH PAIN: Primary | ICD-10-CM

## 2024-08-14 DIAGNOSIS — Z74.09 IMPAIRED FUNCTIONAL MOBILITY AND ACTIVITY TOLERANCE: ICD-10-CM

## 2024-08-14 PROCEDURE — 97140 MANUAL THERAPY 1/> REGIONS: CPT | Mod: PN | Performed by: PHYSICAL THERAPIST

## 2024-08-14 PROCEDURE — 97530 THERAPEUTIC ACTIVITIES: CPT | Mod: PN | Performed by: PHYSICAL THERAPIST

## 2024-08-14 NOTE — PROGRESS NOTES
"[facility]   Physical Therapy Treatment Note      Name: Meredith Ramos  Clinic Number: [id number]    Therapy Diagnosis:   Encounter Diagnoses   Name Primary?    Right thigh pain Yes    Impaired functional mobility and activity tolerance      Physician: Enriqueta Bains MD    Visit Date: 8/14/2024    Physician Orders: PT Eval and Treat "Piriformis stretching and glute strengthening, and hamstring stretches, nerve flossing, dry needling and hip ROM"  Medical Diagnosis from Referral: S76.311A (ICD-10-CM) - Hamstring strain, right, initial encounter G57.01 (ICD-10-CM) - Piriformis syndrome of right side  Evaluation Date: 7/25/2024  Authorization Period Expiration: 7/15/15  Plan of Care Expiration: 9/20/2024  Progress Note Due: 8/20/2024  Date of Surgery: n/a  Visit # / Visits authorized: 8/ 31   FOTO: 1/ 3     Precautions: Standard and blood thinners      Time In: 0815  Time Out: 0915  Total Billable Time: 30 minutes 1:1 (60 min total)    PTA Visit #: 0/5       Subjective     Patient reports: still having trouble sitting, but it is a lot better than it was.     She was compliant with home exercise program.  Response to previous treatment: good response to manual therapy   Functional change: improvement with bending over to load , improvements with sleeping positions     Aggravating Factors: forward flexion, squatting (with too many reps), worst with sitting    Pain: 0/10, 9/10 sharp pain when sitting, but goes away when she stands up and starts moving   Location: R medial/posterior thigh to knee    Objective      Objective Measures updated at progress report unless specified.     Treatment     Meredith received the treatments listed below:      Therapeutic activities to improve functional performance for 40 minutes -  [] Straight leg raise (R), 2x10   [] Bridging, 2x10   [] Bridging with ball between knees, 2x10 (added to HEP)   [] Prone hamstring curl (3#, R), x15   [] hip " "flexor stretch off EOM x 1' ea  (added to HEP)   [x] +standing 4-way hip x2 x 10 ea <> Scout w/ yellow power band  [x] +standing lat lunge with ADD slider x3x8 B -- add KB next visit  [x] +[place] plank x5x15" holds Scout  [] +hip thrust - nv    [x] Pt education including review of dry needling consent and procedure prior to treatment    [] Seated R obturator nerve side, x20 sliders, x20 tensioners  [x] HEP building/HEP review    Manual therapy techniques: to develop flexibility, desensitization, and extensibility for 20 minutes -   [] Trigger point release to R adductor josselin   [] Cupping on R posterior/medial thigh   [] R iliacus release   [x] Application of TDN: Pt educated on benefits and potential side effects of dry needling. Educated pt on benefits, precautions, side effects following TDN. Educated pt to use heat following treatment sessions if pt is experiencing pain or soreness. Pt verbalized good understanding of education.  Pt signed written consent to dry needling. Pt gave verbal consent for DN    Pt received dry needling to the below listed muscles using 50 mm needles.  In supine:  R iliacus  In prone:  R ischium  R adductor josselin x 3  Winding performed every 5 minutes during treatment.      Therapeutic exercises to develop strength, endurance and core stabilization for 00 minutes -    Patient Education and Home Exercises       Education provided:   - 7/30/2024 - advised pt to resume previous HEP (clams, reverse clams, SL hip abd) if prone extension series does not change/improve sx  8/2/24 - add SLR and bridging to reload muscles released today   8/5/24 - added bridge with ball squeeze and hip flexor stretch to HEP     Written Home Exercises Provided: Patient instructed to cont prior HEP. Exercises were reviewed and Meredith was able to demonstrate them prior to the end of the session.  Meredith demonstrated good  understanding of the education provided. See Electronic Medical Record under Patient " Instructions for exercises provided during therapy sessions    Assessment     Dry needling initiated with pt's written and verbal consent. Good soft tissue response to dry needling evident by increased grasp with unilateral winding at all insertion points. Winding performed every 5 minutes during treatment. No adverse effects following treatment. Good tolerance to therex, verbal cuing for technique.     Meredith is progressing well towards her goals.   Patient prognosis is Fair.     Patient will continue to benefit from skilled outpatient physical therapy to address the deficits listed in the problem list box on initial evaluation, provide pt/family education and to maximize pt's level of independence in the home and community environment.     Patient's spiritual, cultural and educational needs considered and pt agreeable to plan of care and goals.     Anticipated barriers to physical therapy: standard    Goals: updated 8/14/2024   Short Term Goals (4 Weeks):   1. Pt will report 20% reduction in pain of the R thigh for ease with ADL's. (Progressing, not met)   2. PT will demonstrate improved upright posture with minimal cuing for ease with functional positioning in home and community. (Progressing, not met)   3. Pt will demonstrate improved lumbar spine ROM in all directions by 10% for ease with bending activities.  (Progressing, not met)   4. Pt to demonstrate improved functional ability with FOTO score >=70% . (Progressing, not met)      Long Term Goals (12 Weeks):   1. Pt will report being independent with HEP for maintenance of improvements gained during therapy sessions (Progressing, not met)   2. PT will report 50% reduction of pain of the R thigh for ease with driving tolerance. (Progressing, not met)   3. Pt will demonstrate trunk and extremity strength to >=4+/5 without the provocation of pain for ease with return to gym exercise at [facility] (Progressing, not met)   4.  Pt to demonstrate improved functional  ability with FOTO score >=77% . (Progressing, not met)     Plan     Cont with POC for mobility, strength.  Assess response to release of R iliacus and adductor josselin on sitting tolerance    Alice Caldwell, PT

## 2024-08-19 ENCOUNTER — CLINICAL SUPPORT (OUTPATIENT)
Dept: REHABILITATION | Facility: OTHER | Age: 74
End: 2024-08-19
Payer: MEDICARE

## 2024-08-19 DIAGNOSIS — M79.651 RIGHT THIGH PAIN: Primary | ICD-10-CM

## 2024-08-19 DIAGNOSIS — Z74.09 IMPAIRED FUNCTIONAL MOBILITY AND ACTIVITY TOLERANCE: ICD-10-CM

## 2024-08-19 PROCEDURE — 97530 THERAPEUTIC ACTIVITIES: CPT | Mod: PN

## 2024-08-19 PROCEDURE — 97140 MANUAL THERAPY 1/> REGIONS: CPT | Mod: PN

## 2024-08-19 NOTE — PROGRESS NOTES
"[facility]   Physical Therapy Treatment Note      Name: Meredith Ramos  Clinic Number: [id number]    Therapy Diagnosis:   Encounter Diagnoses   Name Primary?    Right thigh pain Yes    Impaired functional mobility and activity tolerance      Physician: Enriqueta Bains MD    Visit Date: 8/19/2024    Physician Orders: PT Eval and Treat "Piriformis stretching and glute strengthening, and hamstring stretches, nerve flossing, dry needling and hip ROM"  Medical Diagnosis from Referral: S76.311A (ICD-10-CM) - Hamstring strain, right, initial encounter G57.01 (ICD-10-CM) - Piriformis syndrome of right side  Evaluation Date: 7/25/2024  Authorization Period Expiration: 7/15/15  Plan of Care Expiration: 9/20/2024  Progress Note Due: 8/20/2024  Date of Surgery: n/a  Visit # / Visits authorized: 9/ 31   FOTO: 1/ 3     Precautions: Standard and blood thinners      Time In: 12:07  Time Out: 1:05  Total Billable Time: 38 minutes 1:1 (58 min total)    PTA Visit #: 0/5       Subjective     Patient reports: the dry needling helped reduce overall pain, but cont to have difficulty with sitting. "I can sit more evenly though, despite the discomfort."     She was compliant with home exercise program.  Response to previous treatment: good response to manual therapy   Functional change: improvement with bending over to load , improvements with sleeping positions. Aggravating Factors: forward flexion, squatting (with too many reps), worst with sitting    Pain: 0/10, 9/10 sharp pain when sitting, but goes away when she stands up and starts moving   Location: R medial/posterior thigh to knee    Objective      Objective Measures updated at progress report unless specified.     Treatment     Meredith received the treatments listed below:      Therapeutic activities to improve functional performance for 38 minutes -  [x] standing 4-way hip x2 x 10 ea <> Scout w/ yellow power band  [x] standing lat lunge " "with ADD slider x3x8 B -- add KB next visit  [x] dimple plank x5x15" holds Scout  [x] +hip thrust x3x10 x 18" plyo box  [x] +stationary lunge x10 B    [] Seated R obturator nerve side, x20 sliders, x20 tensioners  [x] HEP building/HEP review    Manual therapy techniques: to develop flexibility, desensitization, and extensibility for 20 minutes -   [] Trigger point release to R adductor josselin   [] Cupping on R posterior/medial thigh   [] R iliacus release   [x] Application of TDN: Pt educated on benefits and potential side effects of dry needling. Educated pt on benefits, precautions, side effects following TDN. Educated pt to use heat following treatment sessions if pt is experiencing pain or soreness. Pt verbalized good understanding of education.  Pt signed written consent to dry needling. Pt gave verbal consent for DN    Pt received dry needling to the below listed muscles using 50 mm needles.  In supine:  R iliacus  (+)R adductors x 2 (AP, ML)     In prone:  R ischium  R adductor josselin x 3  Winding performed every 5 minutes during treatment.      Therapeutic exercises to develop strength, endurance and core stabilization for 00 minutes -    Patient Education and Home Exercises       Education provided:   - 7/30/2024 - advised pt to resume previous HEP (clams, reverse clams, SL hip abd) if prone extension series does not change/improve sx  8/2/24 - add SLR and bridging to reload muscles released today   8/5/24 - added bridge with ball squeeze and hip flexor stretch to HEP     Written Home Exercises Provided: Patient instructed to cont prior HEP. Exercises were reviewed and Meredith was able to demonstrate them prior to the end of the session.  Meredith demonstrated good  understanding of the education provided. See Electronic Medical Record under Patient Instructions for exercises provided during therapy sessions    Assessment     Resumed dry needling as pt notes good tolerance and improvements with mobility " and sitting after last visit. Twitch response to iliacus > adductors in supine today, with marked improvement in soft tissue quality of hip flexors today. Pt able to perform stationary lunges with reduced c/o medial thigh pain on RLE (in anterior position) following VC for TA activation for trunk support, appropriate knee tracking and glute med activation with lowering and elevation of trunk; able to perform without increased sx following VC.    Meredith is progressing well towards her goals.   Patient prognosis is Fair.     Patient will continue to benefit from skilled outpatient physical therapy to address the deficits listed in the problem list box on initial evaluation, provide pt/family education and to maximize pt's level of independence in the home and community environment.     Patient's spiritual, cultural and educational needs considered and pt agreeable to plan of care and goals.     Anticipated barriers to physical therapy: standard    Goals: updated 8/19/2024   Short Term Goals (4 Weeks):   1. Pt will report 20% reduction in pain of the R thigh for ease with ADL's. (Progressing, not met)   2. PT will demonstrate improved upright posture with minimal cuing for ease with functional positioning in home and community. (Progressing, not met)   3. Pt will demonstrate improved lumbar spine ROM in all directions by 10% for ease with bending activities.  (Progressing, not met)   4. Pt to demonstrate improved functional ability with FOTO score >=70% . (Progressing, not met)      Long Term Goals (12 Weeks):   1. Pt will report being independent with HEP for maintenance of improvements gained during therapy sessions (Progressing, not met)   2. PT will report 50% reduction of pain of the R thigh for ease with driving tolerance. (Progressing, not met)   3. Pt will demonstrate trunk and extremity strength to >=4+/5 without the provocation of pain for ease with return to gym exercise at [facility] (Progressing, not met)   4.   Pt to demonstrate improved functional ability with FOTO score >=77% . (Progressing, not met)     Plan     Cont with POC for mobility, strength.  Assess response to release of R iliacus and adductor josselin on sitting tolerance    Sofiya Lopez, PT

## 2024-08-21 ENCOUNTER — CLINICAL SUPPORT (OUTPATIENT)
Dept: REHABILITATION | Facility: OTHER | Age: 74
End: 2024-08-21
Payer: MEDICARE

## 2024-08-21 DIAGNOSIS — M79.651 RIGHT THIGH PAIN: Primary | ICD-10-CM

## 2024-08-21 DIAGNOSIS — Z74.09 IMPAIRED FUNCTIONAL MOBILITY AND ACTIVITY TOLERANCE: ICD-10-CM

## 2024-08-21 PROCEDURE — 97140 MANUAL THERAPY 1/> REGIONS: CPT | Mod: KX,PN | Performed by: PHYSICAL THERAPIST

## 2024-08-21 PROCEDURE — 97530 THERAPEUTIC ACTIVITIES: CPT | Mod: KX,PN | Performed by: PHYSICAL THERAPIST

## 2024-08-21 NOTE — PROGRESS NOTES
"[facility]   Physical Therapy Treatment Note      Name: Meredith Ramos  Clinic Number: [id number]    Therapy Diagnosis:   Encounter Diagnoses   Name Primary?    Right thigh pain Yes    Impaired functional mobility and activity tolerance      Physician: Enriqueta Bains MD    Visit Date: 8/21/2024    Physician Orders: PT Eval and Treat "Piriformis stretching and glute strengthening, and hamstring stretches, nerve flossing, dry needling and hip ROM"  Medical Diagnosis from Referral: S76.311A (ICD-10-CM) - Hamstring strain, right, initial encounter G57.01 (ICD-10-CM) - Piriformis syndrome of right side  Evaluation Date: 7/25/2024  Authorization Period Expiration: 7/15/15  Plan of Care Expiration: 9/20/2024  Progress Note Due: 9/20/2024  Date of Surgery: n/a  Visit # / Visits authorized: 10/ 31   FOTO: 2/ 3 last issued 8/21/2024     Precautions: Standard and blood thinners      Time In: 1315  Time Out: 1410  Total Billable Time: 55 minutes 1:1     PTA Visit #: 0/5       Subjective     Patient reports: she's seeing good improvement overall. She still has the numbness, "but that doesn't bother me." With sitting she'll still reach a point of discomfort and needs to adjust or stand, but it's no longer sharp pain and now more of a dull ache. She is now able to drive without sitting on a yoga block.     She was compliant with home exercise program.  Response to previous treatment: good response to manual therapy   Functional change: improvement with bending over to load , improvements with sleeping positions. Aggravating Factors: forward flexion, squatting (with too many reps), worst with sitting    Pain: 0/10  Location: R medial/posterior thigh to knee    Objective      Objective Measures updated at progress report unless specified.   8/21/2024    ROM  Pt able to perform forward flexion with no c/o pain exacerbation        CMS Impairment/Limitation/Restriction for FOTO Upper Leg " "Survey    Therapist reviewed FOTO scores for Meredith Ramos on 8/21/2024.   FOTO documents entered into [employer] - see Media section.    Evaluation: 65%    Current : 73%    Goal: 77%         Treatment     Meredith received the treatments listed below:      Therapeutic activities to improve functional performance for 30 minutes -  [] standing 4-way hip x2 x 10 ea <> Scout w/ yellow power band  [] standing lat lunge with ADD slider x3x8 B -- add KB next visit  [] [place] plank x5x15" holds Scout  [] +hip thrust x3x10 x 18" plyo box  [] +stationary lunge x10 B    [] Seated R obturator nerve side, x20 sliders, x20 tensioners  [x] HEP building/HEP review: per pt request, review of options for iliacus/iliopsoas release for home including "hip hook," "pso-rite," manual self-release, and use of lacrosse ball peanut and yoga block (performed in clinic). Pt given purchase information for these items as well as sliders  [x] Assessment of current condition for month progress note      Manual therapy techniques: to develop flexibility, desensitization, and extensibility for 25 minutes -   [] Trigger point release to R adductor josselin   [] Cupping on R posterior/medial thigh   [x] R iliacus release   [x] Application of TDN: Pt educated on benefits and potential side effects of dry needling. Educated pt on benefits, precautions, side effects following TDN. Educated pt to use heat following treatment sessions if pt is experiencing pain or soreness. Pt verbalized good understanding of education.  Pt signed written consent to dry needling. Pt gave verbal consent for DN    Pt received dry needling to the below listed muscles using 50 mm needles.  In supine:  R iliacus    In prone:  R ischium  R adductor josselin x 3  E-stim applied through 2 channels at 2 Hz and 3-4 mA to tolerance.        Therapeutic exercises to develop strength, endurance and core stabilization for 00 minutes -    Patient Education and Home Exercises       Education " provided:   - 7/30/2024 - advised pt to resume previous HEP (clams, reverse clams, SL hip abd) if prone extension series does not change/improve sx  8/2/24 - add SLR and bridging to reload muscles released today   8/5/24 - added bridge with ball squeeze and hip flexor stretch to HEP     Written Home Exercises Provided: Patient instructed to cont prior HEP. Exercises were reviewed and Meredith was able to demonstrate them prior to the end of the session.  Meredith demonstrated good  understanding of the education provided. See Electronic Medical Record under Patient Instructions for exercises provided during therapy sessions    Assessment     Meredith is making good progress with therapy and has met short term goals. She is reporting significant decrease in sharp/shooting pain, but still gets aching pain that limits her tolerance to prolonged sitting. She is now able to drive without sitting on yoga block, and bend forward without pain. She continues to demonstrate good improvement in soft tissue mobility with dry needling. Pt states intention to begin to gradually start to return to some of her prior activities to assess her tolerance.     Meredith is progressing well towards her goals.   Patient prognosis is Fair.     Patient will continue to benefit from skilled outpatient physical therapy to address the deficits listed in the problem list box on initial evaluation, provide pt/family education and to maximize pt's level of independence in the home and community environment.     Patient's spiritual, cultural and educational needs considered and pt agreeable to plan of care and goals.     Anticipated barriers to physical therapy: standard    Goals: updated 8/21/2024   Short Term Goals (4 Weeks):   1. Pt will report 20% reduction in pain of the R thigh for ease with ADL's. (met)   2. PT will demonstrate improved upright posture with minimal cuing for ease with functional positioning in home and community. (met)   3. Pt  will demonstrate improved lumbar spine ROM in all directions by 10% for ease with bending activities.  (met)   4. Pt to demonstrate improved functional ability with FOTO score >=70% . (met)      Long Term Goals (12 Weeks):   1. Pt will report being independent with HEP for maintenance of improvements gained during therapy sessions (Progressing, not met)   2. PT will report 50% reduction of pain of the R thigh for ease with driving tolerance. (Progressing, not met)   3. Pt will demonstrate trunk and extremity strength to >=4+/5 without the provocation of pain for ease with return to gym exercise at [facility] (Progressing, not met)   4.  Pt to demonstrate improved functional ability with FOTO score >=77% . (Progressing, not met)     Plan     Cont with POC for mobility, strength.  Assess response to release of R iliacus and adductor josselin on sitting tolerance    Alice Caldwell, PT

## 2024-08-22 ENCOUNTER — HOSPITAL ENCOUNTER (OUTPATIENT)
Dept: RADIOLOGY | Facility: HOSPITAL | Age: 74
Discharge: HOME | End: 2024-08-22
Attending: ORTHOPAEDIC SURGERY
Payer: MEDICARE

## 2024-08-22 ENCOUNTER — OFFICE VISIT (OUTPATIENT)
Dept: SPORTS MEDICINE | Facility: CLINIC | Age: 74
End: 2024-08-22
Payer: MEDICARE

## 2024-08-22 VITALS
DIASTOLIC BLOOD PRESSURE: 68 MMHG | HEART RATE: 58 BPM | SYSTOLIC BLOOD PRESSURE: 108 MMHG | BODY MASS INDEX: 18.37 KG/M2 | HEIGHT: 65 IN | WEIGHT: 110.25 LBS

## 2024-08-22 DIAGNOSIS — M25.511 CHRONIC RIGHT SHOULDER PAIN: ICD-10-CM

## 2024-08-22 DIAGNOSIS — M25.511 RIGHT SHOULDER PAIN, UNSPECIFIED CHRONICITY: ICD-10-CM

## 2024-08-22 DIAGNOSIS — G89.29 CHRONIC RIGHT SHOULDER PAIN: ICD-10-CM

## 2024-08-22 DIAGNOSIS — M25.511 RIGHT SHOULDER PAIN, UNSPECIFIED CHRONICITY: Primary | ICD-10-CM

## 2024-08-22 PROCEDURE — 99213 OFFICE O/P EST LOW 20 MIN: CPT | Mod: PBBFAC,25 | Performed by: ORTHOPAEDIC SURGERY

## 2024-08-22 PROCEDURE — 73030 X-RAY EXAM OF SHOULDER: CPT | Mod: 26,RT,, | Performed by: RADIOLOGY

## 2024-08-22 PROCEDURE — 99999 PR PBB SHADOW E&M-EST. PATIENT-LVL III: CPT | Mod: PBBFAC,,, | Performed by: ORTHOPAEDIC SURGERY

## 2024-08-22 PROCEDURE — 73030 X-RAY EXAM OF SHOULDER: CPT | Mod: TC,RT

## 2024-08-22 NOTE — PROGRESS NOTES
CC: RIGHT shoulder pain     74 y.o. Female with a 1 month history of right shoulder atraumatic pain.  Patient was extremely active in the gym lifting weights when she reported about a month ago doing an overhead pull over and feeling a subjective subluxation or dislocation of the right shoulder.  She reports that the right shoulder then spontaneously reduced.  Since then she has had subjective instability in the right shoulder with overhead motion.  This is associated with pain rated as a 4/10.  She also endorses discomfort with any overhead reaching activity.  She has a relevant history of bilateral shoulder rotator cuff repairs about 10 years ago.     She states that the pain is not responding to any conservative care.      She reports that the pain and weakness is worse with overhead activity. It also bothers her at night.    Is affecting ADLs.  Pain is 6/10 at it's worst.      Past Medical History:   Diagnosis Date    Diverticulitis     Kidney stone     Osteoporosis     ostenopenia        Past Surgical History:   Procedure Laterality Date    BREAST BIOPSY      x1    BUNIONECTOMY      Hammer toe repair    BUNIONECTOMY      COLECTOMY  2004    partial for diverticulitis    DIAGNOSTIC CARDIAC ELECTROPHYSIOLOGY STUDY N/A 10/02/2023    Procedure: EP - diagnostic;  Surgeon: Shin Ortiz MD;  Location: [facility] EP LAB;  Service: Cardiology;  Laterality: N/A;    HYSTERECTOMY  07/22/2013    complete laparoscopic hysterectomy at [facility] 7/22/13.  No cancer.  Found 3cm fibroid on right ovary.    INSERTION OF IMPLANTABLE LOOP RECORDER N/A 1/9/2024    Procedure: Insertion, Implantable Loop Recorder;  Surgeon: Shin Ortiz MD;  Location: [facility] EP LAB;  Service: Cardiology;  Laterality: N/A;  SVT/AT, ILR, BSci, Local, SK, 3 Prep    kidney stones      removal of breast implants         Family History   Problem Relation Name Age of Onset    Dementia Mother      Hypertension Father      Cancer Father          bladder cancer     Cancer Sister 4         breast x2 sis    Breast cancer Sister 4 54    Hashimoto's thyroiditis Sister 4         x2 sis    No Known Problems Brother 1     No Known Problems Daughter 1     Alcohol abuse Son 1     Uterine cancer Maternal Grandmother      Colon cancer Maternal Grandfather      Ovarian cancer Neg Hx           Current Outpatient Medications:     calcium-vitamin D3 (OS- + D3) 500 mg(1,250mg) -200 unit per tablet, Calcium 500 + D, Disp: , Rfl:     cholecalciferol, vitamin D3, (VITAMIN D3) 25 mcg (1,000 unit) capsule, , Disp: , Rfl:     cyanocobalamin, vitamin B-12, (VITAMIN B-12) 50 mcg tablet, Take 50 mcg by mouth once daily., Disp: , Rfl:     LACTOFERRIN ORAL, Take 350 mg by mouth., Disp: , Rfl:     [id number]-algal oil (METANX/FOLTANX RF) 3 mg-35 mg-2 mg -90.314 mg Cap, Take by mouth., Disp: , Rfl:     magnesium glycinate (MAG GLYCINATE ORAL), Take by mouth., Disp: , Rfl:     metoprolol succinate (TOPROL-XL) 25 MG 24 hr tablet, One half to one a day or as directed., Disp: 90 tablet, Rfl: 3    progesterone (PROMETRIUM) 100 MG capsule, Take 100 mg by mouth once daily., Disp: , Rfl:     vitamin K2 100 mcg Cap, Take 1 capsule by mouth once daily., Disp: , Rfl:     XARELTO 20 mg Tab, TAKE 1 TABLET(20 MG) BY MOUTH DAILY EVENING MEAL WITH DINNER, Disp: 30 tablet, Rfl: 11    ZINC PICOLINATE ORAL, , Disp: , Rfl:   No current facility-administered medications for this visit.    Facility-Administered Medications Ordered in Other Visits:     ceFAZolin 2 g in dextrose 5 % in water (D5W) 50 mL IVPB (MB+), 2 g, Intravenous, On Call Procedure, Brandie Adrian, NP    Review of patient's allergies indicates:   Allergen Reactions    Cortisone (hydrocortisone) [hydrocortisone] Other (See Comments)     Triggers svt          REVIEW OF SYSTEMS:  Constitution: Negative. Negative for chills, fever and night sweats.   HENT: Negative for congestion and headaches.    Eyes: Negative for blurred vision, left vision  "loss and right vision loss.   Cardiovascular: Negative for chest pain and syncope.   Respiratory: Negative for cough and shortness of breath.    Endocrine: Negative for polydipsia, polyphagia and polyuria.   Hematologic/Lymphatic: Negative for bleeding problem. Does not bruise/bleed easily.   Skin: Negative for dry skin, itching and rash.   Musculoskeletal: Negative for falls.  Positive for right shoulder pain and muscle weakness.   Gastrointestinal: Negative for abdominal pain and bowel incontinence.   Genitourinary: Negative for bladder incontinence and nocturia.   Neurological: Negative for disturbances in coordination, loss of balance and seizures.   Psychiatric/Behavioral: Negative for depression. The patient does not have insomnia.    Allergic/Immunologic: Negative for hives and persistent infections.      PHYSICAL EXAMINATION:  Vitals:  /68   Pulse (!) 58   Ht 5' 5" (1.651 m)   Wt 50 kg (110 lb 3.7 oz)   BMI 18.34 kg/m²    General: The patient is alert and oriented x 3.  Mood is pleasant.  Observation of ears, eyes and nose reveal no gross abnormalities.  No labored breathing observed.  Gait is coordinated. Patient can toe walk and heel walk without difficulty.      RIGHT SHOULDER / UPPER EXTREMITY EXAM    OBSERVATION:     Swelling  none  Deformity  none   Discoloration  none   Scapular winging none   Scars   none  Atrophy  none    TENDERNESS / CREPITUS (T/C):          T/C      T/C   Clavicle   -/-  SUPRAspinatus    -/-     AC Jt.    +/-  INFRAspinatus  -/-    SC Jt.    -/-  Deltoid    -/-      G. Tuberosity  -/-  LH BICEP groove  -/-   Acromion:  -/-  Midline Neck   -/-     Scapular Spine -/-  Trapezium   -/-   SMA Scapula  -/-  GH jt. line - post  -/-     Scapulothoracic  -/-         ROM: (* = with pain)  Left shoulder   Right shoulder        AROM (PROM)   AROM (PROM)   FE    170° (175°)     170° (175°)     ER at 0°    60°  (65°)    60°  (65°)   ER at 90° ABD  90°  (90°)    90°  (90°)*   IR at " 90°  ABD   NA  (40°)     NA  (40°)      IR (spine level)   T10     T10    STRENGTH: (* = with pain) Left shoulder   Right shoulder   SCAPTION   5/5    5/5    IR    5/5    5/5   ER    5/5    5/5   BICEPS   5/5    5/5   Deltoid    5/5    5/5     SIGNS:  Painful side       NEER   -    OCECILIOS  neg    BLACKBURN   -    SPEEDS  neg     DROP ARM   -   BELLY PRESS neg   Superior escape none    LIFT-OFF  neg   X-Body ADD    pos    MOVING VALGUS neg        STABILITY TESTING    Left shoulder   Right shoulder    Translation     Anterior  up face     up face    Posterior  up face    up face    Sulcus   < 10mm    < 10 mm     Signs   Apprehension   neg      pos       Relocation   no change     no change      Jerk test  neg     neg    EXTREMITY NEURO-VASCULAR EXAM:    Sensation grossly intact to light touch all dermatomal regions.    DTR 2+ Biceps, Triceps, BR and Negative Trents sign   Grossly intact motor function at Elbow, Wrist and Hand   Distal pulses radial and ulnar 2+, brisk cap refill, symmetric.      NECK:  Painless FROM and spinous processes non-tender. Negative Spurlings sign.      OTHER FINDINGS:  No scapular dyskinesia  Scoliosis of thoracolumbar spine     XRAYS:  Xrays including AP, Outlet and Axillary Lateral of shoulder are ordered / images reviewed by me:   No fracture dislocation or other pathology   Acromion type 2   Proximal migration of humeral head: Mild   GH arthritis: Mild to moderate          ASSESSMENT:   Right shoulder pain:  1. Right shoulder pain, unspecified chronicity    2. Chronic right shoulder pain        PLAN:      1. MRI to rule out rotator cuff tear in setting of prior RCR and recent dislocation vs subluxation event   2. If MRI shows cuff tear will consider PT + CSI     All questions were answered, patient will contact us for questions or concerns in the interim.

## 2024-08-27 ENCOUNTER — CLINICAL SUPPORT (OUTPATIENT)
Dept: REHABILITATION | Facility: OTHER | Age: 74
End: 2024-08-27
Payer: MEDICARE

## 2024-08-27 DIAGNOSIS — Z74.09 IMPAIRED FUNCTIONAL MOBILITY AND ACTIVITY TOLERANCE: ICD-10-CM

## 2024-08-27 DIAGNOSIS — M79.651 RIGHT THIGH PAIN: Primary | ICD-10-CM

## 2024-08-27 PROCEDURE — 97530 THERAPEUTIC ACTIVITIES: CPT | Mod: KX,PN | Performed by: PHYSICAL THERAPIST

## 2024-08-27 NOTE — PROGRESS NOTES
"[facility]   Physical Therapy Treatment Note      Name: Meredith Ramos  Clinic Number: [id number]    Therapy Diagnosis:   Encounter Diagnoses   Name Primary?    Right thigh pain Yes    Impaired functional mobility and activity tolerance      Physician: Enriqueta Bains MD    Visit Date: 8/27/2024    Physician Orders: PT Eval and Treat "Piriformis stretching and glute strengthening, and hamstring stretches, nerve flossing, dry needling and hip ROM"  Medical Diagnosis from Referral: S76.311A (ICD-10-CM) - Hamstring strain, right, initial encounter G57.01 (ICD-10-CM) - Piriformis syndrome of right side  Evaluation Date: 7/25/2024  Authorization Period Expiration: 7/15/15  Plan of Care Expiration: 9/20/2024  Progress Note Due: 9/20/2024  Date of Surgery: n/a  Visit # / Visits authorized: 11/ 31   FOTO: 3/ 3 last issued and closed 8/27/2024     Precautions: Standard and blood thinners      Time In: 1310  Time Out: 1335  Total Billable Time: 25 minutes 1:1     PTA Visit #: 0/5       Subjective     Patient reports: she's doing significantly better. She can now sit for up to an hour before she starts to have some discomfort, and is able to drive without pain for longer distances. She did some light weight back squats without pain. She hasn't yet started her more impact activities because she's worried those could cause pain, but otherwise is very pleased with her progress.     She was compliant with home exercise program.  Response to previous treatment: good response to manual therapy   Functional change: improvement with bending over to load , improvements with sleeping positions. Aggravating Factors: forward flexion, squatting (with too many reps), worst with sitting    Pain: 0/10  Location: R medial/posterior thigh to knee    Objective      Objective Measures updated at progress report unless specified.   8/27/2024    ROM  Pt able to perform forward flexion with no c/o pain " "exacerbation        CMS Impairment/Limitation/Restriction for FOTO Upper Leg Survey    Therapist reviewed FOTO scores for Meredith Ramos on 8/27/2024.   FOTO documents entered into [employer] - see Media section.    Evaluation: 65%    Current : 91%    Goal: 77%         Treatment     Meredith received the treatments listed below:      Therapeutic activities to improve functional performance for 25 minutes -  [] standing 4-way hip x2 x 10 ea <> Scout w/ yellow power band  [] standing lat lunge with ADD slider x3x8 B -- add KB next visit  [] copenhagen plank x5x15" holds Scout  [] +hip thrust x3x10 x 18" plyo box  [] +stationary lunge x10 B    [] Seated R obturator nerve side, x20 sliders, x20 tensioners  [x] HEP building/HEP review: technique with hip flexor stretches (pelvic position), use of tissue roller for adductor release  [x] Assessment of current condition and plan of care      Manual therapy techniques: to develop flexibility, desensitization, and extensibility for 00 minutes -   [] Trigger point release to R adductor josselin   [] Cupping on R posterior/medial thigh   [] R iliacus release   [] Application of TDN: Pt educated on benefits and potential side effects of dry needling. Educated pt on benefits, precautions, side effects following TDN. Educated pt to use heat following treatment sessions if pt is experiencing pain or soreness. Pt verbalized good understanding of education.  Pt signed written consent to dry needling. Pt gave verbal consent for DN    Pt received dry needling to the below listed muscles using 50 mm needles.  In supine:  R iliacus    In prone:  R ischium  R adductor josselin x 3  E-stim applied through 2 channels at 2 Hz and 3-4 mA to tolerance.        Therapeutic exercises to develop strength, endurance and core stabilization for 00 minutes -    Patient Education and Home Exercises       Education provided:   - 7/30/2024 - advised pt to resume previous HEP (clams, reverse clams, SL hip abd) if " prone extension series does not change/improve sx  8/2/24 - add SLR and bridging to reload muscles released today   8/5/24 - added bridge with ball squeeze and hip flexor stretch to HEP     Written Home Exercises Provided: Patient instructed to cont prior HEP. Exercises were reviewed and Meredith was able to demonstrate them prior to the end of the session.  Meredith demonstrated good  understanding of the education provided. See Electronic Medical Record under Patient Instructions for exercises provided during therapy sessions    Assessment     Meredith has made excellent progress with therapy and reports marked improvement in functional movement and positional tolerance. At this time, I recommend that she continue to gradually return to her PLOF with her fitness routine. She has an appointment in 2 weeks to use as needed. If she is able to manage symptoms independently while return to activity, she will contact PT and be discharged at that time. If she has any lingering concerns she will return to care as needed.     Meredith is progressing well towards her goals.   Patient prognosis is Fair.     Patient will continue to benefit from skilled outpatient physical therapy to address the deficits listed in the problem list box on initial evaluation, provide pt/family education and to maximize pt's level of independence in the home and community environment.     Patient's spiritual, cultural and educational needs considered and pt agreeable to plan of care and goals.     Anticipated barriers to physical therapy: standard    Goals: updated 8/27/2024   Short Term Goals (4 Weeks):   1. Pt will report 20% reduction in pain of the R thigh for ease with ADL's. (met)   2. PT will demonstrate improved upright posture with minimal cuing for ease with functional positioning in home and community. (met)   3. Pt will demonstrate improved lumbar spine ROM in all directions by 10% for ease with bending activities.  (met)   4. Pt to  demonstrate improved functional ability with FOTO score >=70% . (met)      Long Term Goals (12 Weeks):   1. Pt will report being independent with HEP for maintenance of improvements gained during therapy sessions (met)   2. PT will report 50% reduction of pain of the R thigh for ease with driving tolerance. (met)   3. Pt will demonstrate trunk and extremity strength to >=4+/5 without the provocation of pain for ease with return to gym exercise at [facility] (Progressing, not met)   4.  Pt to demonstrate improved functional ability with FOTO score >=77% . (met)     Plan     Pt to return in 2 weeks for assessment and either discharge to independent fitness program, or continue with skilled intervention as needed    Alice Caldwell, PT

## 2024-08-28 LAB
OHS CV AF BURDEN PERCENT: < 1
OHS CV DC REMOTE DEVICE TYPE: NORMAL

## 2024-08-30 ENCOUNTER — PROCEDURE VISIT (OUTPATIENT)
Dept: HEPATOLOGY | Facility: CLINIC | Age: 74
End: 2024-08-30
Payer: MEDICARE

## 2024-08-30 ENCOUNTER — LAB VISIT (OUTPATIENT)
Dept: LAB | Facility: HOSPITAL | Age: 74
End: 2024-08-30
Attending: INTERNAL MEDICINE
Payer: MEDICARE

## 2024-08-30 ENCOUNTER — OFFICE VISIT (OUTPATIENT)
Dept: HEPATOLOGY | Facility: CLINIC | Age: 74
End: 2024-08-30
Payer: MEDICARE

## 2024-08-30 VITALS
HEIGHT: 65 IN | HEART RATE: 67 BPM | BODY MASS INDEX: 18.15 KG/M2 | DIASTOLIC BLOOD PRESSURE: 57 MMHG | WEIGHT: 108.94 LBS | SYSTOLIC BLOOD PRESSURE: 108 MMHG

## 2024-08-30 DIAGNOSIS — I47.10 SUPRAVENTRICULAR TACHYCARDIA: ICD-10-CM

## 2024-08-30 DIAGNOSIS — R79.89 ABNORMAL LFTS: Primary | ICD-10-CM

## 2024-08-30 DIAGNOSIS — R79.89 ABNORMAL LFTS: ICD-10-CM

## 2024-08-30 LAB
ALBUMIN SERPL BCP-MCNC: 3.9 G/DL (ref 3.5–5.2)
ALP SERPL-CCNC: 99 U/L (ref 55–135)
ALT SERPL W/O P-5'-P-CCNC: 50 U/L (ref 10–44)
ANION GAP SERPL CALC-SCNC: 7 MMOL/L (ref 8–16)
AST SERPL-CCNC: 45 U/L (ref 10–40)
BASOPHILS # BLD AUTO: 0.04 K/UL (ref 0–0.2)
BASOPHILS NFR BLD: 0.7 % (ref 0–1.9)
BILIRUB SERPL-MCNC: 0.5 MG/DL (ref 0.1–1)
BUN SERPL-MCNC: 32 MG/DL (ref 8–23)
CALCIUM SERPL-MCNC: 9.8 MG/DL (ref 8.7–10.5)
CHLORIDE SERPL-SCNC: 104 MMOL/L (ref 95–110)
CO2 SERPL-SCNC: 27 MMOL/L (ref 23–29)
CREAT SERPL-MCNC: 0.9 MG/DL (ref 0.5–1.4)
DIFFERENTIAL METHOD BLD: ABNORMAL
EOSINOPHIL # BLD AUTO: 0.3 K/UL (ref 0–0.5)
EOSINOPHIL NFR BLD: 5.9 % (ref 0–8)
ERYTHROCYTE [DISTWIDTH] IN BLOOD BY AUTOMATED COUNT: 12.8 % (ref 11.5–14.5)
EST. GFR  (NO RACE VARIABLE): >60 ML/MIN/1.73 M^2
FERRITIN SERPL-MCNC: 97 NG/ML (ref 20–300)
GLUCOSE SERPL-MCNC: 88 MG/DL (ref 70–110)
HBV SURFACE AG SERPL QL IA: NORMAL
HCT VFR BLD AUTO: 42.8 % (ref 37–48.5)
HCV AB SERPL QL IA: NORMAL
HGB BLD-MCNC: 14 G/DL (ref 12–16)
IGA SERPL-MCNC: 141 MG/DL (ref 40–350)
IGG SERPL-MCNC: 757 MG/DL (ref 650–1600)
IGM SERPL-MCNC: 116 MG/DL (ref 50–300)
IMM GRANULOCYTES # BLD AUTO: 0.01 K/UL (ref 0–0.04)
IMM GRANULOCYTES NFR BLD AUTO: 0.2 % (ref 0–0.5)
LYMPHOCYTES # BLD AUTO: 1.5 K/UL (ref 1–4.8)
LYMPHOCYTES NFR BLD: 26.2 % (ref 18–48)
MCH RBC QN AUTO: 32.1 PG (ref 27–31)
MCHC RBC AUTO-ENTMCNC: 32.7 G/DL (ref 32–36)
MCV RBC AUTO: 98 FL (ref 82–98)
MONOCYTES # BLD AUTO: 0.4 K/UL (ref 0.3–1)
MONOCYTES NFR BLD: 7 % (ref 4–15)
NEUTROPHILS # BLD AUTO: 3.4 K/UL (ref 1.8–7.7)
NEUTROPHILS NFR BLD: 60 % (ref 38–73)
NRBC BLD-RTO: 0 /100 WBC
PLATELET # BLD AUTO: 151 K/UL (ref 150–450)
PMV BLD AUTO: 14.1 FL (ref 9.2–12.9)
POTASSIUM SERPL-SCNC: 4.6 MMOL/L (ref 3.5–5.1)
PROT SERPL-MCNC: 6.5 G/DL (ref 6–8.4)
RBC # BLD AUTO: 4.36 M/UL (ref 4–5.4)
SODIUM SERPL-SCNC: 138 MMOL/L (ref 136–145)
WBC # BLD AUTO: 5.61 K/UL (ref 3.9–12.7)

## 2024-08-30 PROCEDURE — 85025 COMPLETE CBC W/AUTO DIFF WBC: CPT | Performed by: INTERNAL MEDICINE

## 2024-08-30 PROCEDURE — 82784 ASSAY IGA/IGD/IGG/IGM EACH: CPT | Mod: 59 | Performed by: INTERNAL MEDICINE

## 2024-08-30 PROCEDURE — 86015 ACTIN ANTIBODY EACH: CPT | Performed by: INTERNAL MEDICINE

## 2024-08-30 PROCEDURE — 86803 HEPATITIS C AB TEST: CPT | Performed by: INTERNAL MEDICINE

## 2024-08-30 PROCEDURE — 99205 OFFICE O/P NEW HI 60 MIN: CPT | Mod: S$PBB,,, | Performed by: INTERNAL MEDICINE

## 2024-08-30 PROCEDURE — 99213 OFFICE O/P EST LOW 20 MIN: CPT | Mod: PBBFAC,25 | Performed by: INTERNAL MEDICINE

## 2024-08-30 PROCEDURE — 86038 ANTINUCLEAR ANTIBODIES: CPT | Performed by: INTERNAL MEDICINE

## 2024-08-30 PROCEDURE — 99999 PR PBB SHADOW E&M-EST. PATIENT-LVL III: CPT | Mod: PBBFAC,,, | Performed by: INTERNAL MEDICINE

## 2024-08-30 PROCEDURE — 87340 HEPATITIS B SURFACE AG IA: CPT | Performed by: INTERNAL MEDICINE

## 2024-08-30 PROCEDURE — 86381 MITOCHONDRIAL ANTIBODY EACH: CPT | Performed by: INTERNAL MEDICINE

## 2024-08-30 PROCEDURE — 82728 ASSAY OF FERRITIN: CPT | Performed by: INTERNAL MEDICINE

## 2024-08-30 PROCEDURE — 36415 COLL VENOUS BLD VENIPUNCTURE: CPT | Performed by: INTERNAL MEDICINE

## 2024-08-30 PROCEDURE — 80053 COMPREHEN METABOLIC PANEL: CPT | Performed by: INTERNAL MEDICINE

## 2024-08-30 NOTE — PROGRESS NOTES
Subjective:       Patient ID: Meredith Ramos is a 74 y.o. female.    Chief Complaint: Elevated Hepatic Enzymes    HPI  I saw this 74 y.o.    Mildly elevated LFTs in Feb 2024 + Aug 2024  Normal platelet count  HBV and HCV neg    On anticoagulation for A fib    118 lb 1 year ago- lost 12 lb weight  Body mass index is 18.12 kg/m².      PMH:  Osteopenia  A Fib  Proctitis      No DM    SH:  No alcohol for 20 years (used to drink significant amounts)  Ex smoker- for 20 years at least    Exercises every day- weights and cardio    FH:  Breast cancer  Cirrhosis - ALD    Review of Systems   Constitutional:  Negative for activity change, appetite change, chills, fatigue, fever and unexpected weight change.   HENT:  Negative for ear pain, hearing loss, nosebleeds, sore throat and trouble swallowing.    Eyes:  Negative for redness and visual disturbance.   Respiratory:  Negative for cough, chest tightness, shortness of breath and wheezing.    Cardiovascular:  Negative for chest pain and palpitations.   Gastrointestinal:  Negative for abdominal distention, abdominal pain, blood in stool, constipation, diarrhea, nausea and vomiting.   Genitourinary:  Negative for difficulty urinating, dysuria, frequency, hematuria and urgency.   Musculoskeletal:  Negative for arthralgias, back pain, gait problem, joint swelling and myalgias.   Skin:  Negative for rash.   Neurological:  Negative for tremors, seizures, speech difficulty, weakness and headaches.   Hematological:  Negative for adenopathy.   Psychiatric/Behavioral:  Negative for confusion, decreased concentration and sleep disturbance. The patient is not nervous/anxious.          Lab Results   Component Value Date    ALT 50 (H) 08/30/2024    AST 45 (H) 08/30/2024    ALKPHOS 99 08/30/2024    BILITOT 0.5 08/30/2024     Past Medical History:   Diagnosis Date    Diverticulitis     Kidney stone     Osteoporosis     ostenopenia      Past Surgical History:   Procedure Laterality Date     BREAST BIOPSY      x1    BUNIONECTOMY      Hammer toe repair    BUNIONECTOMY      COLECTOMY  2004    partial for diverticulitis    DIAGNOSTIC CARDIAC ELECTROPHYSIOLOGY STUDY N/A 10/02/2023    Procedure: EP - diagnostic;  Surgeon: Shin Ortiz MD;  Location: [facility] EP LAB;  Service: Cardiology;  Laterality: N/A;    HYSTERECTOMY  07/22/2013    complete laparoscopic hysterectomy at [facility] 7/22/13.  No cancer.  Found 3cm fibroid on right ovary.    INSERTION OF IMPLANTABLE LOOP RECORDER N/A 1/9/2024    Procedure: Insertion, Implantable Loop Recorder;  Surgeon: Shin Ortiz MD;  Location: [facility] EP LAB;  Service: Cardiology;  Laterality: N/A;  SVT/AT, ILR, BSci, Local, SK, 3 Prep    kidney stones      removal of breast implants       Current Outpatient Medications   Medication Sig    calcium-vitamin D3 (OS- + D3) 500 mg(1,250mg) -200 unit per tablet Calcium 500 + D    cholecalciferol, vitamin D3, (VITAMIN D3) 25 mcg (1,000 unit) capsule     cyanocobalamin, vitamin B-12, (VITAMIN B-12) 50 mcg tablet Take 50 mcg by mouth once daily.    LACTOFERRIN ORAL Take 350 mg by mouth.    magnesium glycinate (MAG GLYCINATE ORAL) Take by mouth.    metoprolol succinate (TOPROL-XL) 25 MG 24 hr tablet One half to one a day or as directed.    progesterone (PROMETRIUM) 100 MG capsule Take 100 mg by mouth once daily.    vitamin K2 100 mcg Cap Take 1 capsule by mouth once daily.    XARELTO 20 mg Tab TAKE 1 TABLET(20 MG) BY MOUTH DAILY EVENING MEAL WITH DINNER    ZINC PICOLINATE ORAL      No current facility-administered medications for this visit.     Facility-Administered Medications Ordered in Other Visits   Medication    ceFAZolin 2 g in dextrose 5 % in water (D5W) 50 mL IVPB (MB+)       Objective:      Physical Exam  Constitutional:       General: She is not in acute distress.  HENT:      Head: Normocephalic.   Eyes:      Pupils: Pupils are equal, round, and reactive to light.   Neck:      Thyroid: No thyromegaly.       Vascular: No JVD.      Trachea: No tracheal deviation.   Cardiovascular:      Rate and Rhythm: Normal rate and regular rhythm.      Heart sounds: Normal heart sounds. No murmur heard.  Pulmonary:      Effort: Pulmonary effort is normal.      Breath sounds: Normal breath sounds. No stridor.   Abdominal:      Palpations: Abdomen is soft.   Lymphadenopathy:      Head:      Right side of head: No submental, submandibular, tonsillar, preauricular, posterior auricular or occipital adenopathy.      Left side of head: No submental, submandibular, tonsillar, preauricular, posterior auricular or occipital adenopathy.      Cervical: No cervical adenopathy.   Neurological:      Mental Status: She is alert. She is not disoriented.      Cranial Nerves: No cranial nerve deficit.      Sensory: No sensory deficit.           Assessment:       1. Abnormal LFTs    2. Supraventricular tachycardia        Plan:   This 74 year old lady has had mildly elevated LFTS for a few months.  She does not have any risk factors for MASLD and is very fir and exercises every day.    - labs to rule out causes of liver disease  - liver US  Fibroscan today did not show any steatosis or fibrosis.  I will be in touch with her with the result.

## 2024-08-30 NOTE — PROCEDURES
FibroScan Transplant Hepatology    Date/Time: 8/30/2024 4:00 PM    Performed by: Chris Campbell MD  Authorized by: Chris Campbell MD    Diagnosis:  Other    Probe:  M    Universal Protocol: Patient's identity, procedure and site were verified, confirmatory pause was performed.  Discussed procedure including risks and potential complications.  Questions answered.  Patient verbalizes understanding and wishes to proceed with VCTE.     Procedure: After providing explanations of the procedure, patient was placed in the supine position with right arm in maximum abduction to allow optimal exposure of right lateral abdomen.  Patient was briefly assessed, Testing was performed in the mid-axillary location, 50Hz Shear Wave pulses were applied and the resulting Shear Wave and Propagation Speed detected with a 3.5 MHz ultrasonic signal, using the FibroScan probe, Skin to liver capsule distance and liver parenchyma were accessed during the entire examination with the FibroScan probe, Patient was instructed to breathe normally and to abstain from sudden movements during the procedure, allowing for random measurements of liver stiffness. At least 10 Shear Waves were produced, Individual measurements of each Shear Wave were calculated.  Patient tolerated the procedure well with no complications.  Meets discharge criteria as was dismissed.  Rates pain 0 out of 10.  Patient will follow up with ordering provider to review results.    Findings  Median liver stiffness score:  4.6  CAP Reading: dB/m:  191    IQR/med %:  13  Interpretation  Fibrosis interpretation is based on medial liver stiffness - Kilopascal (kPa).    Fibrosis Stage:  F 0-1  Steatosis interpretation is based on controlled attenuation parameter - (dB/m).    Steatosis Grade:  <S1

## 2024-09-03 ENCOUNTER — HOSPITAL ENCOUNTER (OUTPATIENT)
Dept: RADIOLOGY | Facility: HOSPITAL | Age: 74
Discharge: HOME | End: 2024-09-03
Attending: STUDENT IN AN ORGANIZED HEALTH CARE EDUCATION/TRAINING PROGRAM
Payer: MEDICARE

## 2024-09-03 DIAGNOSIS — G89.29 CHRONIC RIGHT SHOULDER PAIN: ICD-10-CM

## 2024-09-03 DIAGNOSIS — M25.511 CHRONIC RIGHT SHOULDER PAIN: ICD-10-CM

## 2024-09-03 LAB
ANA SER QL IF: NORMAL
MITOCHONDRIA AB TITR SER IF: NORMAL {TITER}
SMOOTH MUSCLE AB TITR SER IF: NORMAL {TITER}

## 2024-09-03 PROCEDURE — 73221 MRI JOINT UPR EXTREM W/O DYE: CPT | Mod: TC,RT

## 2024-09-03 PROCEDURE — 73221 MRI JOINT UPR EXTREM W/O DYE: CPT | Mod: 26,RT,, | Performed by: RADIOLOGY

## 2024-09-05 ENCOUNTER — OFFICE VISIT (OUTPATIENT)
Dept: SPORTS MEDICINE | Facility: CLINIC | Age: 74
End: 2024-09-05
Payer: MEDICARE

## 2024-09-05 VITALS
HEIGHT: 65 IN | BODY MASS INDEX: 18.16 KG/M2 | SYSTOLIC BLOOD PRESSURE: 97 MMHG | WEIGHT: 109 LBS | HEART RATE: 50 BPM | DIASTOLIC BLOOD PRESSURE: 61 MMHG

## 2024-09-05 DIAGNOSIS — M25.511 CHRONIC RIGHT SHOULDER PAIN: ICD-10-CM

## 2024-09-05 DIAGNOSIS — M12.811 RIGHT ROTATOR CUFF TEAR ARTHROPATHY: ICD-10-CM

## 2024-09-05 DIAGNOSIS — G89.29 CHRONIC RIGHT SHOULDER PAIN: ICD-10-CM

## 2024-09-05 DIAGNOSIS — M75.121 NONTRAUMATIC COMPLETE TEAR OF RIGHT ROTATOR CUFF: Primary | ICD-10-CM

## 2024-09-05 DIAGNOSIS — M75.101 RIGHT ROTATOR CUFF TEAR ARTHROPATHY: ICD-10-CM

## 2024-09-05 PROCEDURE — 99214 OFFICE O/P EST MOD 30 MIN: CPT | Mod: S$PBB,,, | Performed by: ORTHOPAEDIC SURGERY

## 2024-09-05 PROCEDURE — 99213 OFFICE O/P EST LOW 20 MIN: CPT | Mod: PBBFAC | Performed by: ORTHOPAEDIC SURGERY

## 2024-09-05 PROCEDURE — 99999 PR PBB SHADOW E&M-EST. PATIENT-LVL III: CPT | Mod: PBBFAC,,, | Performed by: ORTHOPAEDIC SURGERY

## 2024-09-05 NOTE — PROGRESS NOTES
CC: RIGHT shoulder pain    Interval Hx: See initial HPI below. Patient returns for MRI results. She reports good ROM, minimal pain. She reports that her main symptom is weakness when she tries to lift heavier things overhead, but she has adequate strength for most overhead ADL's.     74 y.o. Female with a 1 month history of right shoulder atraumatic pain.  Patient was extremely active in the gym lifting weights when she reported about a month ago doing an overhead pull over and feeling a subjective subluxation or dislocation of the right shoulder.  She reports that the right shoulder then spontaneously reduced.  Since then she has had subjective instability in the right shoulder with overhead motion.  This is associated with pain rated as a 4/10.  She also endorses discomfort with any overhead reaching activity.  She has a relevant history of bilateral rotator cuff injuries about 10 years ago.     She states that the pain is not responding to any conservative care.      She reports that the pain and weakness is worse with overhead activity. It also bothers her at night.    Is affecting ADLs.  Pain is 6/10 at it's worst.      Past Medical History:   Diagnosis Date    Diverticulitis     Kidney stone     Osteoporosis     ostenopenia        Past Surgical History:   Procedure Laterality Date    BREAST BIOPSY      x1    BUNIONECTOMY      Hammer toe repair    BUNIONECTOMY      COLECTOMY  2004    partial for diverticulitis    DIAGNOSTIC CARDIAC ELECTROPHYSIOLOGY STUDY N/A 10/02/2023    Procedure: EP - diagnostic;  Surgeon: Shin Ortiz MD;  Location: [facility] EP LAB;  Service: Cardiology;  Laterality: N/A;    HYSTERECTOMY  07/22/2013    complete laparoscopic hysterectomy at [facility] 7/22/13.  No cancer.  Found 3cm fibroid on right ovary.    INSERTION OF IMPLANTABLE LOOP RECORDER N/A 1/9/2024    Procedure: Insertion, Implantable Loop Recorder;  Surgeon: Shin Ortiz MD;  Location: [facility] EP LAB;  Service: Cardiology;   Laterality: N/A;  SVT/AT, ILR, BSci, Local, SK, 3 Prep    kidney stones      removal of breast implants         Family History   Problem Relation Name Age of Onset    Dementia Mother      Hypertension Father      Cancer Father          bladder cancer    Cancer Sister 4         breast x2 sis    Breast cancer Sister 4 54    Hashimoto's thyroiditis Sister 4         x2 sis    No Known Problems Brother 1     No Known Problems Daughter 1     Alcohol abuse Son 1     Uterine cancer Maternal Grandmother      Colon cancer Maternal Grandfather      Ovarian cancer Neg Hx           Current Outpatient Medications:     calcium-vitamin D3 (OS- + D3) 500 mg(1,250mg) -200 unit per tablet, Calcium 500 + D, Disp: , Rfl:     cholecalciferol, vitamin D3, (VITAMIN D3) 25 mcg (1,000 unit) capsule, , Disp: , Rfl:     cyanocobalamin, vitamin B-12, (VITAMIN B-12) 50 mcg tablet, Take 50 mcg by mouth once daily., Disp: , Rfl:     LACTOFERRIN ORAL, Take 350 mg by mouth., Disp: , Rfl:     magnesium glycinate (MAG GLYCINATE ORAL), Take by mouth., Disp: , Rfl:     metoprolol succinate (TOPROL-XL) 25 MG 24 hr tablet, One half to one a day or as directed., Disp: 90 tablet, Rfl: 3    progesterone (PROMETRIUM) 100 MG capsule, Take 100 mg by mouth once daily., Disp: , Rfl:     vitamin K2 100 mcg Cap, Take 1 capsule by mouth once daily., Disp: , Rfl:     XARELTO 20 mg Tab, TAKE 1 TABLET(20 MG) BY MOUTH DAILY EVENING MEAL WITH DINNER, Disp: 30 tablet, Rfl: 11    ZINC PICOLINATE ORAL, , Disp: , Rfl:   No current facility-administered medications for this visit.    Facility-Administered Medications Ordered in Other Visits:     ceFAZolin 2 g in dextrose 5 % in water (D5W) 50 mL IVPB (MB+), 2 g, Intravenous, On Call Procedure, Brandie Adrian NP    Review of patient's allergies indicates:   Allergen Reactions    Cortisone (hydrocortisone) [hydrocortisone] Other (See Comments)     Triggers svt          REVIEW OF SYSTEMS:  Constitution: Negative.  "Negative for chills, fever and night sweats.   HENT: Negative for congestion and headaches.    Eyes: Negative for blurred vision, left vision loss and right vision loss.   Cardiovascular: Negative for chest pain and syncope.   Respiratory: Negative for cough and shortness of breath.    Endocrine: Negative for polydipsia, polyphagia and polyuria.   Hematologic/Lymphatic: Negative for bleeding problem. Does not bruise/bleed easily.   Skin: Negative for dry skin, itching and rash.   Musculoskeletal: Negative for falls.  Positive for right shoulder pain and muscle weakness.   Gastrointestinal: Negative for abdominal pain and bowel incontinence.   Genitourinary: Negative for bladder incontinence and nocturia.   Neurological: Negative for disturbances in coordination, loss of balance and seizures.   Psychiatric/Behavioral: Negative for depression. The patient does not have insomnia.    Allergic/Immunologic: Negative for hives and persistent infections.      PHYSICAL EXAMINATION:  Vitals:  BP 97/61   Pulse (!) 50   Ht 5' 5" (1.651 m)   Wt 49.5 kg (109 lb 0.3 oz)   BMI 18.14 kg/m²    General: The patient is alert and oriented x 3.  Mood is pleasant.  Observation of ears, eyes and nose reveal no gross abnormalities.  No labored breathing observed.  Gait is coordinated. Patient can toe walk and heel walk without difficulty.      RIGHT SHOULDER / UPPER EXTREMITY EXAM    OBSERVATION:     Swelling  none  Deformity  none   Discoloration  none   Scapular winging none   Scars   none  Atrophy  none    TENDERNESS / CREPITUS (T/C):          T/C      T/C   Clavicle   -/-  SUPRAspinatus    -/-     AC Jt.    +/+  INFRAspinatus  -/-    SC Jt.    -/-  Deltoid    -/-      G. Tuberosity  -/-  LH BICEP groove  -/-   Acromion:  -/-  Midline Neck   -/-     Scapular Spine -/-  Trapezium   -/-   SMA Scapula  -/-  GH jt. line - post  -/-     Scapulothoracic  -/-         ROM: (* = with pain)  Left shoulder   Right shoulder        AROM (PROM) "   AROM (PROM)   FE    170° (175°)     170° (175°)     ER at 0°    60°  (65°)    60°  (65°)   ER at 90° ABD  90°  (90°)    90°  (90°)*   IR at 90°  ABD   NA  (40°)     NA  (40°)      IR (spine level)   T10     T10    STRENGTH: (* = with pain) Left shoulder   Right shoulder   SCAPTION   5/5    5/5    IR    5/5    5/5   ER    5/5    5/5   BICEPS   5/5    5/5   Deltoid    5/5    5/5     SIGNS:  Painful side       NEER   +    OCECILIOS  neg    BLACKBURN   +    SPEEDS  neg     DROP ARM   -   BELLY PRESS neg   Superior escape none    LIFT-OFF  neg   X-Body ADD    pos    MOVING VALGUS neg        STABILITY TESTING    Left shoulder   Right shoulder    Translation     Anterior  up face     up face    Posterior  up face    up face    Sulcus   < 10mm    < 10 mm     Signs   Apprehension   neg      pos       Relocation   no change     no change      Jerk test  neg     neg    EXTREMITY NEURO-VASCULAR EXAM:    Sensation grossly intact to light touch all dermatomal regions.    DTR 2+ Biceps, Triceps, BR and Negative Trents sign   Grossly intact motor function at Elbow, Wrist and Hand   Distal pulses radial and ulnar 2+, brisk cap refill, symmetric.      NECK:  Painless FROM and spinous processes non-tender. Negative Spurlings sign.      OTHER FINDINGS:  No scapular dyskinesia  Scoliosis of thoracolumbar spine     XRAYS:  Xrays including AP, Outlet and Axillary Lateral of shoulder are ordered / images reviewed by me:   No fracture dislocation or other pathology   Acromion type 2   Proximal migration of humeral head: Mild   GH arthritis: moderate      MRI right shoulder: complete tears of supraspinatus and subscapularis with retraction and moderate atrophy. She has full thickness cartilage loss over superior humeral head. Rupture of biceps tendon        ASSESSMENT:   Right shoulder pain:  1. Nontraumatic complete tear of right rotator cuff    2. Right rotator cuff tear arthropathy    3. Chronic right shoulder pain          PLAN:       1. Patient deferred CSI, states she had bad reaction to steroid previously  2. Physical therapy for periscapular strengthening  3. OTC pain medications as needed  4. Given lack of functional limitations, patient is not interested in reverse TSA at this time    All questions were answered, patient will contact us for questions or concerns in the interim.

## 2024-09-06 ENCOUNTER — HOSPITAL ENCOUNTER (OUTPATIENT)
Dept: RADIOLOGY | Facility: HOSPITAL | Age: 74
Discharge: HOME | End: 2024-09-06
Attending: INTERNAL MEDICINE
Payer: MEDICARE

## 2024-09-06 DIAGNOSIS — R79.89 ABNORMAL LFTS: ICD-10-CM

## 2024-09-06 LAB
A1AT PHENOTYP SERPL-IMP: NORMAL
A1AT SERPL NEPH-MCNC: 134 MG/DL (ref 100–190)

## 2024-09-06 PROCEDURE — 76700 US EXAM ABDOM COMPLETE: CPT | Mod: TC

## 2024-09-06 PROCEDURE — 76700 US EXAM ABDOM COMPLETE: CPT | Mod: 26,,, | Performed by: RADIOLOGY

## 2024-09-11 ENCOUNTER — CLINICAL SUPPORT (OUTPATIENT)
Dept: CARDIOLOGY | Facility: HOSPITAL | Age: 74
End: 2024-09-11
Payer: MEDICARE

## 2024-09-11 DIAGNOSIS — I49.9 CARDIAC ARRHYTHMIA, UNSPECIFIED: ICD-10-CM

## 2024-09-11 DIAGNOSIS — I47.10 SUPRAVENTRICULAR TACHYCARDIA, UNSPECIFIED: ICD-10-CM

## 2024-09-11 DIAGNOSIS — R00.2 PALPITATIONS: ICD-10-CM

## 2024-09-13 ENCOUNTER — CLINICAL SUPPORT (OUTPATIENT)
Dept: REHABILITATION | Facility: OTHER | Age: 74
End: 2024-09-13
Payer: MEDICARE

## 2024-09-13 DIAGNOSIS — R29.898 DECREASED STRENGTH OF UPPER EXTREMITY: ICD-10-CM

## 2024-09-13 DIAGNOSIS — M25.611 DECREASED RANGE OF MOTION OF RIGHT SHOULDER: ICD-10-CM

## 2024-09-13 DIAGNOSIS — M79.651 RIGHT THIGH PAIN: Primary | ICD-10-CM

## 2024-09-13 DIAGNOSIS — Z74.09 IMPAIRED FUNCTIONAL MOBILITY AND ACTIVITY TOLERANCE: ICD-10-CM

## 2024-09-13 PROCEDURE — 97530 THERAPEUTIC ACTIVITIES: CPT | Mod: PN

## 2024-09-13 NOTE — PROGRESS NOTES
"[facility]   Physical Therapy Treatment Note      Name: Meredith Ramos  Clinic Number: [id number]    Therapy Diagnosis:   Encounter Diagnoses   Name Primary?    Right thigh pain Yes    Impaired functional mobility and activity tolerance        Physician: Enriqueta Bains MD    Visit Date: 9/13/2024    Physician Orders: PT Eval and Treat "Piriformis stretching and glute strengthening, and hamstring stretches, nerve flossing, dry needling and hip ROM"  Medical Diagnosis from Referral: S76.311A (ICD-10-CM) - Hamstring strain, right, initial encounter G57.01 (ICD-10-CM) - Piriformis syndrome of right side  Evaluation Date: 7/25/2024  Authorization Period Expiration: 7/15/15  Plan of Care Expiration: 9/20/2024  Progress Note Due: 9/20/2024  Date of Surgery: n/a  Visit # / Visits authorized: 12/ 31   FOTO: 3/ 3 last issued and closed 8/27/2024     Precautions: Standard and blood thinners      Time In: 11:00  Time Out: 12:08  Total Billable Time: 38 minutes 1:1  (68 min total)    PTA Visit #: 0/5       Subjective     Patient reports: she was doing really well until recently, when she was trying to do dead lifts at the gym with lighter weight. Started feeling that nerve pain with sitting, with slight numbness still in posterior medial thigh. Says she is leaving next week to go OOT for several weeks; today is her last day of PT.      She was compliant with home exercise program.  Response to previous treatment: good response to manual therapy   Functional change: improvement with bending over to load , improvements with sleeping positions. Aggravating Factors: forward flexion, squatting (with too many reps), worst with sitting    Pain: 3-4/10  Location: R medial/posterior thigh to knee    Objective      Objective Measures updated at progress report unless specified.     9/13/2024    Palpation: moderate TTP STL, PIIS; min-mod TTP with increased tone of prox ADD m's    Lumbar AROM: " "WNL all directions, no pain  Trunk strength: extensors = good, core = fair+ (good TA, but difficulty with plank)     Lower Extremity Strength  Right LE   Left LE     Hip flexion: 5/5 Hip flexion: 5/5   Hip external rotation: 4+/5 - seated  4/5 - prone Hip external rotation: 5/5 - seated  4+/5 - prone   Hip internal rotation: 4/5 - seated  4/5 - prone Hip internal rotation: 5/5 - seated  Prone - prone   Hip extension:  4+/5 Hip extension: 5/5   Hip abduction: 5/5 Hip abduction: 5/5   Hip adduction: 4+/5 Hip adduction 5/5         Special Tests:  -Repeated Flexion: none  -Repeated Ext: none  -Piriformis Test: -  -DAVID: -  -FADIR: -     Neuro Dynamic Testing:               Sciatic nerve:                                       SLR:    R = -                                      L = -    Flexibility:               Hamstring: R = WFL; L = WFL              Quad: R = WFL; L = WFL              Piriformis: R: WFL; L WFL                     CMS Impairment/Limitation/Restriction for FOTO Upper Leg Survey    Therapist reviewed FOTO scores for Meredith Ramos on 8/27/2024.   FOTO documents entered into [employer] - see Media section.    Evaluation: 65%    Current : 91%    Goal: 77%         Treatment     Meredith received the treatments listed below:      Therapeutic activities to improve functional performance for 68 minutes -  [x] Seated hip ER x15 x 5" holds  [x] Seated hip IR x15 B, x15 U - YTB  [x] Prone hip IR x 15 B -- noted increased HS cramping  [x] SL bridge x10    [x] Reassessment   [x] HEP building/HEP review: added new exercises to HEP to promote (see list below), with edu on benefits pending pt positioning and purpose (deep hip rotator vs lateral and posterior glute activation for pelvic stabilization based on obturator nerve pathway). Edu on continuation with previously given HEP = technique with hip flexor stretches (pelvic position), use of tissue roller for adductor release. As overactive iliacus and psoas increase " tension over obturator nerve and potentially lead to lumbar instability, heavy emphasis on TA activation during core exercises at the gym to promote stabilization and reduced lumbar strain as an indicator of obturator nerve pathology.   Seated hip ER (heel squeeze) x20   Seated hip IR (marilou/uni) x 15 ea x YTB at ankles   Prone hip IR (B) x 20 x YTB at ankles   Prone hip ER (heel squeeze) x 20   SL clam x20 x GTB   SL reverse clam x20 x YTB        Manual therapy techniques: to develop flexibility, desensitization, and extensibility for 00 minutes -   [] Trigger point release to R adductor josselin   [] Cupping on R posterior/medial thigh   [] R iliacus release   [] Application of TDN: Pt educated on benefits and potential side effects of dry needling. Educated pt on benefits, precautions, side effects following TDN. Educated pt to use heat following treatment sessions if pt is experiencing pain or soreness. Pt verbalized good understanding of education.  Pt signed written consent to dry needling. Pt gave verbal consent for DN    Pt received dry needling to the below listed muscles using 50 mm needles.  In supine:  R iliacus    In prone:  R ischium  R adductor josselin x 3  E-stim applied through 2 channels at 2 Hz and 3-4 mA to tolerance.        Therapeutic exercises to develop strength, endurance and core stabilization for 00 minutes -    Patient Education and Home Exercises       Education provided:   - 7/30/2024 - advised pt to resume previous HEP (clams, reverse clams, SL hip abd) if prone extension series does not change/improve sx  8/2/24 - add SLR and bridging to reload muscles released today   8/5/24 - added bridge with ball squeeze and hip flexor stretch to HEP     Written Home Exercises Provided: Patient instructed to cont prior HEP. Exercises were reviewed and Meredith was able to demonstrate them prior to the end of the session.  Meredith demonstrated good  understanding of the education provided. See Electronic  Medical Record under Patient Instructions for exercises provided during therapy sessions    Assessment     Pt presents with good improvements in ROM, flexibility, and overall strength since beginning PT. Pt cont to lack sufficient deep hip rotator strength, with TTP at PIIS, adjacent to sciatic notch, which reproduces medial thigh sx, indicating possible proximal impingement of obturator nerve. Edu on self STM to localized area as well as importance of compliance and consistency with updated HEP as well as core stabilization program to reduce neural tension. Despite recent flare up of sx, pt is generally indep with updated HEP and HHCs without increased sx. Pt demo's good return technique with updated HEP. Pt has progressed well and has met 8 of 8 therapeutic goals. Pt no longer requires skilled PT. Recommend D/C from skilled care per pt request as she will be moving OOT x several weeks. Advised pt to F/U with MD if sx worsen or do not resolve.      Meredith is progressing well towards her goals.   Patient prognosis is Fair.     Patient will continue to benefit from skilled outpatient physical therapy to address the deficits listed in the problem list box on initial evaluation, provide pt/family education and to maximize pt's level of independence in the home and community environment.     Patient's spiritual, cultural and educational needs considered and pt agreeable to plan of care and goals.     Anticipated barriers to physical therapy: standard    Goals: updated 9/13/2024   Short Term Goals (4 Weeks):   1. Pt will report 20% reduction in pain of the R thigh for ease with ADL's. (met)   2. PT will demonstrate improved upright posture with minimal cuing for ease with functional positioning in home and community. (met)   3. Pt will demonstrate improved lumbar spine ROM in all directions by 10% for ease with bending activities.  (met)   4. Pt to demonstrate improved functional ability with FOTO score >=70% . (met)       Long Term Goals (12 Weeks):   1. Pt will report being independent with HEP for maintenance of improvements gained during therapy sessions (met)   2. PT will report 50% reduction of pain of the R thigh for ease with driving tolerance. (met)   3. Pt will demonstrate trunk and extremity strength to >=4+/5 without the provocation of pain for ease with return to gym exercise at PLOF (met)   4.  Pt to demonstrate improved functional ability with FOTO score >=77% . (met)     Plan     D/c today as pt is leaving next week for a sevearl-week vacation. Updated HEP to promote global hip strength, stabilization, and edu to cont with previously given HEP. Advised pt to F/U with MD for further diagnostic testing if sx no longer improving.    Sofiya Lopez, PT

## 2024-09-14 ENCOUNTER — CLINICAL SUPPORT (OUTPATIENT)
Dept: CARDIOLOGY | Facility: HOSPITAL | Age: 74
End: 2024-09-14
Attending: INTERNAL MEDICINE
Payer: MEDICARE

## 2024-09-14 DIAGNOSIS — R00.2 PALPITATIONS: ICD-10-CM

## 2024-09-14 DIAGNOSIS — I47.10 SUPRAVENTRICULAR TACHYCARDIA, UNSPECIFIED: ICD-10-CM

## 2024-09-14 DIAGNOSIS — I49.9 CARDIAC ARRHYTHMIA, UNSPECIFIED: ICD-10-CM

## 2024-09-14 PROCEDURE — 93298 REM INTERROG DEV EVAL SCRMS: CPT | Mod: 26,,, | Performed by: INTERNAL MEDICINE

## 2024-09-16 ENCOUNTER — CLINICAL SUPPORT (OUTPATIENT)
Dept: REHABILITATION | Facility: OTHER | Age: 74
End: 2024-09-16
Attending: ORTHOPAEDIC SURGERY
Payer: MEDICARE

## 2024-09-16 DIAGNOSIS — M25.611 DECREASED RANGE OF MOTION OF RIGHT SHOULDER: Primary | ICD-10-CM

## 2024-09-16 DIAGNOSIS — M75.121 NONTRAUMATIC COMPLETE TEAR OF RIGHT ROTATOR CUFF: ICD-10-CM

## 2024-09-16 DIAGNOSIS — R29.898 DECREASED STRENGTH OF UPPER EXTREMITY: ICD-10-CM

## 2024-09-16 LAB
OHS CV AF BURDEN PERCENT: < 1
OHS CV DC REMOTE DEVICE TYPE: NORMAL

## 2024-09-16 PROCEDURE — 97161 PT EVAL LOW COMPLEX 20 MIN: CPT | Mod: KX,PN

## 2024-09-16 PROCEDURE — 97112 NEUROMUSCULAR REEDUCATION: CPT | Mod: KX,PN

## 2024-09-19 PROBLEM — M25.611 DECREASED RANGE OF MOTION OF RIGHT SHOULDER: Status: ACTIVE | Noted: 2024-09-19

## 2024-09-19 PROBLEM — R29.898 DECREASED STRENGTH OF UPPER EXTREMITY: Status: ACTIVE | Noted: 2024-09-19

## 2024-09-19 NOTE — PLAN OF CARE
[facility]   Physical Therapy Initial Evaluation      Name: Meredith Ramos  Clinic Number: [id number]    Therapy Diagnosis:   Encounter Diagnoses   Name Primary?    Nontraumatic complete tear of right rotator cuff     Decreased range of motion of right shoulder Yes    Decreased strength of upper extremity         Physician: Eduar Bruce MD    Physician Orders: PT Eval and Treat   Medical Diagnosis from Referral: M75.121 (ICD-10-CM) - Nontraumatic complete tear of right rotator cuff   Evaluation Date: 9/16/2024  Authorization Period Expiration: 9/5/2024  Plan of Care Expiration: 11/11/2024  Progress Note Due: 10/16/2024  Visit # / Visits authorized: 1/ 1   FOTO: 0/ 3    Precautions: Standard and Osteoporosis      Time In: 2:30  Time Out: 3:20  Total Billable Time: 50 minutes    Subjective     Date of onset: Chronic, with acute onset    History of current condition - eMredith reports: 1 month ago she was doing pull overs in the gym and felt that her shoulder dislocated. She saw Dr. Bruce and was recommended a RTS. She decided to try to manage her shoulder conservatively. She has pian with reaching overhead and with quick movements. She exercises regularly but was cut down significantly to decrease her shoulder use. She would like to return to the gym. She is feeling a lot better since discontinuing use of arm for the last couple of weeks.     Falls: No    Imaging: MRI studies: Impression:     1. Full-thickness full width tears of supraspinatus and infraspinatus tendons.  Moderate atrophy of rotator cuff musculature.  2. Complete tear of long head biceps tendon.  3. Full-thickness cartilage loss over the superior humeral head with subchondral edema.    Prior Therapy: Yes  Social History: Pt lives alone in [facility] with a few steps to enter  Occupation: retired  Prior Level of Function: She was able to exercise 5-6 days a week without limitation   Current Level of Function: She  currently is avoiding the use of her right arm for exercise    Pain:  Current 0/10, worst 4/10, best 0/10   Location: Right Shoulder   Description: Burning  Aggravating Factors: Lifting and Reaching  Easing Factors: rest    Patients goals: She would like to get back to exercising and maintaining the strength in her shoulder     Medical History:   Past Medical History:   Diagnosis Date    Diverticulitis     Kidney stone     Osteoporosis     ostenopenia        Surgical History:   Meredith Ramos  has a past surgical history that includes Bunionectomy; removal of breast implants; Colectomy (2004); Bunionectomy; kidney stones; Breast biopsy; Hysterectomy (07/22/2013); Diagnostic cardiac electrophysiology study (N/A, 10/02/2023); and Insertion of implantable loop recorder (N/A, 1/9/2024).    Medications:   Meredith has a current medication list which includes the following prescription(s): calcium-vitamin d3, cholecalciferol (vitamin d3), cyanocobalamin (vitamin b-12), lactoferrin, magnesium glycinate, metoprolol succinate, progesterone, vitamin k2, xarelto, and zinc, and the following Facility-Administered Medications: ceFAZolin 2 g in dextrose 5 % in water (D5W) 50 mL IVPB (MB+).    Allergies:   Review of patient's allergies indicates:   Allergen Reactions    Cortisone (hydrocortisone) [hydrocortisone] Other (See Comments)     Triggers svt        Objective      Posture: Abducted and anteriorly tilted scapula on right    Passive Range of Motion:   Shoulder Right Left   Flexion 170 175   Abduction 170 175   ER at 0 65 75      Active Range of Motion:   Shoulder Right Left   Flexion 170 175   Abduction 170 175   ER at 0 65 75     Strength:  Shoulder Right Left   Flexion 4/5 5/5   Abduction 4/5 5/5   ER 4-/5 4+/5   IR 4+/5 5/5   Serratus Anterior 3+/5 4/5       Joint Mobility: Slight decrease in posterior capsule mobility      Intake Outcome Measure for FOTO Shoulder Survey    Therapist reviewed FOTO scores for Meredith  Richard on 9/16/2024.   FOTO report - see Media section or FOTO account episode details.    Intake Score: 54%         Treatment     Total Treatment time (time-based codes) separate from Evaluation: 24 minutes     Meredith received the treatments listed below:        neuromuscular re-education activities to improve: Coordination, Kinesthetic, Sense, Proprioception, and Posture for 24 minutes. The following activities were included:  Shoulder ER/IR 2x10  BTB row 2x15  Wall slides 2x10  Land mine press 2x12        Patient Education and Home Exercises     Education provided:   - HEP  - Role of PT    Written Home Exercises Provided: Yes. Exercises were reviewed and Meredith was able to demonstrate them prior to the end of the session.  Meredith demonstrated good  understanding of the education provided. See EMR under Patient Instructions for exercises provided during therapy sessions.    Assessment     Meredith is a 74 y.o. female referred to outpatient Physical Therapy with a medical diagnosis of M75.121 (ICD-10-CM) - Nontraumatic complete tear of right rotator cuff . Patient presents with decreased shoulder range of motion, decreased shoulder strength, and decreased functional use of upper extremity. She demonstrated HEP and tolerated it well without increase in symptom. She required verbal and tactile cues to perform exercises correctly. Her impairments are decreasing her ability to participate in her ADL's and with exercising. She needs to address her impairments in order to return to prior level of function.     Patient prognosis is Good.   Patient will benefit from skilled outpatient Physical Therapy to address the deficits stated above and in the chart below, provide patient /family education, and to maximize patientt's level of independence.     Plan of care discussed with patient: Yes  Patient's spiritual, cultural and educational needs considered and patient is agreeable to the plan of care and goals as stated  below:     Anticipated Barriers for therapy: Travel    Medical Necessity is demonstrated by the following  History  Co-morbidities and personal factors that may impact the plan of care [x] LOW: no personal factors / co-morbidities  [] MODERATE: 1-2 personal factors / co-morbidities  [] HIGH: 3+ personal factors / co-morbidities    Moderate / High Support Documentation:   Co-morbidities affecting plan of care:     Personal Factors:   no deficits     Examination  Body Structures and Functions, activity limitations and participation restrictions that may impact the plan of care [x] LOW: addressing 1-2 elements  [] MODERATE: 3+ elements  [] HIGH: 4+ elements (please support below)    Moderate / High Support Documentation:      Clinical Presentation [x] LOW: stable  [] MODERATE: Evolving  [] HIGH: Unstable     Decision Making/ Complexity Score: low       Goals:  Short Term Goals:  5 weeks  1.Report decreased right shoulder pain < / =  2/10  to increase tolerance for reaching over head, exercising, lifting items  2. Increase PROM 175 shoulder flexion   3. Increased strength by 1/3 MMT grade in right shoulder to increase tolerance for ADL and work activities.  4. Pt to tolerate HEP to improve ROM and independence with ADL's    Long Term Goals: 10 weeks  1.Report decreased right shoulder pain  < / =  0 /10  to increase tolerance for reaching over head, exercising, lifting items  2.Increase AROM to 175 shoulder flexion  3.Increase strength to >/= 4/5 in right shoulder to increase tolerance for ADL and work activities.  4. Pt goal: She would like to get back to exercising and maintaining the strength in her shoulder  5. Pt will have improved FOTO shoulder score of 68 in order to demonstrate true functional improvement.   Plan     Plan of care Certification: 9/16/2024 to 11/11/2024.    Outpatient Physical Therapy 1 times weekly for 10 weeks to include the following interventions: Manual Therapy, Neuromuscular Re-ed, Patient  Education, Therapeutic Activities, and Therapeutic Exercise.     Jayce Yanes, PT, DPT, OCS        Physician's Signature: _________________________________________ Date: ________________

## 2024-10-02 ENCOUNTER — PATIENT MESSAGE (OUTPATIENT)
Dept: ELECTROPHYSIOLOGY | Facility: CLINIC | Age: 74
End: 2024-10-02
Payer: MEDICARE

## 2024-10-10 ENCOUNTER — PATIENT MESSAGE (OUTPATIENT)
Dept: INTERNAL MEDICINE | Facility: CLINIC | Age: 74
End: 2024-10-10
Payer: MEDICARE

## 2024-10-10 ENCOUNTER — PATIENT MESSAGE (OUTPATIENT)
Dept: HEPATOLOGY | Facility: CLINIC | Age: 74
End: 2024-10-10
Payer: MEDICARE

## 2024-10-11 ENCOUNTER — CLINICAL SUPPORT (OUTPATIENT)
Dept: REHABILITATION | Facility: OTHER | Age: 74
End: 2024-10-11
Payer: MEDICARE

## 2024-10-11 ENCOUNTER — PATIENT MESSAGE (OUTPATIENT)
Dept: SPINE | Facility: CLINIC | Age: 74
End: 2024-10-11
Payer: MEDICARE

## 2024-10-11 DIAGNOSIS — R29.898 DECREASED STRENGTH OF UPPER EXTREMITY: ICD-10-CM

## 2024-10-11 DIAGNOSIS — G57.01 PIRIFORMIS SYNDROME OF RIGHT SIDE: ICD-10-CM

## 2024-10-11 DIAGNOSIS — M25.611 DECREASED RANGE OF MOTION OF RIGHT SHOULDER: Primary | ICD-10-CM

## 2024-10-11 DIAGNOSIS — S76.311A HAMSTRING STRAIN, RIGHT, INITIAL ENCOUNTER: Primary | ICD-10-CM

## 2024-10-11 DIAGNOSIS — M62.838 MUSCLE SPASM: ICD-10-CM

## 2024-10-11 PROCEDURE — 97112 NEUROMUSCULAR REEDUCATION: CPT | Mod: PN

## 2024-10-11 NOTE — PROGRESS NOTES
[facility]   Physical Therapy Treatment Note     Name: Meredith Ramos  Clinic Number: [id number]    Therapy Diagnosis:   Encounter Diagnoses   Name Primary?    Decreased range of motion of right shoulder Yes    Decreased strength of upper extremity      Physician: Eduar Bruce MD    Visit Date: 10/11/2024    Physician Orders: PT Eval and Treat   Medical Diagnosis from Referral: M75.121 (ICD-10-CM) - Nontraumatic complete tear of right rotator cuff   Evaluation Date: 9/16/2024  Authorization Period Expiration: 9/5/2024  Plan of Care Expiration: 11/11/2024  Progress Note Due: 10/16/2024  Visit # / Visits authorized: 1/ 10   FOTO: 0/ 3     Precautions: Standard and Osteoporosis       Time In: 10:00  Time Out: 10:45  Total Billable Time: 45 minutes      SUBJECTIVE     Pt reports: She is back from vacation feeling better.    She was compliant with home exercise program.  Response to previous treatment: Warmth in shoulder   Functional change: Increased ROM    Pain: 1/10  Location: right shoulder     OBJECTIVE     Objective Measures updated at progress report unless specified.     Treatment       Meredith received the treatments listed below:        Neuromuscular re-education activities to improve: Coordination, Kinesthetic, Sense, Proprioception, and Posture for 45 minutes. The following activities were included:    UBE 3/3  Shoulder flexion perturbations 3x30s   Wall slides 3x12 Foam roller  Shoulder ER/IR 3x8 to raises  Theraband T 3x10  Wall ball circles 3x30  Land mine press 2x12        Patient Education and Home Exercises     Home Exercises Provided and Patient Education Provided     Education provided:   - HEP    Written Home Exercises Provided: yes. Exercises were reviewed and Meredith was able to demonstrate them prior to the end of the session.  Meredith demonstrated good  understanding of the education provided. See EMR under Patient Instructions for exercises provided during  therapy sessions    ASSESSMENT     Meredith was progressed with rotator cuff and periscapular strengthening. She required cues to perform exercises correctly, but was able to tolerate the exercises. Her HEP was updated. Overall her progress is good. Plan to progress as tolerated.     Meredith Is progressing well towards her goals.     Pt prognosis is Good.     Pt will continue to benefit from skilled outpatient physical therapy to address the deficits listed in the problem list box on initial evaluation, provide pt/family education and to maximize pt's level of independence in the home and community environment.     Pt's spiritual, cultural and educational needs considered and pt agreeable to plan of care and goals.     Anticipated Barriers for therapy: Travel    Goals:  Short Term Goals:  5 weeks (Progressing, not met)  1.Report decreased right shoulder pain < / =  2/10  to increase tolerance for reaching over head, exercising, lifting items  2. Increase PROM 175 shoulder flexion   3. Increased strength by 1/3 MMT grade in right shoulder to increase tolerance for ADL and work activities.  4. Pt to tolerate HEP to improve ROM and independence with ADL's     Long Term Goals: 10 weeks(Progressing, not met)  1.Report decreased right shoulder pain  < / =  0 /10  to increase tolerance for reaching over head, exercising, lifting items  2.Increase AROM to 175 shoulder flexion  3.Increase strength to >/= 4/5 in right shoulder to increase tolerance for ADL and work activities.  4. Pt goal: She would like to get back to exercising and maintaining the strength in her shoulder  5. Pt will have improved FOTO shoulder score of 68 in order to demonstrate true functional improvement.   Plan      Plan of care Certification: 9/16/2024 to 11/11/2024.     Continue with PT plan of care with emphasis on shoulder strengthening    Jayce Yanes, PT, DPT

## 2024-10-16 ENCOUNTER — CLINICAL SUPPORT (OUTPATIENT)
Dept: CARDIOLOGY | Facility: HOSPITAL | Age: 74
End: 2024-10-16
Payer: MEDICARE

## 2024-10-16 ENCOUNTER — CLINICAL SUPPORT (OUTPATIENT)
Dept: CARDIOLOGY | Facility: HOSPITAL | Age: 74
End: 2024-10-16
Attending: INTERNAL MEDICINE
Payer: MEDICARE

## 2024-10-16 DIAGNOSIS — I49.9 CARDIAC ARRHYTHMIA, UNSPECIFIED: ICD-10-CM

## 2024-10-16 DIAGNOSIS — R00.2 PALPITATIONS: ICD-10-CM

## 2024-10-16 DIAGNOSIS — I47.10 SUPRAVENTRICULAR TACHYCARDIA, UNSPECIFIED: ICD-10-CM

## 2024-10-18 ENCOUNTER — CLINICAL SUPPORT (OUTPATIENT)
Dept: REHABILITATION | Facility: OTHER | Age: 74
End: 2024-10-18
Payer: MEDICARE

## 2024-10-18 DIAGNOSIS — M25.611 DECREASED RANGE OF MOTION OF RIGHT SHOULDER: Primary | ICD-10-CM

## 2024-10-18 DIAGNOSIS — R29.898 DECREASED STRENGTH OF UPPER EXTREMITY: ICD-10-CM

## 2024-10-18 PROCEDURE — 97112 NEUROMUSCULAR REEDUCATION: CPT | Mod: PN

## 2024-10-18 NOTE — PROGRESS NOTES
[facility]   Physical Therapy Treatment Note     Name: Meredith Ramos  Clinic Number: [id number]    Therapy Diagnosis:   Encounter Diagnoses   Name Primary?    Decreased range of motion of right shoulder Yes    Decreased strength of upper extremity        Physician: Eduar Bruce MD    Visit Date: 10/18/2024    Physician Orders: PT Eval and Treat   Medical Diagnosis from Referral: M75.121 (ICD-10-CM) - Nontraumatic complete tear of right rotator cuff   Evaluation Date: 9/16/2024  Authorization Period Expiration: 9/5/2024  Plan of Care Expiration: 11/11/2024  Progress Note Due: 10/16/2024  Visit # / Visits authorized: 2/ 10   FOTO: 0/ 3     Precautions: Standard and Osteoporosis       Time In: 10:00  Time Out: 10:57  Total Billable Time: 57 minutes      SUBJECTIVE     Pt reports: She went on a vacation the week. She is feeling better and moving well    She was compliant with home exercise program.  Response to previous treatment: Warmth in shoulder   Functional change: Increased ROM    Pain: 1/10  Location: right shoulder     OBJECTIVE     Objective Measures updated at progress report unless specified.     Treatment       Meredith received the treatments listed below:        Neuromuscular re-education activities to improve: Coordination, Kinesthetic, Sense, Proprioception, and Posture for 57 minutes. The following activities were included:    UBE 3/3  Shoulder flexion perturbations 3x30s   Shoulder ER/IR perturbations 3x30s  Wall slides 3x12 Foam roller  Shoulder ER/IR 3x12 to raises RTB  Theraband T 3x10  Wall ball circles 3x30  Land mine press 3x12 3#  Lateral raises 2x8 2#        Patient Education and Home Exercises     Home Exercises Provided and Patient Education Provided     Education provided:   - HEP    Written Home Exercises Provided: yes. Exercises were reviewed and Meredith was able to demonstrate them prior to the end of the session.  Meredith demonstrated good   understanding of the education provided. See EMR under Patient Instructions for exercises provided during therapy sessions    ASSESSMENT     Meredith was progressed further with shoulder strengthening exercises. She tolerated the session well, but was challenged with lateral raises.   She is making great progress. Plan to progress as tolerated.     Meredith Is progressing well towards her goals.     Pt prognosis is Good.     Pt will continue to benefit from skilled outpatient physical therapy to address the deficits listed in the problem list box on initial evaluation, provide pt/family education and to maximize pt's level of independence in the home and community environment.     Pt's spiritual, cultural and educational needs considered and pt agreeable to plan of care and goals.     Anticipated Barriers for therapy: Travel    Goals:  Short Term Goals:  5 weeks (Progressing, not met)  1.Report decreased right shoulder pain < / =  2/10  to increase tolerance for reaching over head, exercising, lifting items  2. Increase PROM 175 shoulder flexion   3. Increased strength by 1/3 MMT grade in right shoulder to increase tolerance for ADL and work activities.  4. Pt to tolerate HEP to improve ROM and independence with ADL's     Long Term Goals: 10 weeks(Progressing, not met)  1.Report decreased right shoulder pain  < / =  0 /10  to increase tolerance for reaching over head, exercising, lifting items  2.Increase AROM to 175 shoulder flexion  3.Increase strength to >/= 4/5 in right shoulder to increase tolerance for ADL and work activities.  4. Pt goal: She would like to get back to exercising and maintaining the strength in her shoulder  5. Pt will have improved FOTO shoulder score of 68 in order to demonstrate true functional improvement.   Plan      Plan of care Certification: 9/16/2024 to 11/11/2024.     Continue with PT plan of care with emphasis on shoulder strengthening    Jayce Yanes, PT, DPT

## 2024-10-24 ENCOUNTER — CLINICAL SUPPORT (OUTPATIENT)
Dept: REHABILITATION | Facility: OTHER | Age: 74
End: 2024-10-24
Payer: MEDICARE

## 2024-10-24 DIAGNOSIS — M25.611 DECREASED RANGE OF MOTION OF RIGHT SHOULDER: Primary | ICD-10-CM

## 2024-10-24 DIAGNOSIS — R29.898 DECREASED STRENGTH OF UPPER EXTREMITY: ICD-10-CM

## 2024-10-24 PROCEDURE — 97112 NEUROMUSCULAR REEDUCATION: CPT | Mod: PN

## 2024-10-31 LAB
OHS CV AF BURDEN PERCENT: < 1
OHS CV DC REMOTE DEVICE TYPE: NORMAL

## 2024-11-01 ENCOUNTER — CLINICAL SUPPORT (OUTPATIENT)
Dept: REHABILITATION | Facility: OTHER | Age: 74
End: 2024-11-01
Payer: MEDICARE

## 2024-11-01 DIAGNOSIS — M25.611 DECREASED RANGE OF MOTION OF RIGHT SHOULDER: Primary | ICD-10-CM

## 2024-11-01 DIAGNOSIS — R29.898 DECREASED STRENGTH OF UPPER EXTREMITY: ICD-10-CM

## 2024-11-01 PROCEDURE — 97112 NEUROMUSCULAR REEDUCATION: CPT | Mod: KX,PN

## 2024-11-01 PROCEDURE — 97110 THERAPEUTIC EXERCISES: CPT | Mod: KX,PN

## 2024-11-15 ENCOUNTER — HOSPITAL ENCOUNTER (OUTPATIENT)
Dept: RADIOLOGY | Facility: HOSPITAL | Age: 74
Discharge: HOME | End: 2024-11-15
Attending: INTERNAL MEDICINE
Payer: MEDICARE

## 2024-11-15 DIAGNOSIS — Z12.31 ENCOUNTER FOR SCREENING MAMMOGRAM FOR BREAST CANCER: ICD-10-CM

## 2024-11-15 PROCEDURE — 77067 SCR MAMMO BI INCL CAD: CPT | Mod: 26,,, | Performed by: RADIOLOGY

## 2024-11-15 PROCEDURE — 77063 BREAST TOMOSYNTHESIS BI: CPT | Mod: 26,,, | Performed by: RADIOLOGY

## 2024-11-15 PROCEDURE — 77063 BREAST TOMOSYNTHESIS BI: CPT | Mod: TC

## 2024-11-18 ENCOUNTER — CLINICAL SUPPORT (OUTPATIENT)
Dept: CARDIOLOGY | Facility: HOSPITAL | Age: 74
End: 2024-11-18
Payer: MEDICARE

## 2024-11-18 ENCOUNTER — CLINICAL SUPPORT (OUTPATIENT)
Dept: CARDIOLOGY | Facility: HOSPITAL | Age: 74
End: 2024-11-18
Attending: INTERNAL MEDICINE
Payer: MEDICARE

## 2024-11-18 DIAGNOSIS — I49.9 CARDIAC ARRHYTHMIA, UNSPECIFIED: ICD-10-CM

## 2024-11-18 DIAGNOSIS — I47.10 SUPRAVENTRICULAR TACHYCARDIA, UNSPECIFIED: ICD-10-CM

## 2024-11-18 DIAGNOSIS — R00.2 PALPITATIONS: ICD-10-CM

## 2024-11-18 PROCEDURE — 93298 REM INTERROG DEV EVAL SCRMS: CPT | Mod: 26,,, | Performed by: INTERNAL MEDICINE

## 2024-11-21 ENCOUNTER — PATIENT MESSAGE (OUTPATIENT)
Dept: ELECTROPHYSIOLOGY | Facility: CLINIC | Age: 74
End: 2024-11-21
Payer: MEDICARE

## 2024-11-25 LAB
OHS CV AF BURDEN PERCENT: < 1
OHS CV DC REMOTE DEVICE TYPE: NORMAL
OHS CV ICD SHOCK: NO

## 2024-11-25 RX ORDER — VERAPAMIL HYDROCHLORIDE 120 MG/1
120 CAPSULE, EXTENDED RELEASE ORAL DAILY
Qty: 30 CAPSULE | Refills: 11 | Status: SHIPPED | OUTPATIENT
Start: 2024-11-25 | End: 2025-11-25

## 2024-11-29 DIAGNOSIS — I47.10 SUPRAVENTRICULAR TACHYCARDIA: ICD-10-CM

## 2024-11-29 DIAGNOSIS — I48.0 PAF (PAROXYSMAL ATRIAL FIBRILLATION): Primary | ICD-10-CM

## 2024-11-29 RX ORDER — METOPROLOL SUCCINATE 25 MG/1
TABLET, EXTENDED RELEASE ORAL
Qty: 5 TABLET | Refills: 0 | Status: SHIPPED | OUTPATIENT
Start: 2024-11-29

## 2024-11-29 NOTE — TELEPHONE ENCOUNTER
Pt requested an alternative to Metoprolol succinate 12.5 mg daily. You switched her to Verapamil 120 mg daily on 11/25/24. Pt is wanting to wait to start Verapamil 120 mg after she returns from her trip on 12/10/24. She is requesting 5 tablets of Metoprolol succinate 12.5 mg to get her to 12/10.

## 2024-12-02 NOTE — PROGRESS NOTES
"[facility]   Physical Therapy Treatment Note     Name: Meredith Ramos  Clinic Number: [id number]    Therapy Diagnosis:   Encounter Diagnosis   Name Primary?    Right medial knee pain Yes       Physician: Sarwat Lu, *    Visit Date: 3/27/2023    Physician Orders: PT Eval and Treat:   Start neuromuscular retraining program and HEP. Goals of increased pelvic and core stability, IT band, hamstring, adductor stretching, and quadriceps/glute strengthening   Medical Diagnosis from Referral: M17.11 (ICD-10-CM) - Primary osteoarthritis of right knee M70.50 (ICD-10-CM) - Pes anserine bursitis   Evaluation Date: 3/14/2023  Authorization Period Expiration: 4/14/2023  Plan of Care Expiration: 6/9/2023  Progress Note Due: 4/14/2023  Visit # / Visits authorized: 5/ 21   FOTO: 1/ 3:  3/14/2023     Precautions: Standard and blood thinners    PTA Visit #: 0/5     Time In: 1300  Time Out: 1345  Total Billable Time: 45 minutes    SUBJECTIVE     Pt reports: feels like she's improving, able to walk a little mor quickly but still not at normal pace. She's making some progress with stairs, but still has some pain.   She was compliant with home exercise program.  Response to previous treatment: some relief with taping  Functional change: no change    Pain: 0/10 at rest  Location: right medial knee     OBJECTIVE     Objective Measures updated at progress report unless specified.     Treatment     Meredith received the treatments listed below:      therapeutic exercises to develop strength, endurance, ROM, flexibility, posture, and core stabilization for 15 minutes including:  +SL bridges 10x  +3D HS 2 x 10  Shuttle DL x 3 black cord x 3x10  Shuttle SL R x 1 black cord 3x10  resisted side stepping 2 x 50 ft x RTB at ankles    SLR flex with ER 2 x 10  Bridges 20x  SLR abd (slight ext) 20x increase resistance nv  Clams 10" x 10 increase resistance nv  Prone over EOM hip extension 2 x 10 increase resistance " FT-02/20/2025  HTN   nv  Prone over EOM donkey kick 2 x 10 increase resistance nv    Add as tolerated: resisted sidestepping, eccentric HS    manual therapy techniques: Soft tissue Mobilization were applied to the: R LE for 30 minutes, including:  Vacuum cupping to R ITB with static placement and gliding   IASTM with MFR tools to ITB and distal HS  Active release to quad and HS along ITB            Patient Education and Home Exercises     Home Exercises Provided and Patient Education Provided     Education provided:   - therex rationale    Written Home Exercises Provided: yes. Exercises were reviewed and Meredith was able to demonstrate them prior to the end of the session.  Meredith demonstrated good  understanding of the education provided. See EMR under Patient Instructions for exercises provided during therapy sessions    ASSESSMENT     Manual therapy with IASTM to ITB and hamstring with fibrosis noted. Tenderness to distal ITB. Improvement noted with end range flexion following manual therapy. Mm fatigue with initiation of 3D hamstring ex, but able to complete.     Meredith Is progressing well towards her goals.   Pt prognosis is Good.     Pt will continue to benefit from skilled outpatient physical therapy to address the deficits listed in the problem list box on initial evaluation, provide pt/family education and to maximize pt's level of independence in the home and community environment.     Pt's spiritual, cultural and educational needs considered and pt agreeable to plan of care and goals.     Anticipated barriers to physical therapy: standard     Goals: updated 03/27/2023  Short Term Goals (4 weeks)  1. Pt will demonstrate independence with initial HEP to maintain progress made with therapy. (Progressing, not met)  2. Pt will demonstrate ability to descend 3 steps with reciprocal gait pattern and use of HR. (Progressing, not met)  3. Pt will report <5/10 pain at worst within the R knee for ease with ADL's. (Progressing, not  met)     Long Term Goals (12 weeks)  1. Pt will demonstrate ability to descend stairs reciprocally without assistance or pain exacerbation to improve mobility in community and in her home. (Progressing, not met)  2. Pt will demonstrate 4+/5 strength within the B LEfor ease with return to gym exercise and climbing stairs. (Progressing, not met)  3. Pt will report being independent with his/her HEP for maintenance of improvements gained during therapy sessions. (Progressing, not met)  4. Pt will report <2/10 pain at worst within the R knee for ease with ADLs. (Progressing, not met)  5. Pt will demonstrate ambulation x 300 ft without AD or antalgic gait to improve functional mobility in community.  (Progressing, not met)    PLAN   Plan of care Certification: 3/14/2023 to 6/9/2023.     Continue per plan of care with focus on B LE strength and soft tissue mobility.     Alice Caldwell, PT

## 2024-12-06 ENCOUNTER — PATIENT MESSAGE (OUTPATIENT)
Dept: ELECTROPHYSIOLOGY | Facility: CLINIC | Age: 74
End: 2024-12-06
Payer: MEDICARE

## 2024-12-21 ENCOUNTER — CLINICAL SUPPORT (OUTPATIENT)
Dept: CARDIOLOGY | Facility: HOSPITAL | Age: 74
End: 2024-12-21
Attending: INTERNAL MEDICINE
Payer: MEDICARE

## 2024-12-21 ENCOUNTER — CLINICAL SUPPORT (OUTPATIENT)
Dept: CARDIOLOGY | Facility: HOSPITAL | Age: 74
End: 2024-12-21
Payer: MEDICARE

## 2024-12-21 DIAGNOSIS — I49.9 CARDIAC ARRHYTHMIA, UNSPECIFIED: ICD-10-CM

## 2024-12-21 DIAGNOSIS — R00.2 PALPITATIONS: ICD-10-CM

## 2024-12-21 DIAGNOSIS — I47.10 SUPRAVENTRICULAR TACHYCARDIA, UNSPECIFIED: ICD-10-CM

## 2024-12-21 PROCEDURE — 93298 REM INTERROG DEV EVAL SCRMS: CPT | Mod: 26,,, | Performed by: INTERNAL MEDICINE

## 2024-12-21 PROCEDURE — 93298 REM INTERROG DEV EVAL SCRMS: CPT | Performed by: INTERNAL MEDICINE

## 2025-01-06 ENCOUNTER — PATIENT MESSAGE (OUTPATIENT)
Dept: ELECTROPHYSIOLOGY | Facility: CLINIC | Age: 75
End: 2025-01-06
Payer: MEDICARE

## 2025-01-09 DIAGNOSIS — I34.1 MVP (MITRAL VALVE PROLAPSE): ICD-10-CM

## 2025-01-09 DIAGNOSIS — I34.0 NONRHEUMATIC MITRAL VALVE REGURGITATION: Primary | ICD-10-CM

## 2025-01-10 LAB
OHS CV AF BURDEN PERCENT: < 1
OHS CV DC REMOTE DEVICE TYPE: NORMAL
OHS CV ICD SHOCK: NO

## 2025-01-13 ENCOUNTER — TELEPHONE (OUTPATIENT)
Dept: ELECTROPHYSIOLOGY | Facility: CLINIC | Age: 75
End: 2025-01-13
Payer: MEDICARE

## 2025-01-13 NOTE — TELEPHONE ENCOUNTER
ILR alert received tachy episode lasting 34 seconds.  Egm reviewed with Dr. Ortiz, egm appears c/w nsVT.      Pt symptoms:  unsure what she was doing at that time but states she felt LH briefly a few times over the last few days.    Toprol XL was stopped due to insulin resistance and was switched to Verapamil 120 mg daily on 11/29/2024.

## 2025-01-14 DIAGNOSIS — I47.10 SUPRAVENTRICULAR TACHYCARDIA: Primary | ICD-10-CM

## 2025-01-17 ENCOUNTER — TELEPHONE (OUTPATIENT)
Dept: CARDIOTHORACIC SURGERY | Facility: CLINIC | Age: 75
End: 2025-01-17
Payer: MEDICARE

## 2025-01-17 NOTE — TELEPHONE ENCOUNTER
Called and spoke with patient regarding starting Toprol XL.  Pt states that she had requested to stop taking Toprol XL and she does not want to restart it.  She states she feels well overall and able to sleep at night and not interested in restarting the Metoprolol.      Informed patient that her arrhythmia could be coming from the ventricles of heart and could potentially be a life-threatening rhythm and Dr. Ortiz would like her to resume her Toprol XL.  Pt still not interested in restarting Toprol XL.      Instructed patient to call the device clinic if she experiences any symptoms such as lightheadedness, dizziness, shortness of breath, palpitations or chest pain.  Pt instructed to go to the emergency room immediately if the symptoms are severe.  Pt states understanding.  Will cont to monitor.

## 2025-01-17 NOTE — TELEPHONE ENCOUNTER
Called pt to schedule follow-up with Dr. Rubio. No answer, LVM with reason for call and requested call back. Contact number provided.

## 2025-01-20 ENCOUNTER — PATIENT MESSAGE (OUTPATIENT)
Dept: ELECTROPHYSIOLOGY | Facility: CLINIC | Age: 75
End: 2025-01-20
Payer: MEDICARE

## 2025-01-22 ENCOUNTER — CLINICAL SUPPORT (OUTPATIENT)
Dept: CARDIOLOGY | Facility: HOSPITAL | Age: 75
End: 2025-01-22
Attending: INTERNAL MEDICINE
Payer: MEDICARE

## 2025-01-22 ENCOUNTER — CLINICAL SUPPORT (OUTPATIENT)
Dept: CARDIOLOGY | Facility: HOSPITAL | Age: 75
End: 2025-01-22
Payer: MEDICARE

## 2025-01-22 DIAGNOSIS — I49.9 CARDIAC ARRHYTHMIA, UNSPECIFIED: ICD-10-CM

## 2025-01-22 DIAGNOSIS — R00.2 PALPITATIONS: ICD-10-CM

## 2025-01-22 DIAGNOSIS — I47.10 SUPRAVENTRICULAR TACHYCARDIA, UNSPECIFIED: ICD-10-CM

## 2025-01-22 PROCEDURE — 93298 REM INTERROG DEV EVAL SCRMS: CPT | Performed by: INTERNAL MEDICINE

## 2025-01-24 ENCOUNTER — TELEPHONE (OUTPATIENT)
Dept: CARDIOTHORACIC SURGERY | Facility: CLINIC | Age: 75
End: 2025-01-24
Payer: MEDICARE

## 2025-01-28 ENCOUNTER — TELEPHONE (OUTPATIENT)
Dept: CARDIOTHORACIC SURGERY | Facility: CLINIC | Age: 75
End: 2025-01-28
Payer: MEDICARE

## 2025-01-31 ENCOUNTER — PATIENT MESSAGE (OUTPATIENT)
Dept: CARDIOLOGY | Facility: CLINIC | Age: 75
End: 2025-01-31
Payer: MEDICARE

## 2025-01-31 ENCOUNTER — TELEPHONE (OUTPATIENT)
Dept: CARDIOTHORACIC SURGERY | Facility: CLINIC | Age: 75
End: 2025-01-31
Payer: MEDICARE

## 2025-01-31 NOTE — TELEPHONE ENCOUNTER
Attempted return call to pt. No answer, left VM with request for call back and provided my direct number.       ----- Message from Honey sent at 1/31/2025  9:27 AM CST -----  Regarding: Appt request  Contact: pt [phone]    Type:  Sooner Apoointment Request    Caller is requesting a sooner appointment.  Caller declined first available appointment listed below.  Caller will not accept being placed on the waitlist and is requesting a message be sent to doctor.  Name of Caller:Meredith called in regards of getting an appt scheduled   When is the first available appointment?No available in Epic   Would the patient rather a call back or a response via [facility]? Call back   Best Call Back Number:pt [phone]   Additional Information: pt said she received a letter in the mail about annual appt

## 2025-02-03 ENCOUNTER — OFFICE VISIT (OUTPATIENT)
Dept: INTERNAL MEDICINE | Facility: CLINIC | Age: 75
End: 2025-02-03
Payer: MEDICARE

## 2025-02-03 DIAGNOSIS — I70.0 AORTIC ATHEROSCLEROSIS: ICD-10-CM

## 2025-02-03 DIAGNOSIS — D69.2 OTHER NONTHROMBOCYTOPENIC PURPURA: ICD-10-CM

## 2025-02-03 DIAGNOSIS — M06.9 RHEUMATOID ARTHRITIS INVOLVING BOTH HANDS, UNSPECIFIED WHETHER RHEUMATOID FACTOR PRESENT: ICD-10-CM

## 2025-02-03 DIAGNOSIS — Z72.0 TOBACCO USE: ICD-10-CM

## 2025-02-03 DIAGNOSIS — I25.85 CHRONIC CORONARY MICROVASCULAR DYSFUNCTION: ICD-10-CM

## 2025-02-03 DIAGNOSIS — Z13.6 ENCOUNTER FOR SCREENING FOR CARDIOVASCULAR DISORDERS: ICD-10-CM

## 2025-02-03 DIAGNOSIS — E55.9 VITAMIN D DEFICIENCY: ICD-10-CM

## 2025-02-03 DIAGNOSIS — I48.91 ATRIAL FIBRILLATION, UNSPECIFIED TYPE: Primary | ICD-10-CM

## 2025-02-03 PROCEDURE — 99213 OFFICE O/P EST LOW 20 MIN: CPT | Mod: S$PBB,,, | Performed by: INTERNAL MEDICINE

## 2025-02-03 PROCEDURE — 99999 PR PBB SHADOW E&M-EST. PATIENT-LVL IV: CPT | Mod: PBBFAC,,, | Performed by: INTERNAL MEDICINE

## 2025-02-03 PROCEDURE — 99214 OFFICE O/P EST MOD 30 MIN: CPT | Mod: PBBFAC | Performed by: INTERNAL MEDICINE

## 2025-02-03 RX ORDER — VITAMIN B COMPLEX
1 CAPSULE ORAL DAILY
COMMUNITY

## 2025-02-05 ENCOUNTER — LAB VISIT (OUTPATIENT)
Dept: LAB | Facility: HOSPITAL | Age: 75
End: 2025-02-05
Attending: INTERNAL MEDICINE
Payer: MEDICARE

## 2025-02-05 DIAGNOSIS — I48.91 ATRIAL FIBRILLATION, UNSPECIFIED TYPE: ICD-10-CM

## 2025-02-05 DIAGNOSIS — I25.85 CHRONIC CORONARY MICROVASCULAR DYSFUNCTION: ICD-10-CM

## 2025-02-05 DIAGNOSIS — E55.9 VITAMIN D DEFICIENCY: ICD-10-CM

## 2025-02-05 DIAGNOSIS — Z13.6 ENCOUNTER FOR SCREENING FOR CARDIOVASCULAR DISORDERS: ICD-10-CM

## 2025-02-05 LAB
ALBUMIN SERPL BCP-MCNC: 3.9 G/DL (ref 3.5–5.2)
ALP SERPL-CCNC: 123 U/L (ref 40–150)
ALT SERPL W/O P-5'-P-CCNC: 158 U/L (ref 10–44)
ANION GAP SERPL CALC-SCNC: 8 MMOL/L (ref 8–16)
AST SERPL-CCNC: 77 U/L (ref 10–40)
BILIRUB SERPL-MCNC: 0.8 MG/DL (ref 0.1–1)
BUN SERPL-MCNC: 19 MG/DL (ref 8–23)
CALCIUM SERPL-MCNC: 9.5 MG/DL (ref 8.7–10.5)
CHLORIDE SERPL-SCNC: 102 MMOL/L (ref 95–110)
CHOLEST SERPL-MCNC: 185 MG/DL (ref 120–199)
CHOLEST/HDLC SERPL: 2.2 {RATIO} (ref 2–5)
CO2 SERPL-SCNC: 27 MMOL/L (ref 23–29)
CREAT SERPL-MCNC: 0.8 MG/DL (ref 0.5–1.4)
EST. GFR  (NO RACE VARIABLE): >60 ML/MIN/1.73 M^2
ESTIMATED AVG GLUCOSE: 94 MG/DL (ref 68–131)
GLUCOSE SERPL-MCNC: 78 MG/DL (ref 70–110)
HBA1C MFR BLD: 4.9 % (ref 4–5.6)
HDLC SERPL-MCNC: 86 MG/DL (ref 40–75)
HDLC SERPL: 46.5 % (ref 20–50)
INSULIN COLLECTION INTERVAL: 0
INSULIN SERPL-ACNC: 2.1 UU/ML
LDLC SERPL CALC-MCNC: 92.2 MG/DL (ref 63–159)
MAGNESIUM SERPL-MCNC: 2.1 MG/DL (ref 1.6–2.6)
NONHDLC SERPL-MCNC: 99 MG/DL
POTASSIUM SERPL-SCNC: 4.1 MMOL/L (ref 3.5–5.1)
PROT SERPL-MCNC: 6.6 G/DL (ref 6–8.4)
SODIUM SERPL-SCNC: 137 MMOL/L (ref 136–145)
T4 FREE SERPL-MCNC: 0.96 NG/DL (ref 0.71–1.51)
TRIGL SERPL-MCNC: 34 MG/DL (ref 30–150)
TSH SERPL DL<=0.005 MIU/L-ACNC: 4.35 UIU/ML (ref 0.4–4)

## 2025-02-05 PROCEDURE — 80053 COMPREHEN METABOLIC PANEL: CPT | Performed by: INTERNAL MEDICINE

## 2025-02-05 PROCEDURE — 83735 ASSAY OF MAGNESIUM: CPT | Performed by: INTERNAL MEDICINE

## 2025-02-05 PROCEDURE — 80061 LIPID PANEL: CPT | Performed by: INTERNAL MEDICINE

## 2025-02-05 PROCEDURE — 83525 ASSAY OF INSULIN: CPT | Performed by: INTERNAL MEDICINE

## 2025-02-05 PROCEDURE — 84443 ASSAY THYROID STIM HORMONE: CPT | Performed by: INTERNAL MEDICINE

## 2025-02-05 PROCEDURE — 82306 VITAMIN D 25 HYDROXY: CPT | Performed by: INTERNAL MEDICINE

## 2025-02-05 PROCEDURE — 36415 COLL VENOUS BLD VENIPUNCTURE: CPT | Performed by: INTERNAL MEDICINE

## 2025-02-05 PROCEDURE — 83036 HEMOGLOBIN GLYCOSYLATED A1C: CPT | Performed by: INTERNAL MEDICINE

## 2025-02-05 PROCEDURE — 84439 ASSAY OF FREE THYROXINE: CPT | Performed by: INTERNAL MEDICINE

## 2025-02-06 VITALS
WEIGHT: 106.25 LBS | TEMPERATURE: 99 F | SYSTOLIC BLOOD PRESSURE: 102 MMHG | HEART RATE: 75 BPM | DIASTOLIC BLOOD PRESSURE: 64 MMHG | OXYGEN SATURATION: 95 % | HEIGHT: 65 IN | BODY MASS INDEX: 17.7 KG/M2

## 2025-02-06 PROBLEM — M06.9 RHEUMATOID ARTHRITIS INVOLVING BOTH HANDS, UNSPECIFIED WHETHER RHEUMATOID FACTOR PRESENT: Status: ACTIVE | Noted: 2025-02-06

## 2025-02-06 PROBLEM — D69.2 OTHER NONTHROMBOCYTOPENIC PURPURA: Status: ACTIVE | Noted: 2025-02-06

## 2025-02-06 LAB — 25(OH)D3+25(OH)D2 SERPL-MCNC: 61 NG/ML (ref 30–96)

## 2025-02-06 NOTE — PROGRESS NOTES
Subjective:       Patient ID: Meredith Ramos is a 75 y.o. female.    Chief Complaint: Atrial Fibrillation    HPI  She has atrial fibrillation.  Denies shortness or breath     Past medical history: Partial colectomy secondary to diverticular disease, atrial fibrillation, status post loop recorder placement, rheumatoid arthritis, osteoporosis,  nephrolithiasis, status post hysterectomy.  She had a colonoscopy August 2021,  repeat in 10 years     Medications:  Verapamil, Xarelto     NO KNOWN DRUG ALLERGIES       Review of Systems   Constitutional:  Negative for chills, fatigue, fever and unexpected weight change.   Respiratory:  Negative for chest tightness and shortness of breath.    Cardiovascular:  Negative for chest pain and palpitations.   Gastrointestinal:  Negative for abdominal pain and blood in stool.   Neurological:  Negative for dizziness, syncope, numbness and headaches.       Objective:      Physical Exam  HENT:      Right Ear: External ear normal.      Left Ear: External ear normal.      Nose: Nose normal.      Mouth/Throat:      Mouth: Mucous membranes are moist.      Pharynx: Oropharynx is clear.   Eyes:      Pupils: Pupils are equal, round, and reactive to light.   Cardiovascular:      Rate and Rhythm: Normal rate and regular rhythm.      Heart sounds: No murmur heard.  Pulmonary:      Breath sounds: Normal breath sounds.   Abdominal:      General: There is no distension.      Palpations: There is no hepatomegaly or splenomegaly.      Tenderness: There is no abdominal tenderness.   Musculoskeletal:      Cervical back: Normal range of motion.   Lymphadenopathy:      Cervical: No cervical adenopathy.      Upper Body:      Right upper body: No axillary adenopathy.      Left upper body: No axillary adenopathy.   Neurological:      Cranial Nerves: No cranial nerve deficit.      Sensory: No sensory deficit.      Motor: Motor function is intact.      Deep Tendon Reflexes: Reflexes are normal and symmetric.          Assessment/Plan       Atrial fibrillation: Check CMP, lipid panel, TSH.  Schedule low-dose chest CT

## 2025-02-13 LAB
OHS CV AF BURDEN PERCENT: < 1
OHS CV DC REMOTE DEVICE TYPE: NORMAL
OHS CV ICD SHOCK: NO

## 2025-02-19 ENCOUNTER — CLINICAL SUPPORT (OUTPATIENT)
Dept: CARDIOLOGY | Facility: HOSPITAL | Age: 75
End: 2025-02-19
Payer: MEDICARE

## 2025-02-19 DIAGNOSIS — I49.9 CARDIAC ARRHYTHMIA, UNSPECIFIED: ICD-10-CM

## 2025-02-19 DIAGNOSIS — I47.10 SUPRAVENTRICULAR TACHYCARDIA, UNSPECIFIED: ICD-10-CM

## 2025-02-19 DIAGNOSIS — R00.2 PALPITATIONS: ICD-10-CM

## 2025-02-21 NOTE — PROGRESS NOTES
Ms. Ramos is a patient of Dr. Ortiz and was last seen in clinic 2/28/2024.      Subjective:   Patient ID:  Meredith Ramos is a 75 y.o. female who presents for follow up of Loop recorder  .     HPI:    Ms. Ramos is a 75 y.o. female with pAF, SVT, MVP, ILR here for follow up.    Background:    Patient is a 72 yo F pmhx of pAF, SVT (AT) seen on holter monitor, mitral valve prolapse, episode of reported SVT on 2/8 given adenosine by EMS at a rate of 150bpm which then was repeated and AF. No scanned in strip in media. Of note her apple watch just noted high heart rates. It did not note AF per patient. Patient states frequent palpitations. Usually short bursts.  Sometimes nightly sometimes every few days.      2017 Holter with AT.  2017 PET/STRESS negative     2/2023 ECGs with coarse AF with follow up ekg shortly afterwards in normal sinus rhythm    Echo EF 60% MVP    3/8/2023: ECG today normal sinus rhythm rate 68bpm   Patient has palpitations and has ECG with AF. Patient has an elevated [id number] (age and sex) and is on Xarelto. Patient is on toprol 12.5mg daily as well. Patient would like to be off as many medications as possible. Interestingly from her history her Apple watch did not state AF and per ER note EMS was initially concerned for SVT and didn't see AF until after adenosine which can certainly cause someone to degenerate into AF.   -Discussed options of EPS+ILR vs ILR, patient will consider and call back    1/9/2024: Successful implantation of Loop Recorder.     2/28/2024: She is 6 weeks s/p ILR implantation to monitor for AF. Loop reports reviewed show false AF episodes with visible p waves.  Episodes reviewed with patient and are during exercise. She is on low dose toprol 12.5mg daily. Recommend she continue this given hx of SVT.  She is on xarelto with only one confirmed AF event. Will discuss possibly discontinuing OAC with Dr. Ortiz if no AF is seen.    Update (02/27/2025):    1/13/2025: ILR  alert received tachy episode lasting 34 seconds.  Egm reviewed with Dr. Ortiz, egm appears c/w nsVT.    Pt symptoms:  unsure what she was doing at that time but states she felt LH briefly a few times over the last few days.  Toprol XL was stopped due to insulin resistance and was switched to Verapamil 120 mg daily on 11/29/2024.    Called and spoke with patient regarding starting Toprol XL.  Pt states that she had requested to stop taking Toprol XL and she does not want to restart it.      Today she says she is feeling well. Sleeping much better on verapamil vs metoprolol. Exercises daily (aerobics in AM and walks in PM + weights) and denies CP, syncope. Occ MUSE. [place] palps c/w SVT resolved with vagal maneuvers.   Had episode of vertigo recently  - resolved with epley maneuvers. HA1C controlled on CGM.     On xarelto 20mg daily. On verapamil 120mg daily.  She notes that around the time she originally had AF she had not been sleeping well, was drinking a lot of caffeine, and had had a cortisone shot.    I have personally reviewed the patient's EKG today, which shows sinus paddy at 49bpm. GA interval is 152. QRS is 72. QT is 454.    Relevant Cardiac Test Results:    2D Echo (2/27/2024):    Left Ventricle: The left ventricle is normal in size. Normal wall thickness. There is concentric remodeling. There is normal systolic function with a visually estimated ejection fraction of 60 - 65%. Ejection fraction by visual approximation is 63%. There is normal diastolic function.    Right Ventricle: Normal right ventricular cavity size. Wall thickness is normal. Right ventricle wall motion  is normal. Systolic function is normal.    Left Atrium: Left atrium is mildly dilated.    Aortic Valve: There is moderate aortic valve sclerosis.    Mitral Valve: There is mild bileaflet sclerosis. There is bileaflet prolapse. There is at least moderate and possibly moderate-severe ( 2-3+) regurgitation.    Tricuspid Valve: There is mild  regurgitation.    Pulmonary Artery: The estimated pulmonary artery systolic pressure is 31 mmHg.    IVC/SVC: Intermediate venous pressure at 8 mmHg.    Current Outpatient Medications   Medication Sig    b complex vitamins capsule Take 1 capsule by mouth once daily.    calcium-vitamin D3 (OS- + D3) 500 mg(1,250mg) -200 unit per tablet Calcium 500 + D    cholecalciferol, vitamin D3, (VITAMIN D3) 25 mcg (1,000 unit) capsule     cyanocobalamin, vitamin B-12, (VITAMIN B-12) 50 mcg tablet Take 50 mcg by mouth once daily.    LACTOFERRIN ORAL Take 350 mg by mouth.    magnesium glycinate (MAG GLYCINATE ORAL) Take by mouth.    metoprolol succinate (TOPROL-XL) 25 MG 24 hr tablet Take half a tablet daily. (Patient not taking: Reported on 1/13/2025)    progesterone (PROMETRIUM) 100 MG capsule Take 100 mg by mouth once daily.    verapamiL (VERELAN) 120 MG C24P Take 1 capsule (120 mg total) by mouth once daily.    vitamin K2 100 mcg Cap Take 1 capsule by mouth once daily.    XARELTO 20 mg Tab TAKE 1 TABLET(20 MG) BY MOUTH DAILY EVENING MEAL WITH DINNER    ZINC PICOLINATE ORAL      No current facility-administered medications for this visit.     Facility-Administered Medications Ordered in Other Visits   Medication    ceFAZolin 2 g in dextrose 5 % in water (D5W) 50 mL IVPB (MB+)       Review of Systems   Constitutional: Negative for malaise/fatigue.   Cardiovascular:  Positive for dyspnea on exertion (at times) and palpitations. Negative for chest pain, irregular heartbeat and leg swelling.   Respiratory:  Negative for shortness of breath.    Hematologic/Lymphatic: Negative for bleeding problem.   Skin:  Negative for rash.   Musculoskeletal:  Negative for myalgias.   Gastrointestinal:  Negative for hematemesis, hematochezia and nausea.   Genitourinary:  Negative for hematuria.   Neurological:  Positive for vertigo. Negative for light-headedness.   Psychiatric/Behavioral:  Negative for altered mental status.   "  Allergic/Immunologic: Negative for persistent infections.       Objective:          /63 (BP Location: Left arm, Patient Position: Sitting)   Pulse 61   Ht 5' 5" (1.651 m)   Wt 48.5 kg (106 lb 14.8 oz)   BMI 17.79 kg/m²     Physical Exam  Vitals and nursing note reviewed.   Constitutional:       Appearance: Normal appearance. She is well-developed.   HENT:      Head: Normocephalic.      Nose: Nose normal.   Eyes:      Pupils: Pupils are equal, round, and reactive to light.   Cardiovascular:      Rate and Rhythm: Regular rhythm. Bradycardia present.   Pulmonary:      Effort: No respiratory distress.   Musculoskeletal:         General: Normal range of motion.   Skin:     General: Skin is warm and dry.      Findings: No erythema.   Neurological:      Mental Status: She is alert and oriented to person, place, and time.   Psychiatric:         Speech: Speech normal.         Behavior: Behavior normal.           Lab Results   Component Value Date     02/05/2025    K 4.1 02/05/2025    MG 2.1 02/05/2025    BUN 19 02/05/2025    CREATININE 0.8 02/05/2025     (H) 02/05/2025    AST 77 (H) 02/05/2025    HGB 14.0 08/30/2024    HCT 42.8 08/30/2024    TSH 4.346 (H) 02/05/2025    LDLCALC 92.2 02/05/2025       Recent Labs   Lab 09/25/23  0901   INR 1.0       Assessment:     1. PAF (paroxysmal atrial fibrillation)    2. Supraventricular tachycardia    3. On anticoagulant therapy    4. Palpitations    5. Status post placement of implantable loop recorder    6. VT (ventricular tachycardia)    7. MUSE (dyspnea on exertion)        Plan:     In summary, Ms. Ramos is a 75 y.o. female with pAF, SVT, MVP, ILR here for follow up.  Overall she feels well. Did have episode of VT lasting 34 seconds in Jan. Pt on verapamil - she does not tolerate beta blockers. Will update ischemic eval. Echo already planned for March.  Otherwise ILR shows primarily false AF events. She remains on xarelto for now. Will likely stop at next " visit if no AF, as her original AF episode may have been provoked.    Update PET stress.  Already getting echo next month  Continue current meds  Continue loop monitoring  RTC 1 yr. Sooner if needed      *A copy of this note has been sent to Dr. Ortiz*    Follow up in about 1 year (around 2/27/2026).    ------------------------------------------------------------------    GRACIE Dumont, NP-C  Cardiac Electrophysiology

## 2025-02-22 ENCOUNTER — CLINICAL SUPPORT (OUTPATIENT)
Dept: CARDIOLOGY | Facility: HOSPITAL | Age: 75
End: 2025-02-22
Attending: INTERNAL MEDICINE
Payer: MEDICARE

## 2025-02-22 DIAGNOSIS — I47.10 SUPRAVENTRICULAR TACHYCARDIA, UNSPECIFIED: ICD-10-CM

## 2025-02-22 DIAGNOSIS — I49.9 CARDIAC ARRHYTHMIA, UNSPECIFIED: ICD-10-CM

## 2025-02-22 DIAGNOSIS — R00.2 PALPITATIONS: ICD-10-CM

## 2025-02-22 PROCEDURE — 93298 REM INTERROG DEV EVAL SCRMS: CPT | Performed by: INTERNAL MEDICINE

## 2025-02-22 PROCEDURE — 93298 REM INTERROG DEV EVAL SCRMS: CPT | Mod: 26,,, | Performed by: INTERNAL MEDICINE

## 2025-02-27 ENCOUNTER — OFFICE VISIT (OUTPATIENT)
Dept: ELECTROPHYSIOLOGY | Facility: CLINIC | Age: 75
End: 2025-02-27
Payer: MEDICARE

## 2025-02-27 ENCOUNTER — HOSPITAL ENCOUNTER (OUTPATIENT)
Dept: CARDIOLOGY | Facility: CLINIC | Age: 75
Discharge: HOME | End: 2025-02-27
Payer: MEDICARE

## 2025-02-27 VITALS
HEART RATE: 61 BPM | SYSTOLIC BLOOD PRESSURE: 102 MMHG | DIASTOLIC BLOOD PRESSURE: 63 MMHG | HEIGHT: 65 IN | WEIGHT: 106.94 LBS | BODY MASS INDEX: 17.82 KG/M2

## 2025-02-27 DIAGNOSIS — R06.09 DOE (DYSPNEA ON EXERTION): ICD-10-CM

## 2025-02-27 DIAGNOSIS — I48.0 PAF (PAROXYSMAL ATRIAL FIBRILLATION): Primary | ICD-10-CM

## 2025-02-27 DIAGNOSIS — Z95.818 STATUS POST PLACEMENT OF IMPLANTABLE LOOP RECORDER: ICD-10-CM

## 2025-02-27 DIAGNOSIS — I47.10 SUPRAVENTRICULAR TACHYCARDIA: ICD-10-CM

## 2025-02-27 DIAGNOSIS — I47.20 VT (VENTRICULAR TACHYCARDIA): ICD-10-CM

## 2025-02-27 DIAGNOSIS — R00.2 PALPITATIONS: ICD-10-CM

## 2025-02-27 DIAGNOSIS — Z79.01 ON ANTICOAGULANT THERAPY: ICD-10-CM

## 2025-02-27 LAB
OHS QRS DURATION: 72 MS
OHS QTC CALCULATION: 410 MS

## 2025-02-27 PROCEDURE — 99214 OFFICE O/P EST MOD 30 MIN: CPT | Mod: PBBFAC | Performed by: NURSE PRACTITIONER

## 2025-02-27 PROCEDURE — 93005 ELECTROCARDIOGRAM TRACING: CPT | Mod: PBBFAC | Performed by: INTERNAL MEDICINE

## 2025-02-27 PROCEDURE — 99999 PR PBB SHADOW E&M-EST. PATIENT-LVL IV: CPT | Mod: PBBFAC,,, | Performed by: NURSE PRACTITIONER

## 2025-02-27 PROCEDURE — 93010 ELECTROCARDIOGRAM REPORT: CPT | Mod: S$PBB,,, | Performed by: INTERNAL MEDICINE

## 2025-02-27 PROCEDURE — 99214 OFFICE O/P EST MOD 30 MIN: CPT | Mod: S$PBB,,, | Performed by: NURSE PRACTITIONER

## 2025-02-28 ENCOUNTER — HOSPITAL ENCOUNTER (OUTPATIENT)
Dept: RADIOLOGY | Facility: HOSPITAL | Age: 75
Discharge: HOME | End: 2025-02-28
Attending: INTERNAL MEDICINE
Payer: MEDICARE

## 2025-02-28 ENCOUNTER — RESULTS FOLLOW-UP (OUTPATIENT)
Dept: INTERNAL MEDICINE | Facility: CLINIC | Age: 75
End: 2025-02-28

## 2025-02-28 DIAGNOSIS — Z72.0 TOBACCO USE: ICD-10-CM

## 2025-02-28 LAB
OHS CV AF BURDEN PERCENT: < 1
OHS CV DC REMOTE DEVICE TYPE: NORMAL

## 2025-02-28 PROCEDURE — 71250 CT THORAX DX C-: CPT | Mod: TC

## 2025-03-10 ENCOUNTER — PATIENT MESSAGE (OUTPATIENT)
Dept: CARDIOLOGY | Facility: CLINIC | Age: 75
End: 2025-03-10
Payer: MEDICARE

## 2025-03-17 ENCOUNTER — OFFICE VISIT (OUTPATIENT)
Dept: SPORTS MEDICINE | Facility: CLINIC | Age: 75
End: 2025-03-17
Payer: MEDICARE

## 2025-03-17 ENCOUNTER — HOSPITAL ENCOUNTER (OUTPATIENT)
Dept: RADIOLOGY | Facility: HOSPITAL | Age: 75
Discharge: HOME | End: 2025-03-17
Attending: ORTHOPAEDIC SURGERY
Payer: MEDICARE

## 2025-03-17 VITALS
WEIGHT: 106 LBS | SYSTOLIC BLOOD PRESSURE: 107 MMHG | HEART RATE: 60 BPM | BODY MASS INDEX: 17.66 KG/M2 | DIASTOLIC BLOOD PRESSURE: 64 MMHG | HEIGHT: 65 IN

## 2025-03-17 DIAGNOSIS — M25.562 LEFT KNEE PAIN, UNSPECIFIED CHRONICITY: ICD-10-CM

## 2025-03-17 DIAGNOSIS — M25.562 ACUTE PAIN OF LEFT KNEE: Primary | ICD-10-CM

## 2025-03-17 PROCEDURE — 73564 X-RAY EXAM KNEE 4 OR MORE: CPT | Mod: TC,50

## 2025-03-17 PROCEDURE — 73564 X-RAY EXAM KNEE 4 OR MORE: CPT | Mod: 26,50,, | Performed by: RADIOLOGY

## 2025-03-17 PROCEDURE — 99214 OFFICE O/P EST MOD 30 MIN: CPT | Mod: S$PBB,,, | Performed by: ORTHOPAEDIC SURGERY

## 2025-03-17 PROCEDURE — 99213 OFFICE O/P EST LOW 20 MIN: CPT | Mod: PBBFAC,25 | Performed by: ORTHOPAEDIC SURGERY

## 2025-03-17 PROCEDURE — 99999 PR PBB SHADOW E&M-EST. PATIENT-LVL III: CPT | Mod: PBBFAC,,, | Performed by: ORTHOPAEDIC SURGERY

## 2025-03-17 NOTE — PROGRESS NOTES
CC: left knee pain    75 y.o. Female presents today for evaluation of her left knee pain. She states she was doing [place] split squats in the gym when she began to appreciate left posterior knee tightness and swelling and notes having difficulty with steps later that afternoon. Of note, she also admits to wearing high arch supports issued by her podiatrist for longer than initially prescribed beginning 03/10/2025 and feels as though getting out of these has helped.    How long: Approximately 1 week  What makes it better: Rest, compression  What makes it worse: Activity   Does it radiate: Denies  Attempted treatments: Rest, ice, elevation, compression pants, knee brace  Pain score: 0/10  Any mechanical symptoms: Denies  Feelings of instability: Yes - although improved   Affect on ADLs: Denies    Occupation: Retired       PAST MEDICAL HISTORY:   Past Medical History:   Diagnosis Date    Diverticulitis     Kidney stone     Osteoporosis     ostenopenia        PAST SURGICAL HISTORY:   Past Surgical History:   Procedure Laterality Date    BREAST BIOPSY      x1    BUNIONECTOMY      Hammer toe repair    BUNIONECTOMY      COLECTOMY  2004    partial for diverticulitis    DIAGNOSTIC CARDIAC ELECTROPHYSIOLOGY STUDY N/A 10/02/2023    Procedure: EP - diagnostic;  Surgeon: Shin Ortiz MD;  Location: [facility] EP LAB;  Service: Cardiology;  Laterality: N/A;    HYSTERECTOMY  07/22/2013    complete laparoscopic hysterectomy at [facility] 7/22/13.  No cancer.  Found 3cm fibroid on right ovary.    INSERTION OF IMPLANTABLE LOOP RECORDER N/A 1/9/2024    Procedure: Insertion, Implantable Loop Recorder;  Surgeon: Shin Ortiz MD;  Location: [facility] EP LAB;  Service: Cardiology;  Laterality: N/A;  SVT/AT, ILR, BSci, Local, SK, 3 Prep    kidney stones      removal of breast implants         FAMILY HISTORY:   Family History   Problem Relation Name Age of Onset    Dementia Mother      Hypertension Father      Cancer Father          bladder  "cancer    Cancer Sister 4         breast x2 sis    Breast cancer Sister 4 54    Hashimoto's thyroiditis Sister 4         x2 sis    No Known Problems Brother 1     No Known Problems Daughter 1     Alcohol abuse Son 1     Uterine cancer Maternal Grandmother      Colon cancer Maternal Grandfather      Ovarian cancer Neg Hx         SOCIAL HISTORY:   Social History[1]    MEDICATIONS:   Current Medications[2]    ALLERGIES:   Review of patient's allergies indicates:   Allergen Reactions    Cortisone (hydrocortisone) [hydrocortisone] Other (See Comments)     Triggers svt        PHYSICAL EXAMINATION:  /64   Pulse 60   Ht 5' 5" (1.651 m)   Wt 48.1 kg (106 lb)   BMI 17.64 kg/m²   Vitals signs and nursing note have been reviewed.  General: In no acute distress, well developed, well nourished, no diaphoresis  Eyes: EOM full and smooth, no eye redness or discharge  HENT: normocephalic and atraumatic, neck supple, trachea midline, no nasal discharge, no external ear redness or discharge  Cardiovascular: 2+ and symmetric DP pulses bilaterally, no LE edema  Lungs: respirations non-labored, no conversational dyspnea   Abd: non-distended, no rigidity  MSK: no amputation or deformity, no swelling of extremities  Neuro: AAOx3, CN2-12 grossly intact  Skin: No rashes, warm and dry  Psychiatric: cooperative, pleasant, mood and affect appropriate for age    MUSCULOSKELETAL EXAM:    LEFT KNEE EXAMINATION   Affected side is compared to contralateral knee     Observation:  No edema, erythema, ecchymosis, or effusion noted.  No muscle atrophy of the thighs and calves noted.  No obvious bony deformities noted.   No genu valgus/varum noted. No recurvatum noted.    No tibial internal/external torsion.    No pes planus/cavus.    Tenderness:   Patella - none    Lateral joint line - none  Quad tendon - none   Medial joint line - none  Patellar tendon - none  Medial plica - none  Tibial tubercle - none   Lateral plica - none  Pes anserine - " none   MCL prox - none  Distal ITB - none   MCL distal - none  MFC - none    LCL prox - none  LFC - none    LCL distal - none  Tibia - none    Fibula - none    No obvious bursae, plicae, popliteal cysts, or tendon derangement palpated.          ROM:  Active extension to 0° on left without hyperextension, lag, crepitus, or patellar J sign.   Active extension to 0° on right without hyperextension, lag, crepitus, or patellar J sign.   Active flexion to 135° on left and 135° on right.    Strength: (bilaterally) (*pain)  Knee Flexion - 5/5 on left and 5/5 on right  Knee Extension - 5/5 on left and 5/5 on right  Hip Flexion - 5/5 on left and 5/5 on right  Hip Extension - 5/5 on left and 5/5 on right  Ankle dorsiflexion - 5/5 on left and 5/5 on right  Ankle Plantarflexion - 5/5 on left and 5/5 on right    Patellofemoral Exam:  Patellar ballottement - negative  Bulge sign - negative  Patellar grind - negative  No patellar laxity with medial and lateral translation   No apprehension with medial and lateral patellar translation.     Meniscus Testing:     No pain with terminal extension and flexion.  Katies test - negative   Bounce home test - negative    Ligament Testing:  Lachman's test - negative  No laxity with anterior drawer.  No laxity with posterior drawer.    No laxity with varus testing at 0 and 30 degrees.  No laxity with valgus testing at 0 and 30 degrees.    IT Band Testing:  Noble Compression test - negative    Neurovascular Examination:   Normal gait without antalgia.  Sensation intact to light touch in the obturator, lateral/intermediate/medial/posterior femoral cutaneous, saphenous, and common peroneal nerves bilaterally.  Pulses intact at the DP and PT arteries bilaterally.    Capillary refill intact <2 seconds in all toes bilaterally.      IMAGIN. X-ray ordered due to left knee pain   2. X-ray images were reviewed personally by me and then directly with patient.  3. FINDINGS: X-ray images obtained  demonstrate joint space narrowing medially more pronounced on the right knee. Osteophytosis of the PF compartment bilaterally. Kellgren and Guillermo grade 2 left, grade 3 right  4. IMPRESSION: As above.      ASSESSMENT:      ICD-10-CM ICD-9-CM   1. Acute pain of left knee  M25.562 719.46         PLAN:  Acute left knee pain - new issue to physician    - Meredith admits to appreciating left posterior knee swelling and tightness after performing [place] split squats. She feels as though her symptoms have improved with compression.     - XRs ordered in the office today and images were personally reviewed with the patient. See above for further detail.    - We reviewed the natural history of osteoarthritis and a multipronged treatment approach. We reviewed the importance of addressing three different aspects to best manage this condition. Controlling the intra-articular immune reaction through medications and/or injections, improving local stability through bracing and/or injection, and improving functional stability through hip, core, and ankle strength, stability and mobility which may benefit from formal physical therapy.    - Ok to continue with progressing exercise activity as tolerated. Her exam and symptoms are reassuring.      Future planning includes - reassess if symptoms return/worsen    All questions were answered to the best of my ability and all concerns were addressed at this time.    Follow up as needed.       This note is dictated using the M*Modal Fluency Direct word recognition program. There are word recognition mistakes that are occasionally missed on review.               [1]   Social History  Socioeconomic History    Marital status:    Tobacco Use    Smoking status: Former     Current packs/day: 0.00     Types: Cigarettes     Quit date: 2003     Years since quittin.7    Smokeless tobacco: Never    Tobacco comments:     Started at 16, smoking on and off, picked up smoking at 52  for 1 year after quitting drinking.    Substance and Sexual Activity    Alcohol use: No    Drug use: No    Sexual activity: Not Currently     Partners: Male     Social Drivers of Health     Financial Resource Strain: Low Risk  (3/17/2025)    Overall Financial Resource Strain (CARDIA)     Difficulty of Paying Living Expenses: Not hard at all   Food Insecurity: No Food Insecurity (3/17/2025)    Hunger Vital Sign     Worried About Running Out of Food in the Last Year: Never true     Ran Out of Food in the Last Year: Never true   Transportation Needs: No Transportation Needs (3/17/2025)    PRAPARE - Transportation     Lack of Transportation (Medical): No     Lack of Transportation (Non-Medical): No   Physical Activity: Sufficiently Active (3/17/2025)    Exercise Vital Sign     Days of Exercise per Week: 7 days     Minutes of Exercise per Session: 30 min   Stress: Stress Concern Present (3/17/2025)    [employer] [place] [employer] - Occupational Stress Questionnaire     Feeling of Stress : To some extent   Housing Stability: Low Risk  (3/17/2025)    Housing Stability Vital Sign     Unable to Pay for Housing in the Last Year: No     Number of Times Moved in the Last Year: 0     Homeless in the Last Year: No   [2]   Current Outpatient Medications:     b complex vitamins capsule, Take 1 capsule by mouth once daily., Disp: , Rfl:     calcium-vitamin D3 (OS- + D3) 500 mg(1,250mg) -200 unit per tablet, Calcium 500 + D, Disp: , Rfl:     cholecalciferol, vitamin D3, (VITAMIN D3) 25 mcg (1,000 unit) capsule, , Disp: , Rfl:     cyanocobalamin, vitamin B-12, (VITAMIN B-12) 50 mcg tablet, Take 50 mcg by mouth once daily., Disp: , Rfl:     LACTOFERRIN ORAL, Take 350 mg by mouth., Disp: , Rfl:     magnesium glycinate (MAG GLYCINATE ORAL), Take by mouth., Disp: , Rfl:     metoprolol succinate (TOPROL-XL) 25 MG 24 hr tablet, Take half a tablet daily., Disp: 5 tablet, Rfl: 0    progesterone (PROMETRIUM) 100 MG capsule,  Take 100 mg by mouth once daily., Disp: , Rfl:     rivaroxaban (XARELTO) 20 mg Tab, Take 1 tablet (20 mg total) by mouth before evening meal., Disp: 90 tablet, Rfl: 3    verapamiL (VERELAN) 120 MG C24P, Take 1 capsule (120 mg total) by mouth once daily., Disp: 30 capsule, Rfl: 11    vitamin K2 100 mcg Cap, Take 1 capsule by mouth once daily., Disp: , Rfl:     ZINC PICOLINATE ORAL, , Disp: , Rfl:   No current facility-administered medications for this visit.    Facility-Administered Medications Ordered in Other Visits:     ceFAZolin 2 g in dextrose 5 % in water (D5W) 50 mL IVPB (MB+), 2 g, Intravenous, On Call Procedure, Brandie Adrian, NP

## 2025-03-19 ENCOUNTER — TELEPHONE (OUTPATIENT)
Dept: CARDIOTHORACIC SURGERY | Facility: CLINIC | Age: 75
End: 2025-03-19
Payer: MEDICARE

## 2025-03-19 NOTE — TELEPHONE ENCOUNTER
Spoke with pt and confirmed 3/20 appt with Dr. Rubio. Pt verbalized understanding of appt times and locations. Pt denies any questions at this time.

## 2025-03-20 ENCOUNTER — RESULTS FOLLOW-UP (OUTPATIENT)
Dept: ELECTROPHYSIOLOGY | Facility: CLINIC | Age: 75
End: 2025-03-20

## 2025-03-20 ENCOUNTER — HOSPITAL ENCOUNTER (OUTPATIENT)
Dept: CARDIOLOGY | Facility: HOSPITAL | Age: 75
Discharge: HOME | End: 2025-03-20
Attending: NURSE PRACTITIONER
Payer: MEDICARE

## 2025-03-20 VITALS
HEART RATE: 68 BPM | BODY MASS INDEX: 17.67 KG/M2 | HEIGHT: 65 IN | WEIGHT: 106.06 LBS | SYSTOLIC BLOOD PRESSURE: 119 MMHG | DIASTOLIC BLOOD PRESSURE: 67 MMHG

## 2025-03-20 DIAGNOSIS — I47.20 VT (VENTRICULAR TACHYCARDIA): ICD-10-CM

## 2025-03-20 DIAGNOSIS — R06.09 DOE (DYSPNEA ON EXERTION): ICD-10-CM

## 2025-03-20 LAB
CFR FLOW - ANTERIOR: 3.02
CFR FLOW - INFERIOR: 3.05
CFR FLOW - LATERAL: 2.93
CFR FLOW - MAX: 3.61
CFR FLOW - MIN: 1.81
CFR FLOW - SEPTAL: 2.79
CFR FLOW - WHOLE HEART: 2.95
CV STRESS BASE HR: 53 BPM
DIASTOLIC BLOOD PRESSURE: 90 MMHG
EJECTION FRACTION- HIGH: 59 %
END DIASTOLIC INDEX-HIGH: 155 ML/M2
END DIASTOLIC INDEX-LOW: 91 ML/M2
END SYSTOLIC INDEX-HIGH: 78 ML/M2
END SYSTOLIC INDEX-LOW: 40 ML/M2
NUC REST DIASTOLIC VOLUME INDEX: 96
NUC REST EJECTION FRACTION: 70
NUC REST SYSTOLIC VOLUME INDEX: 29
NUC STRESS DIASTOLIC VOLUME INDEX: 97
NUC STRESS EJECTION FRACTION: 77 %
NUC STRESS SYSTOLIC VOLUME INDEX: 23
OHS CV CPX 1 MINUTE RECOVERY HEART RATE: 102 BPM
OHS CV CPX 85 PERCENT MAX PREDICTED HEART RATE MALE: 125
OHS CV CPX MAX PREDICTED HEART RATE: 147
OHS CV CPX PATIENT IS FEMALE: 1
OHS CV CPX PATIENT IS MALE: 0
OHS CV CPX PEAK DIASTOLIC BLOOD PRESSURE: 84 MMHG
OHS CV CPX PEAK HEAR RATE: 62 BPM
OHS CV CPX PEAK RATE PRESSURE PRODUCT: 7502
OHS CV CPX PEAK SYSTOLIC BLOOD PRESSURE: 121 MMHG
OHS CV CPX PERCENT MAX PREDICTED HEART RATE ACHIEVED: 44
OHS CV CPX RATE PRESSURE PRODUCT PRESENTING: 6837
OHS CV INITIAL DOSE: 14.1 MCG/KG/MIN
OHS CV MODERATELY REDUCED FLOW CAPACITY: 0 %
OHS CV NO ISCHEMIA MILDLY REDUCED FLOW CAPACTY: 2 %
OHS CV NO ISCHEMIA MINIMALLY REDUCED FLOW CAPACITY: 28 %
OHS CV NORMAL FLOW CAPACITY COMPARABLE TO HEALTHY YOUNG VOLUNTEERS: 70 %
OHS CV PEAK DOSE: 14 MCG/KG/MIN
OHS CV PET ID: 8401
OHS CV SEVERELY REDUCED FLOW CAPACITY: 0 %
OHS CV TOTAL EXAM DLP: 123.73 MGY-CM
REST FLOW - ANTERIOR: 0.76 CC/MIN/G
REST FLOW - INFERIOR: 0.68 CC/MIN/G
REST FLOW - LATERAL: 0.82 CC/MIN/G
REST FLOW - MAX: 1.18 CC/MIN/G
REST FLOW - MIN: 0.45 CC/MIN/G
REST FLOW - SEPTAL: 0.63 CC/MIN/G
REST FLOW - WHOLE HEART: 0.72 CC/MIN/G
RETIRED EF AND QEF - SEE NOTES: 47 %
STRESS FLOW - ANTERIOR: 2.26 CC/MIN/G
STRESS FLOW - INFERIOR: 2.06 CC/MIN/G
STRESS FLOW - LATERAL: 2.37 CC/MIN/G
STRESS FLOW - MAX: 3.07 CC/MIN/G
STRESS FLOW - MIN: 0.89 CC/MIN/G
STRESS FLOW - SEPTAL: 1.76 CC/MIN/G
STRESS FLOW - WHOLE HEART: 2.11 CC/MIN/G
STRESS ST DEPRESSION: 0.8 MM
SYSTOLIC BLOOD PRESSURE: 129 MMHG

## 2025-03-20 PROCEDURE — 63600175 PHARM REV CODE 636 W HCPCS: Performed by: NURSE PRACTITIONER

## 2025-03-20 PROCEDURE — 78434 AQMBF PET REST & RX STRESS: CPT | Mod: 26,,, | Performed by: INTERNAL MEDICINE

## 2025-03-20 PROCEDURE — A9555 RB82 RUBIDIUM: HCPCS | Performed by: NURSE PRACTITIONER

## 2025-03-20 PROCEDURE — 93017 CV STRESS TEST TRACING ONLY: CPT

## 2025-03-20 PROCEDURE — 93016 CV STRESS TEST SUPVJ ONLY: CPT | Mod: ,,, | Performed by: INTERNAL MEDICINE

## 2025-03-20 PROCEDURE — 78431 MYOCRD IMG PET RST&STRS CT: CPT | Mod: 26,,, | Performed by: INTERNAL MEDICINE

## 2025-03-20 PROCEDURE — 93018 CV STRESS TEST I&R ONLY: CPT | Mod: ,,, | Performed by: INTERNAL MEDICINE

## 2025-03-20 RX ORDER — AMINOPHYLLINE 25 MG/ML
75 INJECTION, SOLUTION INTRAVENOUS ONCE
Status: COMPLETED | OUTPATIENT
Start: 2025-03-20 | End: 2025-03-20

## 2025-03-20 RX ORDER — REGADENOSON 0.08 MG/ML
0.4 INJECTION, SOLUTION INTRAVENOUS ONCE
Status: COMPLETED | OUTPATIENT
Start: 2025-03-20 | End: 2025-03-20

## 2025-03-20 RX ADMIN — REGADENOSON 0.4 MG: 0.08 INJECTION, SOLUTION INTRAVENOUS at 07:03

## 2025-03-20 RX ADMIN — RUBIDIUM CHLORIDE RB-82 14.1 MILLICURIE: 150 INJECTION, SOLUTION INTRAVENOUS at 07:03

## 2025-03-20 RX ADMIN — AMINOPHYLLINE 75 MG: 25 INJECTION, SOLUTION INTRAVENOUS at 07:03

## 2025-03-20 RX ADMIN — RUBIDIUM CHLORIDE RB-82 14 MILLICURIE: 150 INJECTION, SOLUTION INTRAVENOUS at 07:03

## 2025-03-21 ENCOUNTER — TELEPHONE (OUTPATIENT)
Dept: CARDIOTHORACIC SURGERY | Facility: CLINIC | Age: 75
End: 2025-03-21
Payer: MEDICARE

## 2025-03-21 NOTE — TELEPHONE ENCOUNTER
Attempted call to pt to reschedule echo and follow up with Dr. Rubio. No answer, left VM with request for call back and provided my direct number.

## 2025-03-21 NOTE — TELEPHONE ENCOUNTER
Received return call from pt and rescheduled her echo and follow up with Dr. Rubio. Pt verbalized understanding of appointment date, times, and locations.

## 2025-03-25 ENCOUNTER — CLINICAL SUPPORT (OUTPATIENT)
Dept: CARDIOLOGY | Facility: HOSPITAL | Age: 75
End: 2025-03-25
Attending: INTERNAL MEDICINE
Payer: MEDICARE

## 2025-03-25 ENCOUNTER — CLINICAL SUPPORT (OUTPATIENT)
Dept: CARDIOLOGY | Facility: HOSPITAL | Age: 75
End: 2025-03-25
Payer: MEDICARE

## 2025-03-25 DIAGNOSIS — I49.9 CARDIAC ARRHYTHMIA, UNSPECIFIED: ICD-10-CM

## 2025-03-25 DIAGNOSIS — R00.2 PALPITATIONS: ICD-10-CM

## 2025-03-25 DIAGNOSIS — I47.10 SUPRAVENTRICULAR TACHYCARDIA, UNSPECIFIED: ICD-10-CM

## 2025-03-25 PROCEDURE — 93298 REM INTERROG DEV EVAL SCRMS: CPT | Performed by: INTERNAL MEDICINE

## 2025-03-25 PROCEDURE — 93298 REM INTERROG DEV EVAL SCRMS: CPT | Mod: 26,,, | Performed by: INTERNAL MEDICINE

## 2025-03-26 ENCOUNTER — TELEPHONE (OUTPATIENT)
Dept: CARDIOLOGY | Facility: HOSPITAL | Age: 75
End: 2025-03-26
Payer: MEDICARE

## 2025-03-26 NOTE — TELEPHONE ENCOUNTER
ILR alert remote transmission received for 2 symptom activated events.   Events occurred on 3/24/25 at 9:30AM and 9:40AM.     Notable Medications: Verapamil 120mg daily    Symptoms: heart rate racing, SOB    EGMs reviewed with Dr. Ortiz and c/w probable aberrancy leading into an SVT.  Verbal orders received to increase Verapamil to 120mg BID and offer an in-clinic appt to discuss possible ablation.     Called and spoke with the patient who states she was doing a high-intensity workout at the time of the recorded events. States the symptoms felt like an SVT but wanted to record via the symptom button as she has not felt these symptoms in over a year. Discussed Dr. Ortiz's recommendations with the patient. She states that they have tried ablation in the past without success. She is also hesitant to adjust medications at this time as she has been feeling so well otherwise. Would like to attribute these symptoms to the exercise she was doing.     Discussed with Dr. Ortiz. He is okay with continuing to monitor for now as this is the patient's preference. Pt was notified and instructed to contact the clinic for increased frequency or duration of these symptoms. She verbalized understanding and appreciated the call.     Will continue to monitor via ILR remote home monitoring.

## 2025-04-01 LAB
OHS CV AF BURDEN PERCENT: < 1
OHS CV DC REMOTE DEVICE TYPE: NORMAL
OHS CV ICD SHOCK: NO

## 2025-04-10 ENCOUNTER — OFFICE VISIT (OUTPATIENT)
Dept: OBSTETRICS AND GYNECOLOGY | Facility: CLINIC | Age: 75
End: 2025-04-10
Payer: MEDICARE

## 2025-04-10 VITALS
WEIGHT: 107.19 LBS | HEART RATE: 61 BPM | BODY MASS INDEX: 17.86 KG/M2 | SYSTOLIC BLOOD PRESSURE: 100 MMHG | DIASTOLIC BLOOD PRESSURE: 68 MMHG | HEIGHT: 65 IN

## 2025-04-10 DIAGNOSIS — Z12.31 VISIT FOR SCREENING MAMMOGRAM: ICD-10-CM

## 2025-04-10 DIAGNOSIS — Z01.419 ENCOUNTER FOR GYNECOLOGICAL EXAMINATION WITHOUT ABNORMAL FINDING: Primary | ICD-10-CM

## 2025-04-10 PROCEDURE — 99999 PR PBB SHADOW E&M-EST. PATIENT-LVL IV: CPT | Mod: PBBFAC,,, | Performed by: PHYSICIAN ASSISTANT

## 2025-04-10 PROCEDURE — 99214 OFFICE O/P EST MOD 30 MIN: CPT | Mod: PBBFAC | Performed by: PHYSICIAN ASSISTANT

## 2025-04-10 PROCEDURE — G0101 CA SCREEN;PELVIC/BREAST EXAM: HCPCS | Mod: PBBFAC | Performed by: PHYSICIAN ASSISTANT

## 2025-04-10 NOTE — PROGRESS NOTES
CC: Well woman exam    Meredith Ramos is a 75 y.o. female  presents for well woman exam.  LMP: No LMP recorded. Patient is postmenopausal. She is new to [facility]. Previously seeing Dr. Anaya. Menarche at age 14. Menopause around age 52. Hyst/BSO at [facility] in her 60s for mass on her uterus that was ultimately benign. She is on progesterone from an outside provider.  No issues, problems, or complaints.    She exercises regularly and eats a clean diet. She is feeling great.   Would prefer to be seen annually for exam.    Mammogram: 11/15/2024 TC 3.06%   Pap: s/p hyst  PCP: Dr. Lisa Childress  Routine Screening Labs: 2025  Colonoscopy: 2024, repeat in 3 years  DEXA: 2025 osteopenia    Past Medical History:   Diagnosis Date    Diverticulitis     Kidney stone     Osteoporosis     ostenopenia      Past Surgical History:   Procedure Laterality Date    BREAST BIOPSY      x1    BUNIONECTOMY      Hammer toe repair    BUNIONECTOMY      COLECTOMY  2004    partial for diverticulitis    DIAGNOSTIC CARDIAC ELECTROPHYSIOLOGY STUDY N/A 10/02/2023    Procedure: EP - diagnostic;  Surgeon: Shin Ortiz MD;  Location: [facility] EP LAB;  Service: Cardiology;  Laterality: N/A;    HYSTERECTOMY  2013    complete laparoscopic hysterectomy at [facility] 13.  No cancer.  Found 3cm fibroid on right ovary.    INSERTION OF IMPLANTABLE LOOP RECORDER N/A 2024    Procedure: Insertion, Implantable Loop Recorder;  Surgeon: Shin Ortiz MD;  Location: [facility] EP LAB;  Service: Cardiology;  Laterality: N/A;  SVT/AT, ILR, BSci, Local, SK, 3 Prep    kidney stones      removal of breast implants       Social History[1]  Family History   Problem Relation Name Age of Onset    Dementia Mother      Hypertension Father      Cancer Father          bladder cancer    Cancer Sister 4         breast x2 sis    Breast cancer Sister 4 54    Hashimoto's thyroiditis Sister 4         x2 sis    No Known Problems Brother 1      "No Known Problems Daughter 1     Alcohol abuse Son 1     Uterine cancer Maternal Grandmother      Colon cancer Maternal Grandfather      Ovarian cancer Neg Hx       OB History          4    Para   2    Term   2            AB   2    Living             SAB        IAB        Ectopic        Multiple        Live Births                   Current Medications[2]    The 10-year ASCVD risk score (Brett PLAZA, et al., 2019) is: 7.9%    Values used to calculate the score:      Age: 75 years      Sex: Female      Is Non- : No      Diabetic: No      Tobacco smoker: No      Systolic Blood Pressure: 100 mmHg      Is BP treated: No      HDL Cholesterol: 86 mg/dL      Total Cholesterol: 185 mg/dL    /68 (Patient Position: Sitting)   Pulse 61   Ht 5' 5" (1.651 m)   Wt 48.6 kg (107 lb 3.2 oz)   BMI 17.84 kg/m²       ROS:  GENERAL: Denies weight gain or weight loss. Feeling well overall.   SKIN: Denies rash or lesions.   HEAD: Denies head injury or headache.   NODES: Denies enlarged lymph nodes.   CHEST: Denies chest pain or shortness of breath.   CARDIOVASCULAR: Denies palpitations or left sided chest pain.   ABDOMEN: No abdominal pain, constipation, diarrhea, nausea, vomiting or rectal bleeding.   URINARY: No frequency, dysuria, hematuria, or burning on urination.  REPRODUCTIVE: See HPI.   BREASTS: Denies pain, lumps, or nipple discharge.   HEMATOLOGIC: No easy bruisability or excessive bleeding.   MUSCULOSKELETAL: Denies joint pain or swelling.   NEUROLOGIC: Denies syncope or weakness.   PSYCHIATRIC: Denies depression, anxiety or mood swings.    PHYSICAL EXAM:  APPEARANCE: Well nourished, well developed, in no acute distress.  AFFECT: WNL, alert and oriented x 3  CHEST: Good respiratory effect  ABDOMEN: Soft.  No tenderness or masses.  No hepatosplenomegaly.  No hernias.  BREASTS: Symmetrical, no skin changes or visible lesions.  No palpable masses, nipple discharge bilaterally.  PELVIC: " Normal external genitalia without lesions.  Normal hair distribution.  Adequate perineal body, normal urethral meatus.  Vagina moist and well rugated without lesions or discharge. Cervix and uterus are surgically absent.  Adnexa without masses or tenderness.    EXTREMITIES: No edema.    ASSESSMENT:   Encounter for gynecological examination without abnormal finding    Visit for screening mammogram  -     Mammo Digital Screening Bilat w/ Shivam (XPD); Future; Expected date: 04/10/2026          PLAN:   Annual screening mammogram  Continue regular strength workouts and vitamin D/calcium.  Follow up annually or PRN    Patient was counseled today on A.C.S. Pap guidelines and recommendations for yearly pelvic exams, mammograms and monthly self breast exams; to see her PCP for other health maintenance.                             [1]   Social History  Socioeconomic History    Marital status:    Tobacco Use    Smoking status: Former     Current packs/day: 0.00     Types: Cigarettes     Quit date: 2003     Years since quittin.8    Smokeless tobacco: Never    Tobacco comments:     Started at 16, smoking on and off, picked up smoking at 52 for 1 year after quitting drinking.    Substance and Sexual Activity    Alcohol use: No    Drug use: No    Sexual activity: Not Currently     Partners: Male     Social Drivers of Health     Financial Resource Strain: Low Risk  (3/17/2025)    Overall Financial Resource Strain (CARDIA)     Difficulty of Paying Living Expenses: Not hard at all   Food Insecurity: No Food Insecurity (3/17/2025)    Hunger Vital Sign     Worried About Running Out of Food in the Last Year: Never true     Ran Out of Food in the Last Year: Never true   Transportation Needs: No Transportation Needs (3/17/2025)    PRAPARE - Transportation     Lack of Transportation (Medical): No     Lack of Transportation (Non-Medical): No   Physical Activity: Sufficiently Active (3/17/2025)    Exercise Vital Sign      Days of Exercise per Week: 7 days     Minutes of Exercise per Session: 30 min   Stress: Stress Concern Present (3/17/2025)    [employer] [place] [employer] - Occupational Stress Questionnaire     Feeling of Stress : To some extent   Housing Stability: Low Risk  (3/17/2025)    Housing Stability Vital Sign     Unable to Pay for Housing in the Last Year: No     Number of Times Moved in the Last Year: 0     Homeless in the Last Year: No   [2]   Current Outpatient Medications:     b complex vitamins capsule, Take 1 capsule by mouth once daily., Disp: , Rfl:     calcium-vitamin D3 (OS- + D3) 500 mg(1,250mg) -200 unit per tablet, Calcium 500 + D, Disp: , Rfl:     cholecalciferol, vitamin D3, (VITAMIN D3) 25 mcg (1,000 unit) capsule, , Disp: , Rfl:     cyanocobalamin, vitamin B-12, (VITAMIN B-12) 50 mcg tablet, Take 50 mcg by mouth once daily., Disp: , Rfl:     LACTOFERRIN ORAL, Take 350 mg by mouth., Disp: , Rfl:     magnesium glycinate (MAG GLYCINATE ORAL), Take by mouth., Disp: , Rfl:     metoprolol succinate (TOPROL-XL) 25 MG 24 hr tablet, Take half a tablet daily., Disp: 5 tablet, Rfl: 0    progesterone (PROMETRIUM) 100 MG capsule, Take 100 mg by mouth once daily., Disp: , Rfl:     rivaroxaban (XARELTO) 20 mg Tab, Take 1 tablet (20 mg total) by mouth before evening meal., Disp: 90 tablet, Rfl: 3    verapamiL (VERELAN) 120 MG C24P, Take 1 capsule (120 mg total) by mouth once daily., Disp: 30 capsule, Rfl: 11    vitamin K2 100 mcg Cap, Take 1 capsule by mouth once daily., Disp: , Rfl:     ZINC PICOLINATE ORAL, , Disp: , Rfl:   No current facility-administered medications for this visit.    Facility-Administered Medications Ordered in Other Visits:     ceFAZolin 2 g in dextrose 5 % in water (D5W) 50 mL IVPB (MB+), 2 g, Intravenous, On Call Procedure, Brandie Adrian, NP

## 2025-04-11 ENCOUNTER — PATIENT MESSAGE (OUTPATIENT)
Dept: OBSTETRICS AND GYNECOLOGY | Facility: CLINIC | Age: 75
End: 2025-04-11
Payer: MEDICARE

## 2025-04-16 ENCOUNTER — TELEPHONE (OUTPATIENT)
Dept: CARDIOTHORACIC SURGERY | Facility: CLINIC | Age: 75
End: 2025-04-16
Payer: MEDICARE

## 2025-04-16 NOTE — TELEPHONE ENCOUNTER
Attempted call to pt to confirm echo and follow up with Dr. Rubio scheduled for tomorrow. No answer, left detailed VM with appointment details and request for call back should pt have any questions.

## 2025-04-17 ENCOUNTER — PATIENT MESSAGE (OUTPATIENT)
Dept: ELECTROPHYSIOLOGY | Facility: CLINIC | Age: 75
End: 2025-04-17
Payer: MEDICARE

## 2025-04-17 ENCOUNTER — OFFICE VISIT (OUTPATIENT)
Dept: CARDIOTHORACIC SURGERY | Facility: CLINIC | Age: 75
End: 2025-04-17
Payer: MEDICARE

## 2025-04-17 ENCOUNTER — HOSPITAL ENCOUNTER (OUTPATIENT)
Dept: CARDIOLOGY | Facility: HOSPITAL | Age: 75
Discharge: HOME | End: 2025-04-17
Attending: THORACIC SURGERY (CARDIOTHORACIC VASCULAR SURGERY)
Payer: MEDICARE

## 2025-04-17 VITALS
BODY MASS INDEX: 17.86 KG/M2 | SYSTOLIC BLOOD PRESSURE: 104 MMHG | HEART RATE: 70 BPM | WEIGHT: 107.19 LBS | HEIGHT: 65 IN | DIASTOLIC BLOOD PRESSURE: 64 MMHG

## 2025-04-17 VITALS
OXYGEN SATURATION: 99 % | DIASTOLIC BLOOD PRESSURE: 66 MMHG | BODY MASS INDEX: 17.85 KG/M2 | HEIGHT: 65 IN | SYSTOLIC BLOOD PRESSURE: 147 MMHG | WEIGHT: 107.13 LBS | HEART RATE: 59 BPM

## 2025-04-17 DIAGNOSIS — I34.0 NONRHEUMATIC MITRAL VALVE REGURGITATION: Primary | ICD-10-CM

## 2025-04-17 DIAGNOSIS — I34.1 MVP (MITRAL VALVE PROLAPSE): ICD-10-CM

## 2025-04-17 DIAGNOSIS — I34.0 NONRHEUMATIC MITRAL VALVE REGURGITATION: ICD-10-CM

## 2025-04-17 DIAGNOSIS — I48.0 PAF (PAROXYSMAL ATRIAL FIBRILLATION): ICD-10-CM

## 2025-04-17 LAB
ASCENDING AORTA: 3.03 CM
AV AREA BY CONTINUOUS VTI: 1.8 CM2
AV INDEX (PROSTH): 0.47
AV LVOT MEAN GRADIENT: 2 MMHG
AV LVOT PEAK GRADIENT: 4 MMHG
AV MEAN GRADIENT: 4 MMHG
AV PEAK GRADIENT: 8 MMHG
AV VALVE AREA BY VELOCITY RATIO: 2.7 CM²
AV VALVE AREA: 1.8 CM2
AV VELOCITY RATIO: 0.71
BSA FOR ECHO PROCEDURE: 1.49 M2
CV ECHO LV RWT: 0.34 CM
DOP CALC AO PEAK VEL: 1.4 M/S
DOP CALC AO VTI: 23 CM
DOP CALC LVOT AREA: 3.8 CM2
DOP CALC LVOT DIAMETER: 2.2 CM
DOP CALC LVOT PEAK VEL: 1 M/S
DOP CALC LVOT STROKE VOLUME: 41.4 CM3
DOP CALCLVOT PEAK VEL VTI: 10.9 CM
E WAVE DECELERATION TIME: 126 MS
E/A RATIO: 0.93
E/E' RATIO: 9 M/S
ECHO EF ESTIMATED: 66 %
ECHO LV POSTERIOR WALL: 0.7 CM (ref 0.6–1.1)
EJECTION FRACTION: 68 %
FRACTIONAL SHORTENING: 36.6 % (ref 28–44)
INTERVENTRICULAR SEPTUM: 0.7 CM (ref 0.6–1.1)
LA MAJOR: 4.8 CM
LA MINOR: 4.6 CM
LA WIDTH: 4.8 CM
LEFT ATRIUM SIZE: 3.5 CM
LEFT ATRIUM VOLUME INDEX MOD: 39 ML/M2
LEFT ATRIUM VOLUME INDEX: 44 ML/M2
LEFT ATRIUM VOLUME MOD: 60 ML
LEFT ATRIUM VOLUME: 67 CM3
LEFT INTERNAL DIMENSION IN SYSTOLE: 2.6 CM (ref 2.1–4)
LEFT VENTRICLE DIASTOLIC VOLUME INDEX: 48.03 ML/M2
LEFT VENTRICLE DIASTOLIC VOLUME: 73 ML
LEFT VENTRICLE MASS INDEX: 53.7 G/M2
LEFT VENTRICLE SYSTOLIC VOLUME INDEX: 16.4 ML/M2
LEFT VENTRICLE SYSTOLIC VOLUME: 25 ML
LEFT VENTRICULAR INTERNAL DIMENSION IN DIASTOLE: 4.1 CM (ref 3.5–6)
LEFT VENTRICULAR MASS: 81.7 G
LV LATERAL E/E' RATIO: 9.4
LV SEPTAL E/E' RATIO: 9.4
MV A" WAVE DURATION": 87.54 MS
MV PEAK A VEL: 0.81 M/S
MV PEAK E VEL: 0.75 M/S
OHS CV RV/LV RATIO: 0.9 CM
PISA TR MAX VEL: 3.3 M/S
PULM VEIN A" WAVE DURATION": 87.54 MS
PULM VEIN S/D RATIO: 1.63
PULMONIC VEIN PEAK A VELOCITY: 0.9 M/S
PV PEAK D VEL: 0.49 M/S
PV PEAK S VEL: 0.8 M/S
RA MAJOR: 5.02 CM
RA PRESSURE ESTIMATED: 3 MMHG
RA WIDTH: 3.97 CM
RIGHT VENTRICLE DIASTOLIC BASEL DIMENSION: 3.7 CM
RV TB RVSP: 6 MMHG
RV TISSUE DOPPLER FREE WALL SYSTOLIC VELOCITY 1 (APICAL 4 CHAMBER VIEW): 23.24 CM/S
SINUS: 3.01 CM
STJ: 2.46 CM
TDI LATERAL: 0.08 M/S
TDI SEPTAL: 0.08 M/S
TDI: 0.08 M/S
TRICUSPID ANNULAR PLANE SYSTOLIC EXCURSION: 2.95 CM
TV PEAK GRADIENT: 54 MMHG
TV REST PULMONARY ARTERY PRESSURE: 47 MMHG
Z-SCORE OF LEFT VENTRICULAR DIMENSION IN END DIASTOLE: -0.82
Z-SCORE OF LEFT VENTRICULAR DIMENSION IN END SYSTOLE: -0.48

## 2025-04-17 PROCEDURE — 99213 OFFICE O/P EST LOW 20 MIN: CPT | Mod: PBBFAC,25 | Performed by: THORACIC SURGERY (CARDIOTHORACIC VASCULAR SURGERY)

## 2025-04-17 PROCEDURE — 93306 TTE W/DOPPLER COMPLETE: CPT | Mod: 26,,, | Performed by: INTERNAL MEDICINE

## 2025-04-17 PROCEDURE — 99213 OFFICE O/P EST LOW 20 MIN: CPT | Mod: S$PBB,,, | Performed by: THORACIC SURGERY (CARDIOTHORACIC VASCULAR SURGERY)

## 2025-04-17 PROCEDURE — 99999 PR PBB SHADOW E&M-EST. PATIENT-LVL III: CPT | Mod: PBBFAC,,, | Performed by: THORACIC SURGERY (CARDIOTHORACIC VASCULAR SURGERY)

## 2025-04-17 PROCEDURE — 93306 TTE W/DOPPLER COMPLETE: CPT

## 2025-04-17 NOTE — PROGRESS NOTES
Subjective:      Patient ID: Meredith Ramos is a 75 y.o. female.    Chief Complaint: No chief complaint on file.      HPI:  Meredith Ramos is a 75 y.o. female who presents for follow up [facility] Medical conditions include atrial fibrillation?, s/p loop recorder 10/3/23, osteopenia, SVT, NOEMI.    Patient remains active and exercises regularly. Able to squat body weight. Denies any overt shortness of breath, dizziness, chest pain, lower extremity swelling. Does have fatigue and palpitations.      Echo showed mod-sev MR , PAP 47    Family and social history reviewed    Review of patient's allergies indicates:   Allergen Reactions    Cortisone (hydrocortisone) [hydrocortisone] Other (See Comments)     Triggers svt     Past Medical History:   Diagnosis Date    Diverticulitis     Kidney stone     Osteoporosis     ostenopenia      Past Surgical History:   Procedure Laterality Date    BREAST BIOPSY      x1    BUNIONECTOMY      Hammer toe repair    BUNIONECTOMY      COLECTOMY  2004    partial for diverticulitis    DIAGNOSTIC CARDIAC ELECTROPHYSIOLOGY STUDY N/A 10/02/2023    Procedure: EP - diagnostic;  Surgeon: Shin Ortiz MD;  Location: [facility] EP LAB;  Service: Cardiology;  Laterality: N/A;    HYSTERECTOMY  07/22/2013    complete laparoscopic hysterectomy at [facility] 7/22/13.  No cancer.  Found 3cm fibroid on right ovary.    INSERTION OF IMPLANTABLE LOOP RECORDER N/A 1/9/2024    Procedure: Insertion, Implantable Loop Recorder;  Surgeon: Shin Ortiz MD;  Location: [facility] EP LAB;  Service: Cardiology;  Laterality: N/A;  SVT/AT, ILR, BSci, Local, SK, 3 Prep    kidney stones      removal of breast implants       Family History       Problem Relation (Age of Onset)    Alcohol abuse Son    Breast cancer Sister (54)    Cancer Father, Sister    Colon cancer Maternal Grandfather    Dementia Mother    Hashimoto's thyroiditis Sister    Hypertension Father    No Known Problems Brother, Daughter    Uterine cancer Maternal Grandmother           Social History[1]    Current medications Reviewed  Medications Ordered Prior to Encounter[2]    Review of Systems   Constitutional:  Negative for activity change, appetite change, fatigue and fever.   HENT:  Negative for nosebleeds.    Respiratory:  Negative for cough and shortness of breath.    Cardiovascular:  Negative for chest pain, palpitations and leg swelling.   Gastrointestinal:  Negative for abdominal distention, abdominal pain and nausea.   Genitourinary:  Negative for frequency.   Musculoskeletal:  Negative for arthralgias and myalgias.   Skin:  Negative for rash.   Neurological:  Negative for dizziness and numbness.   Hematological:  Does not bruise/bleed easily.     Objective:   Physical Exam  Constitutional:       Appearance: Normal appearance.   HENT:      Head: Normocephalic and atraumatic.   Eyes:      Extraocular Movements: Extraocular movements intact.   Cardiovascular:      Rate and Rhythm: Normal rate and regular rhythm.      Heart sounds: Normal heart sounds.   Pulmonary:      Effort: Pulmonary effort is normal.      Breath sounds: Normal breath sounds.   Abdominal:      General: Abdomen is flat.      Palpations: Abdomen is soft.   Musculoskeletal:         General: Normal range of motion.      Cervical back: Normal range of motion.   Skin:     General: Skin is warm and dry.      Capillary Refill: Capillary refill takes less than 2 seconds.   Neurological:      General: No focal deficit present.      Mental Status: She is alert.         Diagnostic Results: reviewed   TTE 4/17/25    Left Ventricle: The left ventricle is normal in size. Normal wall thickness. Normal wall motion. There is normal systolic function with a visually estimated ejection fraction of 65 - 70%. Ejection fraction is approximately 68%. There is normal diastolic function.    Right Ventricle: The right ventricle is normal in size. Wall thickness is normal. Systolic function is normal.    Left Atrium: Moderately  dilated    Right Atrium: Right atrium is mildly dilated.    Mitral Valve: There is mild bileaflet sclerosis. There is moderate to moderate- severe (2-3+) regurgitation similar to 14 months ago.    Tricuspid Valve: There is mild regurgitation.    Pulmonary Artery: There is mild to moderate pulmonary hypertension. The estimated pulmonary artery systolic pressure is 47 mmHg and higher than 14 months ago.    IVC/SVC: Normal venous pressure at 3 mmHg.  LVIDD 4.1    TTE 2/27/24    Left Ventricle: The left ventricle is normal in size. Normal wall thickness. There is concentric remodeling. There is normal systolic function with a visually estimated ejection fraction of 60 - 65%. Ejection fraction by visual approximation is 63%. There is normal diastolic function.    Right Ventricle: Normal right ventricular cavity size. Wall thickness is normal. Right ventricle wall motion  is normal. Systolic function is normal.    Left Atrium: Left atrium is mildly dilated.    Aortic Valve: There is moderate aortic valve sclerosis.    Mitral Valve: There is mild bileaflet sclerosis. There is bileaflet prolapse. There is at least moderate and possibly moderate-severe ( 2-3+) regurgitation.    Tricuspid Valve: There is mild regurgitation.    Pulmonary Artery: The estimated pulmonary artery systolic pressure is 31 mmHg.    IVC/SVC: Intermediate venous pressure at 8 mmHg.  LV 3.88     TTE 2/8/23  Mild left atrial enlargement.  The left ventricle is normal in size with normal systolic function.  The estimated ejection fraction is 60%.  Indeterminate left ventricular diastolic function.  Normal right ventricular size with normal right ventricular systolic function.  There is bileaflet mitral prolapse.  At least moderate, eccentric, posteriorly directed mitral regurgitation.  Mild pulmonic regurgitation.  Intermediate central venous pressure (8 mmHg).  The estimated PA systolic pressure is 32 mmHg.  LV 4.29     Assessment:   MVP with MR   Atrial  Fibrillation/SVT   Plan:   CTS Attending Note:    I have personally taken the history and examined this patient and agree with the MALATHI's note as stated above.  75-year-old woman followed for at least moderate mitral regurgitation.  She is very active.  She is asymptomatic.  We discussed the change in the reported pulmonary pressures.  We will plan for a repeat echo in 1 year.  She knows to return sooner if she becomes symptomatic.  Active no sx 1 y echo         [1]   Social History  Socioeconomic History    Marital status:    Tobacco Use    Smoking status: Former     Current packs/day: 0.00     Types: Cigarettes     Quit date: 2003     Years since quittin.8    Smokeless tobacco: Never    Tobacco comments:     Started at 16, smoking on and off, picked up smoking at 52 for 1 year after quitting drinking.    Substance and Sexual Activity    Alcohol use: No    Drug use: No    Sexual activity: Not Currently     Partners: Male     Social Drivers of Health     Financial Resource Strain: Low Risk  (3/17/2025)    Overall Financial Resource Strain (CARDIA)     Difficulty of Paying Living Expenses: Not hard at all   Food Insecurity: No Food Insecurity (3/17/2025)    Hunger Vital Sign     Worried About Running Out of Food in the Last Year: Never true     Ran Out of Food in the Last Year: Never true   Transportation Needs: No Transportation Needs (3/17/2025)    PRAPARE - Transportation     Lack of Transportation (Medical): No     Lack of Transportation (Non-Medical): No   Physical Activity: Sufficiently Active (3/17/2025)    Exercise Vital Sign     Days of Exercise per Week: 7 days     Minutes of Exercise per Session: 30 min   Stress: Stress Concern Present (3/17/2025)    [employer] [employer] - Occupational Stress Questionnaire     Feeling of Stress : To some extent   Housing Stability: Low Risk  (3/17/2025)    Housing Stability Vital Sign     Unable to Pay for Housing in the Last Year: No      Number of Times Moved in the Last Year: 0     Homeless in the Last Year: No   [2]   Current Outpatient Medications on File Prior to Visit   Medication Sig Dispense Refill    b complex vitamins capsule Take 1 capsule by mouth once daily.      calcium-vitamin D3 (OS- + D3) 500 mg(1,250mg) -200 unit per tablet Calcium 500 + D      cholecalciferol, vitamin D3, (VITAMIN D3) 25 mcg (1,000 unit) capsule       cyanocobalamin, vitamin B-12, (VITAMIN B-12) 50 mcg tablet Take 50 mcg by mouth once daily.      LACTOFERRIN ORAL Take 350 mg by mouth.      magnesium glycinate (MAG GLYCINATE ORAL) Take by mouth.      metoprolol succinate (TOPROL-XL) 25 MG 24 hr tablet Take half a tablet daily. 5 tablet 0    progesterone (PROMETRIUM) 100 MG capsule Take 100 mg by mouth once daily.      rivaroxaban (XARELTO) 20 mg Tab Take 1 tablet (20 mg total) by mouth before evening meal. 90 tablet 3    verapamiL (VERELAN) 120 MG C24P Take 1 capsule (120 mg total) by mouth once daily. 30 capsule 11    vitamin K2 100 mcg Cap Take 1 capsule by mouth once daily.      ZINC PICOLINATE ORAL        Current Facility-Administered Medications on File Prior to Visit   Medication Dose Route Frequency Provider Last Rate Last Admin    ceFAZolin 2 g in dextrose 5 % in water (D5W) 50 mL IVPB (MB+)  2 g Intravenous On Call Procedure Brandie Adrian, REFUGIO

## 2025-04-18 ENCOUNTER — PATIENT MESSAGE (OUTPATIENT)
Dept: INTERNAL MEDICINE | Facility: CLINIC | Age: 75
End: 2025-04-18
Payer: MEDICARE

## 2025-04-25 ENCOUNTER — CLINICAL SUPPORT (OUTPATIENT)
Dept: CARDIOLOGY | Facility: HOSPITAL | Age: 75
End: 2025-04-25
Payer: MEDICARE

## 2025-04-25 ENCOUNTER — CLINICAL SUPPORT (OUTPATIENT)
Dept: CARDIOLOGY | Facility: HOSPITAL | Age: 75
End: 2025-04-25
Attending: INTERNAL MEDICINE
Payer: MEDICARE

## 2025-04-25 DIAGNOSIS — R00.2 PALPITATIONS: ICD-10-CM

## 2025-04-25 DIAGNOSIS — I47.10 SUPRAVENTRICULAR TACHYCARDIA, UNSPECIFIED: ICD-10-CM

## 2025-04-25 DIAGNOSIS — I49.9 CARDIAC ARRHYTHMIA, UNSPECIFIED: ICD-10-CM

## 2025-04-25 PROCEDURE — 93298 REM INTERROG DEV EVAL SCRMS: CPT | Performed by: INTERNAL MEDICINE

## 2025-04-28 DIAGNOSIS — Z00.00 ENCOUNTER FOR MEDICARE ANNUAL WELLNESS EXAM: ICD-10-CM

## 2025-05-06 PROBLEM — I27.20 PULMONARY HYPERTENSION: Status: ACTIVE | Noted: 2025-05-06

## 2025-05-07 ENCOUNTER — PATIENT MESSAGE (OUTPATIENT)
Dept: CARDIOLOGY | Facility: CLINIC | Age: 75
End: 2025-05-07
Payer: MEDICARE

## 2025-05-16 ENCOUNTER — OFFICE VISIT (OUTPATIENT)
Dept: CARDIOLOGY | Facility: CLINIC | Age: 75
End: 2025-05-16
Payer: MEDICARE

## 2025-05-16 ENCOUNTER — PATIENT MESSAGE (OUTPATIENT)
Dept: CARDIOLOGY | Facility: CLINIC | Age: 75
End: 2025-05-16

## 2025-05-16 VITALS
OXYGEN SATURATION: 97 % | HEIGHT: 65 IN | WEIGHT: 106.94 LBS | SYSTOLIC BLOOD PRESSURE: 96 MMHG | HEART RATE: 64 BPM | BODY MASS INDEX: 17.82 KG/M2 | DIASTOLIC BLOOD PRESSURE: 62 MMHG

## 2025-05-16 DIAGNOSIS — I70.0 AORTIC ATHEROSCLEROSIS: ICD-10-CM

## 2025-05-16 DIAGNOSIS — I27.20 PULMONARY HYPERTENSION: ICD-10-CM

## 2025-05-16 DIAGNOSIS — I47.10 SUPRAVENTRICULAR TACHYCARDIA: ICD-10-CM

## 2025-05-16 DIAGNOSIS — G47.9 SLEEP DISORDER: ICD-10-CM

## 2025-05-16 DIAGNOSIS — I48.0 PAF (PAROXYSMAL ATRIAL FIBRILLATION): ICD-10-CM

## 2025-05-16 DIAGNOSIS — Z79.01 ON ANTICOAGULANT THERAPY: ICD-10-CM

## 2025-05-16 DIAGNOSIS — I34.0 NONRHEUMATIC MITRAL VALVE REGURGITATION: Primary | ICD-10-CM

## 2025-05-16 DIAGNOSIS — I34.1 MVP (MITRAL VALVE PROLAPSE): ICD-10-CM

## 2025-05-16 PROCEDURE — 99999 PR PBB SHADOW E&M-EST. PATIENT-LVL IV: CPT | Mod: PBBFAC,,,

## 2025-05-16 PROCEDURE — 99214 OFFICE O/P EST MOD 30 MIN: CPT | Mod: PBBFAC

## 2025-05-16 RX ORDER — CALCIUM CITRATE/VITAMIN D3 200-3.125
TABLET ORAL
COMMUNITY

## 2025-05-16 RX ORDER — ASCORBIC ACID 125 MG
TABLET,CHEWABLE ORAL
COMMUNITY

## 2025-05-16 RX ORDER — LEUCINE 0.1G-15/4G
POWDER IN PACKET (EA) ORAL
COMMUNITY
Start: 2024-08-01

## 2025-05-16 NOTE — PROGRESS NOTES
General Cardiology Clinic Note  Reason for Visit: Follow up   Last Clinic Visit: 24   General Cardiologist: Dr. Ferguson     HPI:     Meredith Ramos is a 75 y.o. , who presents for follow up     PROBLEM LIST:  Atrial fibrillation   Moderate-severe MR   MVP  SVT   NOEMI     Interval HPI:   She presents today for routine clinic follow up.     CPAP??    ROS:    Pertinent ROS included in HPI and below.  PMH:     Past Medical History:   Diagnosis Date    Diverticulitis     Kidney stone     Osteoporosis     ostenopenia      Past Surgical History:   Procedure Laterality Date    BREAST BIOPSY      x1    BUNIONECTOMY      Hammer toe repair    BUNIONECTOMY      COLECTOMY  2004    partial for diverticulitis    DIAGNOSTIC CARDIAC ELECTROPHYSIOLOGY STUDY N/A 10/02/2023    Procedure: EP - diagnostic;  Surgeon: Shin Ortiz MD;  Location: [facility] EP LAB;  Service: Cardiology;  Laterality: N/A;    HYSTERECTOMY  2013    complete laparoscopic hysterectomy at [facility].  No cancer.  Found 3cm fibroid on right ovary.    INSERTION OF IMPLANTABLE LOOP RECORDER N/A 2024    Procedure: Insertion, Implantable Loop Recorder;  Surgeon: Shin Ortiz MD;  Location: [facility] EP LAB;  Service: Cardiology;  Laterality: N/A;  SVT/AT, ILR, BSci, Local, SK, 3 Prep    kidney stones      removal of breast implants       Allergies:     Review of patient's allergies indicates:   Allergen Reactions    Cortisone (hydrocortisone) [hydrocortisone] Other (See Comments)     Triggers svt     Medications:   Medications Ordered Prior to Encounter[1]  Social History:     Social History     Tobacco Use    Smoking status: Former     Current packs/day: 0.00     Types: Cigarettes     Quit date: 2003     Years since quittin.8    Smokeless tobacco: Never    Tobacco comments:     Started at 16, smoking on and off, picked up smoking at 52 for 1 year after quitting drinking.    Substance Use Topics    Alcohol use: No     Family History:  "    Family History   Problem Relation Name Age of Onset    Dementia Mother      Hypertension Father      Cancer Father          bladder cancer    Cancer Sister 4         breast x2 sis    Breast cancer Sister 4 54    Hashimoto's thyroiditis Sister 4         x2 sis    No Known Problems Brother 1     No Known Problems Daughter 1     Alcohol abuse Son 1     Uterine cancer Maternal Grandmother      Colon cancer Maternal Grandfather      Ovarian cancer Neg Hx       Physical Exam:   BP 96/62   Pulse 64   Ht 5' 5" (1.651 m)   Wt 48.5 kg (106 lb 14.8 oz)   SpO2 97%   BMI 17.79 kg/m²      Physical Exam     Labs:     Blood Tests:  Lab Results   Component Value Date    BNP 24 02/07/2023     02/05/2025    K 4.1 02/05/2025     02/05/2025    CO2 27 02/05/2025    BUN 19 02/05/2025    CREATININE 0.8 02/05/2025    GLU 78 02/05/2025    HGBA1C 4.9 02/05/2025    MG 2.1 02/05/2025    AST 77 (H) 02/05/2025     (H) 02/05/2025    ALBUMIN 3.9 02/05/2025    PROT 6.6 02/05/2025    BILITOT 0.8 02/05/2025    WBC 5.61 08/30/2024    HGB 14.0 08/30/2024    HCT 42.8 08/30/2024    MCV 98 08/30/2024     08/30/2024    INR 1.0 09/25/2023    TSH 4.346 (H) 02/05/2025       Lab Results   Component Value Date    CHOL 185 02/05/2025    HDL 86 (H) 02/05/2025    TRIG 34 02/05/2025       Lab Results   Component Value Date    LDLCALC 92.2 02/05/2025       Lab Results   Component Value Date    TSH 4.346 (H) 02/05/2025       Lab Results   Component Value Date    HGBA1C 4.9 02/05/2025     Imaging:     Echocardiogram  TTE 04/17/25    Left Ventricle: The left ventricle is normal in size. Normal wall thickness. Normal wall motion. There is normal systolic function with a visually estimated ejection fraction of 65 - 70%. Ejection fraction is approximately 68%. There is normal diastolic function.    Right Ventricle: The right ventricle is normal in size. Wall thickness is normal. Systolic function is normal.    Left Atrium: Moderately " dilated    Right Atrium: Right atrium is mildly dilated.    Mitral Valve: There is mild bileaflet sclerosis. There is moderate to moderate- severe (2-3+) regurgitation similar to 14 months ago.    Tricuspid Valve: There is mild regurgitation.    Pulmonary Artery: There is mild to moderate pulmonary hypertension. The estimated pulmonary artery systolic pressure is 47 mmHg and higher than 14 months ago.    IVC/SVC: Normal venous pressure at 3 mmHg.    TTE 02/27/24    Left Ventricle: The left ventricle is normal in size. Normal wall thickness. There is concentric remodeling. There is normal systolic function with a visually estimated ejection fraction of 60 - 65%. Ejection fraction by visual approximation is 63%. There is normal diastolic function.    Right Ventricle: Normal right ventricular cavity size. Wall thickness is normal. Right ventricle wall motion  is normal. Systolic function is normal.    Left Atrium: Left atrium is mildly dilated.    Aortic Valve: There is moderate aortic valve sclerosis.    Mitral Valve: There is mild bileaflet sclerosis. There is bileaflet prolapse. There is at least moderate and possibly moderate-severe ( 2-3+) regurgitation.    Tricuspid Valve: There is mild regurgitation.    Pulmonary Artery: The estimated pulmonary artery systolic pressure is 31 mmHg.    IVC/SVC: Intermediate venous pressure at 8 mmHg.    TTE 02/08/23  Mild left atrial enlargement.  The left ventricle is normal in size with normal systolic function.  The estimated ejection fraction is 60%.  Indeterminate left ventricular diastolic function.  Normal right ventricular size with normal right ventricular systolic function.  There is bileaflet mitral prolapse.  At least moderate, eccentric, posteriorly directed mitral regurgitation.  Mild pulmonic regurgitation.  Intermediate central venous pressure (8 mmHg).  The estimated PA systolic pressure is 32 mmHg.    Stress testing  PET stress 03/20/25    The myocardial  perfusion images show no evidence of ischemia or scar.    There are no clinically significant perfusion abnormalities. There is a resting heterogeneity.    The whole heart absolute myocardial perfusion values were normal at rest, low normal during stress and CFR is normal.    CT attenuation images demonstrate no coronary calcifications and no aortic calcifications.    The gated perfusion images showed an ejection fraction of 70% at rest and 77% during stress. A normal ejection fraction is greater than 47%.    There is normal wall motion at rest and normal wall motion during stress.    The study's ECG is suspicious for ischemia.    There is a prior study for comparison. When compared to a prior study from 3/9/2017, there are no significant changes.    PET stress 03/09/17  EKG Conclusions:     1. The EKG portion of this study is negative for ischemia at a peak heart rate of 88 bpm (59% of predicted).   2. Blood pressure remained stable throughout the protocol  (Presenting BP: 132/84 Peak BP: 111/72).   3. No significant arrhythmias were present.   4. There were no symptoms of chest discomfort or significant dyspnea throughout the protocol.     CONCLUSIONS: NORMAL MYOCARDIAL PERFUSION PET STRESS TEST   1. The perfusion scan is free of evidence for myocardial ischemia or injury.   2. Resting wall motion is physiologic. Stress wall motion is physiologic.   3. Visually estimated LV function is normal at rest and normal at stress.   4. The ventricular volumes are normal at rest and stress.   5. The extracardiac distribution of radioactivity is normal.   6. There was no previous study available to compare.       VINEET 03/01/17  EKG Conclusions:   1. The EKG portion of this study is abnormal but non diagnostic for ischemia at a high workload, and peak heart rate of 162 bpm (109% of predicted).   2. Exercise capacity is above average.   3. Blood pressure response to exercise was normal (Presenting BP: 116/68 Peak BP: 183/93).    4. The following arrhythmias were present: occasional PVCs.   5. There were no symptoms of chest discomfort or significant dyspnea throughout the protocol.   6. The Duke treadmill score was 5 suggesting a low probability for future cardiovascular events.     CONCLUSIONS     1 - Normal left ventricular systolic function (EF 60-65%).     2 - Normal left ventricular diastolic function.     3 - Normal right ventricular systolic function .     4 - The estimated PA systolic pressure is 26 mmHg.     Positive stress echocardiographic study demonstrating an ischemic response involving the mid anteroseptum, basal anteroseptum, apical anterior wall.     Cath Lab  None    Other  24 hr Holter 03/06/17  PREDOMINANT RHYTHM   1. Sinus rhythm with heart rates varying between 44 and 103 bpm with an average of 64 bpm.     VENTRICULAR ARRHYTHMIAS   1. There were occasional PVCs totalling 303 and averaging 12 per hour.  There was 1 couplet.     2. There were no episodes of ventricular tachycardia.     SUPRA VENTRICULAR ARRHYTHMIAS   1. There were rare PACs totalling 119 and averaging 4 per hour.  There were 2 couplets.     2. There were 20 non-sustained runs of AT (atrial tachycardia) lasting from 3 to 8 beats.     SINUS NODE FUNCTION   1. The circadian pattern of sinus rate variability followed a typical curve with HRs averaging 68 bpm during waking hours and 55 bpm during sleep (10:30 PM - 6:05 AM).     2. There was no evidence of high grade SA carmita block.     AV CONDUCTION   1. There was no evidence of high grade AV block.     EKG:   ***    Assessment:     1. Nonrheumatic mitral valve regurgitation    2. MVP (mitral valve prolapse)    3. Aortic atherosclerosis    4. PAF (paroxysmal atrial fibrillation)    5. Supraventricular tachycardia    6. On anticoagulant therapy    7. Pulmonary hypertension    8. Sleep disorder      Plan:     Nonrheumatic mitral valve regurgitation    MVP (mitral valve prolapse)    Aortic  atherosclerosis    PAF (paroxysmal atrial fibrillation)    Supraventricular tachycardia    On anticoagulant therapy    Pulmonary hypertension    Sleep disorder        Signed:  Sharon Lagos, PA-C [facility]     5/16/2025 2:29 PM    Follow-up:     No future appointments.             [1]   Current Outpatient Medications on File Prior to Visit   Medication Sig Dispense Refill    b complex vitamins capsule Take 1 capsule by mouth once daily.      calcium citrate-vitamin D3 200 mg-3.125 mcg (125 unit) Tab tablet      calcium-vitamin D3 (OS- + D3) 500 mg(1,250mg) -200 unit per tablet Calcium 500 + D      cholecalciferol, vitamin D3, (VITAMIN D3) 25 mcg (1,000 unit) capsule       creatine monohydrate 5,000 mg PwPk       cyanocobalamin, vitamin B-12, (VITAMIN B-12) 50 mcg tablet Take 50 mcg by mouth once daily.      cyanocobalamin-cobamamide 5,000-100 mcg Lozg lozenge      inulin (PREBIOTIC FIBER ORAL) Take 1 Capful by mouth. 1 tablespoon      LACTOFERRIN ORAL Take 350 mg by mouth.      magnesium citrate 125 mg Cap capsule      magnesium glycinate (MAG GLYCINATE ORAL) Take by mouth.      progesterone (PROMETRIUM) 100 MG capsule Take 100 mg by mouth once daily.      psyllium (HYDROCIL) packet Take 1 packet by mouth once daily.      rivaroxaban (XARELTO) 20 mg Tab Take 1 tablet (20 mg total) by mouth before evening meal. 90 tablet 3    verapamiL (VERELAN) 120 MG C24P Take 1 capsule (120 mg total) by mouth once daily. 30 capsule 11    vitamin D3-vitamin K2 25 mcg (1,000 unit)-90 mcg TbDL 0 tablet      vitamin K2 100 mcg Cap Take 1 capsule by mouth once daily.      ZINC PICOLINATE ORAL       [DISCONTINUED] metoprolol succinate (TOPROL-XL) 25 MG 24 hr tablet Take half a tablet daily. 5 tablet 0     Current Facility-Administered Medications on File Prior to Visit   Medication Dose Route Frequency Provider Last Rate Last Admin    ceFAZolin 2 g in dextrose 5 % in water (D5W) 50 mL IVPB (MB+)  2 g Intravenous On Call  Procedure Brandie Adrian, NP

## 2025-05-16 NOTE — PROGRESS NOTES
General Cardiology Clinic Note  Reason for Visit: Follow up   Last Clinic Visit: 02/20/24   General Cardiologist: Dr. Ferguson    HPI:     Meredith Ramos is a 75 y.o. female who presents for follow up    PROBLEM LIST:  Atrial fibrillation   Moderate-severe MR   MVP  SVT     Interval HPI:  Meredith presents today for routine clinic follow up. She reports episodes of tachycardia during intense workouts, with heart rate sometimes exceeding 150 BPM. She is followed by EP with Dr. Ortiz. Patient had recent episode consistent with probable aberrancy leading into and SVT on 3/26. Currently on Verapamil  120 mg. She denies chest pain or dyspnea during workouts. She reports A1C and fasting insulin levels increased while on beta blocker therapy, with fasting insulin doubling during this period. Patient reports no longer taking metoprolol with A1C improved from 5.5 to 4.9 within 2 months. She also reports gradual increase in liver enzymes with liver enzymes improving after discontinuing Xarelto under Dr. Ortiz's supervision. She follows a high protein diet, consuming 100-130 g of lean protein daily, including some tofu. She emphasizes non-starchy vegetables and greens in her diet. She has historically avoided starchy carbohydrates but recently began experimenting with resistant starches like cooled sweet potatoes and black beans after using a continuous glucose monitor. She prepares all her meals at home using primarily olive oil for cooking. She maintains a rigorous weight training routine and can squat 135 lbs at her current weight of 107 lbs.       ROS:    Pertinent ROS included in HPI and below.  PMH:     Past Medical History:   Diagnosis Date    Diverticulitis     Kidney stone     Osteoporosis     ostenopenia      Past Surgical History:   Procedure Laterality Date    BREAST BIOPSY      x1    BUNIONECTOMY      Hammer toe repair    BUNIONECTOMY      COLECTOMY  2004    partial for diverticulitis    DIAGNOSTIC CARDIAC  "ELECTROPHYSIOLOGY STUDY N/A 10/02/2023    Procedure: EP - diagnostic;  Surgeon: Shin Ortiz MD;  Location: [facility] EP LAB;  Service: Cardiology;  Laterality: N/A;    HYSTERECTOMY  2013    complete laparoscopic hysterectomy at [facility].  No cancer.  Found 3cm fibroid on right ovary.    INSERTION OF IMPLANTABLE LOOP RECORDER N/A 2024    Procedure: Insertion, Implantable Loop Recorder;  Surgeon: Shin Ortiz MD;  Location: [facility] EP LAB;  Service: Cardiology;  Laterality: N/A;  SVT/AT, ILR, BSci, Local, SK, 3 Prep    kidney stones      removal of breast implants       Allergies:     Review of patient's allergies indicates:   Allergen Reactions    Cortisone (hydrocortisone) [hydrocortisone] Other (See Comments)     Triggers svt     Medications:   Medications Ordered Prior to Encounter[1]  Social History:     Social History     Tobacco Use    Smoking status: Former     Current packs/day: 0.00     Types: Cigarettes     Quit date: 2003     Years since quittin.8    Smokeless tobacco: Never    Tobacco comments:     Started at 16, smoking on and off, picked up smoking at 52 for 1 year after quitting drinking.    Substance Use Topics    Alcohol use: No     Family History:     Family History   Problem Relation Name Age of Onset    Dementia Mother      Hypertension Father      Cancer Father          bladder cancer    Cancer Sister 4         breast x2 sis    Breast cancer Sister 4 54    Hashimoto's thyroiditis Sister 4         x2 sis    No Known Problems Brother 1     No Known Problems Daughter 1     Alcohol abuse Son 1     Uterine cancer Maternal Grandmother      Colon cancer Maternal Grandfather      Ovarian cancer Neg Hx       Physical Exam:   BP 96/62   Pulse 64   Ht 5' 5" (1.651 m)   Wt 48.5 kg (106 lb 14.8 oz)   SpO2 97%   BMI 17.79 kg/m²      Physical Exam  Constitutional:       General: She is not in acute distress.     Appearance: Normal appearance. She is normal weight. She is not " ill-appearing or toxic-appearing.   HENT:      Head: Normocephalic and atraumatic.      Nose: Nose normal. No congestion or rhinorrhea.      Mouth/Throat:      Mouth: Mucous membranes are moist.      Pharynx: Oropharynx is clear. No oropharyngeal exudate or posterior oropharyngeal erythema.   Eyes:      General:         Right eye: No discharge.         Left eye: No discharge.      Extraocular Movements: Extraocular movements intact.      Conjunctiva/sclera: Conjunctivae normal.   Cardiovascular:      Rate and Rhythm: Normal rate and regular rhythm.      Pulses: Normal pulses.           Carotid pulses are 2+ on the right side and 2+ on the left side.       Radial pulses are 2+ on the right side and 2+ on the left side.        Dorsalis pedis pulses are 2+ on the right side and 2+ on the left side.        Posterior tibial pulses are 2+ on the right side and 2+ on the left side.      Heart sounds: Murmur heard.      High-pitched blowing holosystolic murmur is present with a grade of 3/6 at the apex.      Diastolic murmur is present.      No friction rub. No gallop.   Pulmonary:      Effort: Pulmonary effort is normal. No respiratory distress.      Breath sounds: Normal breath sounds. No wheezing or rales.   Musculoskeletal:         General: No swelling. Normal range of motion.      Cervical back: Normal range of motion. No rigidity or tenderness.      Right lower leg: No edema.      Left lower leg: No edema.   Skin:     General: Skin is warm and dry.      Coloration: Skin is not jaundiced.      Findings: No bruising or lesion.   Neurological:      General: No focal deficit present.      Mental Status: She is alert and oriented to person, place, and time. Mental status is at baseline.      Cranial Nerves: No cranial nerve deficit.      Motor: No weakness.      Gait: Gait normal.   Psychiatric:         Mood and Affect: Mood normal.         Behavior: Behavior normal.         Thought Content: Thought content normal.          Judgment: Judgment normal.        Labs:     Blood Tests:  Lab Results   Component Value Date    BNP 24 02/07/2023     02/05/2025    K 4.1 02/05/2025     02/05/2025    CO2 27 02/05/2025    BUN 19 02/05/2025    CREATININE 0.8 02/05/2025    GLU 78 02/05/2025    HGBA1C 4.9 02/05/2025    MG 2.1 02/05/2025    AST 77 (H) 02/05/2025     (H) 02/05/2025    ALBUMIN 3.9 02/05/2025    PROT 6.6 02/05/2025    BILITOT 0.8 02/05/2025    WBC 5.61 08/30/2024    HGB 14.0 08/30/2024    HCT 42.8 08/30/2024    MCV 98 08/30/2024     08/30/2024    INR 1.0 09/25/2023    TSH 4.346 (H) 02/05/2025       Lab Results   Component Value Date    CHOL 185 02/05/2025    HDL 86 (H) 02/05/2025    TRIG 34 02/05/2025       Lab Results   Component Value Date    LDLCALC 92.2 02/05/2025       Lab Results   Component Value Date    TSH 4.346 (H) 02/05/2025       Lab Results   Component Value Date    HGBA1C 4.9 02/05/2025     Imaging:     Echocardiogram  TTE 04/17/25    Left Ventricle: The left ventricle is normal in size. Normal wall thickness. Normal wall motion. There is normal systolic function with a visually estimated ejection fraction of 65 - 70%. Ejection fraction is approximately 68%. There is normal diastolic function.    Right Ventricle: The right ventricle is normal in size. Wall thickness is normal. Systolic function is normal.    Left Atrium: Moderately dilated    Right Atrium: Right atrium is mildly dilated.    Mitral Valve: There is mild bileaflet sclerosis. There is moderate to moderate- severe (2-3+) regurgitation similar to 14 months ago.    Tricuspid Valve: There is mild regurgitation.    Pulmonary Artery: There is mild to moderate pulmonary hypertension. The estimated pulmonary artery systolic pressure is 47 mmHg and higher than 14 months ago.    IVC/SVC: Normal venous pressure at 3 mmHg.     TTE 02/27/24    Left Ventricle: The left ventricle is normal in size. Normal wall thickness. There is concentric remodeling.  There is normal systolic function with a visually estimated ejection fraction of 60 - 65%. Ejection fraction by visual approximation is 63%. There is normal diastolic function.    Right Ventricle: Normal right ventricular cavity size. Wall thickness is normal. Right ventricle wall motion  is normal. Systolic function is normal.    Left Atrium: Left atrium is mildly dilated.    Aortic Valve: There is moderate aortic valve sclerosis.    Mitral Valve: There is mild bileaflet sclerosis. There is bileaflet prolapse. There is at least moderate and possibly moderate-severe ( 2-3+) regurgitation.    Tricuspid Valve: There is mild regurgitation.    Pulmonary Artery: The estimated pulmonary artery systolic pressure is 31 mmHg.    IVC/SVC: Intermediate venous pressure at 8 mmHg.     TTE 02/08/23  Mild left atrial enlargement.  The left ventricle is normal in size with normal systolic function.  The estimated ejection fraction is 60%.  Indeterminate left ventricular diastolic function.  Normal right ventricular size with normal right ventricular systolic function.  There is bileaflet mitral prolapse.  At least moderate, eccentric, posteriorly directed mitral regurgitation.  Mild pulmonic regurgitation.  Intermediate central venous pressure (8 mmHg).  The estimated PA systolic pressure is 32 mmHg.     Stress testing  PET stress 03/20/25    The myocardial perfusion images show no evidence of ischemia or scar.    There are no clinically significant perfusion abnormalities. There is a resting heterogeneity.    The whole heart absolute myocardial perfusion values were normal at rest, low normal during stress and CFR is normal.    CT attenuation images demonstrate no coronary calcifications and no aortic calcifications.    The gated perfusion images showed an ejection fraction of 70% at rest and 77% during stress. A normal ejection fraction is greater than 47%.    There is normal wall motion at rest and normal wall motion during  stress.    The study's ECG is suspicious for ischemia.    There is a prior study for comparison. When compared to a prior study from 3/9/2017, there are no significant changes.     PET stress 03/09/17  EKG Conclusions:     1. The EKG portion of this study is negative for ischemia at a peak heart rate of 88 bpm (59% of predicted).   2. Blood pressure remained stable throughout the protocol  (Presenting BP: 132/84 Peak BP: 111/72).   3. No significant arrhythmias were present.   4. There were no symptoms of chest discomfort or significant dyspnea throughout the protocol.      CONCLUSIONS: NORMAL MYOCARDIAL PERFUSION PET STRESS TEST   1. The perfusion scan is free of evidence for myocardial ischemia or injury.   2. Resting wall motion is physiologic. Stress wall motion is physiologic.   3. Visually estimated LV function is normal at rest and normal at stress.   4. The ventricular volumes are normal at rest and stress.   5. The extracardiac distribution of radioactivity is normal.   6. There was no previous study available to compare.         VINEET 03/01/17  EKG Conclusions:   1. The EKG portion of this study is abnormal but non diagnostic for ischemia at a high workload, and peak heart rate of 162 bpm (109% of predicted).   2. Exercise capacity is above average.   3. Blood pressure response to exercise was normal (Presenting BP: 116/68 Peak BP: 183/93).   4. The following arrhythmias were present: occasional PVCs.   5. There were no symptoms of chest discomfort or significant dyspnea throughout the protocol.   6. The Duke treadmill score was 5 suggesting a low probability for future cardiovascular events.      CONCLUSIONS     1 - Normal left ventricular systolic function (EF 60-65%).     2 - Normal left ventricular diastolic function.     3 - Normal right ventricular systolic function .     4 - The estimated PA systolic pressure is 26 mmHg.     Positive stress echocardiographic study demonstrating an ischemic response  involving the mid anteroseptum, basal anteroseptum, apical anterior wall.      Cath Lab  None     Other  24 hr Holter 17  PREDOMINANT RHYTHM   1. Sinus rhythm with heart rates varying between 44 and 103 bpm with an average of 64 bpm.     VENTRICULAR ARRHYTHMIAS   1. There were occasional PVCs totalling 303 and averaging 12 per hour.  There was 1 couplet.     2. There were no episodes of ventricular tachycardia.     SUPRA VENTRICULAR ARRHYTHMIAS   1. There were rare PACs totalling 119 and averaging 4 per hour.  There were 2 couplets.     2. There were 20 non-sustained runs of AT (atrial tachycardia) lasting from 3 to 8 beats.     SINUS NODE FUNCTION   1. The circadian pattern of sinus rate variability followed a typical curve with HRs averaging 68 bpm during waking hours and 55 bpm during sleep (10:30 PM - 6:05 AM).     2. There was no evidence of high grade SA carmita block.     AV CONDUCTION   1. There was no evidence of high grade AV block.      EK25: Sinus bradycardia at 53 bpm (personally reviewed)     Assessment and Plan     Assessment & Plan    I27.20 Pulmonary hypertension  I48.0 PAF (paroxysmal atrial fibrillation)  I34.1 MVP (mitral valve prolapse)  I70.0 Aortic atherosclerosis  I34.0 Nonrheumatic mitral valve regurgitation  I47.10 Supraventricular tachycardia  Z79.01 On anticoagulant therapy  G47.9 Sleep disorder    PULMONARY HYPERTENSION:  - Slight increase in PASP on most recent TTE.  - Monitor for any changes in exercise tolerance or onset of shortness of breath.  - Contact the office immediately if experiencing shortness of breath, fatigue, or inability to complete usual workouts.    NONRHEUMATIC MITRAL VALVE REGURGITATION:  MVP (MITRAL VALVE PROLAPSE):  - TTE  moderate-severe MR   - Followed by Dr. Rubio     SUPRAVENTRICULAR TACHYCARDIA:  - Followed by EP with Dr. Ortiz   - Continue Verapamil 120 mg qd     ON ANTICOAGULANT THERAPY:  - Discontinued Xarelto.    LIFESTYLE  CHANGES:  - Continue current exercise regimen, including weight lifting and cardiovascular activities.  - Maintain current diet high in lean protein and non-starchy vegetables.  - Follow up in 15 months.           Signed:  Sharon Lagos, PA-C [facility]     5/16/2025 1:41 PM    Follow-up:   No future appointments.    This note was generated with the assistance of ambient listening technology. Verbal consent was obtained by the patient and accompanying visitor(s) for the recording of patient appointment to facilitate this note. I attest to having reviewed and edited the generated note for accuracy, though some syntax or spelling errors may persist. Please contact the author of this note for any clarification.             [1]   Current Outpatient Medications on File Prior to Visit   Medication Sig Dispense Refill    b complex vitamins capsule Take 1 capsule by mouth once daily.      calcium citrate-vitamin D3 200 mg-3.125 mcg (125 unit) Tab tablet      calcium-vitamin D3 (OS- + D3) 500 mg(1,250mg) -200 unit per tablet Calcium 500 + D      cholecalciferol, vitamin D3, (VITAMIN D3) 25 mcg (1,000 unit) capsule       creatine monohydrate 5,000 mg PwPk       cyanocobalamin, vitamin B-12, (VITAMIN B-12) 50 mcg tablet Take 50 mcg by mouth once daily.      cyanocobalamin-cobamamide 5,000-100 mcg Lozg lozenge      inulin (PREBIOTIC FIBER ORAL) Take 1 Capful by mouth. 1 tablespoon      LACTOFERRIN ORAL Take 350 mg by mouth.      magnesium citrate 125 mg Cap capsule      magnesium glycinate (MAG GLYCINATE ORAL) Take by mouth.      progesterone (PROMETRIUM) 100 MG capsule Take 100 mg by mouth once daily.      psyllium (HYDROCIL) packet Take 1 packet by mouth once daily.      rivaroxaban (XARELTO) 20 mg Tab Take 1 tablet (20 mg total) by mouth before evening meal. 90 tablet 3    verapamiL (VERELAN) 120 MG C24P Take 1 capsule (120 mg total) by mouth once daily. 30 capsule 11    vitamin D3-vitamin K2 25 mcg (1,000  unit)-90 mcg TbDL 0 tablet      vitamin K2 100 mcg Cap Take 1 capsule by mouth once daily.      ZINC PICOLINATE ORAL       [DISCONTINUED] metoprolol succinate (TOPROL-XL) 25 MG 24 hr tablet Take half a tablet daily. 5 tablet 0     Current Facility-Administered Medications on File Prior to Visit   Medication Dose Route Frequency Provider Last Rate Last Admin    ceFAZolin 2 g in dextrose 5 % in water (D5W) 50 mL IVPB (MB+)  2 g Intravenous On Call Procedure Brandie Adrian, NP

## 2025-05-19 LAB
OHS CV AF BURDEN PERCENT: < 1
OHS CV DC REMOTE DEVICE TYPE: NORMAL
OHS CV ICD SHOCK: NO

## 2025-05-26 ENCOUNTER — CLINICAL SUPPORT (OUTPATIENT)
Dept: CARDIOLOGY | Facility: HOSPITAL | Age: 75
End: 2025-05-26
Payer: MEDICARE

## 2025-05-26 ENCOUNTER — CLINICAL SUPPORT (OUTPATIENT)
Dept: CARDIOLOGY | Facility: HOSPITAL | Age: 75
End: 2025-05-26
Attending: INTERNAL MEDICINE
Payer: MEDICARE

## 2025-05-26 DIAGNOSIS — I47.10 SUPRAVENTRICULAR TACHYCARDIA, UNSPECIFIED: ICD-10-CM

## 2025-05-26 DIAGNOSIS — I49.9 CARDIAC ARRHYTHMIA, UNSPECIFIED: ICD-10-CM

## 2025-05-26 DIAGNOSIS — R00.2 PALPITATIONS: ICD-10-CM

## 2025-05-26 PROCEDURE — 93298 REM INTERROG DEV EVAL SCRMS: CPT | Mod: 26,,, | Performed by: INTERNAL MEDICINE

## 2025-05-26 PROCEDURE — 93298 REM INTERROG DEV EVAL SCRMS: CPT | Performed by: INTERNAL MEDICINE

## 2025-06-16 LAB
OHS CV AF BURDEN PERCENT: < 1
OHS CV DC REMOTE DEVICE TYPE: NORMAL

## 2025-06-27 ENCOUNTER — CLINICAL SUPPORT (OUTPATIENT)
Dept: CARDIOLOGY | Facility: HOSPITAL | Age: 75
End: 2025-06-27
Payer: MEDICARE

## 2025-06-27 ENCOUNTER — CLINICAL SUPPORT (OUTPATIENT)
Dept: CARDIOLOGY | Facility: HOSPITAL | Age: 75
End: 2025-06-27
Attending: INTERNAL MEDICINE
Payer: MEDICARE

## 2025-06-27 DIAGNOSIS — R00.2 PALPITATIONS: ICD-10-CM

## 2025-06-27 DIAGNOSIS — I49.9 CARDIAC ARRHYTHMIA, UNSPECIFIED: ICD-10-CM

## 2025-06-27 DIAGNOSIS — I47.10 SUPRAVENTRICULAR TACHYCARDIA, UNSPECIFIED: ICD-10-CM

## 2025-06-27 PROCEDURE — 93298 REM INTERROG DEV EVAL SCRMS: CPT | Mod: 26,,, | Performed by: INTERNAL MEDICINE

## 2025-06-27 PROCEDURE — 93298 REM INTERROG DEV EVAL SCRMS: CPT | Performed by: INTERNAL MEDICINE

## 2025-07-16 LAB
OHS CV AF BURDEN PERCENT: < 1
OHS CV DC REMOTE DEVICE TYPE: NORMAL

## 2025-07-21 ENCOUNTER — PATIENT MESSAGE (OUTPATIENT)
Dept: ADMINISTRATIVE | Facility: HOSPITAL | Age: 75
End: 2025-07-21
Payer: MEDICARE

## 2025-07-28 ENCOUNTER — CLINICAL SUPPORT (OUTPATIENT)
Dept: CARDIOLOGY | Facility: HOSPITAL | Age: 75
End: 2025-07-28
Payer: MEDICARE

## 2025-07-28 ENCOUNTER — CLINICAL SUPPORT (OUTPATIENT)
Dept: CARDIOLOGY | Facility: HOSPITAL | Age: 75
End: 2025-07-28
Attending: INTERNAL MEDICINE
Payer: MEDICARE

## 2025-07-28 DIAGNOSIS — I47.10 SUPRAVENTRICULAR TACHYCARDIA, UNSPECIFIED: ICD-10-CM

## 2025-07-28 DIAGNOSIS — I49.9 CARDIAC ARRHYTHMIA, UNSPECIFIED: ICD-10-CM

## 2025-07-28 DIAGNOSIS — R00.2 PALPITATIONS: ICD-10-CM

## 2025-07-28 PROCEDURE — 93298 REM INTERROG DEV EVAL SCRMS: CPT | Mod: 26,,, | Performed by: INTERNAL MEDICINE

## 2025-07-28 PROCEDURE — 93298 REM INTERROG DEV EVAL SCRMS: CPT | Performed by: INTERNAL MEDICINE

## 2025-08-01 LAB
OHS CV AF BURDEN PERCENT: < 1
OHS CV DC REMOTE DEVICE TYPE: NORMAL
OHS CV ICD SHOCK: NO

## 2025-08-19 ENCOUNTER — TELEPHONE (OUTPATIENT)
Dept: SPORTS MEDICINE | Facility: CLINIC | Age: 75
End: 2025-08-19
Payer: MEDICARE

## 2025-08-20 ENCOUNTER — TELEPHONE (OUTPATIENT)
Dept: SPORTS MEDICINE | Facility: CLINIC | Age: 75
End: 2025-08-20
Payer: MEDICARE

## 2025-08-21 ENCOUNTER — PATIENT MESSAGE (OUTPATIENT)
Dept: SPORTS MEDICINE | Facility: CLINIC | Age: 75
End: 2025-08-21
Payer: MEDICARE

## 2025-08-21 ENCOUNTER — TELEPHONE (OUTPATIENT)
Dept: SPORTS MEDICINE | Facility: CLINIC | Age: 75
End: 2025-08-21
Payer: MEDICARE

## 2025-08-27 ENCOUNTER — OFFICE VISIT (OUTPATIENT)
Dept: SPORTS MEDICINE | Facility: CLINIC | Age: 75
End: 2025-08-27
Payer: MEDICARE

## 2025-08-27 VITALS
WEIGHT: 109.88 LBS | BODY MASS INDEX: 18.29 KG/M2 | HEART RATE: 60 BPM | SYSTOLIC BLOOD PRESSURE: 99 MMHG | DIASTOLIC BLOOD PRESSURE: 65 MMHG

## 2025-08-27 DIAGNOSIS — M25.561 CHRONIC PAIN OF RIGHT KNEE: Primary | ICD-10-CM

## 2025-08-27 DIAGNOSIS — M17.11 PRIMARY OSTEOARTHRITIS OF RIGHT KNEE: ICD-10-CM

## 2025-08-27 DIAGNOSIS — G89.29 CHRONIC PAIN OF RIGHT KNEE: Primary | ICD-10-CM

## 2025-08-27 PROCEDURE — 99213 OFFICE O/P EST LOW 20 MIN: CPT | Mod: PBBFAC | Performed by: ORTHOPAEDIC SURGERY

## 2025-08-27 PROCEDURE — 99214 OFFICE O/P EST MOD 30 MIN: CPT | Mod: S$PBB,,, | Performed by: ORTHOPAEDIC SURGERY

## 2025-08-27 PROCEDURE — 99999 PR PBB SHADOW E&M-EST. PATIENT-LVL III: CPT | Mod: PBBFAC,,, | Performed by: ORTHOPAEDIC SURGERY

## 2025-08-28 ENCOUNTER — CLINICAL SUPPORT (OUTPATIENT)
Dept: CARDIOLOGY | Facility: HOSPITAL | Age: 75
End: 2025-08-28
Attending: INTERNAL MEDICINE
Payer: MEDICARE

## 2025-08-28 ENCOUNTER — CLINICAL SUPPORT (OUTPATIENT)
Dept: CARDIOLOGY | Facility: HOSPITAL | Age: 75
End: 2025-08-28
Payer: MEDICARE

## 2025-08-28 DIAGNOSIS — R00.2 PALPITATIONS: ICD-10-CM

## 2025-08-28 DIAGNOSIS — I47.10 SUPRAVENTRICULAR TACHYCARDIA, UNSPECIFIED: ICD-10-CM

## 2025-08-28 DIAGNOSIS — I49.9 CARDIAC ARRHYTHMIA, UNSPECIFIED: ICD-10-CM

## 2025-09-04 LAB
OHS CV AF BURDEN PERCENT: < 1
OHS CV DC REMOTE DEVICE TYPE: NORMAL

## (undated) DEVICE — TRAY CYSTO BASIN

## (undated) DEVICE — CUP MEDICINE GRAD STRL 2OZ

## (undated) DEVICE — PACK EP DRAPE OMC

## (undated) DEVICE — SOL IRR NACL .9% 3000ML

## (undated) DEVICE — INTRO AGILIS MED CRL 8.5F 71CM

## (undated) DEVICE — PACK CYSTO

## (undated) DEVICE — R CATH RESPONS QPLR JSN 6F 120

## (undated) DEVICE — CATH SUPREME QPLR CRD-2 6F 120

## (undated) DEVICE — INTRODUCER HEMOSTASIS 7.5F

## (undated) DEVICE — SOL IRR WATER STRL 3000 ML

## (undated) DEVICE — PAD GROUND UNIV STYLE CORD 9IN

## (undated) DEVICE — R CATH SUPRM QPLR CRD-2 6F 120

## (undated) DEVICE — GOWN X-LG STERILE BACK

## (undated) DEVICE — KIT ENSITE ELECTRODE SURFACE

## (undated) DEVICE — WIRE GUIDE 0.038OLD

## (undated) DEVICE — LINE PRESSURE MONITORING 96IN

## (undated) DEVICE — R CATH DUO DECAPOLAR 7FRX95CM

## (undated) DEVICE — COUNT NDL FOAM MAGNET 40COUNT

## (undated) DEVICE — SET TUR BLADDER IRRIG Y TYPE

## (undated) DEVICE — ELECTRODE REM PLYHSV RETURN 9

## (undated) DEVICE — RGT FERRITIN 100T

## (undated) DEVICE — SPONGE COTTON TRAY 4X4IN

## (undated) DEVICE — ADHESIVE DERMABOND ADVANCED

## (undated) DEVICE — DRESSING AQUACEL FOAM 3 X 3

## (undated) DEVICE — PENCIL ROCKER SWITCH 10FT CORD

## (undated) DEVICE — VALVE ENDOSCOPIC(SURESEAL II)

## (undated) DEVICE — DRAPE PED LAP SURG 108X77IN

## (undated) DEVICE — SYR 10CC LUER LOCK

## (undated) DEVICE — CATH MAP BI-D HD SENSOR ENABLE

## (undated) DEVICE — PAD DEFIB CADENCE ADULT R2